# Patient Record
Sex: MALE | Race: WHITE | NOT HISPANIC OR LATINO | Employment: OTHER | URBAN - METROPOLITAN AREA
[De-identification: names, ages, dates, MRNs, and addresses within clinical notes are randomized per-mention and may not be internally consistent; named-entity substitution may affect disease eponyms.]

---

## 2019-10-12 ENCOUNTER — HOSPITAL ENCOUNTER (EMERGENCY)
Facility: CLINIC | Age: 54
Discharge: HOME | End: 2019-10-12
Attending: PHYSICIAN ASSISTANT | Admitting: PHYSICIAN ASSISTANT
Payer: COMMERCIAL

## 2019-10-12 VITALS
RESPIRATION RATE: 18 BRPM | OXYGEN SATURATION: 95 % | HEIGHT: 72 IN | TEMPERATURE: 98.9 F | WEIGHT: 283 LBS | BODY MASS INDEX: 38.33 KG/M2

## 2019-10-12 DIAGNOSIS — S30.860A TICK BITE OF BACK, INITIAL ENCOUNTER: ICD-10-CM
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
## 2019-10-12 PROCEDURE — G0463 HOSPITAL OUTPT CLINIC VISIT: HCPCS | Performed by: PHYSICIAN ASSISTANT

## 2019-10-12 PROCEDURE — 36415 COLL VENOUS BLD VENIPUNCTURE: CPT | Performed by: PHYSICIAN ASSISTANT

## 2019-10-12 PROCEDURE — 99214 OFFICE O/P EST MOD 30 MIN: CPT | Mod: Z6 | Performed by: PHYSICIAN ASSISTANT

## 2019-10-12 PROCEDURE — 86618 LYME DISEASE ANTIBODY: CPT | Performed by: PHYSICIAN ASSISTANT

## 2019-10-12 RX ORDER — METFORMIN HCL 500 MG
1000 TABLET, EXTENDED RELEASE 24 HR ORAL 2 TIMES DAILY
COMMUNITY
Start: 2018-12-20 | End: 2020-12-01

## 2019-10-12 RX ORDER — DOXYCYCLINE 100 MG/1
200 CAPSULE ORAL ONCE
Qty: 2 CAPSULE | Refills: 0 | Status: SHIPPED | OUTPATIENT
Start: 2019-10-12 | End: 2019-10-12

## 2019-10-12 RX ORDER — GLIPIZIDE 5 MG/1
5 TABLET ORAL DAILY
COMMUNITY
Start: 2019-04-22 | End: 2020-04-21

## 2019-10-12 ASSESSMENT — MIFFLIN-ST. JEOR: SCORE: 2156.68

## 2019-10-12 NOTE — ED PROVIDER NOTES
History     Chief Complaint   Patient presents with     Insect Bite     tick bite to back removed yesterday     HPI    Chris Delcid is a 53 year old male who presents with a tick bite. Removed tick last night. No difficulty with removal and no tick parts remained. Patient thinks tick has been present for 3-4 days. Area around bite has been slightly red with no pain, swelling, drainage, warmth, or annular rash.   Patient denies fever, chills, joint or muscle aches or other areas or rash.    last tetanus booster within 10 years    Problem list, Medication list, Allergies, and Medical/Social/Surgical histories reviewed in Psychiatric and updated as appropriate.    History reviewed. No pertinent past medical history.    Social History     Tobacco Use     Smoking status: Current Some Day Smoker     Packs/day: 0.00     Smokeless tobacco: Current User   Substance Use Topics     Alcohol use: No     Drug use: No           Allergies:  Allergies   Allergen Reactions     Aspirin Other (See Comments)     : Ringing in ears     Atorvastatin      Other reaction(s): Myalgias     Cortisone Hives     Fludrocortisone      Other reaction(s): Hypertension  head pounding at night     Hydrochlorothiazide      Other reaction(s): Syncope  per note 08/28/2012     Penicillins Hives     Simvastatin      Other reaction(s): Other (See Comments)  drowsy       Problem List:    There are no active problems to display for this patient.       Past Medical History:    History reviewed. No pertinent past medical history.    Past Surgical History:    History reviewed. No pertinent surgical history.    Family History:    History reviewed. No pertinent family history.    Social History:  Marital Status:   [2]  Social History     Tobacco Use     Smoking status: Current Some Day Smoker     Packs/day: 0.00     Smokeless tobacco: Current User   Substance Use Topics     Alcohol use: No     Drug use: No        Medications:    glipiZIDE (GLUCOTROL) 5 MG  tablet  metFORMIN (GLUCOPHAGE-XR) 500 MG 24 hr tablet          Review of Systems   Constitutional: Negative for fatigue and fever.   Respiratory: Negative.    Cardiovascular: Negative.    Musculoskeletal: Negative for arthralgias and myalgias.   Skin: Negative for rash.        Tick bite to back    Neurological: Negative for headaches.   All other systems reviewed and are negative.      Physical Exam   Heart Rate: 83  Temp: 98.9  F (37.2  C)  Resp: 18  Height: 182.9 cm (6')  Weight: 128.4 kg (283 lb)  SpO2: 95 %      Physical Exam  Vitals signs and nursing note reviewed.   Constitutional:       General: He is not in acute distress.     Appearance: Normal appearance. He is normal weight. He is not ill-appearing or toxic-appearing.   Musculoskeletal: Normal range of motion.   Skin:     General: Skin is warm.      Capillary Refill: Capillary refill takes less than 2 seconds.      Findings: No erythema or rash.          Neurological:      General: No focal deficit present.      Mental Status: He is alert and oriented to person, place, and time.   Psychiatric:         Mood and Affect: Mood normal.         Behavior: Behavior normal.         Thought Content: Thought content normal.         Judgement: Judgment normal.         ED Course        Procedures  Area cleaned with betadine and alcohol swabs in office today with no complications.             Critical Care time:  none                 Medications - No data to display    Assessments & Plan (with Medical Decision Making)     I have reviewed the nursing notes.    I have reviewed the findings, diagnosis, plan and need for follow up with the patient.   53-year-old male presents the urgent care with tick bite to the back that he found and removed last night.  Patient thinks the tick may have been on for a few days, but not entirely sure.  Tick was removed completely with no retained body parts by patient and patient has not had any symptoms since.  Patient here today because  he is concerned that the fatigue may be undertaken does not want to get Lyme's disease.  See exam as above.  Discussed with patient at this time no concerns or symptoms indicative of Lyme's disease however can treat prophylactically with a single dose of doxycycline.  And is currently pending.  Recommend follow-up Lyme's titer in 2 to 3 weeks patient had a Lyme's titer drawn today in office with primary care doctor.  Patient return sooner if signs or symptoms of Lyme's disease occur this was discussed with patient given a discharge paperwork.  Patient is up-to-date with his tetanus.  Patient also return sooner if signs or symptoms of secondary infection occur these were discussed with patient given a discharge paperwork.  Patient discharged in stable condition.  No concerns at this time for cellulitis, abscess, Active lyme's disease.    Discharge Medication List as of 10/12/2019  1:08 PM      START taking these medications    Details   doxycycline hyclate (VIBRAMYCIN) 100 MG capsule Take 2 capsules (200 mg) by mouth once for 1 dose, Disp-2 capsule, R-0, E-Prescribe             Final diagnoses:   Tick bite of back, initial encounter       10/12/2019   [facility] EMERGENCY DEPARTMENT     Christi Mendiola PA-C  10/13/19 1640

## 2019-10-12 NOTE — DISCHARGE INSTRUCTIONS
Rx: Doxycycline 200mg now for tick bite prophylaxis was provided.    Lyme titer obtained today (call in 2-3 days for results) can recheck Lyme titer in 14-21 days.   Lyme and tick bite information provided.  Return to care if any concerning symptoms occur (headache, fever, bull's-eye rash, joint pain, body aches, or fatigue occur)    Patient voiced understanding of instructions given.

## 2019-10-12 NOTE — ED AVS SNAPSHOT
[facility] Emergency Department  [address] [address]  Phone:  [phone]  Fax:  [phone]                                    Chris Delcid   MRN: [id number]    Department:  [facility] Emergency Department   Date of Visit:  10/12/2019           After Visit Summary Signature Page    I have received my discharge instructions, and my questions have been answered. I have discussed any challenges I see with this plan with the nurse or doctor.    ..........................................................................................................................................  Patient/Patient Representative Signature      ..........................................................................................................................................  Patient Representative Print Name and Relationship to Patient    ..................................................               ................................................  Date                                   Time    ..........................................................................................................................................  Reviewed by Signature/Title    ...................................................              ..............................................  Date                                               Time          22EPIC Rev 08/18

## 2019-10-13 ASSESSMENT — ENCOUNTER SYMPTOMS
MYALGIAS: 0
FATIGUE: 0
HEADACHES: 0
RESPIRATORY NEGATIVE: 1
FEVER: 0
ARTHRALGIAS: 0
CARDIOVASCULAR NEGATIVE: 1

## 2019-10-14 LAB — B BURGDOR IGG+IGM SER QL: 0.07 (ref 0–0.89)

## 2019-10-14 NOTE — RESULT ENCOUNTER NOTE
Final result for Lyme Disease Cielo with reflex to WB Serum is NEGATIVE.    No change in treatment per [facility] ED Lab Result protocol.

## 2020-10-22 ENCOUNTER — APPOINTMENT (OUTPATIENT)
Dept: MRI IMAGING | Facility: CLINIC | Age: 55
End: 2020-10-22
Attending: EMERGENCY MEDICINE
Payer: COMMERCIAL

## 2020-10-22 ENCOUNTER — HOSPITAL ENCOUNTER (OUTPATIENT)
Facility: CLINIC | Age: 55
Setting detail: OBSERVATION
Discharge: HOME HEALTH | End: 2020-10-23
Attending: EMERGENCY MEDICINE | Admitting: INTERNAL MEDICINE
Payer: COMMERCIAL

## 2020-10-22 ENCOUNTER — NURSE TRIAGE (OUTPATIENT)
Dept: NURSING | Facility: CLINIC | Age: 55
End: 2020-10-22

## 2020-10-22 ENCOUNTER — APPOINTMENT (OUTPATIENT)
Dept: CT IMAGING | Facility: CLINIC | Age: 55
End: 2020-10-22
Attending: EMERGENCY MEDICINE
Payer: COMMERCIAL

## 2020-10-22 DIAGNOSIS — R47.1 DYSARTHRIA: ICD-10-CM

## 2020-10-22 DIAGNOSIS — I63.9 LEFT PONTINE CEREBROVASCULAR ACCIDENT (H): ICD-10-CM

## 2020-10-22 DIAGNOSIS — R47.01 EXPRESSIVE APHASIA: ICD-10-CM

## 2020-10-22 DIAGNOSIS — Z20.828 EXPOSURE TO VIRAL DISEASE: ICD-10-CM

## 2020-10-22 DIAGNOSIS — R27.0 ATAXIA: ICD-10-CM

## 2020-10-22 DIAGNOSIS — R29.90 STROKE-LIKE SYMPTOMS: ICD-10-CM

## 2020-10-22 PROBLEM — Z86.73 OLD PONTINE INFARCT WITHOUT LATE EFFECT: Status: ACTIVE | Noted: 2020-10-22

## 2020-10-22 LAB
ALBUMIN SERPL-MCNC: 4.1 G/DL (ref 3.4–5)
ALP SERPL-CCNC: 75 U/L (ref 40–150)
ALT SERPL W P-5'-P-CCNC: 22 U/L (ref 0–70)
ANION GAP SERPL CALCULATED.3IONS-SCNC: 5 MMOL/L (ref 3–14)
APTT PPP: 29 SEC (ref 22–37)
AST SERPL W P-5'-P-CCNC: 14 U/L (ref 0–45)
BASOPHILS # BLD AUTO: 0.1 10E9/L (ref 0–0.2)
BASOPHILS NFR BLD AUTO: 1 %
BILIRUB SERPL-MCNC: 0.4 MG/DL (ref 0.2–1.3)
BUN SERPL-MCNC: 16 MG/DL (ref 7–30)
CALCIUM SERPL-MCNC: 9.3 MG/DL (ref 8.5–10.1)
CHLORIDE SERPL-SCNC: 111 MMOL/L (ref 94–109)
CO2 SERPL-SCNC: 27 MMOL/L (ref 20–32)
CREAT SERPL-MCNC: 0.96 MG/DL (ref 0.66–1.25)
DIFFERENTIAL METHOD BLD: NORMAL
EOSINOPHIL # BLD AUTO: 0.3 10E9/L (ref 0–0.7)
EOSINOPHIL NFR BLD AUTO: 3.8 %
ERYTHROCYTE [DISTWIDTH] IN BLOOD BY AUTOMATED COUNT: 13 % (ref 10–15)
GFR SERPL CREATININE-BSD FRML MDRD: 87 ML/MIN/{1.73_M2}
GLUCOSE BLDC GLUCOMTR-MCNC: 91 MG/DL (ref 70–99)
GLUCOSE SERPL-MCNC: 100 MG/DL (ref 70–99)
HCT VFR BLD AUTO: 46 % (ref 40–53)
HGB BLD-MCNC: 15.6 G/DL (ref 13.3–17.7)
IMM GRANULOCYTES # BLD: 0 10E9/L (ref 0–0.4)
IMM GRANULOCYTES NFR BLD: 0.5 %
INR PPP: 0.98 (ref 0.86–1.14)
LYMPHOCYTES # BLD AUTO: 2.2 10E9/L (ref 0.8–5.3)
LYMPHOCYTES NFR BLD AUTO: 29.3 %
MCH RBC QN AUTO: 29.9 PG (ref 26.5–33)
MCHC RBC AUTO-ENTMCNC: 33.9 G/DL (ref 31.5–36.5)
MCV RBC AUTO: 88 FL (ref 78–100)
MONOCYTES # BLD AUTO: 0.7 10E9/L (ref 0–1.3)
MONOCYTES NFR BLD AUTO: 9 %
NEUTROPHILS # BLD AUTO: 4.1 10E9/L (ref 1.6–8.3)
NEUTROPHILS NFR BLD AUTO: 56.4 %
NRBC # BLD AUTO: 0 10*3/UL
NRBC BLD AUTO-RTO: 0 /100
PLATELET # BLD AUTO: 327 10E9/L (ref 150–450)
POTASSIUM SERPL-SCNC: 4 MMOL/L (ref 3.4–5.3)
PROT SERPL-MCNC: 7.9 G/DL (ref 6.8–8.8)
RBC # BLD AUTO: 5.22 10E12/L (ref 4.4–5.9)
SODIUM SERPL-SCNC: 143 MMOL/L (ref 133–144)
TROPONIN I SERPL-MCNC: <0.015 UG/L (ref 0–0.04)
WBC # BLD AUTO: 7.3 10E9/L (ref 4–11)

## 2020-10-22 PROCEDURE — 255N000002 HC RX 255 OP 636: Performed by: EMERGENCY MEDICINE

## 2020-10-22 PROCEDURE — 70553 MRI BRAIN STEM W/O & W/DYE: CPT

## 2020-10-22 PROCEDURE — G0378 HOSPITAL OBSERVATION PER HR: HCPCS

## 2020-10-22 PROCEDURE — 250N000011 HC RX IP 250 OP 636: Performed by: EMERGENCY MEDICINE

## 2020-10-22 PROCEDURE — 99285 EMERGENCY DEPT VISIT HI MDM: CPT | Mod: 25 | Performed by: EMERGENCY MEDICINE

## 2020-10-22 PROCEDURE — 258N000003 HC RX IP 258 OP 636: Performed by: EMERGENCY MEDICINE

## 2020-10-22 PROCEDURE — 250N000013 HC RX MED GY IP 250 OP 250 PS 637: Performed by: EMERGENCY MEDICINE

## 2020-10-22 PROCEDURE — 85730 THROMBOPLASTIN TIME PARTIAL: CPT | Performed by: EMERGENCY MEDICINE

## 2020-10-22 PROCEDURE — 80053 COMPREHEN METABOLIC PANEL: CPT | Performed by: EMERGENCY MEDICINE

## 2020-10-22 PROCEDURE — 70498 CT ANGIOGRAPHY NECK: CPT

## 2020-10-22 PROCEDURE — U0003 INFECTIOUS AGENT DETECTION BY NUCLEIC ACID (DNA OR RNA); SEVERE ACUTE RESPIRATORY SYNDROME CORONAVIRUS 2 (SARS-COV-2) (CORONAVIRUS DISEASE [COVID-19]), AMPLIFIED PROBE TECHNIQUE, MAKING USE OF HIGH THROUGHPUT TECHNOLOGIES AS DESCRIBED BY CMS-2020-01-R: HCPCS | Performed by: EMERGENCY MEDICINE

## 2020-10-22 PROCEDURE — 99220 PR INITIAL OBSERVATION CARE,LEVEL III: CPT | Performed by: PHYSICIAN ASSISTANT

## 2020-10-22 PROCEDURE — 96360 HYDRATION IV INFUSION INIT: CPT | Mod: 59 | Performed by: EMERGENCY MEDICINE

## 2020-10-22 PROCEDURE — 36415 COLL VENOUS BLD VENIPUNCTURE: CPT | Performed by: PHYSICIAN ASSISTANT

## 2020-10-22 PROCEDURE — 84484 ASSAY OF TROPONIN QUANT: CPT | Performed by: EMERGENCY MEDICINE

## 2020-10-22 PROCEDURE — 250N000009 HC RX 250: Performed by: EMERGENCY MEDICINE

## 2020-10-22 PROCEDURE — 999N001017 HC STATISTIC GLUCOSE BY METER IP

## 2020-10-22 PROCEDURE — C9803 HOPD COVID-19 SPEC COLLECT: HCPCS | Performed by: EMERGENCY MEDICINE

## 2020-10-22 PROCEDURE — 70450 CT HEAD/BRAIN W/O DYE: CPT

## 2020-10-22 PROCEDURE — 85025 COMPLETE CBC W/AUTO DIFF WBC: CPT | Performed by: EMERGENCY MEDICINE

## 2020-10-22 PROCEDURE — A9585 GADOBUTROL INJECTION: HCPCS | Performed by: EMERGENCY MEDICINE

## 2020-10-22 PROCEDURE — 93005 ELECTROCARDIOGRAM TRACING: CPT | Performed by: EMERGENCY MEDICINE

## 2020-10-22 PROCEDURE — 99291 CRITICAL CARE FIRST HOUR: CPT | Performed by: EMERGENCY MEDICINE

## 2020-10-22 PROCEDURE — 85610 PROTHROMBIN TIME: CPT | Performed by: EMERGENCY MEDICINE

## 2020-10-22 RX ORDER — ACETAMINOPHEN 325 MG/1
650 TABLET ORAL EVERY 4 HOURS PRN
Status: DISCONTINUED | OUTPATIENT
Start: 2020-10-22 | End: 2020-10-23

## 2020-10-22 RX ORDER — GADOBUTROL 604.72 MG/ML
12 INJECTION INTRAVENOUS ONCE
Status: COMPLETED | OUTPATIENT
Start: 2020-10-22 | End: 2020-10-22

## 2020-10-22 RX ORDER — ONDANSETRON 4 MG/1
4 TABLET, ORALLY DISINTEGRATING ORAL EVERY 6 HOURS PRN
Status: DISCONTINUED | OUTPATIENT
Start: 2020-10-22 | End: 2020-10-23

## 2020-10-22 RX ORDER — ONDANSETRON 2 MG/ML
4 INJECTION INTRAMUSCULAR; INTRAVENOUS EVERY 6 HOURS PRN
Status: DISCONTINUED | OUTPATIENT
Start: 2020-10-22 | End: 2020-10-23

## 2020-10-22 RX ORDER — METFORMIN HCL 500 MG
1000 TABLET, EXTENDED RELEASE 24 HR ORAL 2 TIMES DAILY
COMMUNITY
Start: 2020-05-04 | End: 2024-05-17

## 2020-10-22 RX ORDER — IOPAMIDOL 755 MG/ML
70 INJECTION, SOLUTION INTRAVASCULAR ONCE
Status: COMPLETED | OUTPATIENT
Start: 2020-10-22 | End: 2020-10-22

## 2020-10-22 RX ORDER — CLOPIDOGREL BISULFATE 75 MG/1
300 TABLET ORAL ONCE
Status: COMPLETED | OUTPATIENT
Start: 2020-10-22 | End: 2020-10-22

## 2020-10-22 RX ORDER — GLIPIZIDE 5 MG/1
5 TABLET ORAL DAILY
COMMUNITY
Start: 2020-02-13 | End: 2023-12-10

## 2020-10-22 RX ORDER — ASPIRIN 325 MG
325 TABLET ORAL ONCE
Status: COMPLETED | OUTPATIENT
Start: 2020-10-22 | End: 2020-10-22

## 2020-10-22 RX ADMIN — SODIUM CHLORIDE 100 ML: 9 INJECTION, SOLUTION INTRAVENOUS at 18:27

## 2020-10-22 RX ADMIN — CLOPIDOGREL BISULFATE 300 MG: 75 TABLET ORAL at 19:27

## 2020-10-22 RX ADMIN — IOPAMIDOL 70 ML: 755 INJECTION, SOLUTION INTRAVENOUS at 18:27

## 2020-10-22 RX ADMIN — SODIUM CHLORIDE 500 ML: 9 INJECTION, SOLUTION INTRAVENOUS at 18:57

## 2020-10-22 RX ADMIN — ASPIRIN 325 MG ORAL TABLET 325 MG: 325 PILL ORAL at 19:27

## 2020-10-22 RX ADMIN — GADOBUTROL 12 ML: 604.72 INJECTION INTRAVENOUS at 20:09

## 2020-10-22 ASSESSMENT — MIFFLIN-ST. JEOR
SCORE: 2170.88
SCORE: 2153.95

## 2020-10-22 NOTE — ED PROVIDER NOTES
History     Chief Complaint   Patient presents with     Extremity Weakness     Aphasia     HPI  Chris Delcid is a 54 year old male with history of hypotension on midodrine, diabetes type 2 on Metformin and glipizide who presents to ED for evaluation of extremity weakness, word finding difficulty and change to his speech.  Patient reports that symptoms began at approximately noon today (6 hours prior to arrival).  He denies any history of heart disease or stroke.  He was able to ambulate and move around but said his arms and legs did not feel like they worked right.  He describes his speech change and word finding difficulty as if he were intoxicated or drunk.  Has not felt like this before.  No headache, change in vision, hearing, neck pain, chest pain, shortness of breath.  No fevers, chills, nausea or vomiting.  His wife later arrived and provided some collateral information.  She says she returned from work at approximately 315/330 this afternoon and he reported that he felt tired.  She did not notice any changes.  She was making some soup for supper and he lie down for a little and took a bath.  At about 5 PM (1 hour prior to arrival) she noticed a change in his speech.  Discussed this with both present in the room and he insists that his symptoms started approximately noon today.    I was called to the room emergently for stroke evaluation.  After my initial evaluation a code stroke was initiated.    The patient's PMHx, Surgical Hx, Allergies, and Medications were all reviewed with the patient.    Allergies:  Allergies   Allergen Reactions     Aspirin Other (See Comments)     : Ringing in ears     Atorvastatin      Other reaction(s): Myalgias     Cortisone Hives     Fludrocortisone      Other reaction(s): Hypertension  head pounding at night     Hydrochlorothiazide      Other reaction(s): Syncope  per note 08/28/2012     Penicillins Hives     Simvastatin      Other reaction(s): Other (See Comments)  drowsy  "      Problem List:    Patient Active Problem List    Diagnosis Date Noted     Ataxia 10/22/2020     Priority: Medium     Dysarthria 10/22/2020     Priority: Medium     Stroke-like symptoms 10/22/2020     Priority: Medium     Left pontine cerebrovascular accident (H) 10/22/2020     Priority: Medium     Old pontine infarct without late effect 10/22/2020     Priority: Medium        Past Medical History:    No past medical history on file.    Past Surgical History:    No past surgical history on file.    Family History:    No family history on file.    Social History:  Marital Status:   [2]  Social History     Tobacco Use     Smoking status: Current Some Day Smoker     Packs/day: 0.00     Smokeless tobacco: Current User   Substance Use Topics     Alcohol use: No     Drug use: No        Medications:    No current outpatient medications on file.        Review of Systems   Constitutional: Negative for chills, diaphoresis and fever.   HENT: Negative for tinnitus and trouble swallowing.    Eyes: Negative for visual disturbance.   Respiratory: Negative for cough and shortness of breath.    Cardiovascular: Negative for chest pain.   Gastrointestinal: Negative for abdominal distention, diarrhea, nausea and vomiting.   Genitourinary: Negative for difficulty urinating.   Musculoskeletal: Negative for back pain.   Skin: Negative for rash.   Neurological: Positive for weakness. Negative for dizziness, light-headedness and headaches.       Physical Exam   BP: (!) 228/123  Pulse: 88  Temp: 97.9  F (36.6  C)  Resp: 18  Height: 185.4 cm (6' 1\")  Weight: 127 kg (280 lb)  SpO2: 99 %    Physical Exam  General: Awake, alert, and cooperative. Appears distressed but non toxic.   Mental Status: Oriented x 3.   Head: Normocephalic, atraumatic, symmetric.   Eyes: Conjunctiva normal, no discharge, PERRL 3 mm. No neglect/hemianopsia.  Ears, Nose, Throat: External ears and nares normal.  No oral exudates. Oral mucosa moist.  Neck: Supple. " No adenopathy. Non-tender.   Cardiovascular: Regular rate. Regular rhythm. No murmurs. Peripheral pulses strong. Normal capillary refill. No LE edema.   Respiratory: Normal effort. No wheezes, rales, or rhonchi bilaterally.  Chest Wall: Equal rise.  Abdomen: No tenderess, soft, non-distended. No rebound or guarding. No masses palpated  Back: Atraumatic.   Genitourinary: Normal external genitalia  Musculoskeletal: No gross deformity. Warm and well perfused  Neurologic: Mental status: Alert, awake. Oriented to self, date, and place. Normal speech and language. GCS 15. Expressive aphasia. Dysarthria, slight slurring of speech.   Cranial Nerves: II-XII intact  Motor: Follows commands x 4 extremities. Down going Babinski's. No Clonus.   Upper Extremities:   RUE: 5/5 shoulder abduction. 5/5 elbow flex/ext. 5/5 wrist flex/ext. 5/5 hand .   LUE: 5/5 shoulder abduction. 5/5 elbow flex/ext. 5/5 wrist flex/ext. 5/5 hand .   Lower Extremities:   RLE: 5/5 hip flexion. 5/5 knee flex/ext. 5/5 ankle plantar-/dorsiflexion. 5/5 EHL.   LLE: 5/5 hip flexion. 5/5 knee flex/ext. 5/5 ankle plantar-/dorsiflexion. 5/5 EHL   Sensory: Sensation intact to light touch in all 4 extremities   Coordination: difficulty with finger to nose on right. Heel-shin intact.  Skin: Warm, dry, no pallor, no erythema, no jaundice or rash.  Psychiatric:  Appropriate affect. Behavior is normal.    ED Course     ED Course as of Oct 22 2306   Thu Oct 22, 2020   1823 I spoke with Dr. Spangler with stroke neurology at [facility] about presentation. Will follow up after imaging      1834 Dr Johnson - patchy white matter change on non contrast      1849 Reviewed imaging with Dr. Spangler and MR recommended aspirin and plavix         Procedures           Critical Care time:  was 45 minutes for this patient excluding procedures.     The patient has stroke symptoms:         ED Stroke specific documentation           NIHSS PDF     Patient last known well time: 1200  ED  Provider first to bedside at: 1815  CT Results received at: 1834/1849    tPA:   Not given due to unclear or unfavorable risk-benefit profile for extended window thrombolysis beyond the conventional 4.5 hour time window.    If treating with tPA: Ensure SBP<185 and DBP<105 prior to treatment with IV tPA.  Administering IV tPA after treatment with IV labetalol, hydralazine, or nicardipine is reasonable once BP control is established.    Endovascular Retrieval:  Not initiated due to absence of proximal vessel occlusion    National Institutes of Health Stroke Scale (Baseline)  Time Performed: 1815     Score    Level of consciousness: (0)   Alert, keenly responsive    LOC questions: (0)   Answers both questions correctly    LOC commands: (0)   Performs both tasks correctly    Best gaze: (0)   Normal    Visual: (0)   No visual loss    Facial palsy: (0)   Normal symmetrical movements    Motor arm (left): (0)   No drift    Motor arm (right): (0)   No drift    Motor leg (left): (0)   No drift    Motor leg (right): (0)   No drift    Limb ataxia: (1)   Present in one limb    Sensory: (0)   Normal- no sensory loss    Best language: (1)   Mild to moderate aphasia    Dysarthria: (1)   Mild to moderate dysarthria    Extinction and inattention: (0)   No abnormality        Total Score:  3        Stroke Mimics were considered (including migraine headache, seizure disorder, hypoglycemia (or hyperglycemia), head or spinal trauma, CNS infection, Toxin ingestion and shock state (e.g. sepsis) .                   Results for orders placed or performed during the hospital encounter of 10/22/20 (from the past 24 hour(s))   Glucose by meter   Result Value Ref Range    Glucose 91 70 - 99 mg/dL   Comprehensive metabolic panel   Result Value Ref Range    Sodium 143 133 - 144 mmol/L    Potassium 4.0 3.4 - 5.3 mmol/L    Chloride 111 (H) 94 - 109 mmol/L    Carbon Dioxide 27 20 - 32 mmol/L    Anion Gap 5 3 - 14 mmol/L    Glucose 100 (H) 70 - 99 mg/dL     Urea Nitrogen 16 7 - 30 mg/dL    Creatinine 0.96 0.66 - 1.25 mg/dL    GFR Estimate 87 >60 mL/min/[1.73_m2]    GFR Estimate If Black >90 >60 mL/min/[1.73_m2]    Calcium 9.3 8.5 - 10.1 mg/dL    Bilirubin Total 0.4 0.2 - 1.3 mg/dL    Albumin 4.1 3.4 - 5.0 g/dL    Protein Total 7.9 6.8 - 8.8 g/dL    Alkaline Phosphatase 75 40 - 150 U/L    ALT 22 0 - 70 U/L    AST 14 0 - 45 U/L   CBC with platelets differential   Result Value Ref Range    WBC 7.3 4.0 - 11.0 10e9/L    RBC Count 5.22 4.4 - 5.9 10e12/L    Hemoglobin 15.6 13.3 - 17.7 g/dL    Hematocrit 46.0 40.0 - 53.0 %    MCV 88 78 - 100 fl    MCH 29.9 26.5 - 33.0 pg    MCHC 33.9 31.5 - 36.5 g/dL    RDW 13.0 10.0 - 15.0 %    Platelet Count 327 150 - 450 10e9/L    Diff Method Automated Method     % Neutrophils 56.4 %    % Lymphocytes 29.3 %    % Monocytes 9.0 %    % Eosinophils 3.8 %    % Basophils 1.0 %    % Immature Granulocytes 0.5 %    Nucleated RBCs 0 0 /100    Absolute Neutrophil 4.1 1.6 - 8.3 10e9/L    Absolute Lymphocytes 2.2 0.8 - 5.3 10e9/L    Absolute Monocytes 0.7 0.0 - 1.3 10e9/L    Absolute Eosinophils 0.3 0.0 - 0.7 10e9/L    Absolute Basophils 0.1 0.0 - 0.2 10e9/L    Abs Immature Granulocytes 0.0 0 - 0.4 10e9/L    Absolute Nucleated RBC 0.0    Partial thromboplastin time   Result Value Ref Range    PTT 29 22 - 37 sec   INR   Result Value Ref Range    INR 0.98 0.86 - 1.14   Troponin I   Result Value Ref Range    Troponin I ES <0.015 0.000 - 0.045 ug/L   CT Head w/o Contrast    Narrative    EXAM: CT HEAD W/O CONTRAST  LOCATION: [facility]  DATE/TIME: 10/22/2020 6:21 PM    INDICATION: Aphasia, right upper extremity weakness  COMPARISON: None.  TECHNIQUE: Routine without IV contrast. Multiplanar reformats. Dose reduction techniques were used.    FINDINGS:  INTRACRANIAL CONTENTS: No intracranial hemorrhage, extraaxial collection, or mass effect.  No CT evidence of acute infarct. Moderate presumed chronic small vessel ischemic changes. Prominent  perivascular space versus chronic lacunar infarct at the   inferior aspect of the right foramen. Normal ventricles and sulci.     VISUALIZED ORBITS/SINUSES/MASTOIDS: No intraorbital abnormality. No paranasal sinus mucosal disease. No middle ear or mastoid effusion.    BONES/SOFT TISSUES: No acute abnormality.      Impression    IMPRESSION:  1.  Moderate patchy periventricular and deep white matter hypoattenuation, nonspecific and age indeterminate. Recommend further evaluation with brain MRI if there is high clinical concern for acute ischemia.  2.  No intracranial hemorrhage.    Results communicated to Dr. Estrella at 10/22/2020 6:34 PM   CTA Head Neck with Contrast    Narrative    EXAM: CTA  HEAD NECK WITH CONTRAST  LOCATION: [facility]  DATE/TIME: 10/22/2020 6:21 PM    INDICATION: Expressive aphasia, right upper extremity weakness  COMPARISON: 10/22/2020 head CT  CONTRAST: 70 ml's isovue  TECHNIQUE: Head and neck CT angiogram with IV contrast. Axial helical CT images of the head and neck vessels obtained during the arterial phase of intravenous contrast administration. Axial 2D reconstructed images and multiplanar 3D MIP reconstructed   images of the head and neck vessels were performed by the technologist. Dose reduction techniques were used. All stenosis measurements made according to NASCET criteria unless otherwise specified.    FINDINGS:     HEAD CTA:  ANTERIOR CIRCULATION: Mild stenosis of the right internal carotid artery at the petrous/cavernous junction. Mild bilateral paraclinoid ICA stenosis. No significant stenosis/occlusion, aneurysm, or high flow vascular malformation. Developmentally   hypoplastic left A1 anterior cerebral artery segment. Fetal origin left posterior cerebral artery.    POSTERIOR CIRCULATION: No stenosis/occlusion, aneurysm, or high flow vascular malformation. Balanced vertebral arteries supply a normal basilar artery.     DURAL VENOUS SINUSES: Expected enhancement of  the major dural venous sinuses.    NECK CTA:  RIGHT CAROTID: No measurable stenosis or dissection.    LEFT CAROTID: Atherosclerotic plaque results in less than 50% stenosis in the left ICA. No dissection.    VERTEBRAL ARTERIES: No focal stenosis or dissection. Balanced vertebral arteries.    AORTIC ARCH: Classic aortic arch anatomy with no significant stenosis at the origin of the great vessels.    NONVASCULAR STRUCTURES: Multiple dental caries.      Impression    IMPRESSION:     HEAD CTA:   1.  Mild multifocal stenoses as described.    NECK CTA:  1.  Normal neck CTA.  2.  Multiple dental caries.   MR Brain w/o & w Contrast    Narrative    MRI BRAIN WITHOUT AND WITH CONTRAST  10/22/2020 8:37 PM     HISTORY: Expressive aphasia, dysarthria, ataxia in right upper  extremity. Extremity weakness which has resolved.     TECHNIQUE:  Multiplanar, multisequence MRI of the brain without and  with 12ml Gadavist     COMPARISON: CT head of same date.    FINDINGS:  There is a small acute ischemic infarct involving the left cuca  (series 7 image 52, series 8 image 12), measuring approximately 1 cm  in anterior-posterior dimension. No other areas of abnormal  intracranial restricted diffusion identified.    The ventricles are normal in size and configuration. Moderate patchy  T2/FLAIR hyperintense signal in the periventricular and deep cerebral  white matter and cuca, nonspecific, but most likely related to chronic  small vessel ischemic disease. Mild generalized brain parenchymal  volume loss. No intracranial hemorrhage, mass lesion or mass effect.  No abnormal intracranial postcontrast enhancement evident.    Bilateral orbits appear normal. Mild mucosal thickening in the ethmoid  air cells. The major vascular flow voids of the skull base are grossly  maintained. The calvarium, skull base and mid face are otherwise  grossly unremarkable.      Impression    IMPRESSION:  1. Small acute left pontine infarct. No hemorrhage or mass  effect.  2. Brain atrophy and presumed chronic small vessel ischemic disease,  as described.         Medications   0.9% sodium chloride BOLUS (0 mLs Intravenous Stopped 10/22/20 2000)   iopamidol (ISOVUE-370) solution 70 mL (70 mLs Intravenous Given 10/22/20 1827)   sodium chloride 0.9 % bag 500mL for CT scan flush use (100 mLs As instructed Given 10/22/20 1827)   aspirin (ASA) tablet 325 mg (325 mg Oral Given 10/22/20 1927)   clopidogrel (PLAVIX) tablet 300 mg (300 mg Oral Given 10/22/20 1927)   gadobutrol (GADAVIST) injection 12 mL (12 mLs Intravenous Given 10/22/20 2009)       Assessments & Plan (with Medical Decision Making)   54 year old male with past medical history of DM2 or oral hypoglycemics, and hypotension on midodrine with expressive aphasia, word finding difficulties and extremity weakness which was resolved prior to arrival. Last known well at noon today (6 hours prior to arrival). On arrival to Emergency Department, vital signs were /123, T 97.9, P 88, RR 18, SpO2 99% on room air.     Initial NIH score of 3 for dysarthria, ataxia, and expressive aphasia.  No focal motor or sensory deficits.  No facial droop.  CT scan with likely chronic white matter changes and no large vessel occlusion on CT angiography of head or neck.  I discussed the noncontrast findings with radiology and the CT angiogram with stroke neurology.  Based on presentation will load with aspirin and Plavix and obtain MRI plan for admission to hospital.  Patient has an allergy listed to aspirin, says that he has had tinnitus but no specific allergy. Tolerated low dose aspirin in past.  He was given 325 mg of aspirin and 300 mg of Plavix in the ED.  MRI obtained and small left acute pontine infarct identified.  Results discussed with patient.  Patient not candidate for TPA given 6 hours of symptoms prior to presentation and relatively minor symptoms.  Not a candidate for thrombectomy given no large vessel occlusion.  Per neurology  recommendations we will tolerate systolic blood pressure less than 200.  Patient will be admitted for frequent neuro checks, medication management, and likely echocardiography in morning.  Discussed case with Samira Keating from the hospitalist service who accepted the admission.  Transition orders placed.  All results discussed with patient as well as his spouse.  They are in agreement with plan for admission.      At time of writing I do not see that an ECG was performed in the department.  Patient does not have any palpitations or known history of atrial fibrillation.  Regardless, he should have an ECG during this admission.  I will relay this information to the hospitalist service.       I have reviewed the nursing notes.         Current Discharge Medication List          Final diagnoses:   Expressive aphasia   Dysarthria   Ataxia   Stroke-like symptoms   Left pontine cerebrovascular accident (H)     Fuentes Estrella MD    10/22/2020   [facility] EMERGENCY DEPT    Disclaimer: This note consists of words and symbols derived from keyboarding and dictation using voice recognition software.  As a result, there may be errors that have gone undetected.  Please consider this when interpreting information found in this note.             Fuentes Estrella MD  10/23/20 0003

## 2020-10-22 NOTE — ED NOTES
Pt states at noon, he had sudden onset of extremity weakness and difficulty getting his words out. Comes in today with slurred speech and difficulty with mobility and coordination of both legs.

## 2020-10-22 NOTE — LETTER
Transition Communication Hand-off for Care Transitions to Next Level of Care Provider    Name: Chris Delcid  : 1965  MRN #: [id number]  Primary Care Provider: Gus Nuñez     Primary Clinic: [facility] [address] [place] [id number]     Reason for Hospitalization:  Expressive aphasia [R47.01]  Ataxia [R27.0]  Dysarthria [R47.1]  Stroke-like symptoms [R29.90]  Left pontine cerebrovascular accident (H) [I63.50]  Admit Date/Time: 10/22/2020  6:08 PM  Discharge Date: 10/23  Payor Source: Payor: MEDICA / Plan: [employer] / Product Type: HMO /            Reason for Communication Hand-off Referral: Fragility    Discharge Plan: home care       Concern for non-adherence with plan of care:   Y/N no  Discharge Needs Assessment:  Needs      Most Recent Value   Equipment Currently Used at Home  cane, straight   # of Referrals Placed by CTS  External Care Coordination, Homecare          Follow-up specialty is recommended: Yes    Follow-up plan:    Future Appointments   Date Time Provider Department Center   10/24/2020  9:00 AM Ritu Van PT [facility]   10/24/2020  9:00 AM Dayna Gaytan OT [facility]           Referrals     Future Labs/Procedures    Home Care Referral     Comments:    Your provider has referred you to: [facility]: [facility] [phone]   [email or web address]    Extended Emergency Contact Information  Primary Emergency Contact: YOSSI CASTRO  Home Phone: [phone]  Relation: Significant other  Secondary Emergency Contact: TUCKER SOL   [facility]  Home Phone: [phone]  Mobile Phone: [phone]  Relation: Sister    Patient Anticipated Discharge Date: 10/23   RN, PT, HHA to begin 24 - 48 hours after discharge.  PLEASE EVALUATE AND TREAT (Evaluation timeline is 24 - 48 hrs. Please call if there is need for a variance to this timeline).    REASON FOR  REFERRAL: Assessment & Treatment: PT    ADDITIONAL SERVICES NEEDED: OT    OTHER PERTINENT INFORMATION: Patient was last seen by provider on 10/23 for CVA.    No current outpatient medications on file.    Patient Active Problem List:     Ataxia     Dysarthria     Stroke-like symptoms     Left pontine cerebrovascular accident (H)     Old pontine infarct without late effect     Expressive aphasia     Chronic low back pain     Essential hypertension     Hyperlipidemia     Orthostatic hypotension     Statin intolerance     Type 2 diabetes mellitus (H)      Documentation of Face to Face and Certification for Home Health Services    I certify that patient, Chris Delcid is under my care and that I, or a Nurse Practitioner or Physician's Assistant working with me, had a face-to-face encounter that meets the physician face-to-face encounter requirements with this patient on: 10/23/2020.    This encounter with the patient was in whole, or in part, for the following medical condition, which is the primary reason for Home Health Care: CVA.    I certify that, based on my findings, the following services are medically necessary Home Health Services: Occupational Therapy and Physical Therapy    My clinical findings support the need for the above services because: Occupational Therapy Services are needed to assess and treat cognitive ability and address ADL safety due to impairment in CVA. and Physical Therapy Services are needed to assess and treat the following functional impairments: CVA.    Further, I certify that my clinical findings support that this patient is homebound (i.e. absences from home require considerable and taxing effort and are for medical reasons or Baptism services or infrequently or of short duration when for other reasons) because: Requires assistance of another person or specialized equipment to access medical services because patient: Requires supervision of another for safe transfer..    Based on the  above findings, I certify that this patient is confined to the home and needs intermittent skilled nursing care, physical therapy and/or speech therapy.  The patient is under my care, and I have initiated the establishment of the plan of care.  This patient will be followed by a physician who will periodically review the plan of care.    Physician/Provider to provide follow up care: Gus Nuñez certified Physician at time of discharge: Dr. Gold    Please be aware that coverage of these services is subject to the terms and limitations of your health insurance plan.  Call member services at your health plan with any benefit or coverage questions.            Key Recommendations:  [facility] home care PT/OT at discharge.  Wife, Meryl is a RN and supportive    Marci Thompson RN    AVS/Discharge Summary is the source of truth; this is a helpful guide for improved communication of patient story

## 2020-10-22 NOTE — TELEPHONE ENCOUNTER
"\"I am very weak all over and I am having a hard time talking. It's hard to get my words out. I also have a headache. My sx started today around noon, they seem to be getting worse.\"  Denies other sx   Rates headache a 2/10.  Patient's speech did seem slower and mumbled during triage.  Advised 911.  Myriam Rushing RN [facility]    COVID 19 Nurse Triage Plan/Patient Instructions    Please be aware that novel coronavirus (COVID-19) may be circulating in the community. If you develop symptoms such as fever, cough, or SOB or if you have concerns about the presence of another infection including coronavirus (COVID-19), please contact your health care provider or visit [email or web address].     Disposition/Instructions    Call to EMS/911 recommended. Follow protocol based instructions.     Bring Your Own Device:  Please also bring your smart device(s) (smart phones, tablets, laptops) and their charging cables for your personal use and to communicate with your care team during your visit.    Thank you for taking steps to prevent the spread of this virus.  o Limit your contact with others.  o Wear a simple mask to cover your cough.  o Wash your hands well and often.    Resources    [facility]: About COVID-19: [email or web address]    CDC: What to Do If You're Sick: www.cdc.gov/coronavirus/2019-ncov/about/steps-when-sick.html    CDC: Ending Home Isolation: www.cdc.gov/coronavirus/2019-ncov/hcp/disposition-in-home-patients.html     CDC: Caring for Someone: www.cdc.gov/coronavirus/2019-ncov/if-you-are-sick/care-for-someone.html     [facility]: Interim Guidance for Hospital Discharge to Home: www.health.[facility].mn.us/diseases/coronavirus/hcp/hospdischarge.pdf    [facility] clinical trials (COVID-19 research studies): clinicalaffairs.[facility]/umn-clinical-trials     Below are the COVID-19 hotlines at the [place] [facility] of [facility] ([facility]). Interpreters are available.   o For health questions: Call " 454.554.1400 or 1-362.509.3508 (7 a.m. to 7 p.m.)  o For questions about schools and childcare: Call 305-593-6241 or 1-602.763.9043 (7 a.m. to 7 p.m.)                     Reason for Disposition    Difficult to awaken or acting confused (e.g., disoriented, slurred speech)    Protocols used: NEUROLOGIC DEFICIT-A-AH

## 2020-10-22 NOTE — CONSULTS
[facility]     Stroke Telephone Note    I was called by Dr. Fuentes Tipton on 10/22/20 at 1818 regarding patient Chris Delcid. The patient is a 54 year old male who developed generalized weakness, slurred speech and expressive aphasia around noon. He presented outside the tPA window with SBP in the 220s/230s. CT/CTA head/neck was negative for hemorrhage, LVO, or significant flow limited stenosis. ED was informed of his finding and recommendations were made to load with  mg and Plavix 300 mg now. He will need to be admitted for MRI brain, additional stroke work up and his elevated blood pressure.     Stroke Code Data  (for stroke code without tele)  Stroke code activated 10/22/20   1818   First stroke provider response 10/22/20   1819   Last known normal 10/22/20   1200   Time of discovery   (or onset of symptoms) 10/22/20   1200   Head CT read by me 10/22/20   1828   Was stroke code de-escalated? Yes 10/22/20 1847  patient is outside emergent treatment time parameters       TPA Treatment   Not given due to unclear or unfavorable risk-benefit profile for extended window thrombolysis beyond the conventional 4.5 hour time window.    Endovascular Treatment  Not initiated due to absence of proximal vessel occlusion    Impression  Generalize weakness  HTN  Language Problem   Etiology: Ischemic Stroke due to embolic strokes vs HTN emergency/urgency   SBP in the 230s  CTA as above     Recommendations  Acute Ischemic Stroke (without tPA) Recommendations  - Permissive HTN; labetalol PRN for SBP > 200  - HTN treatment per primary   - Load with  mg and Plavix 300 mg now, then continue dual antiplatelet of ASA 81 mg and Plavix 75 mg daily x 21 days. Afterwards, switch to  mg monotherapy.  - Statin: Lipitor 40 mg, LDL goal 40-70  - MRI Stroke Protocol  - Telemetry, EKG  - Bedside Glucose Monitoring  - A1c, Lipid Panel, Troponin x 3  - PT/OT/SLP  - Stroke Education  -  "Euthermia, Euglycemia        The Stroke Staff is Dr. Perkins.    Mike Spangler MD  Vascular Neurology Fellow  To page me or covering stroke neurology team member, click here: [facility]   Choose \"On Call\" tab at top, then search dropdown box for \"Neurology Adult\", select location, press Enter, then look for stroke/neuro ICU/telestroke.           "

## 2020-10-23 ENCOUNTER — APPOINTMENT (OUTPATIENT)
Dept: OCCUPATIONAL THERAPY | Facility: CLINIC | Age: 55
End: 2020-10-23
Payer: COMMERCIAL

## 2020-10-23 ENCOUNTER — APPOINTMENT (OUTPATIENT)
Dept: SPEECH THERAPY | Facility: CLINIC | Age: 55
End: 2020-10-23
Payer: COMMERCIAL

## 2020-10-23 ENCOUNTER — APPOINTMENT (OUTPATIENT)
Dept: PHYSICAL THERAPY | Facility: CLINIC | Age: 55
End: 2020-10-23
Payer: COMMERCIAL

## 2020-10-23 ENCOUNTER — APPOINTMENT (OUTPATIENT)
Dept: CARDIOLOGY | Facility: CLINIC | Age: 55
End: 2020-10-23
Attending: PHYSICIAN ASSISTANT
Payer: COMMERCIAL

## 2020-10-23 VITALS
BODY MASS INDEX: 37.6 KG/M2 | SYSTOLIC BLOOD PRESSURE: 152 MMHG | HEIGHT: 73 IN | WEIGHT: 283.73 LBS | DIASTOLIC BLOOD PRESSURE: 85 MMHG | RESPIRATION RATE: 20 BRPM | HEART RATE: 76 BPM | TEMPERATURE: 98.4 F | OXYGEN SATURATION: 98 %

## 2020-10-23 PROBLEM — Z78.9 STATIN INTOLERANCE: Status: ACTIVE | Noted: 2020-02-13

## 2020-10-23 LAB
GLUCOSE BLDC GLUCOMTR-MCNC: 119 MG/DL (ref 70–99)
GLUCOSE BLDC GLUCOMTR-MCNC: 152 MG/DL (ref 70–99)
GLUCOSE BLDC GLUCOMTR-MCNC: 98 MG/DL (ref 70–99)
HBA1C MFR BLD: 6.1 % (ref 0–5.6)
LABORATORY COMMENT REPORT: NORMAL
SARS-COV-2 RNA SPEC QL NAA+PROBE: NEGATIVE
SARS-COV-2 RNA SPEC QL NAA+PROBE: NORMAL
SPECIMEN SOURCE: NORMAL
SPECIMEN SOURCE: NORMAL
TROPONIN I SERPL-MCNC: <0.015 UG/L (ref 0–0.04)

## 2020-10-23 PROCEDURE — 97110 THERAPEUTIC EXERCISES: CPT | Mod: GO

## 2020-10-23 PROCEDURE — 999N001017 HC STATISTIC GLUCOSE BY METER IP

## 2020-10-23 PROCEDURE — 83036 HEMOGLOBIN GLYCOSYLATED A1C: CPT | Performed by: PHYSICIAN ASSISTANT

## 2020-10-23 PROCEDURE — 250N000013 HC RX MED GY IP 250 OP 250 PS 637: Performed by: PHYSICIAN ASSISTANT

## 2020-10-23 PROCEDURE — 999N000208 ECHOCARDIOGRAM COMPLETE

## 2020-10-23 PROCEDURE — 84484 ASSAY OF TROPONIN QUANT: CPT | Performed by: PHYSICIAN ASSISTANT

## 2020-10-23 PROCEDURE — 97535 SELF CARE MNGMENT TRAINING: CPT | Mod: GO

## 2020-10-23 PROCEDURE — 97165 OT EVAL LOW COMPLEX 30 MIN: CPT | Mod: GO

## 2020-10-23 PROCEDURE — G0378 HOSPITAL OBSERVATION PER HR: HCPCS

## 2020-10-23 PROCEDURE — 92610 EVALUATE SWALLOWING FUNCTION: CPT | Mod: GN | Performed by: SPEECH-LANGUAGE PATHOLOGIST

## 2020-10-23 PROCEDURE — 97161 PT EVAL LOW COMPLEX 20 MIN: CPT | Mod: GP | Performed by: PHYSICAL THERAPIST

## 2020-10-23 PROCEDURE — 255N000002 HC RX 255 OP 636: Performed by: INTERNAL MEDICINE

## 2020-10-23 PROCEDURE — 36415 COLL VENOUS BLD VENIPUNCTURE: CPT | Performed by: PHYSICIAN ASSISTANT

## 2020-10-23 PROCEDURE — 97116 GAIT TRAINING THERAPY: CPT | Mod: GP | Performed by: PHYSICAL THERAPIST

## 2020-10-23 PROCEDURE — 99217 PR OBSERVATION CARE DISCHARGE: CPT | Performed by: INTERNAL MEDICINE

## 2020-10-23 PROCEDURE — 93306 TTE W/DOPPLER COMPLETE: CPT | Mod: 26 | Performed by: INTERNAL MEDICINE

## 2020-10-23 PROCEDURE — 93005 ELECTROCARDIOGRAM TRACING: CPT

## 2020-10-23 RX ORDER — ACETAMINOPHEN 325 MG/1
650 TABLET ORAL EVERY 4 HOURS PRN
Status: DISCONTINUED | OUTPATIENT
Start: 2020-10-23 | End: 2020-10-23 | Stop reason: HOSPADM

## 2020-10-23 RX ORDER — HYDROMORPHONE HYDROCHLORIDE 1 MG/ML
0.2 INJECTION, SOLUTION INTRAMUSCULAR; INTRAVENOUS; SUBCUTANEOUS
Status: DISCONTINUED | OUTPATIENT
Start: 2020-10-23 | End: 2020-10-23 | Stop reason: HOSPADM

## 2020-10-23 RX ORDER — METFORMIN HCL 500 MG
500 TABLET, EXTENDED RELEASE 24 HR ORAL 2 TIMES DAILY
Status: DISCONTINUED | OUTPATIENT
Start: 2020-10-23 | End: 2020-10-23 | Stop reason: HOSPADM

## 2020-10-23 RX ORDER — AMOXICILLIN 250 MG
2 CAPSULE ORAL 2 TIMES DAILY PRN
Status: DISCONTINUED | OUTPATIENT
Start: 2020-10-23 | End: 2020-10-23 | Stop reason: HOSPADM

## 2020-10-23 RX ORDER — CLOPIDOGREL BISULFATE 75 MG/1
75 TABLET ORAL DAILY
Status: DISCONTINUED | OUTPATIENT
Start: 2020-10-23 | End: 2020-10-23 | Stop reason: HOSPADM

## 2020-10-23 RX ORDER — AMOXICILLIN 250 MG
1 CAPSULE ORAL 2 TIMES DAILY PRN
Status: DISCONTINUED | OUTPATIENT
Start: 2020-10-23 | End: 2020-10-23 | Stop reason: HOSPADM

## 2020-10-23 RX ORDER — PROCHLORPERAZINE 25 MG
25 SUPPOSITORY, RECTAL RECTAL EVERY 12 HOURS PRN
Status: DISCONTINUED | OUTPATIENT
Start: 2020-10-23 | End: 2020-10-23 | Stop reason: HOSPADM

## 2020-10-23 RX ORDER — GLIPIZIDE 5 MG/1
5 TABLET ORAL DAILY
Status: DISCONTINUED | OUTPATIENT
Start: 2020-10-23 | End: 2020-10-23 | Stop reason: HOSPADM

## 2020-10-23 RX ORDER — ONDANSETRON 2 MG/ML
4 INJECTION INTRAMUSCULAR; INTRAVENOUS EVERY 6 HOURS PRN
Status: DISCONTINUED | OUTPATIENT
Start: 2020-10-23 | End: 2020-10-23 | Stop reason: HOSPADM

## 2020-10-23 RX ORDER — PROCHLORPERAZINE MALEATE 5 MG
10 TABLET ORAL EVERY 6 HOURS PRN
Status: DISCONTINUED | OUTPATIENT
Start: 2020-10-23 | End: 2020-10-23 | Stop reason: HOSPADM

## 2020-10-23 RX ORDER — MIDODRINE HYDROCHLORIDE 2.5 MG/1
5 TABLET ORAL 3 TIMES DAILY PRN
Status: DISCONTINUED | OUTPATIENT
Start: 2020-10-23 | End: 2020-10-23 | Stop reason: HOSPADM

## 2020-10-23 RX ORDER — NALOXONE HYDROCHLORIDE 0.4 MG/ML
.1-.4 INJECTION, SOLUTION INTRAMUSCULAR; INTRAVENOUS; SUBCUTANEOUS
Status: DISCONTINUED | OUTPATIENT
Start: 2020-10-23 | End: 2020-10-23 | Stop reason: HOSPADM

## 2020-10-23 RX ORDER — NICOTINE POLACRILEX 4 MG
15-30 LOZENGE BUCCAL
Status: DISCONTINUED | OUTPATIENT
Start: 2020-10-23 | End: 2020-10-23 | Stop reason: HOSPADM

## 2020-10-23 RX ORDER — ASPIRIN 325 MG
325 TABLET ORAL DAILY
Qty: 30 TABLET | Refills: 0 | Status: SHIPPED | OUTPATIENT
Start: 2020-11-13 | End: 2023-12-10

## 2020-10-23 RX ORDER — MIDODRINE HYDROCHLORIDE 5 MG/1
5 TABLET ORAL 3 TIMES DAILY PRN
COMMUNITY
End: 2023-12-10

## 2020-10-23 RX ORDER — CLOPIDOGREL BISULFATE 75 MG/1
75 TABLET ORAL DAILY
Qty: 20 TABLET | Refills: 0 | Status: SHIPPED | OUTPATIENT
Start: 2020-10-24 | End: 2020-12-01

## 2020-10-23 RX ORDER — ACETAMINOPHEN 650 MG/1
650 SUPPOSITORY RECTAL EVERY 4 HOURS PRN
Status: DISCONTINUED | OUTPATIENT
Start: 2020-10-23 | End: 2020-10-23 | Stop reason: HOSPADM

## 2020-10-23 RX ORDER — ASPIRIN 81 MG/1
81 TABLET ORAL DAILY
Status: DISCONTINUED | OUTPATIENT
Start: 2020-10-23 | End: 2020-10-23 | Stop reason: HOSPADM

## 2020-10-23 RX ORDER — LOVASTATIN 10 MG
10 TABLET ORAL AT BEDTIME
Qty: 30 TABLET | Refills: 0 | Status: SHIPPED | OUTPATIENT
Start: 2020-10-23 | End: 2023-12-10

## 2020-10-23 RX ORDER — ONDANSETRON 4 MG/1
4 TABLET, ORALLY DISINTEGRATING ORAL EVERY 6 HOURS PRN
Status: DISCONTINUED | OUTPATIENT
Start: 2020-10-23 | End: 2020-10-23 | Stop reason: HOSPADM

## 2020-10-23 RX ORDER — DEXTROSE MONOHYDRATE 25 G/50ML
25-50 INJECTION, SOLUTION INTRAVENOUS
Status: DISCONTINUED | OUTPATIENT
Start: 2020-10-23 | End: 2020-10-23 | Stop reason: HOSPADM

## 2020-10-23 RX ORDER — LABETALOL 20 MG/4 ML (5 MG/ML) INTRAVENOUS SYRINGE
10 EVERY 6 HOURS PRN
Status: DISCONTINUED | OUTPATIENT
Start: 2020-10-23 | End: 2020-10-23 | Stop reason: HOSPADM

## 2020-10-23 RX ORDER — OXYCODONE HYDROCHLORIDE 5 MG/1
5-10 TABLET ORAL
Status: DISCONTINUED | OUTPATIENT
Start: 2020-10-23 | End: 2020-10-23 | Stop reason: HOSPADM

## 2020-10-23 RX ORDER — LOVASTATIN 10 MG
10 TABLET ORAL AT BEDTIME
Status: DISCONTINUED | OUTPATIENT
Start: 2020-10-23 | End: 2020-10-23 | Stop reason: HOSPADM

## 2020-10-23 RX ADMIN — LOVASTATIN 10 MG: 10 TABLET ORAL at 01:21

## 2020-10-23 RX ADMIN — CLOPIDOGREL BISULFATE 75 MG: 75 TABLET ORAL at 08:19

## 2020-10-23 RX ADMIN — HUMAN ALBUMIN MICROSPHERES AND PERFLUTREN 2 ML: 10; .22 INJECTION, SOLUTION INTRAVENOUS at 11:28

## 2020-10-23 RX ADMIN — ACETAMINOPHEN 650 MG: 325 TABLET, FILM COATED ORAL at 08:24

## 2020-10-23 RX ADMIN — METFORMIN 500 MG: 500 TABLET, EXTENDED RELEASE ORAL at 08:19

## 2020-10-23 RX ADMIN — ASPIRIN 81 MG: 81 TABLET ORAL at 08:19

## 2020-10-23 RX ADMIN — GLIPIZIDE 5 MG: 5 TABLET ORAL at 08:19

## 2020-10-23 ASSESSMENT — ENCOUNTER SYMPTOMS
WEAKNESS: 1
SHORTNESS OF BREATH: 0
NAUSEA: 0
HEADACHES: 0
DIZZINESS: 0
TROUBLE SWALLOWING: 0
DIAPHORESIS: 0
DIFFICULTY URINATING: 0
BACK PAIN: 0
CHILLS: 0
DIARRHEA: 0
FEVER: 0
ABDOMINAL DISTENTION: 0
VOMITING: 0
COUGH: 0
LIGHT-HEADEDNESS: 0

## 2020-10-23 NOTE — ED NOTES
Pt is resting in bed with wife at bedside. Pt's speech seems clear. He is looking for his reader glasses. States he lost them while here.

## 2020-10-23 NOTE — PLAN OF CARE
"[facility]    Patient admitted to room 2306 at approximately 2250 via wheel chair from emergency room. Patient was accompanied by transport tech.     Verbal SBAR report received from [place] prior to patient arrival.     Patient ambulated to bed with stand-by assist. Patient alert and oriented X 3. The patient is not having any pain.  . Admission vital signs: Blood pressure (!) 143/80, pulse 79, temperature 97.9  F (36.6  C), resp. rate 16, height 1.854 m (6' 1\"), weight 128.7 kg (283 lb 11.7 oz), SpO2 97 %. Patient was oriented to plan of care, call light, bed controls, tv, telephone, bathroom, and visiting hours.     Risk Assessment    The following safety risks were identified during admission: fall. Yellow risk band applied: YES.     Skin Initial Assessment    This writer admitted this patient and completed a full skin assessment and Rebel score in the Adult PCS flowsheet. Appropriate interventions initiated as needed.     Secondary skin check completed by Trinidad.    Rebel Risk Assessment  Sensory Perception: 3-->slightly limited  Moisture: 4-->rarely moist  Activity: 3-->walks occasionally  Mobility: 3-->slightly limited  Nutrition: 3-->adequate  Friction and Shear: 3-->no apparent problem  Rebel Score: 19  Bed Support Surface: Atmos Air mattress  Reassessed using Bed Algorithm: No    Education    Patient has a Nicol[place] to Observation order: Yes  Observation education completed and documented: Yes      Criss Gonzalez RN      "

## 2020-10-23 NOTE — ED NOTES
Called to room by wife - patient seems to have aphasia that is worsening than before - was talking on phone with relative and couldn't speak - lasting about 3 minutes in duration and then subsided. Patient continues to be hypertensive - MD in room.

## 2020-10-23 NOTE — PLAN OF CARE
[facility]      OUTPATIENT OCCUPATIONAL THERAPY  EVALUATION  PLAN OF TREATMENT FOR OUTPATIENT REHABILITATION  (COMPLETE FOR INITIAL CLAIMS ONLY)  Patient's Last Name, First Name, M.I.  YOB: 1965  Chris Delcid                          Provider's Name  [facility] Medical Record No.  [id number]                               Onset Date:  10/22/20   Start of Care Date:   10/23/2020     Type:     ___PT   _X_OT   ___SLP Medical Diagnosis:                        Left Pontine CVA, R UE weakness and incoordination   OT Diagnosis:  decreased independence with ADLs and functional mobility    Visits from SOC:  1   _________________________________________________________________________________  Plan of Treatment/Functional Goals    Planned Interventions: strengthening, progressive activity/exercise, ADL retraining, fine motor coordination training, neuromuscular re-education   Goals: See Occupational Therapy Goals on Care Plan in [employer] electronic health record.    Therapy Frequency: Daily  Predicted Duration of Therapy Intervention: 2-3 days  _________________________________________________________________________________    I CERTIFY THE NEED FOR THESE SERVICES FURNISHED UNDER        THIS PLAN OF TREATMENT AND WHILE UNDER MY CARE     (Physician co-signature of this document indicates review and certification of the therapy plan).               ,      Referring Physician: Samira Keating PA-C            Initial Assessment        See Occupational Therapy evaluation dated   in Epic electronic health record.

## 2020-10-23 NOTE — PLAN OF CARE
[facility]      OUTPATIENT PHYSICAL THERAPY EVALUATION  PLAN OF TREATMENT FOR OUTPATIENT REHABILITATION  (COMPLETE FOR INITIAL CLAIMS ONLY)  Patient's Last Name, First Name, M.I.  YOB: 1965  BhavinPerez  [facility]                        Provider's Name  [facility] Medical Record No.  [id number]                               Onset Date:  10/22/20   Start of Care Date:  10/23/20      Type:     _X_PT   ___OT   ___SLP Medical Diagnosis:  pontine cerebrovascular accident                        PT Diagnosis:  CVA; impaired gait Visits from SOC:  1   _________________________________________________________________________________  Plan of Treatment/Functional Goals    Planned Interventions: balance training, gait training, strengthening     Goals: See Physical Therapy Goals on Care Plan in [employer] electronic health record.    Therapy Frequency: Daily  Predicted Duration of Therapy Intervention: LOS  _________________________________________________________________________________    I CERTIFY THE NEED FOR THESE SERVICES FURNISHED UNDER        THIS PLAN OF TREATMENT AND WHILE UNDER MY CARE     (Physician co-signature of this document indicates review and certification of the therapy plan).                Certification date from: 10/23/20, Certification date to: 10/25/20    Referring Physician: Zuri MEYERS            Initial Assessment        See Physical Therapy evaluation dated 10/23/20 in Epic electronic health record.

## 2020-10-23 NOTE — PROGRESS NOTES
Impression: Patient referred for bedsides swallow exam after being admitted on 10/22/20 presenting with a left pontine cerebrovascular accident. Patient and partner report his condition has improved as of today 10/23/2020 and that he has been tolerating thin liquids and regular foods since admittance. Patient denies history of dysphagia. Patient completed oral mech which revealed some right sided weakness in both cheek and tongue.  PO trials of thin liquid by straw, pudding, and cracker were provided. No overt s/s noted during intake. No oral residue. Recommend regular diet with thin liquids. While swallowing is functional, Patient and his partner report speech and language concerns and would like a referral for a speech therapy eval upon discharge to address those concerns.   Bedside Swallow Evaluation  10/23/20   General Information   Onset of Illness/Injury or Date of Surgery 10/22/20   General Observations Patient is sitting up right at the side of his bed during evaluation. Patient's partner is present for evaluation.   Past History of Dysphagia History not significant for dysphagia   Type of Evaluation   Type of Evaluation Swallow Evaluation   Oral Motor   Oral Musculature anomalies present  (right sided weakness)   Structural Abnormalities none present   Mucosal Quality adequate   Dentition (Oral Motor)   Dentition (Oral Motor) natural dentition;some missing teeth   Facial Symmetry (Oral Motor)   Facial Symmetry (Oral Motor) right side impairment   Right Side Facial Asymmetry minimal impairment;moderate impairment   Lip Function (Oral Motor)   Lip Range of Motion (Oral Motor) WNL   Lip Strength (Oral Motor) WFL   Tongue Function (Oral Motor)   Tongue ROM (Oral Motor) WNL   Protrusion, Tongue ROM Impairment (Oral Motor) right side;minimal impairment   Jaw Function (Oral Motor)   Jaw Function (Oral Motor) WNL   Cough/Swallow/Gag Reflex (Oral Motor)   Soft Palate/Velum (Oral Motor) WNL   Volitional Throat  Clear/Cough (Oral Motor) WNL   Volitional Swallow (Oral Motor) WNL   Vocal Quality/Secretion Management (Oral Motor)   Vocal Quality (Oral Motor) WNL   Secretion Management (Oral Motor) WNL   General Swallowing Observations   Current Diet/Method of Nutritional Intake (General Swallowing Observations, NIS) regular diet;thin liquids   Respiratory Support (General Swallowing Observations) none   Swallowing Evaluation Clinical swallow evaluation   Clinical Swallow Evaluation   Feeding Assistance no assistance needed   Clinical Swallow Evaluation Textures Trialed Thin Liquids;Pudding Thick Liquids;Solid Foods   Clinical Swallow Eval: Thin Liquid Texture Trial   Mode of Presentation, Thin Liquids straw;self-fed   Volume of Liquid or Food Presented 4 oz   Oral Phase of Swallow WFL   Pharyngeal Phase of Swallow intact   Diagnostic Statement No overt s/s of aspiration noted. Swallow WFL for thin liquid textures   Clinical Swallow Evaluation: Pudding Thick Liquid Texture Trial   Mode of Presentation, Pudding spoon;self-fed   Volume of Pudding Presented 1 tablespoon   Oral Phase, Pudding WFL   Pharyngeal Phase, Pudding intact   Diagnostic Statement No overt s/s of aspiration noted. Swallow WFL for pudding textures   Clinical Swallow Evaluation: Solid Food Texture Trial   Mode of Presentation, Solid self-fed   Volume of Solid Food Presented 1 cracker   Oral Phase, Solid WFL   Pharyngeal Phase, Solid intact   Diagnostic Statement No overt s/s of aspiration noted. Swallow WFL for solid textures   Esophageal Phase of Swallow   Patient reports or presents with symptoms of esophageal dysphagia No   Swallowing Recommendations   Diet Consistency Recommendations regular diet;thin liquids   Supervision Level for Intake patient independent   Swallowing Maneuver Recommendations alternate food and liquid intake   Recommended Feeding/Eating Techniques (Swallow Eval) patient is independent, no specific recommendations   Comment, Swallowing  Recommendations Patient referred for bedsides swallow exam after being admitted on 10/22/20 presenting with a left pontine cerebrovascular accident. Patient and partner report his condition has improved as of today 10/23/2020 and that he has been tolerating thin liquids and regular foods since admittance. Patient denies history of dysphagia. Patient completed oral mech which revealed some right sided weakness in both cheek and tongue.  PO trials of thin liquid by straw, pudding, and cracker were provided. No overt s/s noted during intake. No oral residue. Recommend regular diet with thin liquids. While swallowing is functional, Patient and his partner report speech and language concerns and would like a referral for a speech therapy eval upon discharge to address those concerns.   SLP Therapy Assessment/Plan   Criteria for Skilled Therapeutic Interventions Met (SLP Eval) does not meet criteria for skilled intervention  (for swallowing, speech/language eval indicated)    Total Evaluation Time   Total Evaluation Time (Minutes) 15   Coping Strategies   Trust Relationship/Rapport care explained;choices provided;empathic listening provided;questions answered

## 2020-10-23 NOTE — CONSULTS
Care Management Assessment and Discharge Consult    General Information  Assessment completed with:: Family, Meryl  Type of CM/SW Visit: Initial Assessment  Primary Care Provider verified and updated as needed?: Yes  Readmission Within the Last 30 Days: no previous admission in last 30 days     Reason for Consult: discharge planning  Advance Care Planning: Advance Care Planning Reviewed: no concerns identified          Communication Assessment  Patient's communication style: spoken language (English or Bilingual)  Hearing Difficulty or Deaf: no Wear Glasses or Blind: yes    Cognitive  Cognitive/Neuro/Behavioral: WDL, speech        Orientation: oriented x 4  Mood/Behavior: calm, cooperative  Best Language: 1 - Mild to moderate  Speech: dysarthric, slurred    Living Environment:   People in home: spouse  Name(s) of People in Home: Meryl  Current living Arrangements: house          Family/Social Support:  Care provided by: self  Provides care for: no one  Marital Status:   Who is your support system?: Wife  Spouse's Name: Meryl  Description of Support System: Supportive, Involved  Description of Support System: Supportive, Involved  Adequate family and caregiver support        Description of Support System: Supportive, Involved  Support Assessment: Adequate family and caregiver support    Current Resources:   Skilled Home Care Services:  none  Community Resources: None  Equipment currently used at home: cane, straight  Supplies currently used at home: None    Employment:  Employment Status: disabled        Financial/Environmental Concerns: No concerns identified          Lifestyle & Psychosocial Needs:        Socioeconomic History     Marital status:      Spouse name: Not on file     Number of children: Not on file     Years of education: Not on file     Highest education level: Not on file     Tobacco Use     Smoking status: Never Smoker     Smokeless tobacco: Current User   Substance and Sexual Activity      Alcohol use: No     Drug use: No       Functional Status:  Prior to admission patient needed assistance:     Assesssment of Functional Status: Not at baseline with ADL Functioning, Not at baseline with mobility    Mental Health Status:  WDL                            Values/Beliefs:  Spiritual, Cultural Beliefs, Anabaptist Practices, Values that affect care: no                 Discharge Planning:  Expected Discharge Date: 10/23/20     Concerns to be Addressed: discharge planning       Anticipated Discharge Disposition: Home, Home Care  Anticipated Discharge DME: Walker    Patient/family educated on Medicare website which has current facility and service quality ratings: yes  Referrals Placed by CM/SW: External Care Coordination, Homecare  Patient/Family in Agreement with the Plan: yes     Disposition Comments:      Selected Continued Care - Admitted Since 10/22/2020    No services have been selected for the patient.         Additional Information:  Spoke with patient and S.O., Meryl via phone, introduced self and role.  Patient lives with Meryl in a home in [place].  He has no in home services currently.  At baseline, he is active, using a cane for ambulation assist.  He does not work, due to chronic back issues, but continues to drive.  He manages his own ADLs independently.      Discussed discharge planning with Meryl and patient.  They both feel patient will be safe to return home.  Meryl, who works as a RN, plans to take time off work to stay with patient.  They are agreeable to home care services.  Patient prefers [facility]  (phone: [phone] Fax: [phone]).  Spoke with Martine.  They do NOT hae availability 10/27.   Patient then prefers [facility] ([phone] Fax: [phone]).  Order/referral sent for PT and OT.      Meryl will provide transportation home upon discharge.      Marci YORK RN  Inpatient Care Coordinator  [facility] [phone]  M  [facility] [phone]    HOME CARE HAND OFF  Patient Name: Chris Delcid    MRN: [id number]    : 1965    Patient Zip Code: [id number]    Admit Diagnosis: Expressive aphasia [R47.01]  Ataxia [R27.0]  Dysarthria [R47.1]  Stroke-like symptoms [R29.90]  Left pontine cerebrovascular accident (H) [I63.50]      Services Pt Needs at Home: PT and OT    Discharge Support: Family/Friend Support    Living Arrangements: With spouse     or Address Other Than Pt: Yes: wife, Meryl    Wound Care: No    Anticipate DC Date: 10/23/2020

## 2020-10-23 NOTE — DISCHARGE SUMMARY
[facility]   Hospitalist Discharge Summary       Date of Admission:  10/22/2020  Date of Discharge:  10/23/2020  Discharging Provider: Solomon Gold MD      Discharge Diagnoses     Left pontine cerebrovascular infarct  Ataxia  Expressive aphasia  Dysarthria  Type 2 diabetes mellitus   Essential hypertension  Orthostatic hypotension  Hyperlipidemia  Statin intolerance  Chronic low back pain  COVID-19 negative    Follow-ups Needed After Discharge   Follow-up Appointments     Follow-up and recommended labs and tests      Follow up with primary care provider, Gus Nuñez, within 7 days for   hospital follow- up.  The following labs/tests are recommended: BMP and   CBC.           Discharge Disposition   Discharged to home  Condition at discharge: Good    Hospital Course   Chris Delcid is a 54 year old male admitted on 10/22/2020. He presented to the emergency department for evaluation of hand clumsiness, word finding difficulties, and labored speech and was found to have a new stroke for which he is being admitted for further evaluation and treatment.     Left pontine cerebrovascular infarct  Ataxia  Expressive aphasia  Dysarthria  Head CT, CTA head/neck, and MRI brain obtained, MRI found small acute left pontine infarct, no hemorrhage or mass effect. Also some mild multifocal intracranial stenosis noted on CTA. No known arrhythmias. Case discussed between emergency department and stroke neuro. Patient given aspirin 325 mg and Plavix 300 mg in the emergency department.  - Continue aspirin 81 mg and Plavix 75 mg x 21 days; then change to aspirin 325 mg daily  - Allow permissive hypertension - prn labetalol available for SBP > 200  - Started lovastatin 10 mg at bedtime  - Telemetry unremarkable  - EKG demonstrates NSR with PVCs  - HbA1c 6.1%, troponin <0.015  - PT/OT/Speech recommending home therapies  - Echo unremarkable, EF 55-60%, no RWMA, valvulopathies, or interatrial  shunt     Type 2 diabetes mellitus   Last A1c 6.4. Managed prior to admission with glipizide 5 mg bid and metformin 1000 mg bid.   - Continue glipizide and metformin     Essential hypertension  Orthostatic hypotension  Blood pressure significantly elevated in the emergency department - 228/123. Had taken midodrine prior to emergency department arrival. Has history of both hypertension and orthostasis. No longer on antihypertensive medications. Takes midodrine 5 mg prn for hypotension.  - Continue with prn midodrine     Hyperlipidemia  Statin intolerance  Previously on Zetia and Fenofibrate, stopped about 3 months ago by PCP. History of myalgias on atorvastatin and simvastatin, but thinks he tolerated lovastatin.  - Start lovastatin as above     Chronic low back pain  Secondary to prior injury many years ago. Does not take any regular analgesics for pain, just occasional acetaminophen.  - Prn acetaminophen available     COVID-19 negative  Tested as asymptomatic COVID screen based on hospital admission criteria. No concern for active COVID infection on admission.  - COVID PCR negative  - No indication for COVID precautions or repeat testing    Consultations This Hospital Stay   CARE MANAGEMENT / SOCIAL WORK IP CONSULT  PHYSICAL THERAPY ADULT IP CONSULT  OCCUPATIONAL THERAPY ADULT IP CONSULT  SPEECH LANGUAGE PATH ADULT IP CONSULT    Code Status   Full Code    Time Spent on this Encounter   I, Solomon Gold MD, personally saw the patient today and spent greater than 30 minutes discharging this patient.       Solomon Gold MD  [facility]   ______________________________________________________________________    Physical Exam   Vital Signs: Temp: 98.4  F (36.9  C) Temp src: Oral BP: (!) 152/85 Pulse: 76   Resp: 20 SpO2: 98 % O2 Device: None (Room air)    Weight: 283 lbs 11.71 oz       Gen: Well nourished male, alert and oriented x 3, no acute distressed  HEENT: Atraumatic,  normocephalic; sclera non-injected, anicterric; oral mucosa moist, no lesion, no exudate  Lungs: Clear to ausculation, no wheezes, no rhonchi, no rales  Heart: Regular rate, regular rhythm, no gallops, no rubs, no murmurs  GI: Bowel sound normal, no hepatosplenomegaly, no masses, non-tender, non-distended, no guarding, no rebound tenderness  Lymph: No lymphadenopathy, no edema  Skin: No rashes, no chronic venous stasis     Primary Care Physician   Gus Nuñez    Discharge Orders      Home Care Referral      NEUROLOGY ADULT REFERRAL      Home Care PT Referral for Hospital Discharge      Home Care OT Referral for Hospital Discharge      Home Care SLP Referral for Hospital Discharge      Reason for your hospital stay    This is a 54 year old male admitted with an acute ischemic stroke in the left cuca.     Follow-up and recommended labs and tests    Follow up with primary care provider, Gus Nuñez, within 7 days for hospital follow- up.  The following labs/tests are recommended: BMP and CBC.     Activity    Your activity upon discharge: activity as tolerated     MD face to face encounter    Documentation of Face to Face and Certification for Home Health Services    I certify that patient: Chris Delcid is under my care and that I, or a nurse practitioner or physician's assistant working with me, had a face-to-face encounter that meets the physician face-to-face encounter requirements with this patient on: October 23, 2020.    This encounter with the patient was in whole, or in part, for the following medical condition, which is the primary reason for home health care: Left pontine stroke.    I certify that, based on my findings, the following services are medically necessary home health services: Occupational Therapy, Physical Therapy and Speech Language Therapy.    My clinical findings support the need for the above services because: Occupational Therapy Services are needed to assess and treat cognitive ability  and address ADL safety due to impairment in mobility., Physical Therapy Services are needed to assess and treat the following functional impairments: ataxia. and Speech Therapy Services are needed to assess and treat impairments in language and/or swallow functions due to dysarthria.    Further, I certify that my clinical findings support that this patient is homebound (i.e. absences from home require considerable and taxing effort and are for medical reasons or [employer] services or infrequently or of short duration when for other reasons) because: Requires assistance of another person or specialized equipment to access medical services because patient: Requires supervision of another for safe transfer...    Based on the above findings. I certify that this patient is confined to the home and needs intermittent skilled nursing care, physical therapy and/or speech therapy.  The patient is under my care, and I have initiated the establishment of the plan of care.  This patient will be followed by a physician who will periodically review the plan of care.  Physician/Provider to provide follow up care: Gus Nuñez    Attending hospital physician (the Medicare certified PEC provider): Solomon Gold MD  Physician Signature: See electronic signature associated with these discharge orders.  Date: 10/23/2020     Full Code     Diet    Follow this diet upon discharge: Orders Placed This Encounter      Low Salt Low Fat High Consistent CHO Diet       Significant Results and Procedures   Most Recent 3 CBC's:  Recent Labs   Lab Test 10/22/20  1815 09/04/15  2100 08/06/15  1330   WBC 7.3 10.0 10.7   HGB 15.6 14.8 15.4   MCV 88 84 84    373 402     Most Recent 3 BMP's:  Recent Labs   Lab Test 10/22/20  1815 09/04/15  2100 08/06/15  1330    138 138   POTASSIUM 4.0 3.8 4.2   CHLORIDE 111* 104 102   CO2 27 26 27   BUN 16 11 9   CR 0.96 0.99 1.00   ANIONGAP 5 8 9   JAMIE 9.3 8.2* 8.7   * 221* 134*   ,    Results for orders placed or performed during the hospital encounter of 10/22/20   CT Head w/o Contrast    Narrative    EXAM: CT HEAD W/O CONTRAST  LOCATION: [facility]  DATE/TIME: 10/22/2020 6:21 PM    INDICATION: Aphasia, right upper extremity weakness  COMPARISON: None.  TECHNIQUE: Routine without IV contrast. Multiplanar reformats. Dose reduction techniques were used.    FINDINGS:  INTRACRANIAL CONTENTS: No intracranial hemorrhage, extraaxial collection, or mass effect.  No CT evidence of acute infarct. Moderate presumed chronic small vessel ischemic changes. Prominent perivascular space versus chronic lacunar infarct at the   inferior aspect of the right foramen. Normal ventricles and sulci.     VISUALIZED ORBITS/SINUSES/MASTOIDS: No intraorbital abnormality. No paranasal sinus mucosal disease. No middle ear or mastoid effusion.    BONES/SOFT TISSUES: No acute abnormality.      Impression    IMPRESSION:  1.  Moderate patchy periventricular and deep white matter hypoattenuation, nonspecific and age indeterminate. Recommend further evaluation with brain MRI if there is high clinical concern for acute ischemia.  2.  No intracranial hemorrhage.    Results communicated to Dr. Estrella at 10/22/2020 6:34 PM   CTA Head Neck with Contrast    Narrative    EXAM: CTA  HEAD NECK WITH CONTRAST  LOCATION: [facility]  DATE/TIME: 10/22/2020 6:21 PM    INDICATION: Expressive aphasia, right upper extremity weakness  COMPARISON: 10/22/2020 head CT  CONTRAST: 70 ml's isovue  TECHNIQUE: Head and neck CT angiogram with IV contrast. Axial helical CT images of the head and neck vessels obtained during the arterial phase of intravenous contrast administration. Axial 2D reconstructed images and multiplanar 3D MIP reconstructed   images of the head and neck vessels were performed by the technologist. Dose reduction techniques were used. All stenosis measurements made according to NASCET criteria unless  otherwise specified.    FINDINGS:     HEAD CTA:  ANTERIOR CIRCULATION: Mild stenosis of the right internal carotid artery at the petrous/cavernous junction. Mild bilateral paraclinoid ICA stenosis. No significant stenosis/occlusion, aneurysm, or high flow vascular malformation. Developmentally   hypoplastic left A1 anterior cerebral artery segment. Fetal origin left posterior cerebral artery.    POSTERIOR CIRCULATION: No stenosis/occlusion, aneurysm, or high flow vascular malformation. Balanced vertebral arteries supply a normal basilar artery.     DURAL VENOUS SINUSES: Expected enhancement of the major dural venous sinuses.    NECK CTA:  RIGHT CAROTID: No measurable stenosis or dissection.    LEFT CAROTID: Atherosclerotic plaque results in less than 50% stenosis in the left ICA. No dissection.    VERTEBRAL ARTERIES: No focal stenosis or dissection. Balanced vertebral arteries.    AORTIC ARCH: Classic aortic arch anatomy with no significant stenosis at the origin of the great vessels.    NONVASCULAR STRUCTURES: Multiple dental caries.      Impression    IMPRESSION:     HEAD CTA:   1.  Mild multifocal stenoses as described.    NECK CTA:  1.  Normal neck CTA.  2.  Multiple dental caries.   MR Brain w/o & w Contrast    Narrative    MRI BRAIN WITHOUT AND WITH CONTRAST  10/22/2020 8:37 PM     HISTORY: Expressive aphasia, dysarthria, ataxia in right upper  extremity. Extremity weakness which has resolved.     TECHNIQUE:  Multiplanar, multisequence MRI of the brain without and  with 12ml Gadavist     COMPARISON: CT head of same date.    FINDINGS:  There is a small acute ischemic infarct involving the left cuca  (series 7 image 52, series 8 image 12), measuring approximately 1 cm  in anterior-posterior dimension. No other areas of abnormal  intracranial restricted diffusion identified.    The ventricles are normal in size and configuration. Moderate patchy  T2/FLAIR hyperintense signal in the periventricular and deep  cerebral  white matter and cuca, nonspecific, but most likely related to chronic  small vessel ischemic disease. Mild generalized brain parenchymal  volume loss. No intracranial hemorrhage, mass lesion or mass effect.  No abnormal intracranial postcontrast enhancement evident.    Bilateral orbits appear normal. Mild mucosal thickening in the ethmoid  air cells. The major vascular flow voids of the skull base are grossly  maintained. The calvarium, skull base and mid face are otherwise  grossly unremarkable.      Impression    IMPRESSION:  1. Small acute left pontine infarct. No hemorrhage or mass effect.  2. Brain atrophy and presumed chronic small vessel ischemic disease,  as described.    MIRIAN JIANG MD   Echocardiogram Complete    Narrative    [id number]  [id number]  [id number]  [id number]^KENA^HUSEYIN^SHANNA           [facility]  Echocardiography Laboratory  [address] [place] [address]        Name: MARILYN STACK  MRN: [id number]  : 1965  Study Date: 10/23/2020 11:00 AM  Age: 54 yrs  Gender: Male  Patient Location: [facility]  Reason For Study: CVA  Ordering Physician: HUSEYIN ESCUDERO  Referring Physician: Gus Nuñez  Performed By: Miguelina Murray RDCS     BSA: 2.5 m2  Height: 73 in  Weight: 283 lb  HR: 76  BP: 140/85 mmHg  _____________________________________________________________________________  __        Procedure  Complete Portable Bubble Echo Adult. Optison (NDC #[id number]) given  intravenously.  _____________________________________________________________________________  __        Interpretation Summary     1. Left ventricular systolic function is normal. The visual ejection fraction  is estimated at 55-60%.  2. No regional wall motion abnormalities noted.  3. The right ventricle is normal in structure, function and size.  4. The aortic valve is trileaflet with aortic valve sclerosis.  5. There is no color Doppler evidence of an atrial shunt. A contrast  injection  (Bubble Study) was performed that was negative for flow across the interatrial  septum.  _____________________________________________________________________________  __        Left Ventricle  The left ventricle is normal in size. There is mild to moderate concentric  left ventricular hypertrophy. The visual ejection fraction is estimated at 55-  60%. Left ventricular systolic function is normal. Grade I or early diastolic  dysfunction. No regional wall motion abnormalities noted.     Right Ventricle  The right ventricle is normal in structure, function and size.     Atria  The left atrium is mildly dilated. Right atrial size is normal. There is no  color Doppler evidence of an atrial shunt. A contrast injection (Bubble Study)  was performed that was negative for flow across the interatrial septum.     Mitral Valve  There is mild mitral annular calcification.        Tricuspid Valve  The tricuspid valve is normal in structure and function.     Aortic Valve  The aortic valve is trileaflet with aortic valve sclerosis.     Pulmonic Valve  The pulmonic valve is normal in structure and function.     Vessels  Mild aortic root dilatation. The ascending aorta is Mildly dilated.     Pericardium  There is no pericardial effusion.        Rhythm  Sinus rhythm was noted.  _____________________________________________________________________________  __  MMode/2D Measurements & Calculations  IVSd: 1.5 cm     LVIDd: 4.8 cm  LVIDs: 3.3 cm  LVPWd: 1.6 cm  FS: 30.7 %  LV mass(C)d: 316.2 grams  LV mass(C)dI: 126.8 grams/m2  Ao root diam: 4.3 cm  LA dimension: 5.1 cm  asc Aorta Diam: 3.7 cm  LA/Ao: 1.2  LA Volume (BP): 80.0 ml  LA Volume Index (BP): 32.1 ml/m2  RWT: 0.66           Doppler Measurements & Calculations  MV E max richa: 58.6 cm/sec  MV A max richa: 92.2 cm/sec  MV E/A: 0.64  MV dec time: 0.18 sec  PA acc time: 0.16 sec  E/E' av.3  Lateral E/e': 6.7  Medial E/e': 12.0            _____________________________________________________________________________  __           Report approved by: Bere Kline 10/23/2020 01:03 PM            Discharge Medications   Current Discharge Medication List      START taking these medications    Details   aspirin (ASA) 325 MG tablet Take 1 tablet (325 mg) by mouth daily Starting November 13th after 81 mg aspirin therapy complete.  Qty: 30 tablet, Refills: 0    Associated Diagnoses: Left pontine cerebrovascular accident (H)      aspirin (ASA) 81 MG EC tablet Take 1 tablet (81 mg) by mouth daily  Qty: 20 tablet, Refills: 0    Associated Diagnoses: Stroke-like symptoms      clopidogrel (PLAVIX) 75 MG tablet Take 1 tablet (75 mg) by mouth daily  Qty: 20 tablet, Refills: 0    Associated Diagnoses: Stroke-like symptoms      lovastatin (MEVACOR) 10 MG tablet Take 1 tablet (10 mg) by mouth At Bedtime  Qty: 30 tablet, Refills: 0    Associated Diagnoses: Stroke-like symptoms         CONTINUE these medications which have NOT CHANGED    Details   glipiZIDE (GLUCOTROL) 5 MG tablet Take 5 mg by mouth daily      metFORMIN (GLUCOPHAGE-XR) 500 MG 24 hr tablet Take 500 mg by mouth 2 times daily      midodrine (PROAMATINE) 5 MG tablet Take 5 mg by mouth 3 times daily as needed           Allergies   Allergies   Allergen Reactions     Aspirin Other (See Comments)     : Ringing in ears     Atorvastatin      Other reaction(s): Myalgias     Cortisone Hives     Fludrocortisone      Other reaction(s): Hypertension  head pounding at night     Hydrochlorothiazide      Other reaction(s): Syncope  per note 08/28/2012     Penicillins Hives     Simvastatin      Other reaction(s): Other (See Comments)  drowsy

## 2020-10-23 NOTE — PROGRESS NOTES
10/23/20 1000   Quick Adds   Type of Visit Initial PT Evaluation   Living Environment   People in home spouse   Current Living Arrangements house   Home Accessibility stairs within home   Number of Stairs, Within Home, Primary 3   Self-Care   Equipment Currently Used at Home cane, straight   Disability/Function   Hearing Difficulty or Deaf no   Wear Glasses or Blind yes   Concentrating, Remembering or Making Decisions Difficulty no   Difficulty Eating/Swallowing no   Walking or Climbing Stairs Difficulty yes   Walking or Climbing Stairs stair climbing difficulty, requires equipment   Mobility Management PLOF- Indep. ambulation using a SEC    Dressing/Bathing Difficulty no   Toileting no   Doing Errands Independently Difficulty (such as shopping) no   Fall history within last six months no   Change in Functional Status Since Onset of Current Illness/Injury yes   General Information   Onset of Illness/Injury or Date of Surgery 10/22/20   Referring Physician Zuri MEYERS   Patient/Family Therapy Goals Statement (PT) return home   Pertinent History of Current Problem (include personal factors and/or comorbidities that impact the POC) 54 year old male admitted on 10/22/2020. He presented to the emergency department for evaluation of hand clumsiness, word finding difficulties, and labored speech and was found to have a new stroke for which he is being admitted for further evaluation and treatment.- CT revealed Left pontine cerebrovascular accident   Existing Precautions/Restrictions fall   General Observations Pt alert,speech slightly dysarthric-  Wife present and able to assist    Pain Assessment   Patient Currently in Pain No   Strength   Strength Comments Mild weakness throughout R UE, LE   MMT: Shoulder   Shoulder Flexion - Left Side (5/5) normal,left   Shoulder Flexion - Right Side (4-/5) good minus, right   MMT: Elbow/Forearm, Rehab Eval   Elbow Flexion - Left Side (5/5) normal,left   Elbow Extension - Left Side  (5/5) normal,left   Elbow Flexion - Right Side (4-/5) good minus, right   Elbow Extension - Right Side (4-/5) good minus, right   MMT: Hip, Rehab Eval   Hip Flexion - Left Side (5/5) normal,left   Hip Flexion - Right Side (4-/5) good minus, right   MMT: Knee, Rehab Eval   Knee Flexion - Left Side (5/5) normal,left   Knee Extension - Left Side (5/5) normal,left   Knee Flexion - Right Side (4-/5) good minus, right   Knee Extension - Right Side (4-/5) good minus, right   MMT: Ankle, Rehab Eval   Ankle Dorsiflexion - Right Side (4-/5) good minus, right   Bed Mobility   Comment (Bed Mobility) NT    Transfers   Transfer Safety Comments SBA sit<> stand w/ RW use, cues for hand placement   Gait/Stairs (Locomotion)   [place] Level (Gait) verbal cues;contact guard   Assistive Device (Gait) walker, front-wheeled   Distance in Feet (Required for LE Total Joints)   (50 feet x1)   Pattern (Gait) step-through   Deviations/Abnormal Patterns (Gait)   (altered foot placement, discontinous steps)   Comment (Gait/Stairs) Pt amb 50 feet x1 with RW and CGA- variable and mildly unsteady at times w/ discontinuous steps  ( AMB to/ from doorway, wc transport to PT department) .  Practiced steps w/ use of both UEs on one rail- initial attempts w/ SEC too difficult due to incoordination.- Pt able to perform w/ CGA. Wife present and able to assist- has a gait belt at home. Improves w/ second bout of steps w/ cues for foot placment and to widen stance.    Balance   Balance Comments Fair dynamic standing balance    Coordination   Coordination Comments Incoordination noted- impaired FTN on R-     R LE - impaired heel shin movement, toe tapping    Clinical Impression   Criteria for Skilled Therapeutic Intervention Yes, Treatment indicated    PT Diagnosis (PT) CVA   Influenced by the following impairments Incoordination, Mild R hemiparesis    Functional limitations due to impairments decreased amb status - need for walker support' ,  stair amb,  "fall risk    Clinical Presentation Stable/Uncomplicated   Clinical Presentation Rationale clinical judgement   Clinical Decision Making (Complexity) low complexity   Therapy Frequency (PT) Daily   Predicted Duration of Therapy Intervention (days/wks) LOS   Planned Therapy Interventions (PT) balance training;gait training;strengthening   Anticipated Equipment Needs at Discharge (PT)   (Pt has a RW for home use)   Risk & Benefits of therapy have been explained care plan/treatment goals reviewed;patient   PT Discharge Planning    PT Discharge Recommendation (DC Rec) home with home care physical therapy;Transitional Care Facility   PT Rationale for DC Rec   (Level of assistance)   PT Brief overview of current status  Eval completed-   Pt amb w/ RW, CGA, unsteady at times- TCU  would be beneficial but pt/ wife deciding to discharge to home ( Wife reports pt would receive limited therapy due to COVID restrictions) . Wife able to assist- recommned gait belt/ RW  for mobility.   HC PT as it would be a taxing effort to leave the home   Therapy Certification   Start of care date 10/23/20   Certification date from 10/23/20   Certification date to 10/25/20   Medical Diagnosis pontine cerebrovascular accident   [facility] AM-PAC  \"6 Clicks\" V.2 Basic Mobility Inpatient Short Form   1. Turning from your back to your side while in a flat bed without using bedrails? 4 - None   2. Moving from lying on your back to sitting on the side of a flat bed without using bedrails? 4 - None   3. Moving to and from a bed to a chair (including a wheelchair)? 3 - A Little   4. Standing up from a chair using your arms (e.g., wheelchair, or bedside chair)? 3 - A Little   5. To walk in hospital room? 3 - A Little   6. Climbing 3-5 steps with a railing? 3 - A Little   Basic Mobility Raw Score (Score out of 24.Lower scores equate to lower levels of function) 20     "

## 2020-10-23 NOTE — PLAN OF CARE
[facility] DISCHARGE NOTE    Patient discharged to home at 3:30 PM via wheel chair. Accompanied by spouse and staff. Discharge instructions reviewed with patient and spouse, opportunity offered to ask questions. Prescriptions filled and sent with patient upon discharge. All belongings sent with patient.    Rosmery Brock RN

## 2020-10-23 NOTE — H&P
[facility]     History and Physical - Hospitalist Service       Date of Admission:  10/22/2020    Assessment & Plan   Chris Delcid is a 54 year old male admitted on 10/22/2020. He presented to the emergency department for evaluation of hand clumsiness, word finding difficulties, and labored speech and was found to have a new stroke for which he is being admitted for further evaluation and treatment.    Left pontine cerebrovascular accident  Ataxia  Expressive aphasia / Dysarthria  Head CT, CTA head/neck, and MRI brain obtained, MRI found small acute left pontine infarct, no hemorrhage or mass effect. Also some mild multifocal intracranial stenosis noted on CTA. No known arrhythmias. Case discussed between emergency department and stroke neuro. Patient given aspirin 325 mg and Plavix 300 mg in the emergency department.  - Continue aspirin 81 mg and Plavix 75 mg x 21 days; then change to aspirin 325 mg daily  - Allow permissive hypertension - prn labetalol available for SBP > 200  - Lipid profile pending, goal LDL 40-70  - Initiate statin - neuro recommends atorvastatin, although previously intolerant. Though he possibly tolerated lovastatin in the past - will start 10 mg  - Monitor on telemetry  - EKG pending  - A1c, lipid panel, troponin  - PT/OT/Speech  - Echo pending (not mentioned by stroke neuro)    Type 2 diabetes mellitus   Last A1c 6.4. Managed prior to admission with glipizide 5 mg bid and metformin 1000 mg bid.   - Continue glipizide and metformin  - High sliding scale insulin  - Consistent carbohydrate diet  - Hyper/hypoglycemia protocols     Essential hypertension  Orthostatic hypotension  Blood pressure significantly elevated in the emergency department - 228/123. Had taken midodrine prior to emergency department arrival. Has history of both hypertension and orthostasis. No longer on antihypertensive medications. Takes midodrine 5 mg prn for hypotension.  - Continue with  "prn midodrine  - Prn labetalol as above    Hyperlipidemia  Statin intolerance  Previously on Zetia and Fenofibrate, stopped about 3 months ago by PCP. History of myalgias on atorvastatin and simvastatin, but thinks he tolerated lovastatin.  - Start lovastatin as above    Chronic low back pain  Secondary to prior injury many years ago. Does not take any regular analgesics for pain, just occasional acetaminophen.  - Prn acetaminophen available    COVID status  Tested as asymptomatic COVID screen based on hospital admission criteria. No concern for active COVID infection on admission.  - COVID PCR pending  - No indication for COVID precautions or repeat testing       Diet: High Consistent CHO Diet    DVT Prophylaxis: Pneumatic Compression Devices  Hodges Catheter: not present  Code Status: Full Code           Disposition Plan   Expected discharge: Tomorrow, recommended to prior living arrangement once work-up completed, neuro checks stable, therapy evaluations complete and cleared per therapy teams.  Entered: Samira Keating PA-C 10/23/2020, 3:10 AM     The patient's care was discussed with the Attending Physician, Dr. Solomon Gold and Patient.    Samira Keating PA-C  [facility]   Contact information available via [facility] Paging/Directory      ______________________________________________________________________    Chief Complaint   \"my hand was clumsy and I was weak. Then the speech changes started later.\"    History is obtained from the patient, review of EMR, and emergency department sign out from Dr. Sterling Estrella.    History of Present Illness   Chris Delcid is a 54 year old male who presented to the emergency department for evaluation of weakness and hand clumsiness.    Patient woke in his usual state of health with the exception of a sharp pain in his left eye..  Around noon today he developed some non focal weakness in both arms and legs which he thought may be " hypoglycemia.  He also had some clumsiness of his hands, right worse than left.  Hand was slightly numb and tingly.  He had an associated headache.    He was initially concerned about hypoglycemia, although his glucose was noted to be 128 at home.  He was then concerned about hypotension and took his blood pressure which was 70/40.  He took a dose of his midodrine but did not feel any change in his symptoms.    He talked to his girlfriend on the phone about his concerns, his speech was reportedly normal at that time.  By the time girlfriend returned home a few hours later, he had a slow labored speech and some word finding difficulties.  Presented to the emergency department for evaluation.  Work-up for stroke revealed a small acute left pontine infarct.    He denies any associated dizziness/lightheadedness, falls, or syncope.  No dysphagia.  No vision changes.  He denies a prior history of strokes.    On review of systems, he denies any recent illness, fever, chills, myalgias.  Appetite has been adequate.  Poor sleep at baseline due to chronic back pain.  Denies cough, wheeze, shortness of breath.  Reports ongoing and varying abdominal pain, nausea, vomiting, diarrhea, constipation; states this is chronic and unchanged from baseline.  He has occasional palpitations but denies chest pain.  The remainder review of systems is negative.    Review of Systems    The 10 point Review of Systems is negative other than noted in the HPI or here.     Past Medical History    I have reviewed this patient's medical history and updated it with pertinent information if needed.   Past Medical History:   Diagnosis Date     Chronic low back pain 5/29/2014    Overview:  Follow at [facility] in [place]. Follow at [facility] in [place].     Essential hypertension 6/7/2003     Hyperlipidemia 3/4/2011     Orthostatic hypotension 12/26/2014    Overview:  Secondary to diabetic autonomic dysfunction?  Started midodrine Dec,   Secondary to diabetic autonomic dysfunction?  Started midodrine Dec, 2014     Statin intolerance 2020     Type 2 diabetes mellitus (H) 2014    Type 2 diabetes, uncontrolled, with autonomic neuropathy (HCC)       Past Surgical History   I have reviewed this patient's surgical history and updated it with pertinent information if needed.  Past Surgical History:   Procedure Laterality Date     FRACTURE TX, WRIST RT/LT Left     Multiple surgeries, eventually needed fusion     HC SHOULDER ARTHROSCOPY, DX Right      INTRAVITREAL INJECTION OU (BOTH EYES) Bilateral     Multiple injections     RETINAL REATTACHMENT         Social History   I have reviewed this patient's social history and updated it with pertinent information if needed.  Social History     Tobacco Use     Smoking status: Never Smoker     Smokeless tobacco: Current User   Substance Use Topics     Alcohol use: No     Drug use: No       Family History   I have reviewed this patient's family history and updated it with pertinent information if needed.  Family History   Problem Relation Age of Onset     Diabetes Mother          age 49 of pneumonia     Diabetes Father      Heart Failure Father      Coronary Artery Disease Father      Myocardial Infarction Father         Stents     Hypertension Father      No Known Problems Sister      No Known Problems Brother      Esophageal Cancer Brother          Prior to Admission Medications   Prior to Admission Medications   Prescriptions Last Dose Informant Patient Reported? Taking?   glipiZIDE (GLUCOTROL) 5 MG tablet   Yes No   Sig: Take 5 mg by mouth daily   glipiZIDE (GLUCOTROL) 5 MG tablet 10/22/2020 at 0800  Yes Yes   Sig: Take 5 mg by mouth daily   metFORMIN (GLUCOPHAGE-XR) 500 MG 24 hr tablet   Yes No   Sig: Take 1,000 mg by mouth 2 times daily   metFORMIN (GLUCOPHAGE-XR) 500 MG 24 hr tablet 10/22/2020 at 0800  Yes Yes   Sig: Take 500 mg by mouth 2 times daily   midodrine (PROAMATINE) 5 MG  tablet 10/23/2020 at afternoon  Yes Yes   Sig: Take 5 mg by mouth 3 times daily as needed      Facility-Administered Medications: None     Allergies   Allergies   Allergen Reactions     Aspirin Other (See Comments)     : Ringing in ears     Atorvastatin      Other reaction(s): Myalgias     Cortisone Hives     Fludrocortisone      Other reaction(s): Hypertension  head pounding at night     Hydrochlorothiazide      Other reaction(s): Syncope  per note 08/28/2012     Penicillins Hives     Simvastatin      Other reaction(s): Other (See Comments)  drowsy       Physical Exam   Vital Signs: Temp: 98  F (36.7  C) Temp src: Oral BP: (!) 162/75 Pulse: 71   Resp: 15 SpO2: 97 % O2 Device: None (Room air)    Weight: 283 lbs 11.71 oz    Constitutional: Alert, oriented, cooperative. No apparent distress, appears nontoxic, speaking in full sentences.     Eyes: Eyes are clear, pupils are reactive. No scleral icterus. Extraocular movements intact.     HEENT: Oropharynx is clear and moist, no lesions. Poor dentition. Normocephalic, no evidence of cranial trauma.      Cardiovascular: Regular rhythm and rate, normal S1 and S2. No murmur, rubs, or gallops. Peripheral pulses intact bilaterally. No lower extremity edema.    Respiratory: Lung sounds are clear to auscultation bilaterally without wheezes, rhonchi, or crackles.    GI: Soft, non-distended. Non-tender, no rebound or guarding. No hepatosplenomegaly or masses appreciated. Normal bowel sounds.     Musculoskeletal: Without obvious deformity, normal range of motion. Pain with spine movements and readjusting in bed. Normal muscle bulk and tone. Distal CMS intact.      Skin: Warm and dry, no rashes or ecchymoses. No mottling of skin.      Neurologic: Patient moves all extremities. Cranial nerves are grossly intact.  is symmetric. Gross sensation equal bilaterally. Slightly diminished strength on the right upper extremity compared to left. Normal rapid alternating movements, no  pronator drift. Impaired finger to nose test with right hand, normal on left.    Genitourinary: Deferred      Data   Data reviewed today: I reviewed all medications, new labs and imaging results over the last 24 hours. I personally reviewed no images or EKG's today.    Recent Labs   Lab 10/22/20  1815   WBC 7.3   HGB 15.6   MCV 88      INR 0.98      POTASSIUM 4.0   CHLORIDE 111*   CO2 27   BUN 16   CR 0.96   ANIONGAP 5   JAMIE 9.3   *   ALBUMIN 4.1   PROTTOTAL 7.9   BILITOTAL 0.4   ALKPHOS 75   ALT 22   AST 14   TROPI <0.015     Recent Results (from the past 24 hour(s))   CT Head w/o Contrast    Narrative    EXAM: CT HEAD W/O CONTRAST  LOCATION: [facility]  DATE/TIME: 10/22/2020 6:21 PM    INDICATION: Aphasia, right upper extremity weakness  COMPARISON: None.  TECHNIQUE: Routine without IV contrast. Multiplanar reformats. Dose reduction techniques were used.    FINDINGS:  INTRACRANIAL CONTENTS: No intracranial hemorrhage, extraaxial collection, or mass effect.  No CT evidence of acute infarct. Moderate presumed chronic small vessel ischemic changes. Prominent perivascular space versus chronic lacunar infarct at the   inferior aspect of the right foramen. Normal ventricles and sulci.     VISUALIZED ORBITS/SINUSES/MASTOIDS: No intraorbital abnormality. No paranasal sinus mucosal disease. No middle ear or mastoid effusion.    BONES/SOFT TISSUES: No acute abnormality.      Impression    IMPRESSION:  1.  Moderate patchy periventricular and deep white matter hypoattenuation, nonspecific and age indeterminate. Recommend further evaluation with brain MRI if there is high clinical concern for acute ischemia.  2.  No intracranial hemorrhage.    Results communicated to Dr. Estrella at 10/22/2020 6:34 PM   CTA Head Neck with Contrast    Narrative    EXAM: CTA  HEAD NECK WITH CONTRAST  LOCATION: [facility]  DATE/TIME: 10/22/2020 6:21 PM    INDICATION: Expressive aphasia, right upper  extremity weakness  COMPARISON: 10/22/2020 head CT  CONTRAST: 70 ml's isovue  TECHNIQUE: Head and neck CT angiogram with IV contrast. Axial helical CT images of the head and neck vessels obtained during the arterial phase of intravenous contrast administration. Axial 2D reconstructed images and multiplanar 3D MIP reconstructed   images of the head and neck vessels were performed by the technologist. Dose reduction techniques were used. All stenosis measurements made according to NASCET criteria unless otherwise specified.    FINDINGS:     HEAD CTA:  ANTERIOR CIRCULATION: Mild stenosis of the right internal carotid artery at the petrous/cavernous junction. Mild bilateral paraclinoid ICA stenosis. No significant stenosis/occlusion, aneurysm, or high flow vascular malformation. Developmentally   hypoplastic left A1 anterior cerebral artery segment. Fetal origin left posterior cerebral artery.    POSTERIOR CIRCULATION: No stenosis/occlusion, aneurysm, or high flow vascular malformation. Balanced vertebral arteries supply a normal basilar artery.     DURAL VENOUS SINUSES: Expected enhancement of the major dural venous sinuses.    NECK CTA:  RIGHT CAROTID: No measurable stenosis or dissection.    LEFT CAROTID: Atherosclerotic plaque results in less than 50% stenosis in the left ICA. No dissection.    VERTEBRAL ARTERIES: No focal stenosis or dissection. Balanced vertebral arteries.    AORTIC ARCH: Classic aortic arch anatomy with no significant stenosis at the origin of the great vessels.    NONVASCULAR STRUCTURES: Multiple dental caries.      Impression    IMPRESSION:     HEAD CTA:   1.  Mild multifocal stenoses as described.    NECK CTA:  1.  Normal neck CTA.  2.  Multiple dental caries.   MR Brain w/o & w Contrast    Narrative    MRI BRAIN WITHOUT AND WITH CONTRAST  10/22/2020 8:37 PM     HISTORY: Expressive aphasia, dysarthria, ataxia in right upper  extremity. Extremity weakness which has resolved.     TECHNIQUE:   Multiplanar, multisequence MRI of the brain without and  with 12ml Gadavist     COMPARISON: CT head of same date.    FINDINGS:  There is a small acute ischemic infarct involving the left cuca  (series 7 image 52, series 8 image 12), measuring approximately 1 cm  in anterior-posterior dimension. No other areas of abnormal  intracranial restricted diffusion identified.    The ventricles are normal in size and configuration. Moderate patchy  T2/FLAIR hyperintense signal in the periventricular and deep cerebral  white matter and cuca, nonspecific, but most likely related to chronic  small vessel ischemic disease. Mild generalized brain parenchymal  volume loss. No intracranial hemorrhage, mass lesion or mass effect.  No abnormal intracranial postcontrast enhancement evident.    Bilateral orbits appear normal. Mild mucosal thickening in the ethmoid  air cells. The major vascular flow voids of the skull base are grossly  maintained. The calvarium, skull base and mid face are otherwise  grossly unremarkable.      Impression    IMPRESSION:  1. Small acute left pontine infarct. No hemorrhage or mass effect.  2. Brain atrophy and presumed chronic small vessel ischemic disease,  as described.    MIRIAN JIANG MD

## 2020-10-23 NOTE — PROGRESS NOTES
10/23/20 0900   Quick Adds   Type of Visit Initial Occupational Therapy Evaluation       Present no   Living Environment   People in home spouse   Current Living Arrangements house   Home Accessibility stairs within home   Number of Stairs, Within Home, Primary 3   Living Environment Comments once in home all needs are met. does not need to complete stairs.    Self-Care   Regular Exercise No   Equipment Currently Used at Home cane, straight   Activity/Exercise/Self-Care Comment hx of back surgery. chronic pain in back and B shoulders    Disability/Function   Hearing Difficulty or Deaf no   Wear Glasses or Blind yes   Vision Management reading glasses   Concentrating, Remembering or Making Decisions Difficulty no   Difficulty Eating/Swallowing no   Walking or Climbing Stairs Difficulty yes   Walking or Climbing Stairs stair climbing difficulty, requires equipment   Dressing/Bathing Difficulty no   Toileting no   Doing Errands Independently Difficulty (such as shopping) no   Fall history within last six months no   Change in Functional Status Since Onset of Current Illness/Injury yes   General Information   Onset of Illness/Injury or Date of Surgery 10/22/20   Referring Physician Samira Keating PA-C   Patient/Family Therapy Goal Statement (OT) to return home. May consider TCU as pt would get daily therapy   Additional Occupational Profile Info/Pertinent History of Current Problem Left pontine cerebrovascular accident   Cognitive Status Examination   Orientation Status orientation to person, place and time   Affect/Mental Status (Cognitive) WNL   Visual Perception   Visual Impairment/Limitations WNL   Visual Motor Impairment   (WNL)   Pain Assessment   Patient Currently in Pain Yes, see Vital Sign flowsheet  (chronic back and B shoulder pain )   Range of Motion Comprehensive   Comment, General Range of Motion B UE ROM: WNL   Strength Comprehensive (MMT)   Comment, General Manual Muscle  Testing (MMT) Assessment R UE strength: grossly 4-/5. L UE strength: 5/5   Coordination   Upper Extremity Coordination Right UE impaired   Fine Motor Coordination difficulty with finger to thumb tap. Is milagro to complete, however slowed pace and increased concentration needed.    Coordination Comments Pt is R hand dominant    Upper Body Dressing Assessment/Training   [place] Level (Upper Body Dressing) independent   Lower Body Dressing Assessment/Training   [place] Level (Lower Body Dressing) contact guard assist   Grooming Assessment/Training   [place] Level (Grooming) contact guard assist  (standing )   Toileting   [place] Level (Toileting) contact guard assist   Comment (Toileting) using FWW   Instrumental Activities of Daily Living (IADL)   IADL Comments pt is on disability- was independent with other IADLs. Just built a shed.    Clinical Impression   Criteria for Skilled Therapeutic Interventions Met (OT) yes;meets criteria;skilled treatment is necessary   OT Diagnosis decreased independence with ADLs and functional mobility    OT Problem List-Impairments impacting ADL balance;coordination;strength   Assessment of Occupational Performance 5 or more Performance Deficits   Identified Performance Deficits dressing, grooming/hygiene, showering, functional mobility, home management tasks    Planned Therapy Interventions (OT) strengthening;progressive activity/exercise;ADL retraining;fine motor coordination training;neuromuscular re-education   Clinical Decision Making Complexity (OT) low complexity   Therapy Frequency (OT) Daily   Predicted Duration of Therapy 2-3 days   Risks and Benefits of Treatment have been explained. Yes   Patient, Family & other staff in agreement with plan of care Yes   OT Discharge Planning    OT Discharge Recommendation (DC Rec) home with home care occupational therapy;Transitional Care Facility   OT Rationale for DC Rec Pt presents with R sided weakness and  "incoordination affecting ability to complete ADLs/IADLs safely.    OT Brief overview of current status  If pt does return home- does have very supportive wife who is a retired RN and can provide 24/7 assist/supervision if needed. Due to recent stroke may benefit from daily therapy at TCU for best recovery. Pt and spouse state they will start to discuss discharge today.   [facility] AM-PAC  \"6 Clicks\" Daily Activity Inpatient Short Form   1. Putting on and taking off regular lower body clothing? 3 - A Little   2. Bathing (including washing, rinsing, drying)? 3 - A Little   3. Toileting, which includes using toilet, bedpan or urinal? 3 - A Little   4. Putting on and taking off regular upper body clothing? 4 - None   5. Taking care of personal grooming such as brushing teeth? 3 - A Little   6. Eating meals? 4 - None   Daily Activity Raw Score (Score out of 24.Lower scores equate to lower levels of function) 20   Total Evaluation Time (Minutes)   Total Evaluation Time (Minutes) 10     "

## 2020-10-23 NOTE — PLAN OF CARE
Pt is on observation status with the following goals of care:  ~Diagnostic tests and consults completed and       resulted. In progress. Pt has routine EKG and echo ordered.   ~Vital signs at baseline. In progress.  ~Tolerating oral intake. Met.  ~Safe discharge environment has been      identified by care management. In progress.  ~Adequate pain control on oral analgesia. Pt has baseline chronic back pain and neuropathy. Denies need for intervention at present.   ~Ability to ambulate is at baseline. Not met. Pt usually independent. Now up with one assist for safety due to weakness.   ~Tolerating oral antibiotics without worsening      of infection. N/A.  ~O2 sats at baseline or greater than 93% on Met.  Continue to assess progress per goals.

## 2020-10-24 NOTE — PLAN OF CARE
Occupational Therapy Discharge Summary    Reason for therapy discharge:    Discharged to home with home therapy.    Progress towards therapy goal(s). See goals on Care Plan in [facility] electronic health record for goal details.  Goals partially met.  Barriers to achieving goals:   discharge from facility.    Therapy recommendation(s):    Continued therapy is recommended.  Rationale/Recommendations:  To increase strength, safety and I for ADLs.    Dayna Gaytan, OTR

## 2020-11-16 ENCOUNTER — HOSPITAL ENCOUNTER (EMERGENCY)
Facility: CLINIC | Age: 55
Discharge: HOME | End: 2020-11-16
Attending: FAMILY MEDICINE | Admitting: FAMILY MEDICINE
Payer: COMMERCIAL

## 2020-11-16 VITALS
RESPIRATION RATE: 15 BRPM | TEMPERATURE: 98.2 F | WEIGHT: 283 LBS | SYSTOLIC BLOOD PRESSURE: 156 MMHG | HEART RATE: 80 BPM | OXYGEN SATURATION: 94 % | BODY MASS INDEX: 37.34 KG/M2 | DIASTOLIC BLOOD PRESSURE: 96 MMHG

## 2020-11-16 DIAGNOSIS — H35.61 RETINAL HEMORRHAGE OF RIGHT EYE: ICD-10-CM

## 2020-11-16 LAB
ALBUMIN SERPL-MCNC: 3.7 G/DL (ref 3.4–5)
ALP SERPL-CCNC: 79 U/L (ref 40–150)
ALT SERPL W P-5'-P-CCNC: 17 U/L (ref 0–70)
ANION GAP SERPL CALCULATED.3IONS-SCNC: 5 MMOL/L (ref 3–14)
AST SERPL W P-5'-P-CCNC: 11 U/L (ref 0–45)
BASOPHILS # BLD AUTO: 0.1 10E9/L (ref 0–0.2)
BASOPHILS NFR BLD AUTO: 0.7 %
BILIRUB SERPL-MCNC: 0.3 MG/DL (ref 0.2–1.3)
BUN SERPL-MCNC: 24 MG/DL (ref 7–30)
CALCIUM SERPL-MCNC: 9.1 MG/DL (ref 8.5–10.1)
CHLORIDE SERPL-SCNC: 107 MMOL/L (ref 94–109)
CO2 SERPL-SCNC: 27 MMOL/L (ref 20–32)
CREAT SERPL-MCNC: 0.91 MG/DL (ref 0.66–1.25)
DIFFERENTIAL METHOD BLD: NORMAL
EOSINOPHIL # BLD AUTO: 0.2 10E9/L (ref 0–0.7)
EOSINOPHIL NFR BLD AUTO: 3 %
ERYTHROCYTE [DISTWIDTH] IN BLOOD BY AUTOMATED COUNT: 12.9 % (ref 10–15)
GFR SERPL CREATININE-BSD FRML MDRD: >90 ML/MIN/{1.73_M2}
GLUCOSE BLDC GLUCOMTR-MCNC: 148 MG/DL (ref 70–99)
GLUCOSE SERPL-MCNC: 168 MG/DL (ref 70–99)
HCT VFR BLD AUTO: 44.8 % (ref 40–53)
HGB BLD-MCNC: 15.3 G/DL (ref 13.3–17.7)
IMM GRANULOCYTES # BLD: 0.1 10E9/L (ref 0–0.4)
IMM GRANULOCYTES NFR BLD: 0.6 %
LYMPHOCYTES # BLD AUTO: 1.5 10E9/L (ref 0.8–5.3)
LYMPHOCYTES NFR BLD AUTO: 18.2 %
MCH RBC QN AUTO: 30.1 PG (ref 26.5–33)
MCHC RBC AUTO-ENTMCNC: 34.2 G/DL (ref 31.5–36.5)
MCV RBC AUTO: 88 FL (ref 78–100)
MONOCYTES # BLD AUTO: 0.6 10E9/L (ref 0–1.3)
MONOCYTES NFR BLD AUTO: 7.5 %
NEUTROPHILS # BLD AUTO: 5.7 10E9/L (ref 1.6–8.3)
NEUTROPHILS NFR BLD AUTO: 70 %
NRBC # BLD AUTO: 0 10*3/UL
NRBC BLD AUTO-RTO: 0 /100
PLATELET # BLD AUTO: 336 10E9/L (ref 150–450)
POTASSIUM SERPL-SCNC: 3.8 MMOL/L (ref 3.4–5.3)
PROT SERPL-MCNC: 7.6 G/DL (ref 6.8–8.8)
RBC # BLD AUTO: 5.08 10E12/L (ref 4.4–5.9)
SODIUM SERPL-SCNC: 139 MMOL/L (ref 133–144)
WBC # BLD AUTO: 8.1 10E9/L (ref 4–11)

## 2020-11-16 PROCEDURE — 93010 ELECTROCARDIOGRAM REPORT: CPT | Performed by: FAMILY MEDICINE

## 2020-11-16 PROCEDURE — 80053 COMPREHEN METABOLIC PANEL: CPT | Performed by: FAMILY MEDICINE

## 2020-11-16 PROCEDURE — 999N001017 HC STATISTIC GLUCOSE BY METER IP

## 2020-11-16 PROCEDURE — 99284 EMERGENCY DEPT VISIT MOD MDM: CPT | Performed by: FAMILY MEDICINE

## 2020-11-16 PROCEDURE — 99284 EMERGENCY DEPT VISIT MOD MDM: CPT | Mod: 25 | Performed by: FAMILY MEDICINE

## 2020-11-16 PROCEDURE — 85025 COMPLETE CBC W/AUTO DIFF WBC: CPT | Performed by: FAMILY MEDICINE

## 2020-11-16 PROCEDURE — 93005 ELECTROCARDIOGRAM TRACING: CPT | Performed by: FAMILY MEDICINE

## 2020-11-16 ASSESSMENT — ENCOUNTER SYMPTOMS
EYE PAIN: 1
CHILLS: 0
COUGH: 0
DIAPHORESIS: 0
HEADACHES: 0
BLOOD IN STOOL: 0
PALPITATIONS: 0
DIARRHEA: 0
CONSTIPATION: 0
SHORTNESS OF BREATH: 0
DYSURIA: 0
FEVER: 0
ABDOMINAL PAIN: 0
FREQUENCY: 0
SINUS PRESSURE: 0
NAUSEA: 0
SORE THROAT: 0
VOMITING: 0
WHEEZING: 0

## 2020-11-16 NOTE — DISCHARGE INSTRUCTIONS
ICD-10-CM    1. Retinal hemorrhage of right eye  H35.61     see retinal specialists this afterenoon as scheduled.  avoid exertion,l eye trauma,. watch BP

## 2020-11-16 NOTE — ED PROVIDER NOTES
History     Chief Complaint   Patient presents with     Eye Problem     right eye blurry     HPI  Chris Delcid is a 54 year old male who presents with a history of left pontine CVA resulting in a right-sided weakness and dysarthria/dysphagia.  CVA was  October 22.  Was on aspirin and Plavix but Plavix was discontinued he tells me in the last week.  He also has a history of hypertension hyperlipidemia.  He has chronic retinal disease and sees a retinologist.  He has been on Avastin.  He had a visit with the retinologist a week ago who noted a venous change in the right eye he tells me.  I cannot see that note but he says that the provider had wanted to perform a globe injection but with the patient on dual antiplatelet agents  He could not do that.    This morning when he awoke early a.m. hours at 0330 hrs.  He saw floaters that started he believes that the lateral aspect of the eye.  There is some stringy appearance to this and then when he awoke this morning his vision was significantly blurred in the right eye and he could not see well out of the side.  He had no obvious flashes with this.  He had no eye trauma.  His eye pressure.  He has no new weakness.  Chronic weakness right arm right leg.    Allergies:  Allergies   Allergen Reactions     Aspirin Other (See Comments)     : Ringing in ears     Atorvastatin      Other reaction(s): Myalgias     Cortisone Hives     Fludrocortisone      Other reaction(s): Hypertension  head pounding at night     Hydrochlorothiazide      Other reaction(s): Syncope  per note 08/28/2012     Penicillins Hives     Simvastatin      Other reaction(s): Other (See Comments)  drowsy       Problem List:    Patient Active Problem List    Diagnosis Date Noted     Ataxia 10/22/2020     Priority: Medium     Dysarthria 10/22/2020     Priority: Medium     Stroke-like symptoms 10/22/2020     Priority: Medium     Left pontine cerebrovascular accident (H) 10/22/2020     Priority: Medium     Old  pontine infarct without late effect 10/22/2020     Priority: Medium     Expressive aphasia 10/22/2020     Priority: Medium     Statin intolerance 2020     Priority: Medium     Orthostatic hypotension 2014     Priority: Medium     Overview:   Secondary to diabetic autonomic dysfunction?  Started midodrine Dec, 2014  Secondary to diabetic autonomic dysfunction?  Started midodrine Dec, 2014       Type 2 diabetes mellitus (H) 2014     Priority: Medium     Type 2 diabetes, uncontrolled, with autonomic neuropathy (HCC)       Chronic low back pain 2014     Priority: Medium     Overview:   Follow at [facility] in [place].  Follow at [facility] in [place].       Hyperlipidemia 2011     Priority: Medium     Essential hypertension 2003     Priority: Medium        Past Medical History:    Past Medical History:   Diagnosis Date     Chronic low back pain 2014     Essential hypertension 2003     Hyperlipidemia 3/4/2011     Orthostatic hypotension 2014     Statin intolerance 2020     Type 2 diabetes mellitus (H) 2014       Past Surgical History:    Past Surgical History:   Procedure Laterality Date     FRACTURE TX, WRIST RT/LT Left     Multiple surgeries, eventually needed fusion     HC SHOULDER ARTHROSCOPY, DX Right      INTRAVITREAL INJECTION OU (BOTH EYES) Bilateral     Multiple injections     RETINAL REATTACHMENT         Family History:    Family History   Problem Relation Age of Onset     Diabetes Mother          age 49 of pneumonia     Diabetes Father      Heart Failure Father      Coronary Artery Disease Father      Myocardial Infarction Father         Stents     Hypertension Father      No Known Problems Sister      No Known Problems Brother      Esophageal Cancer Brother        Social History:  Marital Status:   [2]  Social History     Tobacco Use     Smoking status: Never Smoker     Smokeless tobacco: Current User    Substance Use Topics     Alcohol use: No     Drug use: No        Medications:         aspirin (ASA) 325 MG tablet       aspirin (ASA) 81 MG EC tablet       clopidogrel (PLAVIX) 75 MG tablet       glipiZIDE (GLUCOTROL) 5 MG tablet       glipiZIDE (GLUCOTROL) 5 MG tablet       lovastatin (MEVACOR) 10 MG tablet       metFORMIN (GLUCOPHAGE-XR) 500 MG 24 hr tablet       metFORMIN (GLUCOPHAGE-XR) 500 MG 24 hr tablet       midodrine (PROAMATINE) 5 MG tablet          Review of Systems   Constitutional: Negative for chills, diaphoresis and fever.   HENT: Negative for ear pain, sinus pressure and sore throat.    Eyes: Positive for pain and visual disturbance.   Respiratory: Negative for cough, shortness of breath and wheezing.    Cardiovascular: Negative for chest pain and palpitations.   Gastrointestinal: Negative for abdominal pain, blood in stool, constipation, diarrhea, nausea and vomiting.   Genitourinary: Negative for dysuria, frequency and urgency.   Skin: Negative for rash.   Neurological: Negative for headaches.   All other systems reviewed and are negative.      Physical Exam   BP: (!) 193/110  Pulse: 90  Temp: 98.2  F (36.8  C)  Resp: 16  Weight: 128.4 kg (283 lb)  SpO2: 98 %      Physical Exam  Constitutional:       General: He is in acute distress.   HENT:      Head: Atraumatic.      Mouth/Throat:      Mouth: Mucous membranes are moist.   Eyes:      Extraocular Movements: Extraocular movements intact.      Conjunctiva/sclera: Conjunctivae normal.   Neck:      Musculoskeletal: Neck supple.   Cardiovascular:      Rate and Rhythm: Normal rate and regular rhythm.      Heart sounds: No murmur.   Pulmonary:      Effort: Pulmonary effort is normal. No respiratory distress.      Breath sounds: No stridor. No wheezing or rhonchi.   Musculoskeletal:      Right lower leg: No edema.      Left lower leg: No edema.   Skin:     Coloration: Skin is not pale.      Findings: No rash.   Neurological:      Mental Status: He is  alert and oriented to person, place, and time.      Cranial Nerves: Cranial nerve deficit present.      Motor: Weakness present.       He has a 4+ out of 5 weakness on the right arm and right leg.  No coordination deficit with finger-nose-finger.  No significant cranial nerve deficits although there is some ptosis on the left side.  I see no obvious facial droop otherwise.  His funduscopic exam is difficult without dilation but I believe I see a somewhat purplish discoloration and difficult to see any retinal findings.  This is obscured by the color.        ED Course        Procedures                 EKG Interpretation:      Interpreted by Ross Esparza MD  EKG done at 750 hours demonstrates a sinus rhythm 81 bpm leftward axis.  There is no ST change.  No T wave changes.  Poor R progression V1 through V3.  No Q waves.  Normal intervals with exception of a MI of 212.  No ectopy.  Normal conduction.  Impression sinus rhythm 81 bpm with poor R wave progression possible prior anterior septal MI.  There is a borderline first-degree AV block.  No other change.        Critical Care time:  none               Results for orders placed or performed during the hospital encounter of 11/16/20 (from the past 24 hour(s))   Glucose by meter   Result Value Ref Range    Glucose 148 (H) 70 - 99 mg/dL   CBC with platelets differential   Result Value Ref Range    WBC 8.1 4.0 - 11.0 10e9/L    RBC Count 5.08 4.4 - 5.9 10e12/L    Hemoglobin 15.3 13.3 - 17.7 g/dL    Hematocrit 44.8 40.0 - 53.0 %    MCV 88 78 - 100 fl    MCH 30.1 26.5 - 33.0 pg    MCHC 34.2 31.5 - 36.5 g/dL    RDW 12.9 10.0 - 15.0 %    Platelet Count 336 150 - 450 10e9/L    Diff Method Automated Method     % Neutrophils 70.0 %    % Lymphocytes 18.2 %    % Monocytes 7.5 %    % Eosinophils 3.0 %    % Basophils 0.7 %    % Immature Granulocytes 0.6 %    Nucleated RBCs 0 0 /100    Absolute Neutrophil 5.7 1.6 - 8.3 10e9/L    Absolute Lymphocytes 1.5 0.8 - 5.3 10e9/L    Absolute  Monocytes 0.6 0.0 - 1.3 10e9/L    Absolute Eosinophils 0.2 0.0 - 0.7 10e9/L    Absolute Basophils 0.1 0.0 - 0.2 10e9/L    Abs Immature Granulocytes 0.1 0 - 0.4 10e9/L    Absolute Nucleated RBC 0.0    Comprehensive metabolic panel   Result Value Ref Range    Sodium 139 133 - 144 mmol/L    Potassium 3.8 3.4 - 5.3 mmol/L    Chloride 107 94 - 109 mmol/L    Carbon Dioxide 27 20 - 32 mmol/L    Anion Gap 5 3 - 14 mmol/L    Glucose 168 (H) 70 - 99 mg/dL    Urea Nitrogen 24 7 - 30 mg/dL    Creatinine 0.91 0.66 - 1.25 mg/dL    GFR Estimate >90 >60 mL/min/[1.73_m2]    GFR Estimate If Black >90 >60 mL/min/[1.73_m2]    Calcium 9.1 8.5 - 10.1 mg/dL    Bilirubin Total 0.3 0.2 - 1.3 mg/dL    Albumin 3.7 3.4 - 5.0 g/dL    Protein Total 7.6 6.8 - 8.8 g/dL    Alkaline Phosphatase 79 40 - 150 U/L    ALT 17 0 - 70 U/L    AST 11 0 - 45 U/L       Medications - No data to display    Assessments & Plan (with Medical Decision Making)     MDCM :  Chris Delcid is a 54 year old male who presents with history of left pontine CVA right-sided weakness and dysarthria dysphagia type 2 diabetes who presents with acute onset floaters and then vision loss this morning.  I am highly suspicious of retinal bleed based on his history and also has opacification of vision that occurred with floaters to start.  Less suspicious of an embolic phenomenon either central artery venous or arterial change.  He has no new acute neurologic findings.    Patient underwent ophthalmology evaluation demonstrating retinal hemorrhage.  He is referred to retinal allergy and they will be seeing him this afternoon.  Discussed precautions for return.      I have reviewed the nursing notes.    I have reviewed the findings, diagnosis, plan and need for follow up with the patient.       New Prescriptions    No medications on file       Final diagnoses:   Retinal hemorrhage of right eye - see retinal specialists this afterenoon as scheduled.  avoid exertion,l eye trauma,. watch  BP       11/16/2020   [facility] EMERGENCY DEPT     Ross Esparza MD  11/16/20 3001

## 2020-11-16 NOTE — ED NOTES
Patient presents with concerns of right vision blurriness.  Pt states that he noticed this at 0330 when he woke up.  Pt states that he went to bed at 2130 last night and had no problems.  Pt had a stroke 3 weeks ago.  He does have a hx of retinal issues

## 2020-11-16 NOTE — ED AVS SNAPSHOT
[facility] Emergency Dept  [address] [address]  Phone: [phone]  Fax: [phone]                                    Chris Delcid   MRN: [id number]    Department: [facility]   Date of Visit: 11/16/2020           After Visit Summary Signature Page    I have received my discharge instructions, and my questions have been answered. I have discussed any challenges I see with this plan with the nurse or doctor.    ..........................................................................................................................................  Patient/Patient Representative Signature      ..........................................................................................................................................  Patient Representative Print Name and Relationship to Patient    ..................................................               ................................................  Date                                   Time    ..........................................................................................................................................  Reviewed by Signature/Title    ...................................................              ..............................................  Date                                               Time          22EPIC Rev 08/18

## 2020-11-30 ENCOUNTER — PRE VISIT (OUTPATIENT)
Dept: NEUROLOGY | Facility: CLINIC | Age: 55
End: 2020-11-30

## 2020-11-30 NOTE — TELEPHONE ENCOUNTER
FUTURE VISIT INFORMATION:    Date: 12/1/20    Time:  1500    Location: [facility]   REFERRAL INFORMATION:    Referring provider:  [facility]    Referring providers clinic:     Reason for visit/diagnosis:  Dysarthria; Left Pontine CVA; Ataxia       NOTES (FOR ALL VISITS) STATUS DETAILS   OFFICE NOTE from referring provider Printed    OFFICE NOTE from other specialist Printed  Dr. Nuñez () 10/30/20  SLP/ OT 11/11/20   DISCHARGE SUMMARY from hospital  Printed  10/23/20 [facility]   DISCHARGE REPORT from the ER Printed 10/22/20 [facility]   MEDICATION LIST In Epic     IMAGING  (FOR ALL VISITS)       EMG       EEG       MRI (HEAD, NECK, SPINE) Printed  Images in PACs Brain MRI 10/22/20  Head and neck CTA 10/22/20  Head CT 10/22/20   CARDIAC STUDIES Printed  Echo 10/23/20  Event Monitor 11/7/15    FORMAL COGNITIVE TESTING       OTHER

## 2020-12-01 ENCOUNTER — VIRTUAL VISIT (OUTPATIENT)
Dept: NEUROLOGY | Facility: CLINIC | Age: 55
End: 2020-12-01
Attending: INTERNAL MEDICINE
Payer: COMMERCIAL

## 2020-12-01 DIAGNOSIS — I63.9 LEFT PONTINE STROKE (H): Primary | ICD-10-CM

## 2020-12-01 PROCEDURE — 99201 PR OFFICE/OUTPT VISIT, NEW, LEVEL I: CPT | Mod: TEL | Performed by: PSYCHIATRY & NEUROLOGY

## 2020-12-01 RX ORDER — OMEPRAZOLE 10 MG/1
10 CAPSULE, DELAYED RELEASE ORAL DAILY
COMMUNITY
End: 2023-12-10

## 2020-12-01 NOTE — PATIENT INSTRUCTIONS
Plan:  -- Continue the daily aspirin and lovastatin.  -- Continue to work with your primary care provider with regards to managing your blood pressure.  -- Follow-up in Neurology clinic in 3 months.

## 2020-12-01 NOTE — LETTER
"    12/1/2020         RE: Chris Delcid  [address]  [place] [address]        Dear Colleague,    Thank you for referring your patient, Chris Delcid, to the [facility] [place]. Please see a copy of my visit note below.    Chris Delcid is a 54 year old male who is being evaluated via a billable telephone visit.      The patient has been notified of following:     \"This telephone visit will be conducted via a call between you and your physician/provider. We have found that certain health care needs can be provided without the need for a physical exam.  This service lets us provide the care you need with a short phone conversation.  If a prescription is necessary we can send it directly to your pharmacy.  If lab work is needed we can place an order for that and you can then stop by our lab to have the test done at a later time.    Telephone visits are billed at different rates depending on your insurance coverage. During this emergency period, for some insurers they may be billed the same as an in-person visit.  Please reach out to your insurance provider with any questions.    If during the course of the call the physician/provider feels a telephone visit is not appropriate, you will not be charged for this service.\"    Patient has given verbal consent for Telephone visit?  Yes    What phone number would you like to be contacted at? [phone]    How would you like to obtain your AVS? Mail a copy    Phone call duration: 15 minutes    *See accompanying note for visit details.    Glo Graff MD        INITIAL NEUROLOGY CONSULTATION VIRTUAL VISIT    DATE OF VISIT: 12/1/2020  MRN: [id number]  PATIENT NAME: Chris Delcid  YOB: 1965    REFERRING PROVIDER: Solomon Gold    Chief Complaint   Patient presents with     New Patient     Left Pontine CVA; Ataxia.  Referral by [facility]       SUBJECTIVE:                                            "           HPI:   Chris Delcid is a 54 year old male whom I have been asked by the [facility] inpatient team to see in consultation for Left pontine stroke. Per chart review, the patient presented to the [facility] ED on 10.22.20 with generalized weakness and some word finding difficulty.  Blood pressure was very high in the ED. exam revealed dysarthria and expressive aphasia.  Otherwise it appears his neurologic exam was normal.  NIHSS: 3.    Head CT did not show any acute findings.  CTA showed multifocal stenosis intracranially.  Normal neck CTA.  Brain MRI showed a small acute left pontine infarct, as well as atrophy and SVID.  Echocardiogram was unremarkable.  He was given aspirin and Plavix.  tPA was not done due to him being outside of the window.  No history of atrial fibrillation.  And was to continue the DAP for 3 weeks and then switch to aspirin monotherapy.  He was started on a statin.  LDL had been 136 earlier this year.  Hemoglobin A1c was 6.1%.  Plan follow-up with his primary provider, he reported ongoing dysarthria and a general feeling of weakness.  Was also having wide swings in his blood pressures at home.  His provider noted risk factors for sleep apnea, so they are suggesting he have a sleep study done.  Speech and Occupational therapy assessments have also been completed in the interim.  They are treating him for dysarthria, swallow and right arm weakness.    He has additional history of hypotension (treated with midodrine), type 2 diabetes.       I spoke with Mr. Delcid over the phone today.  He reports that he still has some slurring, but otherwise he has noticed improvement of his symptoms.  The choking has stopped.    He has been going up to [place] for his therapies.  He is off this week, because he had to have eye surgery and is not able to do any rigorous therapy as he recovers.    He confirms that he was not on an aspirin at the time of the stroke.  He says his pressure has been  fluctuating at home.  He had one reading where hisDBP was above 100, but otherwise it tends to be in the 90s.  His SBP has been in the 160's.  As he still also has low pressures at times as well.  He says he was worked up at [place] for this and had Holter monitoring at that time, probably around 2014/2015.    He is not a heavy snorer, and does not any known problems with breathing during sleep.  He has some back issues so he does not feel well-rested, in the mornings.     He does not smoke tobacco.  He is not a heavy drinker.    Past Medical History:   Diagnosis Date     Chronic low back pain 5/29/2014    Overview:  Follow at [facility] in [place]. Follow at [facility] in [place].     Essential hypertension 6/7/2003     Hyperlipidemia 3/4/2011     Orthostatic hypotension 12/26/2014    Overview:  Secondary to diabetic autonomic dysfunction?  Started midodrine Dec, 2014 Secondary to diabetic autonomic dysfunction?  Started midodrine Dec, 2014     Statin intolerance 2/13/2020     Type 2 diabetes mellitus (H) 12/26/2014    Type 2 diabetes, uncontrolled, with autonomic neuropathy (HCC)     Past Surgical History:   Procedure Laterality Date     FRACTURE TX, WRIST RT/LT Left 1997    Multiple surgeries, eventually needed fusion     HC SHOULDER ARTHROSCOPY, DX Right 1992     INTRAVITREAL INJECTION OU (BOTH EYES) Bilateral     Multiple injections     RETINAL REATTACHMENT              aspirin (ASA) 325 MG tablet, Take 1 tablet (325 mg) by mouth daily Starting November 13th after 81 mg aspirin therapy complete.       glipiZIDE (GLUCOTROL) 5 MG tablet, Take 5 mg by mouth daily       lovastatin (MEVACOR) 10 MG tablet, Take 1 tablet (10 mg) by mouth At Bedtime (Patient taking differently: Take 40 mg by mouth At Bedtime )       metFORMIN (GLUCOPHAGE-XR) 500 MG 24 hr tablet, Take 1,000 mg by mouth 2 times daily        midodrine (PROAMATINE) 5 MG tablet, Take 5 mg by mouth 3 times daily as needed       omeprazole  (PRILOSEC) 10 MG DR capsule, Take 10 mg by mouth daily       glipiZIDE (GLUCOTROL) 5 MG tablet, Take 5 mg by mouth daily    No current facility-administered medications on file prior to visit.     Allergies   Allergen Reactions     Aspirin Other (See Comments)     : Ringing in ears     Atorvastatin      Other reaction(s): Myalgias     Cortisone Hives     Fludrocortisone      Other reaction(s): Hypertension  head pounding at night     Hydrochlorothiazide      Other reaction(s): Syncope  per note 2012     Penicillins Hives     Simvastatin      Other reaction(s): Other (See Comments)  drowsy        Problem (# of Occurrences) Relation (Name,Age of Onset)    Coronary Artery Disease (1) Father    Diabetes (2) Mother:  age 49 of pneumonia, Father    Esophageal Cancer (1) Brother    Heart Failure (1) Father    Hypertension (1) Father    Myocardial Infarction (1) Father: Stents    No Known Problems (2) Sister, Brother        Social History     Tobacco Use     Smoking status: Never Smoker     Smokeless tobacco: Current User     Types: Snuff   Substance Use Topics     Alcohol use: No     Drug use: No       REVIEW OF SYSTEMS:                                                      10-point review of systems is negative except as mentioned above in HPI.     EXAM:                                                      Physical Exam:   Vitals: There were no vitals taken for this visit.  BMI= There is no height or weight on file to calculate BMI.  GENERAL: NAD.  Speech is slightly dysarthric at times.    Right hand dominant.    Relevant Data:  MRI Brain (10.22.20):  FINDINGS:  There is a small acute ischemic infarct involving the left cuca  (series 7 image 52, series 8 image 12), measuring approximately 1 cm  in anterior-posterior dimension. No other areas of abnormal  intracranial restricted diffusion identified.     The ventricles are normal in size and configuration. Moderate patchy  T2/FLAIR hyperintense signal in the  periventricular and deep cerebral  white matter and cuca, nonspecific, but most likely related to chronic  small vessel ischemic disease. Mild generalized brain parenchymal  volume loss. No intracranial hemorrhage, mass lesion or mass effect.  No abnormal intracranial postcontrast enhancement evident.     Bilateral orbits appear normal. Mild mucosal thickening in the ethmoid  air cells. The major vascular flow voids of the skull base are grossly  maintained. The calvarium, skull base and mid face are otherwise  grossly unremarkable.                                                                  IMPRESSION:  1. Small acute left pontine infarct. No hemorrhage or mass effect.  2. Brain atrophy and presumed chronic small vessel ischemic disease,  as described.     CTA Head and Neck (10.22.20):  IMPRESSION:      HEAD CTA:   1.  Mild multifocal stenoses as described.     NECK CTA:  1.  Normal neck CTA.  2.  Multiple dental caries.    Echocardiogram (10.23.20):  Interpretation Summary    1. Left ventricular systolic function is normal. The visual ejection fraction  is estimated at 55-60%.  2. No regional wall motion abnormalities noted.  3. The right ventricle is normal in structure, function and size.  4. The aortic valve is trileaflet with aortic valve sclerosis.  5. There is no color Doppler evidence of an atrial shunt. A contrast injection  (Bubble Study) was performed that was negative for flow across the interatrial  Septum.    Imaging reviewed by me.  Agree with radiology read.    ASSESSMENT and PLAN:                                                      Assessment:     ICD-10-CM    1. Left pontine stroke (H)  I63.50         Bhavin is a pleasant 54-year-old man who was referred to neurology for follow-up regarding left pontine stroke.  He feels that his recovery is going well, and plans to continue his therapies up in [place].  We discussed his risk factors for future strokes.  I do encourage him to continue the  statin and the daily regular strength aspirin.  Will defer to his PCP with regards to managing the blood pressure.  I encouraged Chris to get in touch with Dr. Nuñez about his recent numbers.  I will try to get the records from [place] with regards to the cardiac monitoring that was completed.  The patient mentioned some skipped beats when we were talking today.  No known history of an arrhythmia though.  I would like to meet with Chris in clinic in about 3 months for a physical exam and to follow-up.  Chris understands and agrees with the plan.    Plan:  -- Continue the daily aspirin and lovastatin.  -- Continue to work with your primary care provider with regards to managing your blood pressure.  -- Follow-up in Neurology clinic in 3 months.    Total Time: 15 minutes were spent on the phone. More than 50% of the time spent on counseling (as described above in Assessment and Plan) /coordinating the care.    Glo Graff MD  Neurology    CC: Gus Nuñez MD    Dragon software used in the dictation of this note.        Again, thank you for allowing me to participate in the care of your patient.        Sincerely,        Glo Graff MD

## 2020-12-01 NOTE — PROGRESS NOTES
"Chris Delcid is a 54 year old male who is being evaluated via a billable telephone visit.      The patient has been notified of following:     \"This telephone visit will be conducted via a call between you and your physician/provider. We have found that certain health care needs can be provided without the need for a physical exam.  This service lets us provide the care you need with a short phone conversation.  If a prescription is necessary we can send it directly to your pharmacy.  If lab work is needed we can place an order for that and you can then stop by our lab to have the test done at a later time.    Telephone visits are billed at different rates depending on your insurance coverage. During this emergency period, for some insurers they may be billed the same as an in-person visit.  Please reach out to your insurance provider with any questions.    If during the course of the call the physician/provider feels a telephone visit is not appropriate, you will not be charged for this service.\"    Patient has given verbal consent for Telephone visit?  Yes    What phone number would you like to be contacted at? [phone]    How would you like to obtain your AVS? Mail a copy    Phone call duration: 15 minutes    *See accompanying note for visit details.    Glo Graff MD      "

## 2020-12-01 NOTE — PROGRESS NOTES
INITIAL NEUROLOGY CONSULTATION VIRTUAL VISIT    DATE OF VISIT: 12/1/2020  MRN: [id number]  PATIENT NAME: Chris Delcid  YOB: 1965    REFERRING PROVIDER: Solomon Gold    Chief Complaint   Patient presents with     New Patient     Left Pontine CVA; Ataxia.  Referral by [facility]       SUBJECTIVE:                                                      HPI:   Chris Delcid is a 54 year old male whom I have been asked by the [facility] inpatient team to see in consultation for Left pontine stroke. Per chart review, the patient presented to the [facility] ED on 10.22.20 with generalized weakness and some word finding difficulty.  Blood pressure was very high in the ED. exam revealed dysarthria and expressive aphasia.  Otherwise it appears his neurologic exam was normal.  NIHSS: 3.    Head CT did not show any acute findings.  CTA showed multifocal stenosis intracranially.  Normal neck CTA.  Brain MRI showed a small acute left pontine infarct, as well as atrophy and SVID.  Echocardiogram was unremarkable.  He was given aspirin and Plavix.  tPA was not done due to him being outside of the window.  No history of atrial fibrillation.  And was to continue the DAP for 3 weeks and then switch to aspirin monotherapy.  He was started on a statin.  LDL had been 136 earlier this year.  Hemoglobin A1c was 6.1%.  Plan follow-up with his primary provider, he reported ongoing dysarthria and a general feeling of weakness.  Was also having wide swings in his blood pressures at home.  His provider noted risk factors for sleep apnea, so they are suggesting he have a sleep study done.  Speech and Occupational therapy assessments have also been completed in the interim.  They are treating him for dysarthria, swallow and right arm weakness.    He has additional history of hypotension (treated with midodrine), type 2 diabetes.       I spoke with Mr. Delcid over the phone today.  He reports that he still has some slurring,  but otherwise he has noticed improvement of his symptoms.  The choking has stopped.    He has been going up to [place] for his therapies.  He is off this week, because he had to have eye surgery and is not able to do any rigorous therapy as he recovers.    He confirms that he was not on an aspirin at the time of the stroke.  He says his pressure has been fluctuating at home.  He had one reading where hisDBP was above 100, but otherwise it tends to be in the 90s.  His SBP has been in the 160's.  As he still also has low pressures at times as well.  He says he was worked up at [facility] for this and had Holter monitoring at that time, probably around 2014/2015.    He is not a heavy snorer, and does not any known problems with breathing during sleep.  He has some back issues so he does not feel well-rested, in the mornings.     He does not smoke tobacco.  He is not a heavy drinker.    Past Medical History:   Diagnosis Date     Chronic low back pain 5/29/2014    Overview:  Follow at [facility] in [place]. Follow at [facility] in [place].     Essential hypertension 6/7/2003     Hyperlipidemia 3/4/2011     Orthostatic hypotension 12/26/2014    Overview:  Secondary to diabetic autonomic dysfunction?  Started midodrine Dec, 2014 Secondary to diabetic autonomic dysfunction?  Started midodrine Dec, 2014     Statin intolerance 2/13/2020     Type 2 diabetes mellitus (H) 12/26/2014    Type 2 diabetes, uncontrolled, with autonomic neuropathy (HCC)     Past Surgical History:   Procedure Laterality Date     FRACTURE TX, WRIST RT/LT Left 1997    Multiple surgeries, eventually needed fusion     HC SHOULDER ARTHROSCOPY, DX Right 1992     INTRAVITREAL INJECTION OU (BOTH EYES) Bilateral     Multiple injections     RETINAL REATTACHMENT              aspirin (ASA) 325 MG tablet, Take 1 tablet (325 mg) by mouth daily Starting November 13th after 81 mg aspirin therapy complete.       glipiZIDE (GLUCOTROL) 5 MG tablet, Take 5  mg by mouth daily       lovastatin (MEVACOR) 10 MG tablet, Take 1 tablet (10 mg) by mouth At Bedtime (Patient taking differently: Take 40 mg by mouth At Bedtime )       metFORMIN (GLUCOPHAGE-XR) 500 MG 24 hr tablet, Take 1,000 mg by mouth 2 times daily        midodrine (PROAMATINE) 5 MG tablet, Take 5 mg by mouth 3 times daily as needed       omeprazole (PRILOSEC) 10 MG DR capsule, Take 10 mg by mouth daily       glipiZIDE (GLUCOTROL) 5 MG tablet, Take 5 mg by mouth daily    No current facility-administered medications on file prior to visit.     Allergies   Allergen Reactions     Aspirin Other (See Comments)     : Ringing in ears     Atorvastatin      Other reaction(s): Myalgias     Cortisone Hives     Fludrocortisone      Other reaction(s): Hypertension  head pounding at night     Hydrochlorothiazide      Other reaction(s): Syncope  per note 2012     Penicillins Hives     Simvastatin      Other reaction(s): Other (See Comments)  drowsy        Problem (# of Occurrences) Relation (Name,Age of Onset)    Coronary Artery Disease (1) Father    Diabetes (2) Mother:  age 49 of pneumonia, Father    Esophageal Cancer (1) Brother    Heart Failure (1) Father    Hypertension (1) Father    Myocardial Infarction (1) Father: Stents    No Known Problems (2) Sister, Brother        Social History     Tobacco Use     Smoking status: Never Smoker     Smokeless tobacco: Current User     Types: Snuff   Substance Use Topics     Alcohol use: No     Drug use: No       REVIEW OF SYSTEMS:                                                      10-point review of systems is negative except as mentioned above in HPI.     EXAM:                                                      Physical Exam:   Vitals: There were no vitals taken for this visit.  BMI= There is no height or weight on file to calculate BMI.  GENERAL: NAD.  Speech is slightly dysarthric at times.    Right hand dominant.    Relevant Data:  MRI Brain  (10.22.20):  FINDINGS:  There is a small acute ischemic infarct involving the left cuca  (series 7 image 52, series 8 image 12), measuring approximately 1 cm  in anterior-posterior dimension. No other areas of abnormal  intracranial restricted diffusion identified.     The ventricles are normal in size and configuration. Moderate patchy  T2/FLAIR hyperintense signal in the periventricular and deep cerebral  white matter and cuca, nonspecific, but most likely related to chronic  small vessel ischemic disease. Mild generalized brain parenchymal  volume loss. No intracranial hemorrhage, mass lesion or mass effect.  No abnormal intracranial postcontrast enhancement evident.     Bilateral orbits appear normal. Mild mucosal thickening in the ethmoid  air cells. The major vascular flow voids of the skull base are grossly  maintained. The calvarium, skull base and mid face are otherwise  grossly unremarkable.                                                                  IMPRESSION:  1. Small acute left pontine infarct. No hemorrhage or mass effect.  2. Brain atrophy and presumed chronic small vessel ischemic disease,  as described.     CTA Head and Neck (10.22.20):  IMPRESSION:      HEAD CTA:   1.  Mild multifocal stenoses as described.     NECK CTA:  1.  Normal neck CTA.  2.  Multiple dental caries.    Echocardiogram (10.23.20):  Interpretation Summary    1. Left ventricular systolic function is normal. The visual ejection fraction  is estimated at 55-60%.  2. No regional wall motion abnormalities noted.  3. The right ventricle is normal in structure, function and size.  4. The aortic valve is trileaflet with aortic valve sclerosis.  5. There is no color Doppler evidence of an atrial shunt. A contrast injection  (Bubble Study) was performed that was negative for flow across the interatrial  Septum.    Imaging reviewed by me.  Agree with radiology read.    ASSESSMENT and PLAN:                                                       Assessment:     ICD-10-CM    1. Left pontine stroke (H)  I63.50         Bhavin is a pleasant 54-year-old man who was referred to neurology for follow-up regarding left pontine stroke.  He feels that his recovery is going well, and plans to continue his therapies up in [place].  We discussed his risk factors for future strokes.  I do encourage him to continue the statin and the daily regular strength aspirin.  Will defer to his PCP with regards to managing the blood pressure.  I encouraged Chris to get in touch with Dr. Nuñez about his recent numbers.  I will try to get the records from [place] with regards to the cardiac monitoring that was completed.  The patient mentioned some skipped beats when we were talking today.  No known history of an arrhythmia though.  I would like to meet with Chris in clinic in about 3 months for a physical exam and to follow-up.  Chris understands and agrees with the plan.    Plan:  -- Continue the daily aspirin and lovastatin.  -- Continue to work with your primary care provider with regards to managing your blood pressure.  -- Follow-up in Neurology clinic in 3 months.    Total Time: 15 minutes were spent on the phone. More than 50% of the time spent on counseling (as described above in Assessment and Plan) /coordinating the care.    Glo Graff MD  Neurology    CC: MD Robbie Johnson software used in the dictation of this note.

## 2020-12-01 NOTE — NURSING NOTE
"Initial There were no vitals taken for this visit. Estimated body mass index is 37.34 kg/m  as calculated from the following:    Height as of 10/22/20: 1.854 m (6' 1\").    Weight as of 11/16/20: 128.4 kg (283 lb).     Patti POEA      "

## 2023-05-03 ENCOUNTER — TRANSFERRED RECORDS (OUTPATIENT)
Dept: MULTI SPECIALTY CLINIC | Facility: CLINIC | Age: 58
End: 2023-05-03

## 2023-05-03 LAB — RETINOPATHY: NORMAL

## 2023-12-10 ENCOUNTER — HOSPITAL ENCOUNTER (INPATIENT)
Facility: CLINIC | Age: 58
LOS: 4 days | Discharge: HOME | DRG: 305 | End: 2023-12-15
Attending: EMERGENCY MEDICINE | Admitting: STUDENT IN AN ORGANIZED HEALTH CARE EDUCATION/TRAINING PROGRAM
Payer: COMMERCIAL

## 2023-12-10 ENCOUNTER — APPOINTMENT (OUTPATIENT)
Dept: CT IMAGING | Facility: CLINIC | Age: 58
DRG: 305 | End: 2023-12-10
Attending: EMERGENCY MEDICINE
Payer: COMMERCIAL

## 2023-12-10 ENCOUNTER — APPOINTMENT (OUTPATIENT)
Dept: GENERAL RADIOLOGY | Facility: CLINIC | Age: 58
DRG: 305 | End: 2023-12-10
Attending: EMERGENCY MEDICINE
Payer: COMMERCIAL

## 2023-12-10 DIAGNOSIS — L03.113 CELLULITIS OF RIGHT UPPER EXTREMITY: ICD-10-CM

## 2023-12-10 DIAGNOSIS — F17.220 CHEWING TOBACCO NICOTINE DEPENDENCE, UNCOMPLICATED: Primary | ICD-10-CM

## 2023-12-10 DIAGNOSIS — I16.0 HYPERTENSIVE URGENCY: ICD-10-CM

## 2023-12-10 DIAGNOSIS — I16.9 HYPERTENSIVE CRISIS: ICD-10-CM

## 2023-12-10 DIAGNOSIS — R53.1 GENERALIZED WEAKNESS: ICD-10-CM

## 2023-12-10 PROBLEM — G90.3 NEUROGENIC ORTHOSTATIC HYPOTENSION (H): Status: ACTIVE | Noted: 2021-11-05

## 2023-12-10 PROBLEM — I69.359 HEMIPARESIS AFFECTING DOMINANT SIDE AS LATE EFFECT OF STROKE (H): Status: ACTIVE | Noted: 2020-10-30

## 2023-12-10 PROBLEM — G47.33 OSA ON CPAP: Status: ACTIVE | Noted: 2022-02-08

## 2023-12-10 PROBLEM — I69.322 DYSARTHRIA AS LATE EFFECT OF STROKE: Status: ACTIVE | Noted: 2020-10-30

## 2023-12-10 PROBLEM — I42.8 NONISCHEMIC CARDIOMYOPATHY (H): Status: ACTIVE | Noted: 2021-11-05

## 2023-12-10 PROBLEM — I10 HTN (HYPERTENSION): Status: ACTIVE | Noted: 2021-11-05

## 2023-12-10 PROBLEM — Z86.73 HISTORY OF CEREBROVASCULAR ACCIDENT: Status: ACTIVE | Noted: 2020-10-26

## 2023-12-10 PROBLEM — G25.81 RESTLESS LEGS SYNDROME (RLS): Status: ACTIVE | Noted: 2023-12-10

## 2023-12-10 PROBLEM — K21.00 GASTROESOPHAGEAL REFLUX DISEASE WITH ESOPHAGITIS: Status: ACTIVE | Noted: 2023-12-10

## 2023-12-10 PROBLEM — I49.8 BIGEMINY: Status: ACTIVE | Noted: 2021-11-05

## 2023-12-10 LAB
ALBUMIN SERPL BCG-MCNC: 4.3 G/DL (ref 3.5–5.2)
ALBUMIN UR-MCNC: 100 MG/DL
ALP SERPL-CCNC: 74 U/L (ref 40–150)
ALT SERPL W P-5'-P-CCNC: 15 U/L (ref 0–70)
ANION GAP SERPL CALCULATED.3IONS-SCNC: 11 MMOL/L (ref 7–15)
APPEARANCE UR: CLEAR
AST SERPL W P-5'-P-CCNC: 17 U/L (ref 0–45)
BASOPHILS # BLD AUTO: 0.1 10E3/UL (ref 0–0.2)
BASOPHILS NFR BLD AUTO: 1 %
BILIRUB SERPL-MCNC: 0.4 MG/DL
BILIRUB UR QL STRIP: NEGATIVE
BUN SERPL-MCNC: 13.6 MG/DL (ref 8–23)
CALCIUM SERPL-MCNC: 9.3 MG/DL (ref 8.6–10)
CHLORIDE SERPL-SCNC: 103 MMOL/L (ref 98–107)
COLOR UR AUTO: YELLOW
CREAT SERPL-MCNC: 0.89 MG/DL (ref 0.67–1.17)
DEPRECATED HCO3 PLAS-SCNC: 25 MMOL/L (ref 22–29)
EGFRCR SERPLBLD CKD-EPI 2021: >90 ML/MIN/1.73M2
EOSINOPHIL # BLD AUTO: 0.2 10E3/UL (ref 0–0.7)
EOSINOPHIL NFR BLD AUTO: 3 %
ERYTHROCYTE [DISTWIDTH] IN BLOOD BY AUTOMATED COUNT: 13.3 % (ref 10–15)
GLUCOSE BLDC GLUCOMTR-MCNC: 143 MG/DL (ref 70–99)
GLUCOSE SERPL-MCNC: 146 MG/DL (ref 70–99)
GLUCOSE UR STRIP-MCNC: NEGATIVE MG/DL
HBA1C MFR BLD: 6.3 %
HCT VFR BLD AUTO: 45.3 % (ref 40–53)
HGB BLD-MCNC: 15.7 G/DL (ref 13.3–17.7)
HGB UR QL STRIP: NEGATIVE
HOLD SPECIMEN: NORMAL
HOLD SPECIMEN: NORMAL
IMM GRANULOCYTES # BLD: 0 10E3/UL
IMM GRANULOCYTES NFR BLD: 1 %
KETONES UR STRIP-MCNC: NEGATIVE MG/DL
LEUKOCYTE ESTERASE UR QL STRIP: NEGATIVE
LIPASE SERPL-CCNC: 24 U/L (ref 13–60)
LYMPHOCYTES # BLD AUTO: 1.5 10E3/UL (ref 0.8–5.3)
LYMPHOCYTES NFR BLD AUTO: 25 %
MCH RBC QN AUTO: 29.5 PG (ref 26.5–33)
MCHC RBC AUTO-ENTMCNC: 34.7 G/DL (ref 31.5–36.5)
MCV RBC AUTO: 85 FL (ref 78–100)
MONOCYTES # BLD AUTO: 0.5 10E3/UL (ref 0–1.3)
MONOCYTES NFR BLD AUTO: 8 %
NEUTROPHILS # BLD AUTO: 3.7 10E3/UL (ref 1.6–8.3)
NEUTROPHILS NFR BLD AUTO: 62 %
NITRATE UR QL: NEGATIVE
NRBC # BLD AUTO: 0 10E3/UL
NRBC BLD AUTO-RTO: 0 /100
PH UR STRIP: 6 [PH] (ref 5–7)
PLATELET # BLD AUTO: 357 10E3/UL (ref 150–450)
POTASSIUM SERPL-SCNC: 4.1 MMOL/L (ref 3.4–5.3)
PROT SERPL-MCNC: 7.4 G/DL (ref 6.4–8.3)
RBC # BLD AUTO: 5.32 10E6/UL (ref 4.4–5.9)
RBC URINE: 1 /HPF
SODIUM SERPL-SCNC: 139 MMOL/L (ref 135–145)
SP GR UR STRIP: 1.01 (ref 1–1.03)
SPERM #/AREA URNS HPF: PRESENT /HPF
TROPONIN T SERPL HS-MCNC: 13 NG/L
TROPONIN T SERPL HS-MCNC: 20 NG/L
UROBILINOGEN UR STRIP-MCNC: 2 MG/DL
WBC # BLD AUTO: 6 10E3/UL (ref 4–11)
WBC URINE: 1 /HPF

## 2023-12-10 PROCEDURE — 99285 EMERGENCY DEPT VISIT HI MDM: CPT | Mod: 25 | Performed by: EMERGENCY MEDICINE

## 2023-12-10 PROCEDURE — 93010 ELECTROCARDIOGRAM REPORT: CPT | Performed by: EMERGENCY MEDICINE

## 2023-12-10 PROCEDURE — 83690 ASSAY OF LIPASE: CPT | Performed by: EMERGENCY MEDICINE

## 2023-12-10 PROCEDURE — 96361 HYDRATE IV INFUSION ADD-ON: CPT | Performed by: EMERGENCY MEDICINE

## 2023-12-10 PROCEDURE — 96360 HYDRATION IV INFUSION INIT: CPT | Performed by: EMERGENCY MEDICINE

## 2023-12-10 PROCEDURE — G0378 HOSPITAL OBSERVATION PER HR: HCPCS

## 2023-12-10 PROCEDURE — 250N000013 HC RX MED GY IP 250 OP 250 PS 637: Performed by: PHYSICIAN ASSISTANT

## 2023-12-10 PROCEDURE — 96374 THER/PROPH/DIAG INJ IV PUSH: CPT

## 2023-12-10 PROCEDURE — 258N000003 HC RX IP 258 OP 636: Performed by: EMERGENCY MEDICINE

## 2023-12-10 PROCEDURE — 83036 HEMOGLOBIN GLYCOSYLATED A1C: CPT | Performed by: PHYSICIAN ASSISTANT

## 2023-12-10 PROCEDURE — 85025 COMPLETE CBC W/AUTO DIFF WBC: CPT | Performed by: EMERGENCY MEDICINE

## 2023-12-10 PROCEDURE — 36415 COLL VENOUS BLD VENIPUNCTURE: CPT | Performed by: EMERGENCY MEDICINE

## 2023-12-10 PROCEDURE — 93005 ELECTROCARDIOGRAM TRACING: CPT | Performed by: EMERGENCY MEDICINE

## 2023-12-10 PROCEDURE — 99223 1ST HOSP IP/OBS HIGH 75: CPT | Performed by: PHYSICIAN ASSISTANT

## 2023-12-10 PROCEDURE — 71046 X-RAY EXAM CHEST 2 VIEWS: CPT

## 2023-12-10 PROCEDURE — 250N000011 HC RX IP 250 OP 636: Mod: JZ | Performed by: PHYSICIAN ASSISTANT

## 2023-12-10 PROCEDURE — 70450 CT HEAD/BRAIN W/O DYE: CPT

## 2023-12-10 PROCEDURE — 84484 ASSAY OF TROPONIN QUANT: CPT | Performed by: EMERGENCY MEDICINE

## 2023-12-10 PROCEDURE — 250N000013 HC RX MED GY IP 250 OP 250 PS 637: Performed by: EMERGENCY MEDICINE

## 2023-12-10 PROCEDURE — 81001 URINALYSIS AUTO W/SCOPE: CPT | Performed by: EMERGENCY MEDICINE

## 2023-12-10 PROCEDURE — 82962 GLUCOSE BLOOD TEST: CPT

## 2023-12-10 PROCEDURE — 80053 COMPREHEN METABOLIC PANEL: CPT | Performed by: EMERGENCY MEDICINE

## 2023-12-10 RX ORDER — LISINOPRIL 10 MG/1
10 TABLET ORAL EVERY EVENING
Status: ON HOLD | COMMUNITY
End: 2023-12-15

## 2023-12-10 RX ORDER — PRAVASTATIN SODIUM 20 MG
40 TABLET ORAL EVERY EVENING
Status: DISCONTINUED | OUTPATIENT
Start: 2023-12-10 | End: 2023-12-15 | Stop reason: HOSPADM

## 2023-12-10 RX ORDER — PROCHLORPERAZINE 25 MG
25 SUPPOSITORY, RECTAL RECTAL EVERY 12 HOURS PRN
Status: DISCONTINUED | OUTPATIENT
Start: 2023-12-10 | End: 2023-12-15 | Stop reason: HOSPADM

## 2023-12-10 RX ORDER — TICAGRELOR 90 MG/1
90 TABLET ORAL 2 TIMES DAILY
COMMUNITY
End: 2024-06-21

## 2023-12-10 RX ORDER — ONDANSETRON 2 MG/ML
4 INJECTION INTRAMUSCULAR; INTRAVENOUS EVERY 6 HOURS PRN
Status: DISCONTINUED | OUTPATIENT
Start: 2023-12-10 | End: 2023-12-10

## 2023-12-10 RX ORDER — AMOXICILLIN 250 MG
1 CAPSULE ORAL 2 TIMES DAILY PRN
Status: DISCONTINUED | OUTPATIENT
Start: 2023-12-10 | End: 2023-12-15 | Stop reason: HOSPADM

## 2023-12-10 RX ORDER — ACETAMINOPHEN 325 MG/1
650 TABLET ORAL EVERY 4 HOURS PRN
Status: DISCONTINUED | OUTPATIENT
Start: 2023-12-10 | End: 2023-12-15 | Stop reason: HOSPADM

## 2023-12-10 RX ORDER — CALCIUM CARBONATE 500 MG/1
1000 TABLET, CHEWABLE ORAL 4 TIMES DAILY PRN
Status: DISCONTINUED | OUTPATIENT
Start: 2023-12-10 | End: 2023-12-15 | Stop reason: HOSPADM

## 2023-12-10 RX ORDER — HYDRALAZINE HYDROCHLORIDE 20 MG/ML
5 INJECTION INTRAMUSCULAR; INTRAVENOUS ONCE
Status: COMPLETED | OUTPATIENT
Start: 2023-12-10 | End: 2023-12-10

## 2023-12-10 RX ORDER — BLOOD-GLUCOSE METER
EACH MISCELLANEOUS
COMMUNITY
Start: 2023-04-13

## 2023-12-10 RX ORDER — LANCETS
EACH MISCELLANEOUS
COMMUNITY
Start: 2023-04-13

## 2023-12-10 RX ORDER — AMOXICILLIN 250 MG
2 CAPSULE ORAL 2 TIMES DAILY PRN
Status: DISCONTINUED | OUTPATIENT
Start: 2023-12-10 | End: 2023-12-15 | Stop reason: HOSPADM

## 2023-12-10 RX ORDER — FAMOTIDINE 40 MG/1
1 TABLET, FILM COATED ORAL
COMMUNITY
Start: 2023-09-11 | End: 2024-04-10

## 2023-12-10 RX ORDER — ACETAMINOPHEN 325 MG/1
650 TABLET ORAL EVERY 4 HOURS PRN
Status: DISCONTINUED | OUTPATIENT
Start: 2023-12-10 | End: 2023-12-11

## 2023-12-10 RX ORDER — PROCHLORPERAZINE MALEATE 10 MG
10 TABLET ORAL EVERY 6 HOURS PRN
Status: DISCONTINUED | OUTPATIENT
Start: 2023-12-10 | End: 2023-12-15 | Stop reason: HOSPADM

## 2023-12-10 RX ORDER — DEXTROSE MONOHYDRATE 25 G/50ML
25-50 INJECTION, SOLUTION INTRAVENOUS
Status: DISCONTINUED | OUTPATIENT
Start: 2023-12-10 | End: 2023-12-15 | Stop reason: HOSPADM

## 2023-12-10 RX ORDER — LISINOPRIL 10 MG/1
10 TABLET ORAL EVERY EVENING
Status: DISCONTINUED | OUTPATIENT
Start: 2023-12-11 | End: 2023-12-12

## 2023-12-10 RX ORDER — SODIUM CHLORIDE 9 MG/ML
INJECTION, SOLUTION INTRAVENOUS CONTINUOUS
Status: DISCONTINUED | OUTPATIENT
Start: 2023-12-10 | End: 2023-12-10

## 2023-12-10 RX ORDER — ONDANSETRON 2 MG/ML
4 INJECTION INTRAMUSCULAR; INTRAVENOUS EVERY 6 HOURS PRN
Status: DISCONTINUED | OUTPATIENT
Start: 2023-12-10 | End: 2023-12-15 | Stop reason: HOSPADM

## 2023-12-10 RX ORDER — ONDANSETRON 4 MG/1
4 TABLET, ORALLY DISINTEGRATING ORAL EVERY 6 HOURS PRN
Status: DISCONTINUED | OUTPATIENT
Start: 2023-12-10 | End: 2023-12-15 | Stop reason: HOSPADM

## 2023-12-10 RX ORDER — LISINOPRIL 10 MG/1
10 TABLET ORAL ONCE
Status: COMPLETED | OUTPATIENT
Start: 2023-12-10 | End: 2023-12-10

## 2023-12-10 RX ORDER — LOVASTATIN 40 MG
TABLET ORAL
COMMUNITY
Start: 2023-10-11 | End: 2024-04-10

## 2023-12-10 RX ORDER — GLIPIZIDE 5 MG/1
5 TABLET ORAL
COMMUNITY
End: 2024-08-14

## 2023-12-10 RX ORDER — METFORMIN HCL 500 MG
1000 TABLET, EXTENDED RELEASE 24 HR ORAL 2 TIMES DAILY
Status: DISCONTINUED | OUTPATIENT
Start: 2023-12-11 | End: 2023-12-15 | Stop reason: HOSPADM

## 2023-12-10 RX ORDER — L. ACIDOPHILUS/BIFIDO. LONGUM 15 MG
CAPSULE,DELAYED RELEASE (ENTERIC COATED) ORAL
COMMUNITY
Start: 2023-04-13

## 2023-12-10 RX ORDER — SODIUM CHLORIDE 9 MG/ML
1000 INJECTION, SOLUTION INTRAVENOUS CONTINUOUS
Status: DISCONTINUED | OUTPATIENT
Start: 2023-12-10 | End: 2023-12-10

## 2023-12-10 RX ORDER — ATORVASTATIN CALCIUM 10 MG/1
10 TABLET, FILM COATED ORAL DAILY
Status: DISCONTINUED | OUTPATIENT
Start: 2023-12-11 | End: 2023-12-10

## 2023-12-10 RX ORDER — ONDANSETRON 4 MG/1
4 TABLET, ORALLY DISINTEGRATING ORAL EVERY 6 HOURS PRN
Status: DISCONTINUED | OUTPATIENT
Start: 2023-12-10 | End: 2023-12-10

## 2023-12-10 RX ORDER — NICOTINE POLACRILEX 4 MG
15-30 LOZENGE BUCCAL
Status: DISCONTINUED | OUTPATIENT
Start: 2023-12-10 | End: 2023-12-15 | Stop reason: HOSPADM

## 2023-12-10 RX ORDER — GLIPIZIDE 5 MG/1
5 TABLET ORAL
Status: DISCONTINUED | OUTPATIENT
Start: 2023-12-11 | End: 2023-12-15 | Stop reason: HOSPADM

## 2023-12-10 RX ORDER — FAMOTIDINE 20 MG/1
40 TABLET, FILM COATED ORAL
Status: DISCONTINUED | OUTPATIENT
Start: 2023-12-10 | End: 2023-12-15 | Stop reason: HOSPADM

## 2023-12-10 RX ADMIN — FAMOTIDINE 40 MG: 20 TABLET, FILM COATED ORAL at 21:55

## 2023-12-10 RX ADMIN — LISINOPRIL 10 MG: 10 TABLET ORAL at 16:58

## 2023-12-10 RX ADMIN — PRAVASTATIN SODIUM 40 MG: 20 TABLET ORAL at 21:55

## 2023-12-10 RX ADMIN — TICAGRELOR 90 MG: 90 TABLET ORAL at 21:16

## 2023-12-10 RX ADMIN — SODIUM CHLORIDE: 9 INJECTION, SOLUTION INTRAVENOUS at 18:46

## 2023-12-10 RX ADMIN — SODIUM CHLORIDE 250 ML: 9 INJECTION, SOLUTION INTRAVENOUS at 14:30

## 2023-12-10 RX ADMIN — HYDRALAZINE HYDROCHLORIDE 5 MG: 20 INJECTION, SOLUTION INTRAMUSCULAR; INTRAVENOUS at 21:17

## 2023-12-10 ASSESSMENT — ACTIVITIES OF DAILY LIVING (ADL)
ADLS_ACUITY_SCORE: 23
ADLS_ACUITY_SCORE: 35
ADLS_ACUITY_SCORE: 23
ADLS_ACUITY_SCORE: 20
ADLS_ACUITY_SCORE: 35

## 2023-12-10 NOTE — ED PROVIDER NOTES
History     Chief Complaint   Patient presents with    Hypertension     HPI  Chris Delcid is a 57 year old male past medical history significant for ataxia, dysarthria CVA x 3 rest of aphasia orthostatic hypotension type 2 diabetes hyperlipidemia who presents emergency department complaining of elevated blood pressure and generalized weakness.  Patient states he has often gotten episodes of this in the past where his blood pressure was elevated and he feels a bit weak these usually last about 2 to 3 days he has felt a little off for the last week and noted his blood pressure to be significantly elevated and 175/110 at home.  Patient fell a week ago does not think he had a head but has had symptoms since then.  Denies any recent illness has not had any significant medication changes denies any headache chronic visual changes.  Denies any bowel or bladder dysfunction.  Has not had any significant leg swelling calf pain focal numbness weakness in extremity or bowel or bladder dysfunction.    Allergies:  Allergies   Allergen Reactions    Aspirin Other (See Comments)     : Ringing in ears    Atorvastatin      Other reaction(s): Myalgias    Cortisone Hives    Fludrocortisone      Other reaction(s): Hypertension  head pounding at night    Hydrochlorothiazide      Other reaction(s): Syncope  per note 08/28/2012    Pcn [Penicillins] Hives    Simvastatin      Other reaction(s): Other (See Comments)  drowsy       Problem List:    Patient Active Problem List    Diagnosis Date Noted    Ataxia 10/22/2020     Priority: Medium    Dysarthria 10/22/2020     Priority: Medium    Stroke-like symptoms 10/22/2020     Priority: Medium    Left pontine cerebrovascular accident (H) 10/22/2020     Priority: Medium    Old pontine infarct without late effect 10/22/2020     Priority: Medium    Expressive aphasia 10/22/2020     Priority: Medium    Statin intolerance 02/13/2020     Priority: Medium    Orthostatic hypotension 12/26/2014      Priority: Medium     Overview:   Secondary to diabetic autonomic dysfunction?  Started midodrine Dec, 2014  Secondary to diabetic autonomic dysfunction?  Started midodrine Dec, 2014      Type 2 diabetes mellitus (H) 2014     Priority: Medium     Type 2 diabetes, uncontrolled, with autonomic neuropathy (HCC)      Chronic low back pain 2014     Priority: Medium     Overview:   Follow at [facility] in [place].  Follow at [facility] in [place].      Hyperlipidemia 2011     Priority: Medium    Essential hypertension 2003     Priority: Medium        Past Medical History:    Past Medical History:   Diagnosis Date    Chronic low back pain 2014    Essential hypertension 2003    Hyperlipidemia 3/4/2011    Orthostatic hypotension 2014    Statin intolerance 2020    Type 2 diabetes mellitus (H) 2014       Past Surgical History:    Past Surgical History:   Procedure Laterality Date    FRACTURE TX, WRIST RT/LT Left     Multiple surgeries, eventually needed fusion    HC SHOULDER ARTHROSCOPY, DX Right     INTRAVITREAL INJECTION OU (BOTH EYES) Bilateral     Multiple injections    RETINAL REATTACHMENT         Family History:    Family History   Problem Relation Age of Onset    Diabetes Mother          age 49 of pneumonia    Diabetes Father     Heart Failure Father     Coronary Artery Disease Father     Myocardial Infarction Father         Stents    Hypertension Father     No Known Problems Sister     No Known Problems Brother     Esophageal Cancer Brother        Social History:  Marital Status:   [2]  Social History     Tobacco Use    Smoking status: Never    Smokeless tobacco: Current     Types: Snuff   Substance Use Topics    Alcohol use: No    Drug use: No        Medications:    aspirin (ASA) 325 MG tablet  glipiZIDE (GLUCOTROL) 5 MG tablet  lovastatin (MEVACOR) 10 MG tablet  metFORMIN (GLUCOPHAGE-XR) 500 MG 24 hr tablet  midodrine (PROAMATINE) 5  MG tablet  omeprazole (PRILOSEC) 10 MG DR capsule          Review of Systems  As per HPI.  Physical Exam   BP: (!) 184/104  Pulse: 88  Temp: 97.1  F (36.2  C)  Resp: 16  Height: 182.9 cm (6')  SpO2: 100 %  Lying Orthostatic BP: 154/115  Lying Orthostatic Pulse: 90 bpm  Sitting Orthostatic BP: 158/108  Sitting Orthostatic Pulse: 62 bpm  Standing Orthostatic BP: 126/94  Standing Orthostatic Pulse: 96 bpm      Physical Exam  Vitals and nursing note reviewed.   Constitutional:       General: He is not in acute distress.     Appearance: Normal appearance. He is not ill-appearing, toxic-appearing or diaphoretic.   HENT:      Head: Normocephalic and atraumatic.      Nose: Nose normal.      Mouth/Throat:      Mouth: Mucous membranes are moist.      Pharynx: Oropharynx is clear.   Eyes:      Conjunctiva/sclera: Conjunctivae normal.   Cardiovascular:      Rate and Rhythm: Normal rate and regular rhythm.      Pulses: Normal pulses.      Heart sounds: Normal heart sounds. No murmur heard.  Pulmonary:      Effort: Pulmonary effort is normal.      Breath sounds: Normal breath sounds. No stridor. No wheezing or rhonchi.   Abdominal:      General: Abdomen is flat. Bowel sounds are normal. There is no distension.      Palpations: Abdomen is soft.      Tenderness: There is no abdominal tenderness. There is no right CVA tenderness or left CVA tenderness.   Musculoskeletal:         General: No swelling or tenderness. Normal range of motion.      Cervical back: Normal range of motion and neck supple.      Right lower leg: No edema.      Left lower leg: No edema.   Skin:     General: Skin is warm and dry.      Findings: No rash.   Neurological:      General: No focal deficit present.      Mental Status: He is alert and oriented to person, place, and time.      Sensory: No sensory deficit.      Motor: No weakness.      Coordination: Coordination normal.      Comments: Slight dysarthria is present with mild weakness in bilateral upper  extremities and lower extremities but symmetrical no obvious focal neurologic deficit.   Psychiatric:         Mood and Affect: Mood normal.         ED Course                 Procedures              EKG Interpretation:      Interpreted by Yaniv Landeros MD  Rhythm: normal sinus   Rate: Normal  Axis: Normal  Ectopy: none  Conduction: normal  ST Segments/ T Waves: Non-specific ST-T wave changes  Q Waves: none  Comparison to prior: Unchanged from 11/16/20    Clinical Impression: Normal sinus rhythm with nonspecific ST-T wave changes.            Critical Care time:  none               Results for orders placed or performed during the hospital encounter of 12/10/23 (from the past 24 hour(s))   CBC with platelets differential    Narrative    The following orders were created for panel order CBC with platelets differential.  Procedure                               Abnormality         Status                     ---------                               -----------         ------                     CBC with platelets and d...[[id number]]                                                   Please view results for these tests on the individual orders.       Medications   sodium chloride 0.9% BOLUS 250 mL (has no administration in time range)   sodium chloride 0.9 % infusion (has no administration in time range)       Assessments & Plan (with Medical Decision Making) GERD reviewed.  Past medical history medications and allergies were reviewed.  He send office visit with cardiology on 6/30/2023 was reviewed.  Labs were obtained.  EKG revealed a sinus rhythm with nonspecific ST-T wave changes no change from previous.  I independently reviewed and interpreted labs.  A CT scan of the head was also obtained along with chest x-ray.  Statics were obtained.  Patient's blood pressure significantly decreased normal range when he stood up and felt a little lightheaded.  When lying down his blood pressure is elevated.  Patient takes his  blood pressure at night.  CBC was unremarkable.  Presents metabolic panel with a glucose of 146.  Analysis without significant abnormality.  Lipase 24 initial troponin 20-second troponin was 13.  Patient was given oral lisinopril.  Blood pressure remains elevated after fluid bolus.  At this time patient feels off balance and dizzy and I feel he would not do well at home.  He has previously been on midodrine for orthostatic hypotension and now has significant hypertension with lying still this was checked in both arms and his in the 190-200 range.  At this point I feel would be best to admit the patient for further evaluation and care.  Case was discussed with Sera VILLAVICENCIO with hospitalist service and she is agree with excepting the patient for admission.  Findings and plan discussed with  and wife throughout his stay and they are in agreement with admission.     I have reviewed the nursing notes.    I have reviewed the findings, diagnosis, plan and need for follow up with the patient.         Current Discharge Medication List          Final diagnoses:   Hypertensive urgency   Generalized weakness       12/10/2023   [facility] EMERGENCY DEPT       Yaniv Landeros MD  12/11/23 [id number]

## 2023-12-10 NOTE — ED NOTES
History of 3 strokes last one in August, has generalized weakness as a result.  Per patient and significant other patient has periods where he is more weak, lasting 2-3 days. Currently has had generalized weakness over 1 week.  Also hypertensive.

## 2023-12-10 NOTE — MEDICATION SCRIBE - ADMISSION MEDICATION HISTORY
Medication Scribe Admission Medication History    Admission medication history is complete. The information provided in this note is only as accurate as the sources available at the time of the update.    Information Source(s): Patient via in-person    Pertinent Information: pt also receives bilat eye injections, monthly    Changes made to PTA medication list:  Added: lisinopril, famotidine, brilinta, artificial tears  Deleted: asa, midodrine, omeprazole  Changed: glipizide 5 mg every day to BID, lovastatin 10 mg to 40 mg,      Medication Affordability:  Not including over the counter (OTC) medications, was there a time in the past 3 months when you did not take your medications as prescribed because of cost?: No    Allergies reviewed with patient and updates made in EHR: yes    Medication History Completed By: Annabel Dela Cruz 12/10/2023 5:58 PM    PTA Med List   Medication Sig Last Dose    blood glucose monitoring (SOFTCLIX) lancets TEST BLOOD SUGAR ONCE DAILY     BRILINTA 90 MG tablet Take 90 mg by mouth 2 times daily 12/10/2023 at am    famotidine (PEPCID) 40 MG tablet Take 1 tablet by mouth 2 times daily 12/10/2023 at am    glipiZIDE (GLUCOTROL) 5 MG tablet Take 5 mg by mouth 2 times daily (before meals) 12/10/2023 at am    hypromellose (ARTIFICIAL TEARS) 0.5 % SOLN ophthalmic solution Place 1 drop into both eyes 4 times daily as needed for dry eyes 12/10/2023 at am    [employer] BLOOD GLUCOSE TEST test strip Test 1 times per day.     lisinopril (ZESTRIL) 10 MG tablet Take 10 mg by mouth every evening 12/9/2023 at pm    lovastatin (MEVACOR) 40 MG tablet TAKE ONE TABLET BY MOUTH ONCE DAILY WITH EVENING MEAL 12/9/2023 at pm    metFORMIN (GLUCOPHAGE-XR) 500 MG 24 hr tablet Take 1,000 mg by mouth 2 times daily  12/10/2023 at am

## 2023-12-10 NOTE — ED NOTES
[facility]   Admission Handoff    The patient is Chris Delcid, 57 year old who arrived in the ED by WALKED from emergency room with a complaint of Hypertension  . The patient's current symptoms are new and during this time the symptoms have increased. In the ED, patient was diagnosed with   Final diagnoses:   Hypertensive urgency   Generalized weakness         Needed?: No    Allergies:    Allergies   Allergen Reactions    Aspirin Other (See Comments)     : Ringing in ears    Atorvastatin      Other reaction(s): Myalgias    Cortisone Hives    Fludrocortisone      Other reaction(s): Hypertension  head pounding at night    Hydrochlorothiazide      Other reaction(s): Syncope  per note 08/28/2012    Pcn [Penicillins] Hives    Simvastatin      Other reaction(s): Other (See Comments)  drowsy       Past Medical Hx:   Past Medical History:   Diagnosis Date    Chronic low back pain 5/29/2014    Overview:  Follow at [facility] in [place]. Follow at [facility] in [place].    Essential hypertension 6/7/2003    Hyperlipidemia 3/4/2011    Orthostatic hypotension 12/26/2014    Overview:  Secondary to diabetic autonomic dysfunction?  Started midodrine Dec, 2014 Secondary to diabetic autonomic dysfunction?  Started midodrine Dec, 2014    Statin intolerance 2/13/2020    Type 2 diabetes mellitus (H) 12/26/2014    Type 2 diabetes, uncontrolled, with autonomic neuropathy (HCC)       Initial vitals were: BP: (!) 184/104  Pulse: 88  Temp: 97.1  F (36.2  C)  Resp: 16  Height: 182.9 cm (6')  SpO2: 100 %   Recent vital Signs: BP (!) 179/113   Pulse 85   Temp 97.1  F (36.2  C) (Tympanic)   Resp 16   Ht 1.829 m (6')   SpO2 98%   BMI 38.38 kg/m      Elimination Status: Continent: Yes     Activity Level: SBA w/ walker with cane    Fall Status: Reason for falls risk:  Mobility  room door open and toileting schedule implemented    Baseline Mental status: WDL history of CVA's slurred  speech at baseline  Current Mental Status changes: at basesline    Infection present or suspected this encounter: no  Sepsis suspected: No    Isolation type: none      Bariatric equipment needed?: No    In the ED these meds were given:   Medications   sodium chloride 0.9 % infusion (has no administration in time range)   sodium chloride 0.9% BOLUS 250 mL (0 mLs Intravenous Stopped 12/10/23 1602)   lisinopril (ZESTRIL) tablet 10 mg (10 mg Oral $Given 12/10/23 0133)       Drips running?  No    Home pump  No    Current LDAs: Peripheral IV: Site Right AC; Gauge 20g  none     Results:   Labs/Imaging  Ordered and Resulted from Time of ED Arrival Up to the Time of Departure from the ED  Results for orders placed or performed during the hospital encounter of 12/10/23 (from the past 24 hour(s))   CBC with platelets differential    Narrative    The following orders were created for panel order CBC with platelets differential.  Procedure                               Abnormality         Status                     ---------                               -----------         ------                     CBC with platelets and d...[[id number]]                      Final result                 Please view results for these tests on the individual orders.   Comprehensive metabolic panel   Result Value Ref Range    Sodium 139 135 - 145 mmol/L    Potassium 4.1 3.4 - 5.3 mmol/L    Carbon Dioxide (CO2) 25 22 - 29 mmol/L    Anion Gap 11 7 - 15 mmol/L    Urea Nitrogen 13.6 8.0 - 23.0 mg/dL    Creatinine 0.89 0.67 - 1.17 mg/dL    GFR Estimate >90 >60 mL/min/1.73m2    Calcium 9.3 8.6 - 10.0 mg/dL    Chloride 103 98 - 107 mmol/L    Glucose 146 (H) 70 - 99 mg/dL    Alkaline Phosphatase 74 40 - 150 U/L    AST 17 0 - 45 U/L    ALT 15 0 - 70 U/L    Protein Total 7.4 6.4 - 8.3 g/dL    Albumin 4.3 3.5 - 5.2 g/dL    Bilirubin Total 0.4 <=1.2 mg/dL   Lipase   Result Value Ref Range    Lipase 24 13 - 60 U/L   Troponin T, High Sensitivity   Result Value  Ref Range    Troponin T, High Sensitivity 20 <=22 ng/L   CBC with platelets and differential   Result Value Ref Range    WBC Count 6.0 4.0 - 11.0 10e3/uL    RBC Count 5.32 4.40 - 5.90 10e6/uL    Hemoglobin 15.7 13.3 - 17.7 g/dL    Hematocrit 45.3 40.0 - 53.0 %    MCV 85 78 - 100 fL    MCH 29.5 26.5 - 33.0 pg    MCHC 34.7 31.5 - 36.5 g/dL    RDW 13.3 10.0 - 15.0 %    Platelet Count 357 150 - 450 10e3/uL    % Neutrophils 62 %    % Lymphocytes 25 %    % Monocytes 8 %    % Eosinophils 3 %    % Basophils 1 %    % Immature Granulocytes 1 %    NRBCs per 100 WBC 0 <1 /100    Absolute Neutrophils 3.7 1.6 - 8.3 10e3/uL    Absolute Lymphocytes 1.5 0.8 - 5.3 10e3/uL    Absolute Monocytes 0.5 0.0 - 1.3 10e3/uL    Absolute Eosinophils 0.2 0.0 - 0.7 10e3/uL    Absolute Basophils 0.1 0.0 - 0.2 10e3/uL    Absolute Immature Granulocytes 0.0 <=0.4 10e3/uL    Absolute NRBCs 0.0 10e3/uL   [facility] Draw    Narrative    The following orders were created for panel order [place] Draw.  Procedure                               Abnormality         Status                     ---------                               -----------         ------                     Extra Blue Top Tube[[id number]]                              Final result               Extra Red Top Tube[[id number]]                               Final result                 Please view results for these tests on the individual orders.   Extra Blue Top Tube   Result Value Ref Range    Hold Specimen JIC    Extra Red Top Tube   Result Value Ref Range    Hold Specimen     CT Head w/o Contrast    Narrative    EXAM: CT HEAD W/O CONTRAST  LOCATION: [facility]  DATE: 12/10/2023    INDICATION: Fall. Head trauma. Evaluate for intracranial hemorrhage.  COMPARISON: 10/22/2020 head CT.  TECHNIQUE: Routine CT Head without IV contrast. Multiplanar reformats. Dose reduction techniques were used.    FINDINGS:  INTRACRANIAL CONTENTS: No intracranial hemorrhage, extraaxial  collection, or mass effect.  No CT evidence of acute infarct. Moderate to severe burden presumed chronic small vessel ischemic changes. Mild to moderate generalized volume loss. No   hydrocephalus.     VISUALIZED ORBITS/SINUSES/MASTOIDS: No intraorbital abnormality. No paranasal sinus mucosal disease. No middle ear or mastoid effusion.    BONES/SOFT TISSUES: No skull fracture. No scalp hematoma.      Impression    IMPRESSION:  1.  Age-related changes as above with no acute intracranial abnormality.   Chest XR,  PA & LAT    Narrative    EXAM: XR CHEST 2 VIEWS  LOCATION: [facility]  DATE: 12/10/2023    INDICATION: Dizziness lightheadedness rule out infection  COMPARISON: None.      Impression    IMPRESSION:     Cardiac silhouette is normal in size. Normal mediastinal interfaces. There is a loop recorder in the left anterior chest wall.    Minimal atelectasis in the left base near the apex of the left ventricle. The lungs are otherwise clear. Normal vascularity. No pleural effusion.   UA with Microscopic reflex to Culture    Specimen: Urine, Midstream   Result Value Ref Range    Color Urine Yellow Colorless, Straw, Light Yellow, Yellow    Appearance Urine Clear Clear    Glucose Urine Negative Negative mg/dL    Bilirubin Urine Negative Negative    Ketones Urine Negative Negative mg/dL    Specific Gravity Urine 1.010 1.003 - 1.035    Blood Urine Negative Negative    pH Urine 6.0 5.0 - 7.0    Protein Albumin Urine 100 (A) Negative mg/dL    Urobilinogen Urine 2.0 Normal, 2.0 mg/dL    Nitrite Urine Negative Negative    Leukocyte Esterase Urine Negative Negative    Sperm Urine Present (A) None Seen /HPF    RBC Urine 1 <=2 /HPF    WBC Urine 1 <=5 /HPF    Narrative    Urine Culture not indicated   Troponin T, High Sensitivity   Result Value Ref Range    Troponin T, High Sensitivity 13 <=22 ng/L       For the majority of the shift this patient's behavior was Green     Cardiac Rhythm: Other  Pt needs  tele? No  Skin/wound Issues: None    Code Status: Full Code    Pain control: good    Nausea control: good    Abnormal labs/tests/findings requiring intervention: n/a    Patient tested for COVID 19 prior to admission: NO     OBS brochure/video discussed/provided to patient/family: Yes     Family present during ED course? Yes     Family Comments/Social Situation comments: significant other at bedside    Tasks needing completion: None    Tish Verma RN

## 2023-12-10 NOTE — ED TRIAGE NOTES
Pt in today for elevated blood pressure.  Has not been feeling well x 1 week, feeling week and dizzy.  BP at home 174/110.  Pt has been taking medications as prescribed.  Hx of stroke x 2.  Hx of blurry vision (retinal issue), also patient's baseline.  Pt collapsed last Sunday, states he was feeling weak and dizzy at that time.     Triage Assessment (Adult)       Row Name 12/10/23 3889          Triage Assessment    Airway WDL WDL        Respiratory WDL    Respiratory WDL WDL        Skin Circulation/Temperature WDL    Skin Circulation/Temperature WDL WDL        Cardiac WDL    Cardiac WDL X  hypertensive        Peripheral/Neurovascular WDL    Peripheral Neurovascular WDL X  numbness/tinging in lower extremitieso-patient's baseline        Cognitive/Neuro/Behavioral WDL    Cognitive/Neuro/Behavioral WDL WDL

## 2023-12-11 ENCOUNTER — APPOINTMENT (OUTPATIENT)
Dept: OCCUPATIONAL THERAPY | Facility: CLINIC | Age: 58
DRG: 305 | End: 2023-12-11
Payer: COMMERCIAL

## 2023-12-11 PROBLEM — I16.9 HYPERTENSIVE CRISIS: Status: ACTIVE | Noted: 2023-12-11

## 2023-12-11 LAB
ANION GAP SERPL CALCULATED.3IONS-SCNC: 10 MMOL/L (ref 7–15)
BASOPHILS # BLD AUTO: 0.1 10E3/UL (ref 0–0.2)
BASOPHILS NFR BLD AUTO: 1 %
BUN SERPL-MCNC: 11.3 MG/DL (ref 8–23)
CALCIUM SERPL-MCNC: 9 MG/DL (ref 8.6–10)
CHLORIDE SERPL-SCNC: 103 MMOL/L (ref 98–107)
CREAT SERPL-MCNC: 0.86 MG/DL (ref 0.67–1.17)
DEPRECATED HCO3 PLAS-SCNC: 25 MMOL/L (ref 22–29)
EGFRCR SERPLBLD CKD-EPI 2021: >90 ML/MIN/1.73M2
EOSINOPHIL # BLD AUTO: 0.2 10E3/UL (ref 0–0.7)
EOSINOPHIL NFR BLD AUTO: 4 %
ERYTHROCYTE [DISTWIDTH] IN BLOOD BY AUTOMATED COUNT: 13.4 % (ref 10–15)
GLUCOSE BLDC GLUCOMTR-MCNC: 133 MG/DL (ref 70–99)
GLUCOSE BLDC GLUCOMTR-MCNC: 164 MG/DL (ref 70–99)
GLUCOSE BLDC GLUCOMTR-MCNC: 186 MG/DL (ref 70–99)
GLUCOSE BLDC GLUCOMTR-MCNC: 194 MG/DL (ref 70–99)
GLUCOSE BLDC GLUCOMTR-MCNC: 209 MG/DL (ref 70–99)
GLUCOSE SERPL-MCNC: 170 MG/DL (ref 70–99)
HCT VFR BLD AUTO: 42.6 % (ref 40–53)
HGB BLD-MCNC: 14.8 G/DL (ref 13.3–17.7)
IMM GRANULOCYTES # BLD: 0 10E3/UL
IMM GRANULOCYTES NFR BLD: 1 %
LYMPHOCYTES # BLD AUTO: 1.5 10E3/UL (ref 0.8–5.3)
LYMPHOCYTES NFR BLD AUTO: 23 %
MCH RBC QN AUTO: 29.5 PG (ref 26.5–33)
MCHC RBC AUTO-ENTMCNC: 34.7 G/DL (ref 31.5–36.5)
MCV RBC AUTO: 85 FL (ref 78–100)
MONOCYTES # BLD AUTO: 0.6 10E3/UL (ref 0–1.3)
MONOCYTES NFR BLD AUTO: 9 %
NEUTROPHILS # BLD AUTO: 4 10E3/UL (ref 1.6–8.3)
NEUTROPHILS NFR BLD AUTO: 62 %
NRBC # BLD AUTO: 0 10E3/UL
NRBC BLD AUTO-RTO: 0 /100
PLATELET # BLD AUTO: 347 10E3/UL (ref 150–450)
POTASSIUM SERPL-SCNC: 3.7 MMOL/L (ref 3.4–5.3)
RBC # BLD AUTO: 5.01 10E6/UL (ref 4.4–5.9)
SODIUM SERPL-SCNC: 138 MMOL/L (ref 135–145)
WBC # BLD AUTO: 6.5 10E3/UL (ref 4–11)

## 2023-12-11 PROCEDURE — 250N000013 HC RX MED GY IP 250 OP 250 PS 637: Performed by: PHYSICIAN ASSISTANT

## 2023-12-11 PROCEDURE — 85025 COMPLETE CBC W/AUTO DIFF WBC: CPT | Performed by: PHYSICIAN ASSISTANT

## 2023-12-11 PROCEDURE — 82962 GLUCOSE BLOOD TEST: CPT

## 2023-12-11 PROCEDURE — 82384 ASSAY THREE CATECHOLAMINES: CPT | Performed by: STUDENT IN AN ORGANIZED HEALTH CARE EDUCATION/TRAINING PROGRAM

## 2023-12-11 PROCEDURE — 120N000001 HC R&B MED SURG/OB

## 2023-12-11 PROCEDURE — 97165 OT EVAL LOW COMPLEX 30 MIN: CPT | Mod: GO

## 2023-12-11 PROCEDURE — G0378 HOSPITAL OBSERVATION PER HR: HCPCS

## 2023-12-11 PROCEDURE — 97530 THERAPEUTIC ACTIVITIES: CPT | Mod: GO

## 2023-12-11 PROCEDURE — 36415 COLL VENOUS BLD VENIPUNCTURE: CPT | Performed by: PHYSICIAN ASSISTANT

## 2023-12-11 PROCEDURE — 250N000012 HC RX MED GY IP 250 OP 636 PS 637: Performed by: PHYSICIAN ASSISTANT

## 2023-12-11 PROCEDURE — 999N000147 HC STATISTIC PT IP EVAL DEFER

## 2023-12-11 PROCEDURE — 99232 SBSQ HOSP IP/OBS MODERATE 35: CPT | Performed by: STUDENT IN AN ORGANIZED HEALTH CARE EDUCATION/TRAINING PROGRAM

## 2023-12-11 PROCEDURE — 82310 ASSAY OF CALCIUM: CPT | Performed by: PHYSICIAN ASSISTANT

## 2023-12-11 PROCEDURE — 36415 COLL VENOUS BLD VENIPUNCTURE: CPT | Performed by: STUDENT IN AN ORGANIZED HEALTH CARE EDUCATION/TRAINING PROGRAM

## 2023-12-11 PROCEDURE — 250N000011 HC RX IP 250 OP 636: Mod: JZ | Performed by: PHYSICIAN ASSISTANT

## 2023-12-11 PROCEDURE — 250N000013 HC RX MED GY IP 250 OP 250 PS 637: Performed by: STUDENT IN AN ORGANIZED HEALTH CARE EDUCATION/TRAINING PROGRAM

## 2023-12-11 RX ORDER — NIFEDIPINE 30 MG/1
30 TABLET, EXTENDED RELEASE ORAL DAILY
Status: DISCONTINUED | OUTPATIENT
Start: 2023-12-11 | End: 2023-12-12

## 2023-12-11 RX ORDER — CLONIDINE HYDROCHLORIDE 0.2 MG/1
0.2 TABLET ORAL ONCE
Status: COMPLETED | OUTPATIENT
Start: 2023-12-11 | End: 2023-12-11

## 2023-12-11 RX ORDER — CLONIDINE HYDROCHLORIDE 0.1 MG/1
0.1 TABLET ORAL
Status: DISCONTINUED | OUTPATIENT
Start: 2023-12-11 | End: 2023-12-15 | Stop reason: HOSPADM

## 2023-12-11 RX ADMIN — PRAVASTATIN SODIUM 40 MG: 20 TABLET ORAL at 17:40

## 2023-12-11 RX ADMIN — NIFEDIPINE 30 MG: 30 TABLET, FILM COATED, EXTENDED RELEASE ORAL at 09:41

## 2023-12-11 RX ADMIN — LABETALOL HYDROCHLORIDE 50 MG: 100 TABLET, FILM COATED ORAL at 20:40

## 2023-12-11 RX ADMIN — CLONIDINE HYDROCHLORIDE 0.2 MG: 0.2 TABLET ORAL at 09:34

## 2023-12-11 RX ADMIN — INSULIN ASPART 2 UNITS: 100 INJECTION, SOLUTION INTRAVENOUS; SUBCUTANEOUS at 17:42

## 2023-12-11 RX ADMIN — GLIPIZIDE 5 MG: 5 TABLET ORAL at 17:40

## 2023-12-11 RX ADMIN — FAMOTIDINE 40 MG: 20 TABLET, FILM COATED ORAL at 09:34

## 2023-12-11 RX ADMIN — CLONIDINE HYDROCHLORIDE 0.1 MG: 0.1 TABLET ORAL at 12:02

## 2023-12-11 RX ADMIN — INSULIN ASPART 1 UNITS: 100 INJECTION, SOLUTION INTRAVENOUS; SUBCUTANEOUS at 12:03

## 2023-12-11 RX ADMIN — TICAGRELOR 90 MG: 90 TABLET ORAL at 20:41

## 2023-12-11 RX ADMIN — LISINOPRIL 10 MG: 10 TABLET ORAL at 17:41

## 2023-12-11 RX ADMIN — ACETAMINOPHEN 650 MG: 325 TABLET, FILM COATED ORAL at 15:39

## 2023-12-11 RX ADMIN — ONDANSETRON 4 MG: 2 INJECTION INTRAMUSCULAR; INTRAVENOUS at 08:37

## 2023-12-11 RX ADMIN — FAMOTIDINE 40 MG: 20 TABLET, FILM COATED ORAL at 17:40

## 2023-12-11 RX ADMIN — TICAGRELOR 90 MG: 90 TABLET ORAL at 09:34

## 2023-12-11 RX ADMIN — GLIPIZIDE 5 MG: 5 TABLET ORAL at 06:35

## 2023-12-11 ASSESSMENT — ACTIVITIES OF DAILY LIVING (ADL)
ADLS_ACUITY_SCORE: 23
ADLS_ACUITY_SCORE: 23
ADLS_ACUITY_SCORE: 24
ADLS_ACUITY_SCORE: 23
ADLS_ACUITY_SCORE: 24
ADLS_ACUITY_SCORE: 23
ADLS_ACUITY_SCORE: 24
ADLS_ACUITY_SCORE: 23
ADLS_ACUITY_SCORE: 24
ADLS_ACUITY_SCORE: 24

## 2023-12-11 NOTE — PLAN OF CARE
Problem: Adult Inpatient Plan of Care  Goal: Absence of Hospital-Acquired Illness or Injury  Intervention: Identify and Manage Fall Risk  Recent Flowsheet Documentation  Taken 12/11/2023 0000 by Devick, Karen M, RN  Safety Promotion/Fall Prevention:   activity supervised   clutter free environment maintained   nonskid shoes/slippers when out of bed   mobility aid in reach   safety round/check completed   room organization consistent  Intervention: Prevent Skin Injury  Recent Flowsheet Documentation  Taken 12/11/2023 0000 by Devick, Karen M, RN  Body Position: position changed independently  Intervention: Prevent Infection  Recent Flowsheet Documentation  Taken 12/11/2023 0000 by Devick, Karen M, RN  Infection Prevention: rest/sleep promoted     Problem: Hypertension Acute  Goal: Blood Pressure Within Desired Range  Outcome: Progressing     Goal Outcome Evaluation:  Patient is on telemetry in Sinus Rhythm. Patient alert and oriented. Up with assist of one and his own walker. SBP less than 180.

## 2023-12-11 NOTE — H&P
"[facility]    History and Physical - Hospitalist Service       Date of Admission:  12/10/2023    Assessment & Plan      Chris Delcid is a 57 year old male admitted on 12/10/2023. He has a past medical history significant for dysarthria and ataxia secondary to multiple prior strokes, hypertension, orthostatic hypotension, type 2 diabetes mellitus, hyperlipidemia, GERD, and restless leg syndrome who presented to the emergency department for evaluation of feeling lightheaded / dizzy intermittently for the past week and was found to have hypertensive urgency for which he is being admitted for further evaluation and treatment.     Hypertensive urgency  Orthostatic hypotension  Essential hypertension  Presented with blood pressure as high as 208/112. Hasn't felt \"right\" for the past week, especially in the mornings; he feels dizzy and lightheaded when making coffee. Has a known history of hypertension managed with lisinopril 10 mg q pm. Also has known orthostatic hypotension, was formerly on midodrine but this was discontinued by cardiology after his last stroke.   Patient asymptomatic, no evidence of end organ damage.   Was given his home lisinopril in the emergency department with slight improvement in blood pressure.   Suspect patient may have supine / nocturnal hypertension in addition to his orthostasis, making him difficult to manage.  - Give IV hydralazine 5 mg x 1 dose and monitor blood pressure closely; goal SBP not less than 160 for gradual lowering  - If still elevated, could consider oral hydralazine 25 mg and monitor closely  - Blood pressure checks q1h x 6 occurrences  - Orthostatic vitals  - PT/OT evaluations due to history of orthostasis / syncope    History of cerebrovascular accident  Hemiparesis affecting dominant side as late effect of stroke (H)  Ataxia  Dysphagia   Dysarthria as late effect of stroke.  Hyperlipidemia  Prior history of 3 strokes (Oct 2020, Nov 2021, Aug " 2022) with residual dysarthria, dysphagia, hemiparesis, and ataxia. No known history of arrhythmias. Is managed with Brilinta 90 mg bid and lovastatin 40 mg q pm.  - Continue Brilinta and statin (formulary substitution with pravastatin)    Type 2 diabetes mellitus  HgbA1c 6.3. Managed prior to admission with metformin 1000 mg bid and glipizide 5 mg bid.   - Continue metformin and glipizide  - Medium sliding scale insulin   - Hyper/hypoglycemia protocols  - Consistent carbohydrate diet    Nonischemic cardiomyopathy  Follows with cardiology through [facility]. Last echo in 2022 with EF 60-65%, normal LV and RV size and function. Has a loop recorder in situ, but no known arrhythmias. Is on lisinopril noted above, but no beta blockers or diuretics.   Appears euvolemic, but reports gasping for air at night.   - See above regarding lisinopril  - Would benefit from outpatient cardiology, possibly echo    Chronic low back pain  Chronic and stable. Not on any chronic narcotics.  - Prn medications available    Gastroesophageal reflux disease with esophagitis  Continue home Pepcid.    Restless legs syndrome (RLS)  Continue home Requip.    ADITI  Formerly used a CPAP, but stopped after developing aspiration problems.            Observation Goals: -diagnostic tests and consults completed and resulted, -vital signs normal or at patient baseline, -safe disposition plan has been identified, Nurse to notify provider when observation goals have been met and patient is ready for discharge.  Diet: Combination Diet Low Saturated Fat Diet  DVT Prophylaxis: Low Risk/Ambulatory with no VTE prophylaxis indicated  Hodges Catheter: Not present  Lines: None     Cardiac Monitoring: ACTIVE order. Indication: Tachyarrhythmias, acute (48 hours)  Code Status: Full Code - discussed at time of admission     Clinically Significant Risk Factors Present on Admission                # Drug Induced Platelet Defect: home medication list includes an antiplatelet  "medication   # Hypertension: Noted on problem list                 Disposition Plan      Expected Discharge Date: 12/11/2023                The patient's care was discussed with the Attending Physician, Dr. Zaki Lugo and Patient.    Samira Keating PA-C  Hospitalist Service  [facility]  Securely message with [employer] (more info)  Text page via [facility] Paging/Directory     ______________________________________________________________________    Chief Complaint   \"I haven't been feeling quite right.\"    History is obtained from the patient, review of EMR, and emergency department sign out from Dr. Sanjeev Rice.     History of Present Illness   Chris Delcid is a 57 year old male who presented to the emergency department for evaluation of intermittent dizziness.     He has a known history of orthostatic hypotension, was formerly on midodrine which was discontinued after a prior stroke.   Also has known hypertension and takes lisinopril 10 mg daily.    He fell once last week which sounds like a syncopal episode.   Fall was witnessed by his girlfriend, said he doesn't think he lost consciousness, denies head trauma or other traumatic injuries.   He says this happens quite frequently, so he did not seek medical care.    Four days ago he was working in the shop in the morning and felt nauseated and dizzy.   Since then he has felt dizzy, specifically in the mornings when getting up, moving around, and getting coffee.  He has nausea and sometimes vomits, often feels near syncopal.      Today he presented to the emergency department today because symptoms were not getting better.  He was found to have hypertensive urgency but is asymptomatic at this time.     Review of systems   For the past number of months he often feels short of breath, but no acute respiratory changes.   Has dysphagia after his strokes and frequently chokes when he swallows, especially on rice. No acute changes.  Has had " "alternating diarrhea and constipation over the last week or two.  No dysuria or urinary problems.  No rash, hearing changes.  Has bilateral blurry vision since his prior strokes, no acute changes. Gets eye injections every 6 weeks.   Wakes at night gasping for air, has been occurring since last stroke.   Used to use CPAP, but got aspiration pneumonia so not using it anymore.      Past Medical History    Past Medical History:   Diagnosis Date    Chronic low back pain 5/29/2014    Overview:  Follow at [facility] in [place]. Follow at [facility] in [place].    Essential hypertension 6/7/2003    Hyperlipidemia 3/4/2011    Orthostatic hypotension 12/26/2014    Overview:  Secondary to diabetic autonomic dysfunction?  Started midodrine Dec, 2014 Secondary to diabetic autonomic dysfunction?  Started midodrine Dec, 2014    Statin intolerance 2/13/2020    Type 2 diabetes mellitus (H) 12/26/2014    Type 2 diabetes, uncontrolled, with autonomic neuropathy (HCC)       Past Surgical History   Past Surgical History:   Procedure Laterality Date    FRACTURE TX, WRIST RT/LT Left 1997    Multiple surgeries, eventually needed fusion    HC SHOULDER ARTHROSCOPY, DX Right 1992    INTRAVITREAL INJECTION OU (BOTH EYES) Bilateral     Multiple injections    RETINAL REATTACHMENT         Prior to Admission Medications   Prior to Admission Medications   Prescriptions Last Dose Informant Patient Reported? Taking?   BRILINTA 90 MG tablet 12/10/2023 at am Self Yes Yes   Sig: Take 90 mg by mouth 2 times daily   Blood Glucose Monitoring Suppl (ACCU-CHEK GUIDE) w/Device KIT  Self Yes Yes   Sig: TEST BLOOD SUGAR ONCE DAILY   [facility] test strip  Self Yes Yes   Sig: Test 1 times per day.   UNABLE TO FIND Past Month at unknown Self Yes Yes   Sig: Place 1 drop into both eyes every 30 days MEDICATION NAME: Injection in each eye-\" different medications for each eye\" every month   blood glucose monitoring (SOFTCLIX) lancets "  Self Yes Yes   Sig: TEST BLOOD SUGAR ONCE DAILY   famotidine (PEPCID) 40 MG tablet 12/10/2023 at am Self Yes Yes   Sig: Take 1 tablet by mouth 2 times daily   glipiZIDE (GLUCOTROL) 5 MG tablet 12/10/2023 at am Self Yes Yes   Sig: Take 5 mg by mouth 2 times daily (before meals)   hypromellose (ARTIFICIAL TEARS) 0.5 % SOLN ophthalmic solution 12/10/2023 at am Self Yes Yes   Sig: Place 1 drop into both eyes 4 times daily as needed for dry eyes   lisinopril (ZESTRIL) 10 MG tablet 2023 at pm Self Yes Yes   Sig: Take 10 mg by mouth every evening   lovastatin (MEVACOR) 40 MG tablet 2023 at pm Self Yes Yes   Sig: TAKE ONE TABLET BY MOUTH ONCE DAILY WITH EVENING MEAL   metFORMIN (GLUCOPHAGE-XR) 500 MG 24 hr tablet 12/10/2023 at am Self Yes Yes   Sig: Take 1,000 mg by mouth 2 times daily       Facility-Administered Medications: None        Review of Systems    The 10 point Review of Systems is negative other than noted in the HPI or here.     Social History   I have reviewed this patient's social history and updated it with pertinent information if needed.  Social History     Tobacco Use    Smoking status: Never    Smokeless tobacco: Current     Types: Snuff   Substance Use Topics    Alcohol use: No    Drug use: No         Family History   I have reviewed this patient's family history and updated it with pertinent information if needed.  Family History   Problem Relation Age of Onset    Diabetes Mother          age 49 of pneumonia    Diabetes Father     Heart Failure Father     Coronary Artery Disease Father     Myocardial Infarction Father         Stents    Hypertension Father     No Known Problems Sister     No Known Problems Brother     Esophageal Cancer Brother         Physical Exam   Vital Signs: Temp: 98.1  F (36.7  C) Temp src: Oral BP: (!) 195/107 Pulse: 82   Resp: 20 SpO2: 96 % O2 Device: None (Room air)    Weight: 0 lbs 0 oz    Constitutional: Alert, oriented, cooperative. No apparent distress,  appears nontoxic. Speaking in full sentences, although speech slightly slurred.    Eyes: Eyes are clear, pupils are reactive. No scleral icterus.     HEENT: Oropharynx is clear and moist, no lesions. Normocephalic, no evidence of cranial trauma.      Cardiovascular: Regular rhythm and rate, normal S1 and S2. No murmur, rubs, or gallops. Peripheral pulses intact bilaterally. No lower extremity edema.    Respiratory: Non-labored breathing on room air. Lung sounds are clear to auscultation bilaterally without wheezes, rhonchi, or crackles.    GI: Soft, non-distended. Non-tender, no rebound or guarding. No hepatosplenomegaly or masses appreciated. Normal bowel sounds.     Musculoskeletal: Without obvious deformity, normal range of motion. Normal muscle bulk and tone. Distal CMS intact.      Skin: Warm and dry, no rashes or ecchymoses. No mottling of skin.      Neurologic: Patient moves all extremities. Gross strength and sensation are equal bilaterally.    Genitourinary: Deferred      Medical Decision Making       75 MINUTES SPENT BY ME on the date of service doing chart review, history, exam, documentation & further activities per the note.  MANAGEMENT DISCUSSED with the following over the past 24 hours: Dr. Zaki Lugo   NOTE(S)/MEDICAL RECORDS REVIEWED over the past 24 hours: Care Everywhere - cardiology visit June 2023, hospitalizations Oct 2020, Nov 2021, Aug 2022   Tests ORDERED & REVIEWED in the past 24 hours:  - BMP  - CBC  - Troponin  - UA  Tests personally interpreted in the past 24 hours:      Data     I have personally reviewed the following data over the past 24 hrs:    6.0  \   15.7   / 357     139 103 13.6 /  146 (H)   4.1 25 0.89 \     ALT: 15 AST: 17 AP: 74 TBILI: 0.4   ALB: 4.3 TOT PROTEIN: 7.4 LIPASE: 24     Trop: 13 BNP: N/A     TSH: N/A T4: N/A A1C: 6.3 (H)       Imaging results reviewed over the past 24 hrs:   Recent Results (from the past 24 hour(s))   CT Head w/o Contrast    Narrative    EXAM: CT  HEAD W/O CONTRAST  LOCATION: [facility]  DATE: 12/10/2023    INDICATION: Fall. Head trauma. Evaluate for intracranial hemorrhage.  COMPARISON: 10/22/2020 head CT.  TECHNIQUE: Routine CT Head without IV contrast. Multiplanar reformats. Dose reduction techniques were used.    FINDINGS:  INTRACRANIAL CONTENTS: No intracranial hemorrhage, extraaxial collection, or mass effect.  No CT evidence of acute infarct. Moderate to severe burden presumed chronic small vessel ischemic changes. Mild to moderate generalized volume loss. No   hydrocephalus.     VISUALIZED ORBITS/SINUSES/MASTOIDS: No intraorbital abnormality. No paranasal sinus mucosal disease. No middle ear or mastoid effusion.    BONES/SOFT TISSUES: No skull fracture. No scalp hematoma.      Impression    IMPRESSION:  1.  Age-related changes as above with no acute intracranial abnormality.   Chest XR,  PA & LAT    Narrative    EXAM: XR CHEST 2 VIEWS  LOCATION: [facility]  DATE: 12/10/2023    INDICATION: Dizziness lightheadedness rule out infection  COMPARISON: None.      Impression    IMPRESSION:     Cardiac silhouette is normal in size. Normal mediastinal interfaces. There is a loop recorder in the left anterior chest wall.    Minimal atelectasis in the left base near the apex of the left ventricle. The lungs are otherwise clear. Normal vascularity. No pleural effusion.

## 2023-12-11 NOTE — PROGRESS NOTES
12/11/23 0900   Appointment Info   Signing Clinician's Name / Credentials (OT) Gertrude Campos, OTR/L   Living Environment   People in Home significant other   Current Living Arrangements house   Home Accessibility stairs to enter home   Number of Stairs, Main Entrance 3   Stair Railings, Main Entrance railings on both sides of stairs   Living Environment Comments significant other is an RN.   Self-Care   Equipment Currently Used at Home walker, rolling;cane, straight;grab bar, toilet;grab bar, tub/shower   Fall history within last six months yes   Number of times patient has fallen within last six months 1  (Pt states d/t dizziness.)   Activity/Exercise/Self-Care Comment Uses rolling walker out in the community. FWW or cane within the home.   General Information   Onset of Illness/Injury or Date of Surgery 12/10/23   Referring Physician Samira Keating PA-C   Patient/Family Therapy Goal Statement (OT) to return home.   Additional Occupational Profile Info/Pertinent History of Current Problem Chris Delcid is a 57 year old male admitted on 12/10/2023. He has a past medical history significant for dysarthria and ataxia secondary to multiple prior strokes, hypertension, orthostatic hypotension, type 2 diabetes mellitus, hyperlipidemia, GERD, and restless leg syndrome who presented to the emergency department for evaluation of feeling lightheaded / dizzy intermittently for the past week and was found to have hypertensive urgency for which he is being admitted for further evaluation and treatment.   Existing Precautions/Restrictions fall   Cognitive Status Examination   Orientation Status orientation to person, place and time   Pain Assessment   Patient Currently in Pain No   Strength Comprehensive (MMT)   Comment, General Manual Muscle Testing (MMT) Assessment B UE strength: WFL. Pt denies any acute weakness.   Bed Mobility   Comment (Bed Mobility) independent   Transfers   Transfer Comments independent    Balance   Balance Comments steady on feet using 4WW within room. Able to complete tight turns within bathroom with no difficulties. Denies any new balance deficits.   Activities of Daily Living   BADL Assessment/Intervention lower body dressing;upper body dressing;toileting   Upper Body Dressing Assessment/Training   Cobb Level (Upper Body Dressing) independent   Lower Body Dressing Assessment/Training   Cobb Level (Lower Body Dressing) supervision   Toileting   Cobb Level (Toileting) supervision   Clinical Impression   Criteria for Skilled Therapeutic Interventions Met (OT) Yes, treatment indicated   OT Diagnosis assess ADLS for safe discharge.   Influenced by the following impairments orthostatic with hx of stroke.   Planned Therapy Interventions (OT) progressive activity/exercise   Clinical Decision Making Complexity (OT) problem focused assessment/low complexity   Risk & Benefits of therapy have been explained patient;participants included;participants voiced agreement with care plan;risks/benefits reviewed;current/potential barriers reviewed;care plan/treatment goals reviewed;evaluation/treatment results reviewed   OT Total Evaluation Time   OT Eval, Low Complexity Minutes (00345) 10   OT Goals   Therapy Frequency (OT) One time eval and treatment   OT Predicted Duration/Target Date for Goal Attainment 12/11/23   OT Goals OT Goal 1   OT: Goal 1 Pt will verbalize understanding to recommended activity while admitted in hospital to maintain strength/activity tolerance in prep for safe return home.   Interventions   Interventions Quick Adds Therapeutic Activity   Therapeutic Activities   Therapeutic Activity Minutes (86063) 10   Treatment Detail/Skilled Intervention Educated pt on technique to decrease dizziness/lightheaded when dressing and showering, verbalizes understanding. Educated pt on recommended activity while IP to assist with maintaining strength/activity tolerance in prep for  safe return home. Pt declines any further IP OT or PT needs.   OT Discharge Planning   OT Plan 1x treat. no further IP OT needs identified. Pt appears to be at baseline. Recommend continued activity with nursing staff.   OT Discharge Recommendation (DC Rec) home   OT Rationale for DC Rec Based on assessment and conversation with pt- pt is at baseline for ADLs and related mobility using 4WW.   OT Brief overview of current status independent with ADLs and related mobility using 4WW   Total Session Time   Timed Code Treatment Minutes 10   Total Session Time (sum of timed and untimed services) 20

## 2023-12-11 NOTE — UTILIZATION REVIEW
Admission Status; Secondary Review Determination         Under the authority of the Utilization Management Committee, the utilization review process indicated a secondary review on the above patient.  The review outcome is based on review of the medical records, discussions with staff, and applying clinical experience noted on the date of the review.        (x)      Inpatient Status Appropriate - This patient's medical care is consistent with medical management for inpatient care and reasonable inpatient medical practice.     RATIONALE FOR DETERMINATION   The patient is a 57-year-old male admitted on 12/10/2023.  Patient has had significant past medical history for prior strokes along with history of orthostatic hypotension.  He has diabetes mellitus type 2.  He has a history of obstructive sleep apnea and does not use a CPAP machine currently due to previous history of possible aspiration pneumonia.  He came to the ED because of feeling lightheaded and dizzy intermittently over the prior week.  He was admitted for hypertensive emergency.  Blood pressure noted in the chart is 219/128 and current blood pressure is 192/112.  He was given IV hydralazine.  Blood pressures remained labile.  Based on criteria for hypertensive urgency and appropriate admission for inpatient criteria (see below MCG criteria) he is appropriate to remain as inpatient status pending stabilization of severe hypertension.    MCG - Admission is indicated for 1 or more of the following:  Expand Hypertensive emergency indicated by SBP greater than 180 mm Hg or DBP greater than 120 mm Hg[A] with evidence of acute or worsening target organ damage.      The severity of illness, intensity of service provided, expected LOS and risk for adverse outcome make the care complex, high risk and appropriate for hospital admission.        The information on this document is developed by the utilization review team in order for the business office to ensure  compliance.  This only denotes the appropriateness of proper admission status and does not reflect the quality of care rendered.         The definitions of Inpatient Status and Observation Status used in making the determination above are those provided in the CMS Coverage Manual, Chapter 1 and Chapter 6, section 70.4.      Sincerely,     Tomas Her MD  Physician Advisor  Utilization Review/ Case Management  [facility].

## 2023-12-11 NOTE — PROGRESS NOTES
"[facility]    Medicine Progress Note - Hospitalist Service    Date of Admission:  12/10/2023    Assessment & Plan      Chris Delcid is a 57 year old male admitted on 12/10/2023. He has a past medical history significant for dysarthria and ataxia secondary to multiple prior strokes, hypertension, orthostatic hypotension, type 2 diabetes mellitus, hyperlipidemia, GERD, and restless leg syndrome who presented to the emergency department for evaluation of feeling lightheaded / dizzy intermittently for the past week and was found to have hypertensivecrisis for which he is being admitted for further evaluation and treatment.     Hypertensive Crisis   Orthostatic hypotension  Essential hypertension  Presented with blood pressure as high as 208/112. Hasn't felt \"right\" for the past week, especially in the mornings; he feels dizzy and lightheaded when making coffee. Has a known history of hypertension managed with lisinopril 10 mg q pm. Also has known orthostatic hypotension, was formerly on midodrine but this was discontinued by cardiology after his last stroke.   Patient asymptomatic, no evidence of end organ damage.   Was given his home lisinopril in the emergency department with slight improvement in blood pressure.   Suspect patient may have supine / nocturnal hypertension in addition to his orthostasis, making him difficult to manage.  Orthostatic vital signs 12/11: Positive for SBP drop and increase in HR  Plasma norepi pending (<220 could indicate use of atomoxetine but will hold off until BP controlled)    - clonidine 0.2mg once () followed by 0.1mg q1h for SBP >160 for 5 total doses  - started nifedipine 30mg qAM, normodyne 50mg BID to start PM 12/11  - Isac Murphy  - Fall precautions 12/11 while lowering blood pressure  - PT/OT evaluations due to history of orthostasis / syncope    History of cerebrovascular accident  Hemiparesis affecting dominant side as late effect of stroke " (H)  Ataxia  Dysphagia   Dysarthria as late effect of stroke.  Hyperlipidemia  Prior history of 3 strokes (Oct 2020, Nov 2021, Aug 2022) with residual dysarthria, dysphagia, hemiparesis, and ataxia. No known history of arrhythmias. Is managed with Brilinta 90 mg bid and lovastatin 40 mg q pm.  - Continue Brilinta and statin (formulary substitution with pravastatin)    Type 2 diabetes mellitus  Diabetic Retinopathy  Diabetic neuropathy  HgbA1c 6.3. Managed prior to admission with metformin 1000 mg bid and glipizide 5 mg bid.   - Continue glipizide  - Medium sliding scale insulin   - Hyper/hypoglycemia protocols  - Consistent carbohydrate diet    Nonischemic cardiomyopathy  Follows with cardiology through [facility]. Last echo in 2022 with EF 60-65%, normal LV and RV size and function. Has a loop recorder in situ, but no known arrhythmias. Is on lisinopril noted above, but no beta blockers or diuretics.   Appears euvolemic, but reports gasping for air at night.   - See above regarding lisinopril  - Would benefit from outpatient cardiology, possibly echo    Chronic low back pain  Chronic and stable. Not on any chronic narcotics.  - Prn medications available    Gastroesophageal reflux disease with esophagitis  Continue home Pepcid.    Restless legs syndrome (RLS)  Continue home Requip.    ADITI  Formerly used a CPAP, but stopped after developing aspiration problems.            Observation Goals: -diagnostic tests and consults completed and resulted, -vital signs normal or at patient baseline, -safe disposition plan has been identified, Nurse to notify provider when observation goals have been met and patient is ready for discharge.  Diet: Combination Diet Moderate Consistent Carb (60 g CHO per Meal) Diet; Low Saturated Fat Diet    DVT Prophylaxis: Low Risk/Ambulatory with no VTE prophylaxis indicated  Hodges Catheter: Not present  Lines: None     Cardiac Monitoring: ACTIVE order. Indication: Tachyarrhythmias, acute (48  hours)  Code Status: Full Code      Clinically Significant Risk Factors Present on Admission                # Drug Induced Platelet Defect: home medication list includes an antiplatelet medication   # Hypertension: Noted on problem list                 Disposition Plan      Expected Discharge Date: 12/11/2023                    Tanmay Reid DO  Hospitalist Service  [facility]  Securely message with [employer] (more info)  Text page via [employer] Paging/Directory   ______________________________________________________________________    Interval History   No acute events. Nauseated in AM with some lightheadedness. . Orthostatics + for SBP drop with increase in HR .    Physical Exam   Vital Signs: Temp: 97.5  F (36.4  C) Temp src: Oral BP: (!) 192/112 Pulse: 89   Resp: 18 SpO2: 98 % O2 Device: None (Room air)    Weight: 0 lbs 0 oz    Physical Exam  Constitutional:       General: He is not in acute distress.  HENT:      Head: Normocephalic and atraumatic.   Eyes:      Comments: Reduced upwards end gaze holding  Reduced lateral end gaze holding  No nystagmus  Pupils minimally reactive to light bilaterally. No obvious dilation when room darkened.   Bilateral cataracts.   Cardiovascular:      Rate and Rhythm: Normal rate and regular rhythm.   Pulmonary:      Effort: Pulmonary effort is normal.      Breath sounds: No wheezing.   Abdominal:      General: Abdomen is flat. There is no distension.      Tenderness: There is no abdominal tenderness.   Musculoskeletal:      Right lower leg: No edema.      Left lower leg: No edema.      Comments: Interosseous muscle wasting bilaterally affecting dorsal 1st interosseous muscles and palmar lateral interosseous muscles. Left slight greater than right.   Skin:     General: Skin is warm.      Capillary Refill: Capillary refill takes less than 2 seconds.   Neurological:      Mental Status: He is alert.           Medical Decision Making       55 MINUTES SPENT BY  ME on the date of service doing chart review, history, exam, documentation & further activities per the note.      Data     I have personally reviewed the following data over the past 24 hrs:    6.5  \   14.8   / 347     138 103 11.3 /  164 (H)   3.7 25 0.86 \     ALT: 15 AST: 17 AP: 74 TBILI: 0.4   ALB: 4.3 TOT PROTEIN: 7.4 LIPASE: 24     Trop: 13 BNP: N/A     TSH: N/A T4: N/A A1C: 6.3 (H)       Imaging results reviewed over the past 24 hrs:   Recent Results (from the past 24 hour(s))   CT Head w/o Contrast    Narrative    EXAM: CT HEAD W/O CONTRAST  LOCATION: [facility]  DATE: 12/10/2023    INDICATION: Fall. Head trauma. Evaluate for intracranial hemorrhage.  COMPARISON: 10/22/2020 head CT.  TECHNIQUE: Routine CT Head without IV contrast. Multiplanar reformats. Dose reduction techniques were used.    FINDINGS:  INTRACRANIAL CONTENTS: No intracranial hemorrhage, extraaxial collection, or mass effect.  No CT evidence of acute infarct. Moderate to severe burden presumed chronic small vessel ischemic changes. Mild to moderate generalized volume loss. No   hydrocephalus.     VISUALIZED ORBITS/SINUSES/MASTOIDS: No intraorbital abnormality. No paranasal sinus mucosal disease. No middle ear or mastoid effusion.    BONES/SOFT TISSUES: No skull fracture. No scalp hematoma.      Impression    IMPRESSION:  1.  Age-related changes as above with no acute intracranial abnormality.   Chest XR,  PA & LAT    Narrative    EXAM: XR CHEST 2 VIEWS  LOCATION: [facility]  DATE: 12/10/2023    INDICATION: Dizziness lightheadedness rule out infection  COMPARISON: None.      Impression    IMPRESSION:     Cardiac silhouette is normal in size. Normal mediastinal interfaces. There is a loop recorder in the left anterior chest wall.    Minimal atelectasis in the left base near the apex of the left ventricle. The lungs are otherwise clear. Normal vascularity. No pleural effusion.

## 2023-12-11 NOTE — PROGRESS NOTES
[facility] [facility] ADMISSION NOTE    Patient admitted to room 2211 at approximately 1825 via wheel chair from emergency room. Patient was accompanied by transport tech.     Verbal SBAR report received from Tish prior to patient arrival.     Patient ambulated to bed independently. Patient alert and oriented X 3. The patient is not having any pain.  . Admission vital signs: Blood pressure (!) 177/111, pulse 87, temperature 97.1  F (36.2  C), temperature source Tympanic, resp. rate 16, height 1.829 m (6'), SpO2 98%. Patient was oriented to plan of care, call light, bed controls, tv, telephone, bathroom, and visiting hours.     Risk Assessment    The following safety risks were identified during admission: fall. Yellow risk band applied: YES.     Skin Initial Assessment    This writer admitted this patient and completed a full skin assessment and Rebel score in the Adult PCS flowsheet. Appropriate interventions initiated as needed.     Secondary skin check completed by declined.         Education    Patient has a [place] to Observation order: Yes  Observation education completed and documented: Yes      Catracho Hernandez RN

## 2023-12-11 NOTE — CONSULTS
Care Management Note:     Care Management team received referral to assist with discharge planning.      Per IDT rounds, EMR review, discussion with pt's hospital medical provider, and/or brief discussion with pt, it has been determined that pt will discharge to home with no identifiable discharge needs.        Care Management will close referral at this time, but please place new consult should pt develop new needs during this stay.        Lila Dixon, \[facility] [place] [facility]\""  Inpatient Care Coordinator   [facility] [phone]  [facility] [phone]

## 2023-12-11 NOTE — PLAN OF CARE
Physical Therapy: Per discussion with OT, no identified IP PT needs and pt denies concerns re: mobility. Will discontinue order at this time. Refer to OT evaluation for discharge recommendations.

## 2023-12-12 LAB
GLUCOSE BLDC GLUCOMTR-MCNC: 187 MG/DL (ref 70–99)
GLUCOSE BLDC GLUCOMTR-MCNC: 187 MG/DL (ref 70–99)
GLUCOSE BLDC GLUCOMTR-MCNC: 211 MG/DL (ref 70–99)
GLUCOSE BLDC GLUCOMTR-MCNC: 220 MG/DL (ref 70–99)
GLUCOSE BLDC GLUCOMTR-MCNC: 245 MG/DL (ref 70–99)

## 2023-12-12 PROCEDURE — 250N000013 HC RX MED GY IP 250 OP 250 PS 637: Performed by: STUDENT IN AN ORGANIZED HEALTH CARE EDUCATION/TRAINING PROGRAM

## 2023-12-12 PROCEDURE — 250N000013 HC RX MED GY IP 250 OP 250 PS 637: Performed by: PHYSICIAN ASSISTANT

## 2023-12-12 PROCEDURE — 250N000011 HC RX IP 250 OP 636: Performed by: STUDENT IN AN ORGANIZED HEALTH CARE EDUCATION/TRAINING PROGRAM

## 2023-12-12 PROCEDURE — 250N000013 HC RX MED GY IP 250 OP 250 PS 637

## 2023-12-12 PROCEDURE — 120N000001 HC R&B MED SURG/OB

## 2023-12-12 PROCEDURE — 99232 SBSQ HOSP IP/OBS MODERATE 35: CPT | Performed by: STUDENT IN AN ORGANIZED HEALTH CARE EDUCATION/TRAINING PROGRAM

## 2023-12-12 RX ORDER — LABETALOL HYDROCHLORIDE 5 MG/ML
20 INJECTION, SOLUTION INTRAVENOUS EVERY 4 HOURS PRN
Status: DISCONTINUED | OUTPATIENT
Start: 2023-12-12 | End: 2023-12-13

## 2023-12-12 RX ORDER — LOSARTAN POTASSIUM 50 MG/1
50 TABLET ORAL EVERY EVENING
Status: DISCONTINUED | OUTPATIENT
Start: 2023-12-13 | End: 2023-12-13

## 2023-12-12 RX ORDER — NIFEDIPINE 30 MG/1
60 TABLET, EXTENDED RELEASE ORAL DAILY
Status: DISCONTINUED | OUTPATIENT
Start: 2023-12-13 | End: 2023-12-13

## 2023-12-12 RX ORDER — LABETALOL HYDROCHLORIDE 5 MG/ML
20 INJECTION, SOLUTION INTRAVENOUS ONCE
Status: COMPLETED | OUTPATIENT
Start: 2023-12-12 | End: 2023-12-12

## 2023-12-12 RX ORDER — LOSARTAN POTASSIUM 25 MG/1
25 TABLET ORAL
Status: DISCONTINUED | OUTPATIENT
Start: 2023-12-13 | End: 2023-12-15 | Stop reason: HOSPADM

## 2023-12-12 RX ORDER — NIFEDIPINE 30 MG/1
30 TABLET, EXTENDED RELEASE ORAL ONCE
Status: COMPLETED | OUTPATIENT
Start: 2023-12-12 | End: 2023-12-12

## 2023-12-12 RX ADMIN — LABETALOL HYDROCHLORIDE 50 MG: 100 TABLET, FILM COATED ORAL at 08:25

## 2023-12-12 RX ADMIN — LABETALOL HYDROCHLORIDE 50 MG: 100 TABLET, FILM COATED ORAL at 21:49

## 2023-12-12 RX ADMIN — ACETAMINOPHEN 650 MG: 325 TABLET, FILM COATED ORAL at 12:26

## 2023-12-12 RX ADMIN — INSULIN ASPART 3 UNITS: 100 INJECTION, SOLUTION INTRAVENOUS; SUBCUTANEOUS at 12:26

## 2023-12-12 RX ADMIN — TICAGRELOR 90 MG: 90 TABLET ORAL at 08:19

## 2023-12-12 RX ADMIN — GLIPIZIDE 5 MG: 5 TABLET ORAL at 17:59

## 2023-12-12 RX ADMIN — TICAGRELOR 90 MG: 90 TABLET ORAL at 21:49

## 2023-12-12 RX ADMIN — ACETAMINOPHEN 650 MG: 325 TABLET, FILM COATED ORAL at 03:46

## 2023-12-12 RX ADMIN — NIFEDIPINE 30 MG: 30 TABLET, FILM COATED, EXTENDED RELEASE ORAL at 14:27

## 2023-12-12 RX ADMIN — NIFEDIPINE 30 MG: 30 TABLET, FILM COATED, EXTENDED RELEASE ORAL at 08:28

## 2023-12-12 RX ADMIN — ACETAMINOPHEN 650 MG: 325 TABLET, FILM COATED ORAL at 08:25

## 2023-12-12 RX ADMIN — FAMOTIDINE 40 MG: 20 TABLET, FILM COATED ORAL at 17:58

## 2023-12-12 RX ADMIN — INSULIN ASPART 1 UNITS: 100 INJECTION, SOLUTION INTRAVENOUS; SUBCUTANEOUS at 08:19

## 2023-12-12 RX ADMIN — ACETAMINOPHEN 650 MG: 325 TABLET, FILM COATED ORAL at 17:57

## 2023-12-12 RX ADMIN — Medication 3 MG: at 21:53

## 2023-12-12 RX ADMIN — LABETALOL HYDROCHLORIDE 20 MG: 5 INJECTION, SOLUTION INTRAVENOUS at 13:04

## 2023-12-12 RX ADMIN — INSULIN ASPART 2 UNITS: 100 INJECTION, SOLUTION INTRAVENOUS; SUBCUTANEOUS at 18:03

## 2023-12-12 RX ADMIN — FAMOTIDINE 40 MG: 20 TABLET, FILM COATED ORAL at 08:18

## 2023-12-12 RX ADMIN — PRAVASTATIN SODIUM 40 MG: 20 TABLET ORAL at 17:57

## 2023-12-12 RX ADMIN — GLIPIZIDE 5 MG: 5 TABLET ORAL at 06:35

## 2023-12-12 ASSESSMENT — ACTIVITIES OF DAILY LIVING (ADL)
ADLS_ACUITY_SCORE: 24

## 2023-12-12 NOTE — PROGRESS NOTES
"[facility]    Medicine Progress Note - Hospitalist Service    Date of Admission:  12/10/2023    Assessment & Plan      Chris Delcid is a 57 year old male admitted on 12/10/2023. He has a past medical history significant for dysarthria and ataxia secondary to multiple prior strokes, hypertension, orthostatic hypotension, type 2 diabetes mellitus, hyperlipidemia, GERD, and restless leg syndrome who presented to the emergency department for evaluation of feeling lightheaded / dizzy intermittently for the past week and was found to have hypertensivecrisis for which he is being admitted for further evaluation and treatment.     Hypertensive Crisis   Orthostatic hypotension  Essential hypertension  Presented with blood pressure as high as 208/112. Hasn't felt \"right\" for the past week, especially in the mornings; he feels dizzy and lightheaded when making coffee. Has a known history of hypertension managed with lisinopril 10 mg q pm. Also has known orthostatic hypotension, was formerly on midodrine but this was discontinued by cardiology after his last stroke.   Patient asymptomatic, no evidence of end organ damage.   Was given his home lisinopril in the emergency department with slight improvement in blood pressure.   Suspect patient may have supine / nocturnal hypertension in addition to his orthostasis, making him difficult to manage.  Orthostatic vital signs 12/11: Positive for SBP drop and increase in HR  12/12 BP controlled overnight at 2am but 200/100 this AM. 50 point SBP drop on orthostat. Lightheadedness delayed until about 60-90 seconds after standing (not immediate) suggesting a vasodepressor response. HR 88 while lying and went up to 94 so no true reflex tachycardia. Patient was aware of sx and was able to hold onto his walker. Told patient to not lay on the floor if his back hurts and told him if he gets dizzy to sit down. As this is his baseline, fall precautions removed. "     Discontinued home lisinopril (last dose 12/11 17:30), started 25mg Cozaar qAM starting 12/13 AM (36hr washout) and 50mg cozaar qPM to help with supine hypertension  Started nifedipine 30mg qAM 12/11 -> increased to 60mg 12/12   Started normodyne 50mg BID started PM 12/11  Plasma norepi pending (>220 could indicate use of atomoxetine but will hold off until BP controlled)  Isac Hose knee high and abdominal binder - both during day only while upright/out of bed. Off at night or when laying down  HOB to 30-45 degrees at night and out of bed during day to help with autonomic issues  PT/OT taught patient counter pressor maneuvers     History of cerebrovascular accident  Hemiparesis affecting dominant side as late effect of stroke (H)  Ataxia  Dysphagia   Dysarthria as late effect of stroke.  Hyperlipidemia  Prior history of 3 strokes (Oct 2020, Nov 2021, Aug 2022) with residual dysarthria, dysphagia, hemiparesis, and ataxia. No known history of arrhythmias. Is managed with Brilinta 90 mg bid and lovastatin 40 mg q pm.  - Continue Brilinta and statin (formulary substitution with pravastatin)    Type 2 diabetes mellitus  Diabetic Retinopathy  Diabetic neuropathy  HgbA1c 6.3. Managed prior to admission with metformin 1000 mg bid and glipizide 5 mg bid.   - Continue glipizide  - Medium sliding scale insulin   - Hyper/hypoglycemia protocols  - Consistent carbohydrate diet    Nonischemic cardiomyopathy  Follows with cardiology through [facility]. Last echo in 2022 with EF 60-65%, normal LV and RV size and function. Has a loop recorder in situ, but no known arrhythmias. Is on lisinopril noted above, but no beta blockers or diuretics.   Appears euvolemic, but reports gasping for air at night.   - See above regarding lisinopril  - Would benefit from outpatient cardiology, possibly echo    Chronic low back pain  Chronic and stable. Not on any chronic narcotics.  - Prn medications available    Gastroesophageal reflux disease with  esophagitis  Continue home Pepcid.    Restless legs syndrome (RLS)  Continue home Requip.    ADITI  Formerly used a CPAP, but stopped after developing aspiration problems.               Diet: Combination Diet Moderate Consistent Carb (60 g CHO per Meal) Diet; Mechanical/Dental Soft Diet; Low Saturated Fat Diet    DVT Prophylaxis: Low Risk/Ambulatory with no VTE prophylaxis indicated  Hodges Catheter: Not present  Lines: None     Cardiac Monitoring: ACTIVE order. Indication: Tachyarrhythmias, acute (48 hours)  Code Status: Full Code      Clinically Significant Risk Factors                  # Hypertension: Noted on problem list                   Disposition Plan     Expected Discharge Date: 12/12/2023                    Tanmay Reid DO  Hospitalist Service  [facility]  Securely message with [employer] (more info)  Text page via [employer] Paging/Directory   ______________________________________________________________________    Interval History   No acute events. Chart review shows documented lightheadedness with standing but increase in BP when standing that accompanied sx. BP controlled this /74 at 2AM but 200 SBP at 11AM with 50 point SBP drop on orthostatics. Patient will stay to improve BP control given risk with his CVA hx and orthostasis.     Physical Exam   Vital Signs: Temp: 97.9  F (36.6  C) Temp src: Oral BP: (!) 182/108 Pulse: 88   Resp: 18 SpO2: 99 % O2 Device: None (Room air)    Weight: 0 lbs 0 oz    Physical Exam  Constitutional:       General: He is not in acute distress.     Comments: During orthostatic vital signs 12/12, patient started to sway in all directions while standing after 90 seconds. No swaying in first 60 seconds of standing.   HENT:      Head: Normocephalic and atraumatic.   Eyes:      Comments: Reduced upwards end gaze holding  Reduced lateral end gaze holding  No nystagmus  Pupils minimally reactive to light bilaterally. No obvious dilation when room darkened.    Bilateral cataracts.   Cardiovascular:      Rate and Rhythm: Normal rate and regular rhythm.   Pulmonary:      Effort: Pulmonary effort is normal.      Breath sounds: No wheezing.   Abdominal:      General: Abdomen is flat. There is no distension.      Tenderness: There is no abdominal tenderness.   Musculoskeletal:      Right lower leg: No edema.      Left lower leg: No edema.      Comments: Interosseous muscle wasting bilaterally affecting dorsal 1st interosseous muscles and palmar lateral interosseous muscles. Left slight greater than right.   Skin:     General: Skin is warm.      Capillary Refill: Capillary refill takes less than 2 seconds.   Neurological:      Mental Status: He is alert.           Medical Decision Making       45 MINUTES SPENT BY ME on the date of service doing chart review, history, exam, documentation & further activities per the note.      Data         Imaging results reviewed over the past 24 hrs:   No results found for this or any previous visit (from the past 24 hour(s)).

## 2023-12-12 NOTE — PLAN OF CARE
Met with patient and wife Meryl to discuss the use of bed/chair alarms. The patient is adamant that he does not want any alarms in place. Our discussion included that the nursing teams goal is to keep our patients safe from falling and/or injury from falling, hence the bed/chair alarms and keeping patient within arms reach. His admitting diagnosis including orthostatic hypotension places him at an increased risk for falls per the A falls scoring, and that the nursing team is trained to take fall prevention very seriously. Patient remains adamant that he does not want to be alarmed following the education. The patient agreed that the nursing staff can ask each time they are leaving the room if they could engage the alarm and that he has the right to decline. Nursing staff will document each education attempt and refusal. In addition, the patient agreed to use the call light for needs throughout the night.  To assist with the patients comfort, the air mattress pump will be added to the bed. The patient often lays on the floor at home when his back is hurting, we discussed that if he felt like he needed to lay on the floor to let the team know and we will get a mat for him to lay on and help him to the floor.

## 2023-12-12 NOTE — PLAN OF CARE
Goal Outcome Evaluation:  Problem: Hypertension Acute  Goal: Blood Pressure Within Desired Range  Outcome: Progressing          Blood pressure improved. Last check Blood pressure: 132/84. Clonidine 0.1 mg PRN for SBP >160.

## 2023-12-12 NOTE — PLAN OF CARE
Problem: Adult Inpatient Plan of Care  Goal: Absence of Hospital-Acquired Illness or Injury  Intervention: Identify and Manage Fall Risk  Recent Flowsheet Documentation  Taken 12/12/2023 0300 by Debora Phillips RN  Safety Promotion/Fall Prevention:   activity supervised   assistive device/personal items within reach   clutter free environment maintained   increased rounding and observation   lighting adjusted   increase visualization of patient   mobility aid in reach   nonskid shoes/slippers when out of bed   room near nurse's station   supervised activity   safety round/check completed  Taken 12/11/2023 2055 by Debora Phillips, RN  Safety Promotion/Fall Prevention:   activity supervised   assistive device/personal items within reach   clutter free environment maintained   increased rounding and observation   lighting adjusted   increase visualization of patient   mobility aid in reach   nonskid shoes/slippers when out of bed   room near nurse's station   supervised activity   safety round/check completed     Problem: Adult Inpatient Plan of Care  Goal: Plan of Care Review  Description: The Plan of Care Review/Shift note should be completed every shift.  The Outcome Evaluation is a brief statement about your assessment that the patient is improving, declining, or no change.  This information will be displayed automatically on your shift  note.  Flowsheets (Taken 12/12/2023 0644)  Outcome Evaluation: Patient is alert and oriented, does not like the bed alarm on or chair alarm.  Continues to have an increase in BP when standing and became dizzy.  Did go for multiple walks in the night.  Complained of chronic low back pain and PRN tylenol utilized  Plan of Care Reviewed With: patient  Overall Patient Progress: no change   Goal Outcome Evaluation:      Plan of Care Reviewed With: patient    Overall Patient Progress: no changeOverall Patient Progress: no change    Outcome Evaluation: Patient is alert and  oriented, does not like the bed alarm on or chair alarm.  Continues to have an increase in BP when standing and became dizzy.  Did go for multiple walks in the night.  Complained of chronic low back pain and PRN tylenol utilized

## 2023-12-13 LAB
GLUCOSE BLDC GLUCOMTR-MCNC: 183 MG/DL (ref 70–99)
GLUCOSE BLDC GLUCOMTR-MCNC: 228 MG/DL (ref 70–99)
GLUCOSE BLDC GLUCOMTR-MCNC: 228 MG/DL (ref 70–99)
GLUCOSE BLDC GLUCOMTR-MCNC: 234 MG/DL (ref 70–99)
GLUCOSE BLDC GLUCOMTR-MCNC: 244 MG/DL (ref 70–99)

## 2023-12-13 PROCEDURE — 250N000013 HC RX MED GY IP 250 OP 250 PS 637: Performed by: STUDENT IN AN ORGANIZED HEALTH CARE EDUCATION/TRAINING PROGRAM

## 2023-12-13 PROCEDURE — 250N000011 HC RX IP 250 OP 636: Performed by: PHYSICIAN ASSISTANT

## 2023-12-13 PROCEDURE — 250N000013 HC RX MED GY IP 250 OP 250 PS 637: Performed by: PHYSICIAN ASSISTANT

## 2023-12-13 PROCEDURE — 120N000001 HC R&B MED SURG/OB

## 2023-12-13 PROCEDURE — 99232 SBSQ HOSP IP/OBS MODERATE 35: CPT | Performed by: INTERNAL MEDICINE

## 2023-12-13 PROCEDURE — 250N000013 HC RX MED GY IP 250 OP 250 PS 637: Performed by: INTERNAL MEDICINE

## 2023-12-13 PROCEDURE — 250N000013 HC RX MED GY IP 250 OP 250 PS 637

## 2023-12-13 RX ORDER — NICARDIPINE HYDROCHLORIDE 20 MG/1
40 CAPSULE ORAL AT BEDTIME
Status: DISCONTINUED | OUTPATIENT
Start: 2023-12-13 | End: 2023-12-13

## 2023-12-13 RX ORDER — LABETALOL HYDROCHLORIDE 5 MG/ML
10 INJECTION, SOLUTION INTRAVENOUS
Status: DISCONTINUED | OUTPATIENT
Start: 2023-12-13 | End: 2023-12-15 | Stop reason: HOSPADM

## 2023-12-13 RX ORDER — LOSARTAN POTASSIUM 50 MG/1
50 TABLET ORAL EVERY EVENING
Status: DISCONTINUED | OUTPATIENT
Start: 2023-12-13 | End: 2023-12-15 | Stop reason: HOSPADM

## 2023-12-13 RX ORDER — NIFEDIPINE 30 MG/1
60 TABLET, EXTENDED RELEASE ORAL AT BEDTIME
Status: DISCONTINUED | OUTPATIENT
Start: 2023-12-14 | End: 2023-12-15 | Stop reason: HOSPADM

## 2023-12-13 RX ORDER — HYDRALAZINE HYDROCHLORIDE 20 MG/ML
10 INJECTION INTRAMUSCULAR; INTRAVENOUS EVERY 4 HOURS PRN
Status: DISCONTINUED | OUTPATIENT
Start: 2023-12-13 | End: 2023-12-15 | Stop reason: HOSPADM

## 2023-12-13 RX ORDER — LABETALOL 100 MG/1
100 TABLET, FILM COATED ORAL AT BEDTIME
Status: DISCONTINUED | OUTPATIENT
Start: 2023-12-13 | End: 2023-12-15 | Stop reason: HOSPADM

## 2023-12-13 RX ADMIN — NIFEDIPINE 60 MG: 30 TABLET, FILM COATED, EXTENDED RELEASE ORAL at 07:46

## 2023-12-13 RX ADMIN — LOSARTAN POTASSIUM 25 MG: 25 TABLET, FILM COATED ORAL at 07:32

## 2023-12-13 RX ADMIN — LABETALOL HYDROCHLORIDE 50 MG: 100 TABLET, FILM COATED ORAL at 07:33

## 2023-12-13 RX ADMIN — TICAGRELOR 90 MG: 90 TABLET ORAL at 19:42

## 2023-12-13 RX ADMIN — TICAGRELOR 90 MG: 90 TABLET ORAL at 07:32

## 2023-12-13 RX ADMIN — INSULIN ASPART 3 UNITS: 100 INJECTION, SOLUTION INTRAVENOUS; SUBCUTANEOUS at 12:27

## 2023-12-13 RX ADMIN — GLIPIZIDE 5 MG: 5 TABLET ORAL at 16:16

## 2023-12-13 RX ADMIN — LABETALOL HYDROCHLORIDE 100 MG: 100 TABLET, FILM COATED ORAL at 19:42

## 2023-12-13 RX ADMIN — FAMOTIDINE 40 MG: 20 TABLET, FILM COATED ORAL at 07:32

## 2023-12-13 RX ADMIN — LOSARTAN POTASSIUM 50 MG: 50 TABLET, FILM COATED ORAL at 17:19

## 2023-12-13 RX ADMIN — ONDANSETRON 4 MG: 4 TABLET, ORALLY DISINTEGRATING ORAL at 06:56

## 2023-12-13 RX ADMIN — GLIPIZIDE 5 MG: 5 TABLET ORAL at 07:46

## 2023-12-13 RX ADMIN — ACETAMINOPHEN 650 MG: 325 TABLET, FILM COATED ORAL at 22:17

## 2023-12-13 RX ADMIN — PRAVASTATIN SODIUM 40 MG: 20 TABLET ORAL at 17:19

## 2023-12-13 RX ADMIN — FAMOTIDINE 40 MG: 20 TABLET, FILM COATED ORAL at 17:20

## 2023-12-13 RX ADMIN — INSULIN ASPART 2 UNITS: 100 INJECTION, SOLUTION INTRAVENOUS; SUBCUTANEOUS at 07:32

## 2023-12-13 RX ADMIN — INSULIN ASPART 2 UNITS: 100 INJECTION, SOLUTION INTRAVENOUS; SUBCUTANEOUS at 17:22

## 2023-12-13 RX ADMIN — ACETAMINOPHEN 650 MG: 325 TABLET, FILM COATED ORAL at 06:56

## 2023-12-13 RX ADMIN — Medication 3 MG: at 22:17

## 2023-12-13 RX ADMIN — ACETAMINOPHEN 650 MG: 325 TABLET, FILM COATED ORAL at 17:19

## 2023-12-13 ASSESSMENT — ACTIVITIES OF DAILY LIVING (ADL)
ADLS_ACUITY_SCORE: 22
ADLS_ACUITY_SCORE: 24
ADLS_ACUITY_SCORE: 24
ADLS_ACUITY_SCORE: 22
ADLS_ACUITY_SCORE: 22
ADLS_ACUITY_SCORE: 24
ADLS_ACUITY_SCORE: 22
ADLS_ACUITY_SCORE: 24
ADLS_ACUITY_SCORE: 24
ADLS_ACUITY_SCORE: 22
ADLS_ACUITY_SCORE: 22
ADLS_ACUITY_SCORE: 24

## 2023-12-13 NOTE — PLAN OF CARE
12/12/23  [id number]    Problem: Adult Inpatient Plan of Care  Goal: Plan of Care Review  Outcome: Progressing  Flowsheets (Taken 12/12/2023 1727)  Plan of Care Reviewed With:   patient   spouse     Problem: Fall Injury Risk  Goal: Absence of Fall and Fall-Related Injury  Outcome: Unable to Meet  Intervention: Promote Injury-Free Environment  Recent Flowsheet Documentation  Taken 12/12/2023 1616 by Diana Shields RN  Safety Promotion/Fall Prevention:   activity supervised   assistive device/personal items within reach   clutter free environment maintained   increased rounding and observation   lighting adjusted   increase visualization of patient   mobility aid in reach   nonskid shoes/slippers when out of bed   room near nurse's station   supervised activity   safety round/check completed     Goal Outcome Evaluation:      Plan of Care Reviewed With: patient, spouse      Discussed and educated patient/family on promoting safety. Pt positive for orthostatic hypotension and reports feeling dizzy upon standing. MD present for assessment. Pt uncooperative and irritable. Pt refusing to call for assistance when standing/ambulating. Pt refusing bed/chair alarms and refusing romain hose. Pt found sitting on floor and expressed to staff that he was told by his outpatient provider to sit on the floor if he feels dizzy. No injury noted. Pt advised repeatedly by RN, staff, Charge, and Nurse manager to call for assistance when wanting to stand/ambulate and staff will continue to request to allow bed and chair alarms for safety precautions.

## 2023-12-13 NOTE — PROGRESS NOTES
"[facility]    Hospitalist Progress Note    Date of Service (when I saw the patient): 12/13/2023    Assessment & Plan   Chris Delcid is a 57 year old male admitted on 12/10/2023 with hypertensive crisis, after presenting to the emergency department feeling lightheaded and dizzy intermittently for the past week.       Hypertensive Crisis   Orthostatic hypotension  Essential hypertension  Presented with blood pressure as high as 208/112. Hasn't felt \"right\" for the past week, especially in the mornings; he feels dizzy and lightheaded when making coffee. Has a known history of hypertension managed with lisinopril 10 mg q pm. Also has known orthostatic hypotension, was formerly on midodrine but this was discontinued by cardiology after his last stroke.   Suspect patient may have supine / nocturnal hypertension in addition to his orthostasis, making him difficult to manage.  -Discontinuing a.m. antihypertensives and changing medications to 8 PM: labetalol 100 mg, Cozaar 50 mg, and nifedipine ER 60 mg.  May take several days for long-acting medications to wear off, then will likely need to add on low doses of a.m. medications.  -Discussed starting midodrine or Florinef, patient declined.    History of cerebrovascular accident  Hemiparesis affecting dominant side as late effect of stroke (H)  Ataxia  Dysphagia   Dysarthria as late effect of stroke.  Hyperlipidemia  Prior history of 3 strokes (Oct 2020, Nov 2021, Aug 2022) with residual dysarthria, dysphagia, hemiparesis, and ataxia. No known history of arrhythmias. Is managed with Brilinta 90 mg bid and lovastatin 40 mg q pm.  - Continue Brilinta and statin (formulary substitution with pravastatin)     Type 2 diabetes mellitus  Diabetic Retinopathy  Diabetic neuropathy  HgbA1c 6.3. Managed prior to admission with metformin 1000 mg bid and glipizide 5 mg bid.   - Continue glipizide  - Medium sliding scale insulin   - Hyper/hypoglycemia " protocols  - Consistent carbohydrate diet     Nonischemic cardiomyopathy  Follows with cardiology through [facility]. Last echo in 2022 with EF 60-65%, normal LV and RV size and function. Has a loop recorder in situ, but no known arrhythmias. Is on lisinopril noted above, but no beta blockers or diuretics.   Appears euvolemic, but reports gasping for air at night.   - See above regarding lisinopril  - Would benefit from outpatient cardiology, possibly echo     Chronic low back pain  Chronic and stable. Not on any chronic narcotics.  - Prn medications available     Gastroesophageal reflux disease with esophagitis  Continue home Pepcid.     Restless legs syndrome (RLS)  Continue home Requip.     ADITI  Formerly used a CPAP, but stopped after developing aspiration problems.     Diet: Combination Diet Moderate Consistent Carb (60 g CHO per Meal) Diet; Mechanical/Dental Soft Diet; Low Saturated Fat Diet    DVT Prophylaxis: Low Risk/Ambulatory with no VTE prophylaxis indicated  Hodges Catheter: Not present  Lines: None     Cardiac Monitoring: ACTIVE order. Indication: Tachyarrhythmias, acute (48 hours)  Code Status: Full Code    Disposition: Expected discharge in 2-3 days once orthostasis and BP control improved.    30 MINUTES SPENT BY ME on the date of service doing chart review, history, exam, documentation & further activities per the note.    Solomon Gold MD    Interval History   The patient is sitting in a chair.  He complains of lightheadedness with standing.  He complained of waking up with a headache the last several mornings.    -Data reviewed today: I reviewed all new labs and imaging results over the last 24 hours. I personally reviewed no images or EKG's today.    Physical Exam   Temp: 97.5  F (36.4  C) Temp src: Oral BP: (!) 157/90 Pulse: 84   Resp: 18 SpO2: 98 % O2 Device: None (Room air)    There were no vitals filed for this visit.  Vital Signs with Ranges  Temp:  [97.5  F (36.4  C)-98.6  F (37  C)] 97.5   F (36.4  C)  Pulse:  [77-90] 84  Resp:  [18] 18  BP: (144-178)/(67-98) 157/90  SpO2:  [94 %-99 %] 98 %  No intake/output data recorded.    Gen: Well nourished male, alert and oriented x 3, no acute distressed  HEENT: Atraumatic, normocephalic; sclera non-injected, anicterric; oral mucosa moist, no lesion, no exudate  Lungs: Clear to ausculation, no wheezes, no rhonchi, no rales  Heart: Regular rate, regular rhythm, no gallops, no rubs, no murmurs  GI: Bowel sound normal, no hepatosplenomegaly, no masses, non-tender, non-distended, no guarding, no rebound tenderness  Lymph: No lymphadenopathy, no edema  Skin: No rashes, no chronic venous stasis     Medications      famotidine  40 mg Oral BID    glipiZIDE  5 mg Oral BID AC    insulin aspart  1-7 Units Subcutaneous TID AC    insulin aspart  1-5 Units Subcutaneous At Bedtime    labetalol  100 mg Oral At Bedtime    [Held by provider] losartan  25 mg Oral QAM AC    losartan  50 mg Oral QPM    [Held by provider] metFORMIN  1,000 mg Oral BID    niCARdipine  40 mg Oral At Bedtime    pravastatin  40 mg Oral QPM    ticagrelor  90 mg Oral BID       Data   Recent Labs   Lab 12/13/23  1138 12/13/23  0550 12/13/23  0211 12/11/23  0814 12/11/23  0600 12/10/23  2208 12/10/23  1416   WBC  --   --   --   --  6.5  --  6.0   HGB  --   --   --   --  14.8  --  15.7   MCV  --   --   --   --  85  --  85   PLT  --   --   --   --  347  --  357   NA  --   --   --   --  138  --  139   POTASSIUM  --   --   --   --  3.7  --  4.1   CHLORIDE  --   --   --   --  103  --  103   CO2  --   --   --   --  25  --  25   BUN  --   --   --   --  11.3  --  13.6   CR  --   --   --   --  0.86  --  0.89   ANIONGAP  --   --   --   --  10  --  11   JAMIE  --   --   --   --  9.0  --  9.3   * 234* 244*   < > 170*   < > 146*   ALBUMIN  --   --   --   --   --   --  4.3   PROTTOTAL  --   --   --   --   --   --  7.4   BILITOTAL  --   --   --   --   --   --  0.4   ALKPHOS  --   --   --   --   --   --  74   ALT  --    --   --   --   --   --  15   AST  --   --   --   --   --   --  17   LIPASE  --   --   --   --   --   --  24    < > = values in this interval not displayed.       No results found for this or any previous visit (from the past 24 hour(s)).

## 2023-12-14 LAB
ANION GAP SERPL CALCULATED.3IONS-SCNC: 12 MMOL/L (ref 7–15)
BUN SERPL-MCNC: 17.2 MG/DL (ref 8–23)
CALCIUM SERPL-MCNC: 8.8 MG/DL (ref 8.8–10.2)
CATECHOLS PLAS-IMP: ABNORMAL
CHLORIDE SERPL-SCNC: 104 MMOL/L (ref 98–107)
CREAT SERPL-MCNC: 0.89 MG/DL (ref 0.67–1.17)
DEPRECATED HCO3 PLAS-SCNC: 23 MMOL/L (ref 22–29)
DOPAMINE SERPL-MCNC: <130 PMOL/L
EGFRCR SERPLBLD CKD-EPI 2021: >90 ML/MIN/1.73M2
EPINEPH PLAS-MCNC: <55 PMOL/L
ERYTHROCYTE [DISTWIDTH] IN BLOOD BY AUTOMATED COUNT: 13.4 % (ref 10–15)
GLUCOSE BLDC GLUCOMTR-MCNC: 192 MG/DL (ref 70–99)
GLUCOSE BLDC GLUCOMTR-MCNC: 212 MG/DL (ref 70–99)
GLUCOSE BLDC GLUCOMTR-MCNC: 216 MG/DL (ref 70–99)
GLUCOSE BLDC GLUCOMTR-MCNC: 228 MG/DL (ref 70–99)
GLUCOSE BLDC GLUCOMTR-MCNC: 234 MG/DL (ref 70–99)
GLUCOSE SERPL-MCNC: 217 MG/DL (ref 70–99)
HCT VFR BLD AUTO: 41.2 % (ref 40–53)
HGB BLD-MCNC: 14 G/DL (ref 13.3–17.7)
MCH RBC QN AUTO: 29.5 PG (ref 26.5–33)
MCHC RBC AUTO-ENTMCNC: 34 G/DL (ref 31.5–36.5)
MCV RBC AUTO: 87 FL (ref 78–100)
NOREPINEPH PLAS-MCNC: 796 PMOL/L
PLATELET # BLD AUTO: 335 10E3/UL (ref 150–450)
POTASSIUM SERPL-SCNC: 3.8 MMOL/L (ref 3.4–5.3)
RBC # BLD AUTO: 4.74 10E6/UL (ref 4.4–5.9)
SODIUM SERPL-SCNC: 139 MMOL/L (ref 135–145)
WBC # BLD AUTO: 6.4 10E3/UL (ref 4–11)

## 2023-12-14 PROCEDURE — 99232 SBSQ HOSP IP/OBS MODERATE 35: CPT | Performed by: INTERNAL MEDICINE

## 2023-12-14 PROCEDURE — 250N000013 HC RX MED GY IP 250 OP 250 PS 637: Performed by: INTERNAL MEDICINE

## 2023-12-14 PROCEDURE — 36415 COLL VENOUS BLD VENIPUNCTURE: CPT | Performed by: INTERNAL MEDICINE

## 2023-12-14 PROCEDURE — 120N000001 HC R&B MED SURG/OB

## 2023-12-14 PROCEDURE — 250N000013 HC RX MED GY IP 250 OP 250 PS 637: Performed by: PHYSICIAN ASSISTANT

## 2023-12-14 PROCEDURE — 85027 COMPLETE CBC AUTOMATED: CPT | Performed by: INTERNAL MEDICINE

## 2023-12-14 PROCEDURE — 80048 BASIC METABOLIC PNL TOTAL CA: CPT | Performed by: INTERNAL MEDICINE

## 2023-12-14 PROCEDURE — 250N000011 HC RX IP 250 OP 636: Mod: JZ | Performed by: INTERNAL MEDICINE

## 2023-12-14 PROCEDURE — 250N000013 HC RX MED GY IP 250 OP 250 PS 637

## 2023-12-14 RX ADMIN — TICAGRELOR 90 MG: 90 TABLET ORAL at 08:04

## 2023-12-14 RX ADMIN — HYDRALAZINE HYDROCHLORIDE 10 MG: 20 INJECTION, SOLUTION INTRAMUSCULAR; INTRAVENOUS at 17:28

## 2023-12-14 RX ADMIN — GLIPIZIDE 5 MG: 5 TABLET ORAL at 17:21

## 2023-12-14 RX ADMIN — FAMOTIDINE 40 MG: 20 TABLET, FILM COATED ORAL at 17:20

## 2023-12-14 RX ADMIN — NIFEDIPINE 60 MG: 30 TABLET, FILM COATED, EXTENDED RELEASE ORAL at 21:16

## 2023-12-14 RX ADMIN — Medication 3 MG: at 21:16

## 2023-12-14 RX ADMIN — TICAGRELOR 90 MG: 90 TABLET ORAL at 20:13

## 2023-12-14 RX ADMIN — LOSARTAN POTASSIUM 50 MG: 50 TABLET, FILM COATED ORAL at 17:22

## 2023-12-14 RX ADMIN — INSULIN ASPART 2 UNITS: 100 INJECTION, SOLUTION INTRAVENOUS; SUBCUTANEOUS at 12:10

## 2023-12-14 RX ADMIN — LABETALOL HYDROCHLORIDE 100 MG: 100 TABLET, FILM COATED ORAL at 20:13

## 2023-12-14 RX ADMIN — GLIPIZIDE 5 MG: 5 TABLET ORAL at 08:06

## 2023-12-14 RX ADMIN — INSULIN ASPART 2 UNITS: 100 INJECTION, SOLUTION INTRAVENOUS; SUBCUTANEOUS at 17:21

## 2023-12-14 RX ADMIN — PRAVASTATIN SODIUM 40 MG: 20 TABLET ORAL at 17:22

## 2023-12-14 RX ADMIN — FAMOTIDINE 40 MG: 20 TABLET, FILM COATED ORAL at 08:04

## 2023-12-14 RX ADMIN — INSULIN ASPART 2 UNITS: 100 INJECTION, SOLUTION INTRAVENOUS; SUBCUTANEOUS at 08:04

## 2023-12-14 RX ADMIN — ACETAMINOPHEN 650 MG: 325 TABLET, FILM COATED ORAL at 08:04

## 2023-12-14 ASSESSMENT — ACTIVITIES OF DAILY LIVING (ADL)
ADLS_ACUITY_SCORE: 23
ADLS_ACUITY_SCORE: 24
ADLS_ACUITY_SCORE: 23
ADLS_ACUITY_SCORE: 24
ADLS_ACUITY_SCORE: 23
ADLS_ACUITY_SCORE: 24
ADLS_ACUITY_SCORE: 23
ADLS_ACUITY_SCORE: 23
ADLS_ACUITY_SCORE: 24
ADLS_ACUITY_SCORE: 23

## 2023-12-14 NOTE — PROGRESS NOTES
"[facility]    Hospitalist Progress Note    Date of Service (when I saw the patient): 12/14/2023    Assessment & Plan   Chris Delcid is a 57 year old male admitted on 12/10/2023 with hypertensive crisis, after presenting to the emergency department feeling lightheaded and dizzy intermittently for the past week.       Hypertensive Crisis   Orthostatic hypotension  Essential hypertension  Presented with blood pressure as high as 208/112. Hasn't felt \"right\" for the past week, especially in the mornings; he feels dizzy and lightheaded when making coffee. Has a known history of hypertension managed with lisinopril 10 mg q pm. Also has known orthostatic hypotension, was formerly on midodrine but this was discontinued by cardiology after his last stroke.   Suspect patient may have supine / nocturnal hypertension in addition to his orthostasis, making him difficult to manage.  -Discontinuing a.m. antihypertensives and changing medications to 8 PM: labetalol 100 mg, Cozaar 50 mg, and nifedipine ER 60 mg.  May take several days for long-acting medications to wear off, then will likely need to add on low doses of a.m. medications.  -Discussed starting midodrine or Florinef, patient declined.  -Orthostatics much improved on a.m. of 12/14: Sitting 177/102 P87, standing 138/83 P85,  168/91 P86  -Observe again overnight as patient continues to complain of dizziness with standing.    History of cerebrovascular accident  Hemiparesis affecting dominant side as late effect of stroke (H)  Ataxia  Dysphagia   Dysarthria as late effect of stroke.  Hyperlipidemia  Prior history of 3 strokes (Oct 2020, Nov 2021, Aug 2022) with residual dysarthria, dysphagia, hemiparesis, and ataxia. No known history of arrhythmias. Is managed with Brilinta 90 mg bid and lovastatin 40 mg q pm.  - Continue Brilinta and statin (formulary substitution with pravastatin)     Type 2 diabetes mellitus  Diabetic Retinopathy  Diabetic " neuropathy  HgbA1c 6.3. Managed prior to admission with metformin 1000 mg bid and glipizide 5 mg bid.   - Continue glipizide  - Medium sliding scale insulin   - Hyper/hypoglycemia protocols  - Consistent carbohydrate diet     Nonischemic cardiomyopathy  Follows with cardiology through [facility]. Last echo in 2022 with EF 60-65%, normal LV and RV size and function. Has a loop recorder in situ, but no known arrhythmias. Is on lisinopril noted above, but no beta blockers or diuretics.   Appears euvolemic, but reports gasping for air at night.   -Lisinopril changed to losartan  - Would benefit from outpatient cardiology, possibly echo     Chronic low back pain  Chronic and stable. Not on any chronic narcotics.  - Prn medications available     Gastroesophageal reflux disease with esophagitis  Continue home Pepcid.     Restless legs syndrome (RLS)  Continue home Requip.     ADITI  Formerly used a CPAP, but stopped after developing aspiration problems.     Diet: Combination Diet Moderate Consistent Carb (60 g CHO per Meal) Diet; Mechanical/Dental Soft Diet; Low Saturated Fat Diet    DVT Prophylaxis: Low Risk/Ambulatory with no VTE prophylaxis indicated  Hodges Catheter: Not present  Lines: None     Cardiac Monitoring: ACTIVE order. Indication: Tachyarrhythmias, acute (48 hours)  Code Status: Full Code    Disposition: Expected discharge in 1-2 days once orthostasis and BP control improved.    30 MINUTES SPENT BY ME on the date of service doing chart review, history, exam, documentation & further activities per the note.    Solomon Gold MD    Interval History   The patient is sitting in a chair.  Wife reports she was able to ambulate down the gaviria, but felt weak at the end.  She feels that he attempted to walk too much.  Patient complaining of dizziness with standing.  Appetite remains good.    -Data reviewed today: I reviewed all new labs and imaging results over the last 24 hours. I personally reviewed no images or EKG's  today.    Physical Exam   Temp: 97.7  F (36.5  C) Temp src: Oral BP: (!) 164/91 Pulse: 76   Resp: 18 SpO2: 99 % O2 Device: None (Room air)    There were no vitals filed for this visit.  Vital Signs with Ranges  Temp:  [97.6  F (36.4  C)-98.2  F (36.8  C)] 97.7  F (36.5  C)  Pulse:  [73-86] 76  Resp:  [16-18] 18  BP: (134-166)/(54-99) 164/91  SpO2:  [97 %-99 %] 99 %  No intake/output data recorded.    Gen: Well nourished male, alert and oriented x 3, no acute distressed  HEENT: Atraumatic, normocephalic; sclera non-injected, anicterric; oral mucosa moist, no lesion, no exudate  Lungs: Clear to ausculation, no wheezes, no rhonchi, no rales  Heart: Regular rate, regular rhythm, no gallops, no rubs, no murmurs  GI: Bowel sound normal, no hepatosplenomegaly, no masses, non-tender, non-distended, no guarding, no rebound tenderness  Lymph: No lymphadenopathy, no edema  Skin: No rashes, no chronic venous stasis     Medications      famotidine  40 mg Oral BID    glipiZIDE  5 mg Oral BID AC    insulin aspart  1-7 Units Subcutaneous TID AC    insulin aspart  1-5 Units Subcutaneous At Bedtime    labetalol  100 mg Oral At Bedtime    [Held by provider] losartan  25 mg Oral QAM AC    losartan  50 mg Oral QPM    [Held by provider] metFORMIN  1,000 mg Oral BID    NIFEdipine ER OSMOTIC  60 mg Oral At Bedtime    pravastatin  40 mg Oral QPM    ticagrelor  90 mg Oral BID       Data   Recent Labs   Lab 12/14/23  0730 12/14/23  0421 12/14/23  0226 12/11/23  0814 12/11/23  0600 12/10/23  2208 12/10/23  1416   WBC  --  6.4  --   --  6.5  --  6.0   HGB  --  14.0  --   --  14.8  --  15.7   MCV  --  87  --   --  85  --  85   PLT  --  335  --   --  347  --  357   NA  --  139  --   --  138  --  139   POTASSIUM  --  3.8  --   --  3.7  --  4.1   CHLORIDE  --  104  --   --  103  --  103   CO2  --  23  --   --  25  --  25   BUN  --  17.2  --   --  11.3  --  13.6   CR  --  0.89  --   --  0.86  --  0.89   ANIONGAP  --  12  --   --  10  --  11   JAMIE   --  8.8  --   --  9.0  --  9.3   * 217* 192*   < > 170*   < > 146*   ALBUMIN  --   --   --   --   --   --  4.3   PROTTOTAL  --   --   --   --   --   --  7.4   BILITOTAL  --   --   --   --   --   --  0.4   ALKPHOS  --   --   --   --   --   --  74   ALT  --   --   --   --   --   --  15   AST  --   --   --   --   --   --  17   LIPASE  --   --   --   --   --   --  24    < > = values in this interval not displayed.       No results found for this or any previous visit (from the past 24 hour(s)).

## 2023-12-14 NOTE — PLAN OF CARE
Goal Outcome Evaluation:      Plan of Care Reviewed With: patient    Overall Patient Progress: improvingOverall Patient Progress: improving    Patient refuses bed alarm, transfer belt and assistance. Ambulated in the halls with stand by assist. Ambulating in room independently.    Blood pressure 134/54, pulse 79, temperature 97.6  F (36.4  C), temperature source Oral, resp. rate 16, height 1.829 m (6'), SpO2 97%.

## 2023-12-14 NOTE — PLAN OF CARE
"12/13/23  [id number]  Problem: Adult Inpatient Plan of Care  Outcome: Progressing     Problem: Hypertension Acute  Goal: Blood Pressure Within Desired Range  Outcome: improving. MD adjusted POC and implemented medication changes     Goal Outcome Evaluation:       BP (!) 148/76 (BP Location: Left arm, Patient Position: Chair, Cuff Size: Adult Regular)   Pulse 84   Temp 97.5  F (36.4  C) (Oral)   Resp 18   Ht 1.829 m (6')   SpO2 97%   BMI 38.38 kg/m          Pt refusing bed alarms, pt refusing to notify staff of ambulation/standing. Pt refusing gait belt. Pt refusing cardiac/easy to chew diet. Diet changed to regular. MD notified that pt reported visual \"floaters\". This is not accompanied by changes in position. Neuros intact per baseline. Pt confirms this has occurred in the past and denied HA. Only reports dizziness upon standing.          "

## 2023-12-15 VITALS
BODY MASS INDEX: 37.34 KG/M2 | HEART RATE: 87 BPM | TEMPERATURE: 97.9 F | OXYGEN SATURATION: 98 % | DIASTOLIC BLOOD PRESSURE: 55 MMHG | HEIGHT: 73 IN | SYSTOLIC BLOOD PRESSURE: 115 MMHG | RESPIRATION RATE: 16 BRPM

## 2023-12-15 LAB
GLUCOSE BLDC GLUCOMTR-MCNC: 170 MG/DL (ref 70–99)
GLUCOSE BLDC GLUCOMTR-MCNC: 215 MG/DL (ref 70–99)
HOLD SPECIMEN: NORMAL
MAGNESIUM SERPL-MCNC: 2.1 MG/DL (ref 1.7–2.3)

## 2023-12-15 PROCEDURE — 99239 HOSP IP/OBS DSCHRG MGMT >30: CPT | Performed by: INTERNAL MEDICINE

## 2023-12-15 PROCEDURE — 83735 ASSAY OF MAGNESIUM: CPT | Performed by: INTERNAL MEDICINE

## 2023-12-15 PROCEDURE — 36415 COLL VENOUS BLD VENIPUNCTURE: CPT | Performed by: INTERNAL MEDICINE

## 2023-12-15 PROCEDURE — 250N000013 HC RX MED GY IP 250 OP 250 PS 637: Performed by: INTERNAL MEDICINE

## 2023-12-15 PROCEDURE — 250N000013 HC RX MED GY IP 250 OP 250 PS 637: Performed by: PHYSICIAN ASSISTANT

## 2023-12-15 RX ORDER — CEPHALEXIN 500 MG/1
500 CAPSULE ORAL EVERY 6 HOURS SCHEDULED
Status: DISCONTINUED | OUTPATIENT
Start: 2023-12-15 | End: 2023-12-15 | Stop reason: HOSPADM

## 2023-12-15 RX ORDER — LOSARTAN POTASSIUM 50 MG/1
50 TABLET ORAL EVERY EVENING
Qty: 30 TABLET | Refills: 0 | Status: SHIPPED | OUTPATIENT
Start: 2023-12-15 | End: 2024-01-08

## 2023-12-15 RX ORDER — LABETALOL 100 MG/1
100 TABLET, FILM COATED ORAL AT BEDTIME
Qty: 30 TABLET | Refills: 0 | Status: SHIPPED | OUTPATIENT
Start: 2023-12-15 | End: 2024-01-18

## 2023-12-15 RX ORDER — HYDRALAZINE HYDROCHLORIDE 10 MG/1
10-20 TABLET, FILM COATED ORAL EVERY 6 HOURS PRN
Qty: 30 TABLET | Refills: 0 | Status: SHIPPED | OUTPATIENT
Start: 2023-12-15 | End: 2024-01-08

## 2023-12-15 RX ORDER — CEPHALEXIN 500 MG/1
500 CAPSULE ORAL EVERY 6 HOURS
Qty: 27 CAPSULE | Refills: 0 | Status: SHIPPED | OUTPATIENT
Start: 2023-12-15 | End: 2023-12-22

## 2023-12-15 RX ORDER — NIFEDIPINE 30 MG
30 TABLET, EXTENDED RELEASE ORAL AT BEDTIME
Qty: 30 TABLET | Refills: 0 | Status: SHIPPED | OUTPATIENT
Start: 2023-12-15 | End: 2024-01-09

## 2023-12-15 RX ADMIN — INSULIN ASPART 2 UNITS: 100 INJECTION, SOLUTION INTRAVENOUS; SUBCUTANEOUS at 08:46

## 2023-12-15 RX ADMIN — TICAGRELOR 90 MG: 90 TABLET ORAL at 08:47

## 2023-12-15 RX ADMIN — FAMOTIDINE 40 MG: 20 TABLET, FILM COATED ORAL at 08:47

## 2023-12-15 RX ADMIN — GLIPIZIDE 5 MG: 5 TABLET ORAL at 08:47

## 2023-12-15 RX ADMIN — CEPHALEXIN 500 MG: 500 CAPSULE ORAL at 11:52

## 2023-12-15 ASSESSMENT — ACTIVITIES OF DAILY LIVING (ADL)
ADLS_ACUITY_SCORE: 23
ADLS_ACUITY_SCORE: 24
ADLS_ACUITY_SCORE: 23
ADLS_ACUITY_SCORE: 24

## 2023-12-15 NOTE — PLAN OF CARE
Problem: Fall Injury Risk  Goal: Absence of Fall and Fall-Related Injury  Intervention: Identify and Manage Contributors  Recent Flowsheet Documentation  Taken 12/15/2023 0400 by Devick, Karen M, RN  Medication Review/Management: medications reviewed  Taken 12/15/2023 0000 by Devick, Karen M, RN  Medication Review/Management: medications reviewed  Intervention: Promote Injury-Free Environment  Recent Flowsheet Documentation  Taken 12/15/2023 0400 by Devick, Karen M, RN  Safety Promotion/Fall Prevention: assistive device/personal items within reach  Taken 12/15/2023 0000 by Devick, Karen M, RN  Safety Promotion/Fall Prevention:   assistive device/personal items within reach   clutter free environment maintained   nonskid shoes/slippers when out of bed   mobility aid in reach   safety round/check completed   room organization consistent     Goal Outcome Evaluation: Patient is alert and oriented. 18g right forearm, saline lock. Independent in room with walker. Patient had an episode at 0230 after using the bathroom where he felt nauseated and light headed.  Blood pressure dropped to 108/61. Patient condition improved after lying down in bed with his feet up, BP was rechecked 149/69. Abdominal Binder placed in room for patient to wear during the day when out of bed. Patient refused Isac Compression Stockings yesterday.  Patient's PIV was pulled yesterday from the right AC. There is a small white, pus-like area in the middle and the area is red and warm.  Note left for Provider to look at.    Patient had another episode of nausea and light headedness at 0545 when he sat up to go to the bathroom. Blood Pressure was 129/61 HR 62 (left arm).  BP checked on right arm and BP was 150/59 HR 60. Patient laid down in bed and BP was rechecked at 0600 and was 115/55 (right arm).   Provider Joao Greene was notified at 0620.

## 2023-12-15 NOTE — PLAN OF CARE
"12/14/23  [id number]  Problem: Adult Inpatient Plan of Care  Goal: Absence of Hospital-Acquired Illness or Injury  Intervention: Identify and Manage Fall Risk/SAFETY  Recent Flowsheet Documentation  Taken 12/14/2023 1528 by Diana Shields, RN  Safety Promotion/Fall Prevention:   activity supervised   assistive device/personal items within reach   clutter free environment maintained   increased rounding and observation   lighting adjusted   increase visualization of patient   mobility aid in reach   nonskid shoes/slippers when out of bed   room near nurse's station   supervised activity   safety round/check completed  Intervention: Prevent Skin Injury  Recent Flowsheet Documentation  Taken 12/14/2023 1528 by Diana Shields, RN  Body Position: position changed independently  Intervention: Prevent and Manage VTE (Venous Thromboembolism) Risk  Recent Flowsheet Documentation  Taken 12/14/2023 1528 by Diana Shields RN  VTE Prevention/Management: SCDs (sequential compression devices) off  Intervention: Prevent Infection  Recent Flowsheet Documentation  Taken 12/14/2023 1528 by Diana Shields RN  Infection Prevention: rest/sleep promoted    Problem: Fall Injury Risk  Goal: Absence of Fall and Fall-Related Injury  Intervention: Promote Injury-Free Environment     Goal Outcome Evaluation:         BP (!) 148/81 (BP Location: Left arm)   Pulse 99   Temp 97.9  F (36.6  C)   Resp 20   Ht 1.854 m (6' 1\")   SpO2 98%   BMI 37.34 kg/m         Pt refusing bed alarms, pt refusing romain hose, pt refusing to use call light, pt refusing to notify for assistance to stand/ambulate. Pt educated on safety and orthostatic hypotension and risk for falls. 1 dose hydralazine given for bp 189/105. Improvement noted at recheck 148/81.          "

## 2023-12-15 NOTE — PROGRESS NOTES
[facility] DISCHARGE NOTE    Patient discharged to home at 12:26 PM via wheel chair. Accompanied by spouse and staff. Discharge instructions reviewed with patient, opportunity offered to ask questions. Prescriptions sent to patients preferred pharmacy. All belongings sent with patient.    Eliud Wilhelm RN

## 2023-12-15 NOTE — PLAN OF CARE
Problem: Adult Inpatient Plan of Care  Goal: Plan of Care Review  Description: The Plan of Care Review/Shift note should be completed every shift.  The Outcome Evaluation is a brief statement about your assessment that the patient is improving, declining, or no change.  This information will be displayed automatically on your shift  note.  Outcome: Not Progressing  Flowsheets (Taken 12/15/2023 1634)  Outcome Evaluation: While patient has not had an extreme hypewrtensive crisis his BP still remains labile and he is having swings in his BP which cause symptoms of nausea and dizziness, he does recover from these, hopefully they will even out as he gets use to his new therapy regime   Goal Outcome Evaluation:                 Outcome Evaluation: While patient has not had an extreme hypewrtensive crisis his BP still remains labile and he is having swings in his BP which cause symptoms of nausea and dizziness, he does recover from these, hopefully they will even out as he gets use to his new therapy regime    A&Ox4  Slurred speech, baseline, due to previous stroke    Patient is expressing frustration with his current hospitalization, often cursing but speaking in a calm non aggressive tone:  He does not like the hospital gown  Staff were too loud last night out in the [place]  Patient adamantly refuses fall prevention interventions    Otherwise cooperative with cares and friendly  Good appetite eating 75% of breakfast    Red area in right AC from previous PIV, less than a dime size in diameter, no pus or drainage noted, tiny dot of eschar in the center, seems to be improved from previous RNs note on this old insertion site    Had shower this AM    Eliud PACHECO [facility]

## 2023-12-15 NOTE — DISCHARGE SUMMARY
"[facility]  Hospitalist Discharge Summary       Date of Admission:  12/10/2023  Date of Discharge:  12/15/2023 12:25 PM  Discharging Provider: Solomon Gold MD      Discharge Diagnoses     Hypertensive Crisis   Orthostatic hypotension  Essential hypertension  History of cerebrovascular accident  Hemiparesis affecting dominant side as late effect of stroke (H)  Ataxia  Dysphagia   Dysarthria as late effect of stroke.  Hyperlipidemia  Type 2 diabetes mellitus  Diabetic Retinopathy  Diabetic neuropathy  Nonischemic cardiomyopathy  Chronic low back pain  Gastroesophageal reflux disease with esophagitis  Restless legs syndrome (RLS)  ADITI  Right antecubital cellulitis    Follow-ups Needed After Discharge   Follow-up Appointments     Follow-up and recommended labs and tests       Follow up with primary care provider, Gus Nuñez, within 7 days for   hospital follow- up.  The following labs/tests are recommended: CBC and   CMP.            Discharge Disposition   Discharged to home  Condition at discharge: Stable    Hospital Course   Chris Delcid is a 57 year old male admitted on 12/10/2023 with hypertensive crisis, after presenting to the emergency department feeling lightheaded and dizzy intermittently for the past week.       Hypertensive Crisis   Orthostatic hypotension  Essential hypertension  Presented with blood pressure as high as 208/112. Hasn't felt \"right\" for the past week, especially in the mornings; he feels dizzy and lightheaded when making coffee. Has a known history of hypertension managed with lisinopril 10 mg q pm. Also has known orthostatic hypotension, was formerly on midodrine but this was discontinued by cardiology after his last stroke.   Suspect patient may have supine / nocturnal hypertension in addition to his orthostasis, making him difficult to manage.  -Discontinued a.m. antihypertensives and changing medications to 8 PM: labetalol 100 mg, Cozaar 50 mg, and " nifedipine ER 60 mg.    -Discussed starting midodrine or Florinef, patient declined.  -Orthostatics much improved on a.m. of 12/14: Sitting 177/102 P87, standing 138/83 P85,  168/91 P86  -Patient complains of hypertension getting up in the middle of the night.  Patient encouraged to slowly change positions, and taking a moment to reequilibrate, before moving.  -Nifedipine ER decreased to 30 mg at bedtime.     History of cerebrovascular accident  Hemiparesis affecting dominant side as late effect of stroke (H)  Ataxia  Dysphagia   Dysarthria as late effect of stroke.  Hyperlipidemia  Prior history of 3 strokes (Oct 2020, Nov 2021, Aug 2022) with residual dysarthria, dysphagia, hemiparesis, and ataxia. No known history of arrhythmias. Is managed with Brilinta 90 mg bid and lovastatin 40 mg q pm.  - Continue Brilinta and statin (formulary substitution with pravastatin)     Type 2 diabetes mellitus  Diabetic Retinopathy  Diabetic neuropathy  HgbA1c 6.3. Managed prior to admission with metformin 1000 mg bid and glipizide 5 mg bid.   - Resume glipizide at discharge  - Consistent carbohydrate diet     Nonischemic cardiomyopathy  [facility]. Last echo in 2022 with EF 60-65%, normal LV and RV size and function. Has a loop recorder in situ, but no known arrhythmias. Is on lisinopril noted above, but no beta blockers or diuretics.   Appears euvolemic, but reports gasping for air at night.   - Lisinopril changed to losartan  - Would benefit from outpatient cardiology, possibly echo     Chronic low back pain  Chronic and stable. Not on any chronic narcotics.  - Prn medications available     Gastroesophageal reflux disease with esophagitis  Continue home Pepcid.     Restless legs syndrome (RLS)  Continue home Requip.     ADITI  Formerly used a CPAP, but stopped after developing aspiration problems.    Right antecubital cellulitis  Patient noted to have cellulitis at IV site in right antecubital  fossa.  -Start Keflex    Consultations This Hospital Stay   PHYSICAL THERAPY ADULT IP CONSULT  OCCUPATIONAL THERAPY ADULT IP CONSULT  CARE MANAGEMENT / SOCIAL WORK IP CONSULT    Code Status   Full Code    35 MINUTES SPENT BY ME on the date of service doing chart review, history, exam, documentation & further activities per the note.         Solomon Gold MD  [facility]  ______________________________________________________________________    Physical Exam   Vital Signs: Temp: 97.9  F (36.6  C) Temp src: Oral BP: 115/55 Pulse: 87   Resp: 16 SpO2: 98 % O2 Device: None (Room air)    Weight: 0 lbs 0 oz       Gen: Obese, well developed, alert and oriented x 3, no acute distressed  HEENT: Atraumatic, normocephalic; sclera non-injected, anicterric; oral mucosa moist, no lesion, no exudate  Lungs: Clear to ausculation, no wheezes, no rhonchi, no rales  Heart: Regular rate, regular rhythm, no gallops, no rubs, no murmurs  GI: Bowel sound normal, no hepatosplenomegaly, no masses, non-tender, non-distended, no guarding, no rebound tenderness  Lymph: No lymphadenopathy, trace BLE edema  Skin: No rashes, no chronic venous stasis     Primary Care Physician   Gus Nuñez    Discharge Orders      Primary Care Referral      Adult Nephrology  Referral      Reason for your hospital stay    This is a 58-year-old male admitted with hypertensive urgency.     Activity    Your activity upon discharge: activity as tolerated     Follow-up and recommended labs and tests     Follow up with primary care provider, [facility] or [facility], within 7 days for hospital follow- up.  The following labs/tests are recommended: CBC and CMP.     Full Code     Diet    Follow this diet upon discharge: Orders Placed This Encounter      Low Salt Low Fat Diabetic Diet Adult       Significant Results and Procedures   Most Recent 3 CBC's:  Recent Labs   Lab Test 12/14/23  0421 12/11/23  0600 12/10/23  1416    WBC 6.4 6.5 6.0   HGB 14.0 14.8 15.7   MCV 87 85 85    347 357     Most Recent 3 BMP's:  Recent Labs   Lab Test 12/15/23  0749 12/15/23  0201 12/14/23  2227 12/14/23  0730 12/14/23  0421 12/11/23  0814 12/11/23  0600 12/10/23  2208 12/10/23  1416   NA  --   --   --   --  139  --  138  --  139   POTASSIUM  --   --   --   --  3.8  --  3.7  --  4.1   CHLORIDE  --   --   --   --  104  --  103  --  103   CO2  --   --   --   --  23  --  25  --  25   BUN  --   --   --   --  17.2  --  11.3  --  13.6   CR  --   --   --   --  0.89  --  0.86  --  0.89   ANIONGAP  --   --   --   --  12  --  10  --  11   JAMIE  --   --   --   --  8.8  --  9.0  --  9.3   * 170* 212*   < > 217*   < > 170*   < > 146*    < > = values in this interval not displayed.   ,   Results for orders placed or performed during the hospital encounter of 12/10/23   CT Head w/o Contrast    Narrative    EXAM: CT HEAD W/O CONTRAST  LOCATION: [facility]  DATE: 12/10/2023    INDICATION: Fall. Head trauma. Evaluate for intracranial hemorrhage.  COMPARISON: 10/22/2020 head CT.  TECHNIQUE: Routine CT Head without IV contrast. Multiplanar reformats. Dose reduction techniques were used.    FINDINGS:  INTRACRANIAL CONTENTS: No intracranial hemorrhage, extraaxial collection, or mass effect.  No CT evidence of acute infarct. Moderate to severe burden presumed chronic small vessel ischemic changes. Mild to moderate generalized volume loss. No   hydrocephalus.     VISUALIZED ORBITS/SINUSES/MASTOIDS: No intraorbital abnormality. No paranasal sinus mucosal disease. No middle ear or mastoid effusion.    BONES/SOFT TISSUES: No skull fracture. No scalp hematoma.      Impression    IMPRESSION:  1.  Age-related changes as above with no acute intracranial abnormality.   Chest XR,  PA & LAT    Narrative    EXAM: XR CHEST 2 VIEWS  LOCATION: [facility]  DATE: 12/10/2023    INDICATION: Dizziness lightheadedness rule out  infection  COMPARISON: None.      Impression    IMPRESSION:     Cardiac silhouette is normal in size. Normal mediastinal interfaces. There is a loop recorder in the left anterior chest wall.    Minimal atelectasis in the left base near the apex of the left ventricle. The lungs are otherwise clear. Normal vascularity. No pleural effusion.       Discharge Medications   Current Discharge Medication List        START taking these medications    Details   cephALEXin (KEFLEX) 500 MG capsule Take 1 capsule (500 mg) by mouth every 6 hours for 7 days  Qty: 27 capsule, Refills: 0    Associated Diagnoses: Cellulitis of right upper extremity      hydrALAZINE (APRESOLINE) 10 MG tablet Take 1-2 tablets (10-20 mg) by mouth every 6 hours as needed for high blood pressure (take 1 tab for SBP > 160 mmHg or 2 tabs for SBP > 200 mmHg)  Qty: 30 tablet, Refills: 0    Associated Diagnoses: Hypertensive crisis      labetalol (NORMODYNE) 100 MG tablet Take 1 tablet (100 mg) by mouth at bedtime  Qty: 30 tablet, Refills: 0    Associated Diagnoses: Hypertensive crisis      losartan (COZAAR) 50 MG tablet Take 1 tablet (50 mg) by mouth every evening  Qty: 30 tablet, Refills: 0    Associated Diagnoses: Hypertensive crisis      NIFEdipine ER OSMOTIC (ADALAT CC) 30 MG 24 hr tablet Take 1 tablet (30 mg) by mouth at bedtime  Qty: 30 tablet, Refills: 0    Associated Diagnoses: Hypertensive crisis           CONTINUE these medications which have NOT CHANGED    Details   blood glucose monitoring (SOFTCLIX) lancets TEST BLOOD SUGAR ONCE DAILY      Blood Glucose Monitoring Suppl (ACCU-CHEK GUIDE) w/Device KIT TEST BLOOD SUGAR ONCE DAILY      BRILINTA 90 MG tablet Take 90 mg by mouth 2 times daily      famotidine (PEPCID) 40 MG tablet Take 1 tablet by mouth 2 times daily      glipiZIDE (GLUCOTROL) 5 MG tablet Take 5 mg by mouth 2 times daily (before meals)      hypromellose (ARTIFICIAL TEARS) 0.5 % SOLN ophthalmic solution Place 1 drop into both eyes 4  times daily as needed for dry eyes      [employer] BLOOD GLUCOSE TEST test strip Test 1 times per day.      lovastatin (MEVACOR) 40 MG tablet TAKE ONE TABLET BY MOUTH ONCE DAILY WITH EVENING MEAL      metFORMIN (GLUCOPHAGE-XR) 500 MG 24 hr tablet Take 1,000 mg by mouth 2 times daily            STOP taking these medications       lisinopril (ZESTRIL) 10 MG tablet Comments:   Reason for Stopping:         UNABLE TO FIND Comments:   Reason for Stopping:             Allergies   Allergies   Allergen Reactions    Aspirin Other (See Comments)     : Ringing in ears    Atorvastatin      Other reaction(s): Myalgias    Cortisone Hives    Fludrocortisone      Other reaction(s): Hypertension  head pounding at night    Hydrochlorothiazide      Other reaction(s): Syncope  per note 08/28/2012    Pcn [Penicillins] Hives    Simvastatin      Other reaction(s): Other (See Comments)  drowsy

## 2023-12-17 ENCOUNTER — NURSE TRIAGE (OUTPATIENT)
Dept: NURSING | Facility: CLINIC | Age: 58
End: 2023-12-17
Payer: COMMERCIAL

## 2023-12-17 NOTE — TELEPHONE ENCOUNTER
Verbal consent received from Chris to speak with his SO Meryl.    Patient was hospitalized last week and has been home on a new BP regiment.  Meryl states that he is scheduled to take 2 BP medicines before bed.  She states the last 2 nights Chris has been so dizzy he cannot sit on the side of the bed.  She states that during the day he is fine.  She just wants to move the time he takes the BP medicine up so it is not so close to bedtime. Advised ok to try that but if no improvement he would need to be seen. Patient declined triage.     PRETTY LAMB RN                    Reason for Disposition   Caller has medicine question, adult has minor symptoms, caller declines triage, AND triager answers question    Additional Information   Negative: [1] Intentional drug overdose AND [2] suicidal thoughts or ideas   Negative: Drug overdose and triager unable to answer question   Negative: Caller requesting a renewal or refill of a medicine patient is currently taking   Negative: Caller requesting information unrelated to medicine   Negative: Caller requesting information about COVID-19 Vaccine   Negative: Caller requesting information about Emergency Contraception   Negative: Caller requesting information about Combined Birth Control Pills   Negative: Caller requesting information about Progestin Birth Control Pills   Negative: Caller requesting information about Post-Op pain or medicines   Negative: Caller requesting a prescription antibiotic (such as Penicillin) for Strep throat and has a positive culture result   Negative: Caller requesting a prescription anti-viral med (such as Tamiflu) and has influenza (flu) symptoms   Negative: Immunization reaction suspected   Negative: Rash while taking a medicine or within 3 days of stopping it   Negative: [1] Asthma and [2] having symptoms of asthma (cough, wheezing, etc.)   Negative: [1] Symptom of illness (e.g., headache, abdominal pain, earache, vomiting) AND [2]  more than mild   Negative: Breastfeeding questions about mother's medicines and diet   Negative: MORE THAN A DOUBLE DOSE of a prescription or over-the-counter (OTC) drug   Negative: [1] DOUBLE DOSE (an extra dose or lesser amount) of prescription drug AND [2] any symptoms (e.g., dizziness, nausea, pain, sleepiness)   Negative: [1] DOUBLE DOSE (an extra dose or lesser amount) of over-the-counter (OTC) drug AND [2] any symptoms (e.g., dizziness, nausea, pain, sleepiness)   Negative: Took another person's prescription drug   Negative: [1] DOUBLE DOSE (an extra dose or lesser amount) of prescription drug AND [2] NO symptoms  (Exception: A double dose of antibiotics.)   Negative: Diabetes drug error or overdose (e.g., took wrong type of insulin or took extra dose)   Negative: [1] Prescription not at pharmacy AND [2] was prescribed by PCP recently (Exception: Triager has access to EMR and prescription is recorded there. Go to Home Care and confirm for pharmacy.)   Negative: [1] Pharmacy calling with prescription question AND [2] triager unable to answer question   Negative: [1] Caller has URGENT medicine question about med that PCP or specialist prescribed AND [2] triager unable to answer question   Negative: Medicine patch causing local rash or itching   Negative: [1] Caller has medicine question about med NOT prescribed by PCP AND [2] triager unable to answer question (e.g., compatibility with other med, storage)   Negative: Prescription request for new medicine (not a refill)   Negative: [1] Caller has NON-URGENT medicine question about med that PCP prescribed AND [2] triager unable to answer question   Negative: Caller wants to use a complementary or alternative medicine   Negative: [1] Prescription prescribed recently is not at pharmacy AND [2] triager has access to patient's EMR AND [3] prescription is recorded in the EMR   Negative: [1] DOUBLE DOSE (an extra dose or lesser amount) of over-the-counter (OTC) drug AND  [2] NO symptoms   Negative: [1] DOUBLE DOSE (an extra dose or lesser amount) of antibiotic drug AND [2] NO symptoms   Negative: Caller has medicine question only, adult not sick, AND triager answers question    Protocols used: Medication Question Call-A-AH

## 2023-12-19 ENCOUNTER — TELEPHONE (OUTPATIENT)
Dept: NEPHROLOGY | Facility: CLINIC | Age: 58
End: 2023-12-19
Payer: COMMERCIAL

## 2023-12-19 DIAGNOSIS — I10 ESSENTIAL HYPERTENSION: Primary | ICD-10-CM

## 2023-12-19 NOTE — TELEPHONE ENCOUNTER
[facility] Call Center    Phone Message    May a detailed message be left on voicemail: yes     Reason for Call: Other: Please add lab orders for 12/20 lab appointment for Dr. Crowley at the [facility]. Thank you!     Action Taken: Other: [facility] Neph    Travel Screening: Not Applicable

## 2023-12-20 ENCOUNTER — OFFICE VISIT (OUTPATIENT)
Dept: FAMILY MEDICINE | Facility: CLINIC | Age: 58
End: 2023-12-20
Payer: COMMERCIAL

## 2023-12-20 ENCOUNTER — PATIENT OUTREACH (OUTPATIENT)
Dept: CARE COORDINATION | Facility: CLINIC | Age: 58
End: 2023-12-20

## 2023-12-20 VITALS
TEMPERATURE: 98.5 F | HEIGHT: 73 IN | RESPIRATION RATE: 20 BRPM | BODY MASS INDEX: 35.41 KG/M2 | HEART RATE: 82 BPM | OXYGEN SATURATION: 100 % | SYSTOLIC BLOOD PRESSURE: 173 MMHG | WEIGHT: 267.2 LBS | DIASTOLIC BLOOD PRESSURE: 95 MMHG

## 2023-12-20 DIAGNOSIS — I95.1 ORTHOSTATIC HYPOTENSION: Primary | ICD-10-CM

## 2023-12-20 DIAGNOSIS — I10 ESSENTIAL HYPERTENSION: ICD-10-CM

## 2023-12-20 DIAGNOSIS — K31.84 DIABETIC GASTROPARESIS (H): ICD-10-CM

## 2023-12-20 DIAGNOSIS — E11.43 DIABETIC GASTROPARESIS (H): ICD-10-CM

## 2023-12-20 LAB
ALBUMIN MFR UR ELPH: 31.5 MG/DL
ALBUMIN SERPL BCG-MCNC: 4.3 G/DL (ref 3.5–5.2)
ALBUMIN UR-MCNC: 30 MG/DL
ANION GAP SERPL CALCULATED.3IONS-SCNC: 11 MMOL/L (ref 7–15)
APPEARANCE UR: CLEAR
BACTERIA #/AREA URNS HPF: ABNORMAL /HPF
BILIRUB UR QL STRIP: NEGATIVE
BUN SERPL-MCNC: 14.1 MG/DL (ref 8–23)
CALCIUM SERPL-MCNC: 9.9 MG/DL (ref 8.8–10.2)
CHLORIDE SERPL-SCNC: 104 MMOL/L (ref 98–107)
CHOLEST SERPL-MCNC: 155 MG/DL
COLOR UR AUTO: YELLOW
CREAT SERPL-MCNC: 0.88 MG/DL (ref 0.67–1.17)
CREAT UR-MCNC: 54.5 MG/DL
CREAT UR-MCNC: 54.5 MG/DL
DEPRECATED HCO3 PLAS-SCNC: 24 MMOL/L (ref 22–29)
EGFRCR SERPLBLD CKD-EPI 2021: >90 ML/MIN/1.73M2
ERYTHROCYTE [DISTWIDTH] IN BLOOD BY AUTOMATED COUNT: 13 % (ref 10–15)
FASTING STATUS PATIENT QL REPORTED: NORMAL
GLUCOSE SERPL-MCNC: 132 MG/DL (ref 70–99)
GLUCOSE UR STRIP-MCNC: NEGATIVE MG/DL
HCT VFR BLD AUTO: 43.6 % (ref 40–53)
HDLC SERPL-MCNC: 50 MG/DL
HGB BLD-MCNC: 15.2 G/DL (ref 13.3–17.7)
HGB UR QL STRIP: NEGATIVE
KETONES UR STRIP-MCNC: NEGATIVE MG/DL
LDLC SERPL CALC-MCNC: 86 MG/DL
LEUKOCYTE ESTERASE UR QL STRIP: NEGATIVE
MCH RBC QN AUTO: 30.2 PG (ref 26.5–33)
MCHC RBC AUTO-ENTMCNC: 34.9 G/DL (ref 31.5–36.5)
MCV RBC AUTO: 87 FL (ref 78–100)
MICROALBUMIN UR-MCNC: 151 MG/L
MICROALBUMIN/CREAT UR: 277.06 MG/G CR (ref 0–17)
NITRATE UR QL: NEGATIVE
NONHDLC SERPL-MCNC: 105 MG/DL
PH UR STRIP: 5.5 [PH] (ref 5–8)
PHOSPHATE SERPL-MCNC: 3.3 MG/DL (ref 2.5–4.5)
PLATELET # BLD AUTO: 354 10E3/UL (ref 150–450)
POTASSIUM SERPL-SCNC: 5.2 MMOL/L (ref 3.4–5.3)
PROT/CREAT 24H UR: 0.58 MG/MG CR (ref 0–0.2)
RBC # BLD AUTO: 5.04 10E6/UL (ref 4.4–5.9)
RBC #/AREA URNS AUTO: ABNORMAL /HPF
SODIUM SERPL-SCNC: 139 MMOL/L (ref 135–145)
SP GR UR STRIP: 1.01 (ref 1–1.03)
SQUAMOUS #/AREA URNS AUTO: ABNORMAL /LPF
TRIGL SERPL-MCNC: 96 MG/DL
UROBILINOGEN UR STRIP-ACNC: 1 E.U./DL
WBC # BLD AUTO: 6 10E3/UL (ref 4–11)
WBC #/AREA URNS AUTO: ABNORMAL /HPF

## 2023-12-20 PROCEDURE — 99000 SPECIMEN HANDLING OFFICE-LAB: CPT | Performed by: FAMILY MEDICINE

## 2023-12-20 PROCEDURE — 84244 ASSAY OF RENIN: CPT | Mod: 90 | Performed by: FAMILY MEDICINE

## 2023-12-20 PROCEDURE — 99204 OFFICE O/P NEW MOD 45 MIN: CPT | Performed by: FAMILY MEDICINE

## 2023-12-20 PROCEDURE — 84156 ASSAY OF PROTEIN URINE: CPT | Performed by: FAMILY MEDICINE

## 2023-12-20 PROCEDURE — 80069 RENAL FUNCTION PANEL: CPT | Performed by: FAMILY MEDICINE

## 2023-12-20 PROCEDURE — 82043 UR ALBUMIN QUANTITATIVE: CPT | Performed by: FAMILY MEDICINE

## 2023-12-20 PROCEDURE — 82570 ASSAY OF URINE CREATININE: CPT | Performed by: FAMILY MEDICINE

## 2023-12-20 PROCEDURE — 82088 ASSAY OF ALDOSTERONE: CPT | Performed by: FAMILY MEDICINE

## 2023-12-20 PROCEDURE — 85027 COMPLETE CBC AUTOMATED: CPT | Performed by: FAMILY MEDICINE

## 2023-12-20 PROCEDURE — 36415 COLL VENOUS BLD VENIPUNCTURE: CPT | Performed by: FAMILY MEDICINE

## 2023-12-20 PROCEDURE — 80061 LIPID PANEL: CPT | Performed by: FAMILY MEDICINE

## 2023-12-20 PROCEDURE — 81001 URINALYSIS AUTO W/SCOPE: CPT | Performed by: FAMILY MEDICINE

## 2023-12-20 RX ORDER — RESPIRATORY SYNCYTIAL VIRUS VACCINE 120MCG/0.5
0.5 KIT INTRAMUSCULAR ONCE
Qty: 1 EACH | Refills: 0 | Status: CANCELLED | OUTPATIENT
Start: 2023-12-20 | End: 2023-12-20

## 2023-12-20 ASSESSMENT — PAIN SCALES - GENERAL: PAINLEVEL: SEVERE PAIN (6)

## 2023-12-20 NOTE — PROGRESS NOTES
Contact   Chart Review     Situation: Patient chart reviewed by .    Assessment: Sent email to [facility] as [facility] cannot accept the home care referral.     Plan/Recommendations: will look for home care.   [facility] has accepted the order.     No further outreach.      Zainab Gramajo,   [facility]  [phone]

## 2023-12-20 NOTE — PATIENT INSTRUCTIONS
Move labetalol to AM, and keep the losartan to the afternoon, and nifedipine at bed time.     Have the nephrologist weigh in on the blood pressure medications for the labetalol and nifedipine. I also want to also have your heart checked to see if there is a valve.     In three days let's have you follow up with me via mychart with the new readings.

## 2023-12-20 NOTE — PROGRESS NOTES
"  Assessment & Plan     Orthostatic hypotension  Chronic, likely multifactorial from prior neurologic injury and possible diabetic autonomic neuropathy. Will order up to date echocardiogram to rule out valvular pathology. Change labetalol to AM to better adjust for real time high blood pressures.   - Echocardiogram Complete  - Physical Therapy Referral  - Home Care Referral    Diabetic gastroparesis (H)  Chronic, stable.   - Lipid panel reflex to direct LDL Non-fasting  - Lipid panel reflex to direct LDL Non-fasting    Essential hypertension  Chronic, not well controlled. Pending nephrology referral. Unclear if nephrology will be primary- would want to change to higher dose ARB and consider spironolactone for more consistent Bps.   - Renal panel  - CBC with platelets  - UA with Microscopic  - Protein  random urine  - Albumin Random Urine Quantitative with Creat Ratio  - Renin activity  - Aldosterone  - Urine Microscopic Exam      Prescription drug management  I spent a total of 27 minutes on the day of the visit.   Time spent by me doing chart review, history and exam, documentation and further activities per the note     MED REC REQUIRED  Post Medication Reconciliation Status:  Discharge medications reconciled and changed, see notes/orders  BMI:   Estimated body mass index is 35.25 kg/m  as calculated from the following:    Height as of this encounter: 1.854 m (6' 1\").    Weight as of this encounter: 121.2 kg (267 lb 3.2 oz).   Weight management plan: Discussed healthy diet and exercise guidelines    See Patient Instructions    DO JUNIOR [facility]    Amy Perez is a 58 year old, presenting for the following health issues:  Hospital F/U (Hypertensive Urgency), Referral (Physical Therapy), and Hypertension (Nights are bad-can't even sit up without getting dizzy or losing his breath. Meds were changed to taken in afternoon to help, but it isn't. Hydralazine dosage " "is not good-could that change?)        12/20/2023    12:34 PM   Additional Questions   Roomed by VIDHI Davila   Accompanied by Girlfriend       [facility]     Hospital Follow-up Visit:    Hospital/Nursing Home/IP Rehab Facility: [facility]  Date of Admission: 12/10/2023  Date of Discharge: 12/15/2023  Reason(s) for Admission: Hypertensive urgency    Was your hospitalization related to COVID-19? No   Problems taking medications regularly:  None  Medication changes since discharge: START: cephALEXin (KEFLEX) 500 MG capsule,  hydrALAZINE (APRESOLINE) 10 MG tablet, labetalol (NORMODYNE) 100 MG tablet, losartan (COZAAR) 50 MG tablet,  NIFEdipine ER OSMOTIC (ADALAT CC) 30 MG 24 hr tablet     STOP: lisinopril (ZESTRIL) 10 MG tablet   Problems adhering to non-medication therapy:  None    Summary of hospitalization:  [facility] discharge summary reviewed  Diagnostic Tests/Treatments reviewed.  Follow up needed: none  Other Healthcare Providers Involved in Patient s Care:         Specialist appointment - 12/27   Update since discharge: fluctuating course. Breathing at night is difficult. He will get low blood pressure at night. He struggles chronically with orthostatic hypotension.     Plan of care communicated with patient and family.    Mild improvement with switching losartan to 1PM.     Review of Systems   Constitutional, HEENT, cardiovascular, pulmonary, gi and gu systems are negative, except as otherwise noted.      Objective    BP (!) 173/95 (BP Location: Right arm, Patient Position: Sitting, Cuff Size: Adult Large)   Pulse 82   Temp 98.5  F (36.9  C) (Oral)   Resp 20   Ht 1.854 m (6' 1\")   Wt 121.2 kg (267 lb 3.2 oz)   SpO2 100%   BMI 35.25 kg/m    Body mass index is 35.25 kg/m .  Physical Exam   GENERAL: healthy, alert and no distress  EYES: Eyes grossly normal to inspection, PERRL and conjunctivae and sclerae normal  NECK: no adenopathy, no asymmetry, masses, or scars " and thyroid normal to palpation  RESP: lungs clear to auscultation - no rales, rhonchi or wheezes  CV: regular rate and rhythm, normal S1 S2, no S3 or S4, no murmur, click or rub, no peripheral edema and peripheral pulses strong  MS: no gross musculoskeletal defects noted, no edema  SKIN: no suspicious lesions or rashes    Admission on 12/10/2023, Discharged on 12/15/2023   Component Date Value Ref Range Status    Sodium 12/10/2023 139  135 - 145 mmol/L Final    Reference intervals for this test were updated on 09/26/2023 to more accurately reflect our healthy population. There may be differences in the flagging of prior results with similar values performed with this method. Interpretation of those prior results can be made in the context of the updated reference intervals.     Potassium 12/10/2023 4.1  3.4 - 5.3 mmol/L Final    Carbon Dioxide (CO2) 12/10/2023 25  22 - 29 mmol/L Final    Anion Gap 12/10/2023 11  7 - 15 mmol/L Final    Urea Nitrogen 12/10/2023 13.6  8.0 - 23.0 mg/dL Final    Creatinine 12/10/2023 0.89  0.67 - 1.17 mg/dL Final    GFR Estimate 12/10/2023 >90  >60 mL/min/1.73m2 Final    Calcium 12/10/2023 9.3  8.6 - 10.0 mg/dL Final    Chloride 12/10/2023 103  98 - 107 mmol/L Final    Glucose 12/10/2023 146 (H)  70 - 99 mg/dL Final    Alkaline Phosphatase 12/10/2023 74  40 - 150 U/L Final    Reference intervals for this test were updated on 11/14/2023 to more accurately reflect our healthy population. There may be differences in the flagging of prior results with similar values performed with this method. Interpretation of those prior results can be made in the context of the updated reference intervals.    AST 12/10/2023 17  0 - 45 U/L Final    Reference intervals for this test were updated on 6/12/2023 to more accurately reflect our healthy population. There may be differences in the flagging of prior results with similar values performed with this method. Interpretation of those prior results can be  made in the context of the updated reference intervals.    ALT 12/10/2023 15  0 - 70 U/L Final    Reference intervals for this test were updated on 6/12/2023 to more accurately reflect our healthy population. There may be differences in the flagging of prior results with similar values performed with this method. Interpretation of those prior results can be made in the context of the updated reference intervals.      Protein Total 12/10/2023 7.4  6.4 - 8.3 g/dL Final    Albumin 12/10/2023 4.3  3.5 - 5.2 g/dL Final    Bilirubin Total 12/10/2023 0.4  <=1.2 mg/dL Final    Lipase 12/10/2023 24  13 - 60 U/L Final    Troponin T, High Sensitivity 12/10/2023 20  <=22 ng/L Final    Either a High Sensitivity Troponin T baseline (0 hours) value = 100 ng/L, or an increase in High Sensitivity Troponin T = 7 ng/L at 2 hours compared to 0 hours (2-0 hours), suggests myocardial injury, and urgent clinical attention is required.    If the 2-0 hours increase is <7 ng/L, a High Sensitivity Troponin T result above gender-specific reference ranges warrants further evaluation.   Recommendations for further evaluation include correlation with clinical decision-making tool (e.g., HEART), a 3rd High Sensitivity Troponin T test 2 hours after the 2nd (a 20% change from baseline would represent concern), admission for observation, close PCC/cardiology follow-up, or urgent outpatient provocative testing.    Color Urine 12/10/2023 Yellow  Colorless, Straw, Light Yellow, Yellow Final    Appearance Urine 12/10/2023 Clear  Clear Final    Glucose Urine 12/10/2023 Negative  Negative mg/dL Final    Bilirubin Urine 12/10/2023 Negative  Negative Final    Ketones Urine 12/10/2023 Negative  Negative mg/dL Final    Specific Gravity Urine 12/10/2023 1.010  1.003 - 1.035 Final    Blood Urine 12/10/2023 Negative  Negative Final    pH Urine 12/10/2023 6.0  5.0 - 7.0 Final    Protein Albumin Urine 12/10/2023 100 (A)  Negative mg/dL Final    Urobilinogen Urine  12/10/2023 2.0  Normal, 2.0 mg/dL Final    Nitrite Urine 12/10/2023 Negative  Negative Final    Leukocyte Esterase Urine 12/10/2023 Negative  Negative Final    Sperm Urine 12/10/2023 Present (A)  None Seen /HPF Final    RBC Urine 12/10/2023 1  <=2 /HPF Final    WBC Urine 12/10/2023 1  <=5 /HPF Final    WBC Count 12/10/2023 6.0  4.0 - 11.0 10e3/uL Final    RBC Count 12/10/2023 5.32  4.40 - 5.90 10e6/uL Final    Hemoglobin 12/10/2023 15.7  13.3 - 17.7 g/dL Final    Hematocrit 12/10/2023 45.3  40.0 - 53.0 % Final    MCV 12/10/2023 85  78 - 100 fL Final    MCH 12/10/2023 29.5  26.5 - 33.0 pg Final    MCHC 12/10/2023 34.7  31.5 - 36.5 g/dL Final    RDW 12/10/2023 13.3  10.0 - 15.0 % Final    Platelet Count 12/10/2023 357  150 - 450 10e3/uL Final    % Neutrophils 12/10/2023 62  % Final    % Lymphocytes 12/10/2023 25  % Final    % Monocytes 12/10/2023 8  % Final    % Eosinophils 12/10/2023 3  % Final    % Basophils 12/10/2023 1  % Final    % Immature Granulocytes 12/10/2023 1  % Final    NRBCs per 100 WBC 12/10/2023 0  <1 /100 Final    Absolute Neutrophils 12/10/2023 3.7  1.6 - 8.3 10e3/uL Final    Absolute Lymphocytes 12/10/2023 1.5  0.8 - 5.3 10e3/uL Final    Absolute Monocytes 12/10/2023 0.5  0.0 - 1.3 10e3/uL Final    Absolute Eosinophils 12/10/2023 0.2  0.0 - 0.7 10e3/uL Final    Absolute Basophils 12/10/2023 0.1  0.0 - 0.2 10e3/uL Final    Absolute Immature Granulocytes 12/10/2023 0.0  <=0.4 10e3/uL Final    Absolute NRBCs 12/10/2023 0.0  10e3/uL Final    Hold Specimen 12/10/2023 [facility]   Final    Troponin T, High Sensitivity 12/10/2023 13  <=22 ng/L Final    Either a High Sensitivity Troponin T baseline (0 hours) value = 100 ng/L, or an increase in High Sensitivity Troponin T = 7 ng/L at 2 hours compared to 0 hours (2-0 hours), suggests myocardial injury, and urgent clinical attention is required.    If the 2-0 hours increase is <7 ng/L, a High Sensitivity Troponin T result above gender-specific reference ranges  warrants further evaluation.   Recommendations for further evaluation include correlation with clinical decision-making tool (e.g., HEART), a 3rd High Sensitivity Troponin T test 2 hours after the 2nd (a 20% change from baseline would represent concern), admission for observation, close PCC/cardiology follow-up, or urgent outpatient provocative testing.    Hemoglobin A1C 12/10/2023 6.3 (H)  <5.7 % Final    Normal <5.7%   Prediabetes 5.7-6.4%    Diabetes 6.5% or higher     Note: Adopted from ADA consensus guidelines.    GLUCOSE BY METER POCT 12/10/2023 143 (H)  70 - 99 mg/dL Final    Sodium 12/11/2023 138  135 - 145 mmol/L Final    Reference intervals for this test were updated on 09/26/2023 to more accurately reflect our healthy population. There may be differences in the flagging of prior results with similar values performed with this method. Interpretation of those prior results can be made in the context of the updated reference intervals.     Potassium 12/11/2023 3.7  3.4 - 5.3 mmol/L Final    Chloride 12/11/2023 103  98 - 107 mmol/L Final    Carbon Dioxide (CO2) 12/11/2023 25  22 - 29 mmol/L Final    Anion Gap 12/11/2023 10  7 - 15 mmol/L Final    Urea Nitrogen 12/11/2023 11.3  8.0 - 23.0 mg/dL Final    Creatinine 12/11/2023 0.86  0.67 - 1.17 mg/dL Final    GFR Estimate 12/11/2023 >90  >60 mL/min/1.73m2 Final    Calcium 12/11/2023 9.0  8.6 - 10.0 mg/dL Final    Glucose 12/11/2023 170 (H)  70 - 99 mg/dL Final    GLUCOSE BY METER POCT 12/11/2023 133 (H)  70 - 99 mg/dL Final    WBC Count 12/11/2023 6.5  4.0 - 11.0 10e3/uL Final    RBC Count 12/11/2023 5.01  4.40 - 5.90 10e6/uL Final    Hemoglobin 12/11/2023 14.8  13.3 - 17.7 g/dL Final    Hematocrit 12/11/2023 42.6  40.0 - 53.0 % Final    MCV 12/11/2023 85  78 - 100 fL Final    MCH 12/11/2023 29.5  26.5 - 33.0 pg Final    MCHC 12/11/2023 34.7  31.5 - 36.5 g/dL Final    RDW 12/11/2023 13.4  10.0 - 15.0 % Final    Platelet Count 12/11/2023 347  150 - 450 10e3/uL  Final    % Neutrophils 12/11/2023 62  % Final    % Lymphocytes 12/11/2023 23  % Final    % Monocytes 12/11/2023 9  % Final    % Eosinophils 12/11/2023 4  % Final    % Basophils 12/11/2023 1  % Final    % Immature Granulocytes 12/11/2023 1  % Final    NRBCs per 100 WBC 12/11/2023 0  <1 /100 Final    Absolute Neutrophils 12/11/2023 4.0  1.6 - 8.3 10e3/uL Final    Absolute Lymphocytes 12/11/2023 1.5  0.8 - 5.3 10e3/uL Final    Absolute Monocytes 12/11/2023 0.6  0.0 - 1.3 10e3/uL Final    Absolute Eosinophils 12/11/2023 0.2  0.0 - 0.7 10e3/uL Final    Absolute Basophils 12/11/2023 0.1  0.0 - 0.2 10e3/uL Final    Absolute Immature Granulocytes 12/11/2023 0.0  <=0.4 10e3/uL Final    Absolute NRBCs 12/11/2023 0.0  10e3/uL Final    GLUCOSE BY METER POCT 12/11/2023 164 (H)  70 - 99 mg/dL Final    Catecholamine Blood Interpretation 12/11/2023 See Note   Final    Comment: INTERPRETIVE INFORMATION: Catecholamines Panel, Plasma    Small increases in catecholamines (less than 2 times the   upper reference limit) are usually the result of   physiological stimuli, drugs, or improper specimen   collection. Significant elevation of one or more   catecholamines (2 or more times the upper reference limit)   is associated with an increased probability of a   neuroendocrine tumor. Measurement of plasma or urine   fractionated metanephrines provides better diagnostic   sensitivity than measurement of catecholamines.    Lower catecholamine concentrations are observed in   specimens collected from supine adults.     To convert to picograms per milliliter (pg/mL), multiply   the reported concentration for Dopamine by 0.153,   Epinephrine by 0.183, and Norepinephrine by 0.169.    Access complete set of age- and/or gender-specific   reference intervals for this test in the [facility]   Test Directory ([facility]).    This test was developed and its performance                            characteristics   determined by [employer] [facility] [employer].  It has not been cleared or   approved by the US Food and Drug Administration. This test   was performed in a CLIA certified laboratory and is   intended for clinical purposes.  Performed By: [facility]  [address] [place] [address]  : Romeo Awan MD, PhD  CLIA Number: [id number]    Dopamine (pmol/L) 12/11/2023 <130  <=240 pmol/L Final    INTERPRETIVE INFORMATION: Dopamine    Seated (15 min) less than or equal to 240 pmol/L  Supine (30 min) less than or equal to 240 pmol/L    Epinephrine (pmol/L) 12/11/2023 <55  <=330 pmol/L Final    INTERPRETIVE INFORMATION:Epinephrine    Seated (15 min) less than or equal to 330 pmol/L  Supine (30 min) less than or equal to 265 pmol/L    Norepinephrine (pmol/L) 12/11/2023 796 (L)  1050 - 4800 pmol/L Final    INTERPRETIVE INFORMATION: Norepinephrine    Seated (15 min) 1050 - 4800 pmol/L  Supine (30 min) 680 - 3100 pmol/L    GLUCOSE BY METER POCT 12/11/2023 186 (H)  70 - 99 mg/dL Final    GLUCOSE BY METER POCT 12/11/2023 209 (H)  70 - 99 mg/dL Final    GLUCOSE BY METER POCT 12/11/2023 194 (H)  70 - 99 mg/dL Final    GLUCOSE BY METER POCT 12/12/2023 187 (H)  70 - 99 mg/dL Final    GLUCOSE BY METER POCT 12/12/2023 187 (H)  70 - 99 mg/dL Final    GLUCOSE BY METER POCT 12/12/2023 245 (H)  70 - 99 mg/dL Final    GLUCOSE BY METER POCT 12/12/2023 211 (H)  70 - 99 mg/dL Final    GLUCOSE BY METER POCT 12/12/2023 220 (H)  70 - 99 mg/dL Final    GLUCOSE BY METER POCT 12/13/2023 244 (H)  70 - 99 mg/dL Final    GLUCOSE BY METER POCT 12/13/2023 234 (H)  70 - 99 mg/dL Final    Patient Treated    GLUCOSE BY METER POCT 12/13/2023 228 (H)  70 - 99 mg/dL Final    GLUCOSE BY METER POCT 12/13/2023 228 (H)  70 - 99 mg/dL Final    GLUCOSE BY METER POCT 12/13/2023 183 (H)  70 - 99 mg/dL Final    Sodium 12/14/2023 139  135 - 145 mmol/L Final    Reference intervals for this test were updated on 09/26/2023 to more accurately reflect our healthy population. There  may be differences in the flagging of prior results with similar values performed with this method. Interpretation of those prior results can be made in the context of the updated reference intervals.     Potassium 12/14/2023 3.8  3.4 - 5.3 mmol/L Final    Chloride 12/14/2023 104  98 - 107 mmol/L Final    Carbon Dioxide (CO2) 12/14/2023 23  22 - 29 mmol/L Final    Anion Gap 12/14/2023 12  7 - 15 mmol/L Final    Urea Nitrogen 12/14/2023 17.2  8.0 - 23.0 mg/dL Final    Creatinine 12/14/2023 0.89  0.67 - 1.17 mg/dL Final    GFR Estimate 12/14/2023 >90  >60 mL/min/1.73m2 Final    Calcium 12/14/2023 8.8  8.8 - 10.2 mg/dL Final    Glucose 12/14/2023 217 (H)  70 - 99 mg/dL Final    WBC Count 12/14/2023 6.4  4.0 - 11.0 10e3/uL Final    RBC Count 12/14/2023 4.74  4.40 - 5.90 10e6/uL Final    Hemoglobin 12/14/2023 14.0  13.3 - 17.7 g/dL Final    Hematocrit 12/14/2023 41.2  40.0 - 53.0 % Final    MCV 12/14/2023 87  78 - 100 fL Final    MCH 12/14/2023 29.5  26.5 - 33.0 pg Final    MCHC 12/14/2023 34.0  31.5 - 36.5 g/dL Final    RDW 12/14/2023 13.4  10.0 - 15.0 % Final    Platelet Count 12/14/2023 335  150 - 450 10e3/uL Final    GLUCOSE BY METER POCT 12/14/2023 192 (H)  70 - 99 mg/dL Final    GLUCOSE BY METER POCT 12/14/2023 228 (H)  70 - 99 mg/dL Final    GLUCOSE BY METER POCT 12/14/2023 234 (H)  70 - 99 mg/dL Final    GLUCOSE BY METER POCT 12/14/2023 216 (H)  70 - 99 mg/dL Final    GLUCOSE BY METER POCT 12/14/2023 212 (H)  70 - 99 mg/dL Final    GLUCOSE BY METER POCT 12/15/2023 170 (H)  70 - 99 mg/dL Final    Hold Specimen 12/15/2023 [facility]   Final    Hold Specimen 12/15/2023 [facility]   Final    Hold Specimen 12/15/2023 [facility]   Final    Magnesium 12/15/2023 2.1  1.7 - 2.3 mg/dL Final    GLUCOSE BY METER POCT 12/15/2023 215 (H)  70 - 99 mg/dL Final    Chart Critical Test  Dr/RN Notified

## 2023-12-20 NOTE — COMMUNITY RESOURCES LIST (ENGLISH)
12/20/2023   [facility]  N/A  For questions about this resource list or additional care needs, please contact your primary care clinic or care manager.  Phone: [phone]   Email: N/A   Address: [address] [place] [id number]   Hours: N/A        Transportation       Free or low-cost transportation  1  [employer] Distance: 16.65 miles      In-Person   [address] [place] [address]  Language: English  Hours: Mon - Fri 6:00 AM - 6:00 PM  Fees: Insurance, Self Pay   Phone: [phone]) [phone] Email: info@[employer] Website: http://www.[employer]     Transportation to medical appointments  2  [employer] Distance: 16.65 miles      In-Person   [address] [place] [address]  Language: English  Hours: Mon - Fri 6:00 AM - 6:00 PM  Fees: Insurance, Self Pay   Phone: [phone]) [phone] Email: info@[employer] Website: http://www.[employer]     3  [facility]   Family Wellness ([employer]) Distance: 21.79 miles      In-Person   [address] [place] [address] [address] [place] 583250  Language: English, Georgian, English, Gujarati, Yessenia, Tajik, Bengali, Belarusian, Kazakh, German  Hours: Mon - Wed 9:00 AM - 5:00 PM , Thu 12:00 PM - 6:00 PM , Fri 9:00 AM - 5:00 PM , Sun 10:30 AM - 2:00 PM Appt. Only  Fees: Free   Phone: (387) 299-2884 Email: info@[employer]aif[employer].org Website: https://www.sewa-aifw.org/          Important Numbers & Websites       Emergency Services   911  City Services   311  Poison Control   (503) 783-4549  Suicide Prevention Lifeline   (210) 923-4555 (TALK)  Child Abuse Hotline   (301) 339-8679 (4-A-Child)  Sexual Assault Hotline   (554) 933-6194 (HOPE)  National Runaway Safeline   (478) 392-4407 (RUNAWAY)  All-Options Talkline   (728) 740-3126  Substance Abuse Referral   (456) 572-1313 (HELP)

## 2023-12-21 ENCOUNTER — TELEPHONE (OUTPATIENT)
Dept: FAMILY MEDICINE | Facility: CLINIC | Age: 58
End: 2023-12-21
Payer: COMMERCIAL

## 2023-12-21 NOTE — TELEPHONE ENCOUNTER
Multiple Concerns: BP, Home Care referral, Visit note    ( BP/Medication Concern) Spoke with Meryl ANDERSON) with patient on speaker phone.   Patient had visit with provider on 12/20.  Patient is reporting the following:  Patient took labetalol (100 mg) last night at 8 pm as advised as patient had not taken BP medication day of appt. BP at time of administration 145/90 at 2 am patient attempting to get out of bed to use bathroom  and he could not sit up, weak,  /58, HR 42 ( lying position).   Patient also dizzy and fell walking mid stride at 6:30 PM (/113) and 8 PM ( /91) , Patient also reported bloody nose, wretching and dizziness last evening.  Pulse this AM 72. Has not taken Labetalol today. Patient also noted he does not have these issues when taking Losartan.     Blood pressure today at 9 am 173/119, patient took hydralazine PRN dose for BP  > 160    Per visit note: going forward patient to take labetalol in AM,  Losartan in the afternoon, and Nifedipine at HS.    Patient asking if a catheter would be appropriate as patient is having difficulty getting out of bed and ambulating to bathroom during the night. Cannot sit at side of bed.    Provider Please advise patient  BP and labetolol concern    2.  Home Care referral sent by provider at 12/20 visit . PT requested. RN assessment would advise RN  and OT eval and treat referral also. The recent referral would require update.    3. [facility] has accepted patient. Provider visit note for 12/20 requires completion for referral to be sent to home care for start of care as soon as possible.

## 2023-12-21 NOTE — TELEPHONE ENCOUNTER
Reason for Call:  Other call back    Detailed comments: Completed appointment yesterday with Dr. Zainab Oneil and were advised to call with any questions.  Would like to speak to PCP or Care Team regarding medication,  labetalol (NORMODYNE) 100 MG tablet     Patient does not have a Consent to Communicate on file, please call patient back. Patient is currently with friend Meryl.    Phone Number Patient can be reached at: Home number on file [phone] (home)    Best Time: Any    Can we leave a detailed message on this number?  Yes    Call taken on 12/21/2023 at 8:52 AM by Dayna Ji

## 2023-12-22 ENCOUNTER — TELEPHONE (OUTPATIENT)
Dept: FAMILY MEDICINE | Facility: CLINIC | Age: 58
End: 2023-12-22
Payer: COMMERCIAL

## 2023-12-22 ENCOUNTER — MYC MEDICAL ADVICE (OUTPATIENT)
Dept: FAMILY MEDICINE | Facility: CLINIC | Age: 58
End: 2023-12-22
Payer: COMMERCIAL

## 2023-12-22 DIAGNOSIS — I95.1 ORTHOSTATIC HYPOTENSION: Primary | ICD-10-CM

## 2023-12-22 DIAGNOSIS — I16.9 HYPERTENSIVE CRISIS: ICD-10-CM

## 2023-12-22 LAB — ALDOST SERPL-MCNC: <3 NG/DL (ref 0–31)

## 2023-12-22 NOTE — RESULT ENCOUNTER NOTE
Hello -    Here are my comments about the recent results. Your kidney function is stable. You do have some protein in your urine, but we will just monitor. Your cholesterol is great, however, I will leave it up to you and your cardiologist as they may want your LDL C less than 70. Please let me know about the changes in blood pressure.    Please let us know if you have any questions or concerns.    Regards,  YAAKOV BETANCUR, DO

## 2023-12-22 NOTE — TELEPHONE ENCOUNTER
Home Care is calling regarding an established patient with [facility].       Requesting orders from: Zainab Oneil  Provider is following patient: Yes  Is this a 60-day recertification request?  No    Orders Requested    Skilled Nursing  Request for initial evaluation and treatment (one time)        Verbal orders given.  Home Care will send orders for provider to sign.  Confirmed ok to leave a detailed message with call back.  Contact information confirmed and updated as needed.    Sobia Espinal RN

## 2023-12-22 NOTE — TELEPHONE ENCOUNTER
[facility]   Patti    Verbal Orders     Skilled nursing eval   start of care     Trying to get scheduled over the weekend for start of care     Critical access hospital  [phone]

## 2023-12-22 NOTE — TELEPHONE ENCOUNTER
I am OK with PRN hydralazine. Sorry to clarify, holding parameters for labetalol: Pulse <65, BP <100/70 mmHg

## 2023-12-22 NOTE — TELEPHONE ENCOUNTER
Signed note. Please see how yesterday's pulse and BP were yesterday. If some of his orthostatic hypotension is being exacerbated by the labetalol, then we should stay away from potent AV node blocking meds like metoprolol.     YAAKOV BETANCUR, DO

## 2023-12-22 NOTE — TELEPHONE ENCOUNTER
Spoke to patient & significant other- they are holding Labetalol    See in3Deptht message from today with udpate on BP & Pulse    Want to ensure okay to continue PRN doses of Hydralazine- okay to reply via [employer] ([employer])hart    Faxed homecare referrals to [facility]

## 2023-12-23 LAB — RENIN PLAS-CCNC: 0.6 NG/ML/HR

## 2023-12-26 ENCOUNTER — TELEPHONE (OUTPATIENT)
Dept: FAMILY MEDICINE | Facility: CLINIC | Age: 58
End: 2023-12-26
Payer: COMMERCIAL

## 2023-12-26 NOTE — TELEPHONE ENCOUNTER
Order/Referral Request    Who is requesting: Arlene    Orders being requested: Start of care on 12/24    Skilled nursing 1 time a wk for 1 wk   Twice a wk for 3 wks   Once a wk for 5 wks & 3 as needed     Add social eval and treat, for community resources and transportation     When are orders needed by: ASAP     Has this been discussed with Provider: No    Does patient have a preference on a Group/Provider/Facility? [facility]     Does patient have an appointment scheduled?: No    Where to send orders:  Verbal Orders    Could we send this information to you in [facility] or would you prefer to receive a phone call?:   No preference   Okay to leave a detailed message?: Yes at Other phone number:  Arlene [phone]        Pt has decided to hold this medication Labetalol until he sees the nephrologist tomorrow. Pt has noticed an improvement in his low BP

## 2023-12-27 ENCOUNTER — VIRTUAL VISIT (OUTPATIENT)
Dept: NEPHROLOGY | Facility: CLINIC | Age: 58
End: 2023-12-27
Attending: INTERNAL MEDICINE
Payer: COMMERCIAL

## 2023-12-27 VITALS — BODY MASS INDEX: 36.57 KG/M2 | HEIGHT: 72 IN | WEIGHT: 270 LBS

## 2023-12-27 DIAGNOSIS — I16.9 HYPERTENSIVE CRISIS: ICD-10-CM

## 2023-12-27 DIAGNOSIS — R03.0 ELEVATED BP WITHOUT DIAGNOSIS OF HYPERTENSION: Primary | ICD-10-CM

## 2023-12-27 PROBLEM — E66.812 CLASS 2 SEVERE OBESITY DUE TO EXCESS CALORIES WITH SERIOUS COMORBIDITY IN ADULT (H): Status: ACTIVE | Noted: 2023-12-27

## 2023-12-27 PROBLEM — E66.01 CLASS 2 SEVERE OBESITY DUE TO EXCESS CALORIES WITH SERIOUS COMORBIDITY IN ADULT (H): Status: ACTIVE | Noted: 2023-12-27

## 2023-12-27 PROCEDURE — 99204 OFFICE O/P NEW MOD 45 MIN: CPT | Mod: VID | Performed by: INTERNAL MEDICINE

## 2023-12-27 ASSESSMENT — PAIN SCALES - GENERAL: PAINLEVEL: SEVERE PAIN (7)

## 2023-12-27 NOTE — PROGRESS NOTES
Virtual Visit Details    Type of service:  Video Visit   Video Start Time:  2.09 pm  Video End Time: 2.32 pm    Originating Location (pt. Location): Home    Distant Location (provider location):  Off-site  Platform used for Video Visit: Ben

## 2023-12-27 NOTE — PATIENT INSTRUCTIONS
24 hour blood pressure montior to be scheduled  1 month follow up with Joanne Rojas ( med renal) or Brenda Carrington ([facility] renal or )- labs prior

## 2023-12-27 NOTE — PROGRESS NOTES
Nephrology Initial Consult  December 27, 2023      Chris Delcid MRN:[id number] YOB: 1965  Date of Admission:(Not on file)  Primary care provider: Zainab Oneil  Requesting physician: Jenelle Crowley, *      REASON FOR CONSULT: hypertension       HISTORY OF PRESENT ILLNESS:    Patient has a history of multiple strokes in October 2020, November 2021, August 2022.  He does have a history of autonomic neuropathy, orthostatic hypotension, longstanding diabetes with retinopathy and peripheral neuropathy.  Recently he presented to the emergency room for not feeling well and it was found that his blood pressures were as high as 208/112 mmHg.  Some changes to his medications were made and he was started on labetalol 10 mg Cozaar 50 mg of nifedipine 60 mg in the evening.  Eventually his nifedipine was decreased to 30 mg at bedtime on discharge.  Since his hospital admission, his blood pressures have been somewhat better at night 123/69, 135/73 at night 140/82 mm of Hg.  In the middle of the day there are episodes where his blood pressure spikes up to 180/100 mmHg and he takes the as needed hydralazine 10 mg.  He feels significantly tired after that.  His blood pressure does improve in response to that.  He still does have some orthostatic symptoms occasionally.  He used to be on midodrine in the past for his autonomic dysfunction, and this had been discontinued after his stroke.  Currently he is taking his Cozaar in the morning, hydralazine prn in the evening and nifedipine 30 mg at night.  He has stopped taking his labetalol due to bradycardia and hypotensive episodes.  Denies any lower extremity edema.  He does report occasional short of breath when his blood pressures are high.  He reports of being diabetic for last 15 years, initially was on insulin and metformin and now remains only on metformin and glipizide since his blood sugars have improved significantly.    PAST MEDICAL  HISTORY:  Reviewed with patient on 12/27/2023   As per HPI    MEDICATIONS:  Reviewed with the patient in detail    ALLERGIES:    Reviewed with the patient in detail    REVIEW OF SYSTEMS:  A comprehensive of systems was negative except as noted above.    SOCIAL HISTORY:   Reviewed with patient, no smoking and no alcohol use     FAMILY MEDICAL HISTORY:   Reviewed, no family history of need for dialysis, transplant or CKD    PHYSICAL EXAM:   Vital signs:Ht 1.829 m (6')   Wt 122.5 kg (270 lb)   BMI 36.62 kg/m      Physical Exam    ASSESSMENT AND RECOMMENDATIONS:     # recent hypertensive crisis  # h/o autonomic neuropathy   # orthostatic hypotension  # Ischemic CVA x 3,  hemiparesis, dysarthria on brilinta and statin    He likely has autonomic neuropathy form his long standing DM, ? Spinal cord lesion. From his history I see that he tends to have orthostatic hypotension and recently has had significant hypertensive episodes. It has been difficult for him to time his medications. He currently remains on losartan 50 mg daily and Nifedipine 30 mg which he takes in the evening. He checks his blood pressures around 4 pm and they are occasionally high requiring prn hydralazine 10 mg daily.     We discussed that it is a better idea to maintain his blood pressure goal between 140-160 mm of Hg systolic and  mm of Hg diastolic since he is at risk of hypotension.    --For now, I recommend taking antihypertensives at 8 am in the morning and 6 pm in the evening.   --will order 24 hour ambulatory blood pressure monitoring to study the pattern of his blood pressures on a daily basis.   --f/up in clinic in 1 month and will go over the plan for his antihypertensives   --I also reassured them that it is okay to not check blood pressure in the afternoon if patient is asymptomatic and continue to with his current antihypertensives at 8 am and 6 pm    F/up in 1 month     Jenelle Crowley MD

## 2023-12-27 NOTE — NURSING NOTE
Is the patient currently in the state of [place]? YES    Visit mode:VIDEO    If the visit is dropped, the patient can be reconnected by: VIDEO VISIT: Text to cell phone:   Telephone Information:   Mobile [phone]       Will anyone else be joining the visit? NO  (If patient encounters technical issues they should call [phone]    How would you like to obtain your AVS? MyChart    Are changes needed to the allergy or medication list? No    Reason for visit: Consult    Ashley PEREZ

## 2024-01-02 ENCOUNTER — TELEPHONE (OUTPATIENT)
Dept: FAMILY MEDICINE | Facility: CLINIC | Age: 59
End: 2024-01-02

## 2024-01-08 RX ORDER — LOSARTAN POTASSIUM 50 MG/1
50 TABLET ORAL EVERY EVENING
Qty: 30 TABLET | Refills: 1 | Status: SHIPPED | OUTPATIENT
Start: 2024-01-08 | End: 2024-02-02

## 2024-01-08 RX ORDER — HYDRALAZINE HYDROCHLORIDE 10 MG/1
10-20 TABLET, FILM COATED ORAL EVERY 6 HOURS PRN
Qty: 60 TABLET | Refills: 0 | Status: SHIPPED | OUTPATIENT
Start: 2024-01-08

## 2024-01-09 ENCOUNTER — ANCILLARY PROCEDURE (OUTPATIENT)
Dept: CARDIOLOGY | Facility: CLINIC | Age: 59
End: 2024-01-09
Attending: INTERNAL MEDICINE
Payer: COMMERCIAL

## 2024-01-09 DIAGNOSIS — R03.0 ELEVATED BP WITHOUT DIAGNOSIS OF HYPERTENSION: ICD-10-CM

## 2024-01-09 PROCEDURE — 93784 AMBL BP MNTR W/SOFTWARE: CPT | Performed by: INTERNAL MEDICINE

## 2024-01-14 ENCOUNTER — HEALTH MAINTENANCE LETTER (OUTPATIENT)
Age: 59
End: 2024-01-14

## 2024-01-16 ENCOUNTER — TELEPHONE (OUTPATIENT)
Dept: FAMILY MEDICINE | Facility: CLINIC | Age: 59
End: 2024-01-16
Payer: COMMERCIAL

## 2024-01-16 NOTE — TELEPHONE ENCOUNTER
"Contacted patient who reports Jackie, home care nurse with [employer] is currently with patient.  Jackie reports current BP of 167/101.  Patient reports feeling \"flush\" and tightness in his legs.  Jackie further explained that he has hydralazine 10 mg tablets with PRN instructions as follows-    Systolic 180 or above = 1 tab every 6 hours prn  Systolic 200 or above = 2 tabs every 6 hours prn      Jackie additionally explained patient use to have systolic of 160 to trigger 10 mg hydralazine but that it was changed as his blood pressures are very labile.  "

## 2024-01-16 NOTE — TELEPHONE ENCOUNTER
No changes to regimen if asymptomatic. In looking in chart, agree with the change in hydralazine parameters as he was also having issues with hypotension the last time he saw Dr. Brownlee.     Looks like she wanted him to see nephrology to help manage blood pressure. That visit is coming up end of January to discuss further.

## 2024-01-16 NOTE — TELEPHONE ENCOUNTER
Left message for Barbara identifying writer/title, patient/, and that writer calling to get additional information regarding symptoms.

## 2024-01-16 NOTE — TELEPHONE ENCOUNTER
Barbara with [facility] calling to report elevated BP at today's visit:   BP: 176/116   Pulse: 91  BP: 181/122   Pulse: 92  BP: 166/112   Pulse: 91  BP: 167/109   Pulse: 90  Patient is asymptomatic, pushing fluids, tried different cuffs, tried different positions, patient took BP meds 1 hour ago    Holter monitor last week so this is a known issue      Call patient back with recommendations along with homecare nurse

## 2024-01-16 NOTE — TELEPHONE ENCOUNTER
Barbara calling back.    Patient is asymptomatic - no symptoms. He is acting normally, no concerns.

## 2024-01-18 ENCOUNTER — TELEPHONE (OUTPATIENT)
Dept: FAMILY MEDICINE | Facility: CLINIC | Age: 59
End: 2024-01-18
Payer: COMMERCIAL

## 2024-01-18 NOTE — TELEPHONE ENCOUNTER
FYI - Status Update    Who is Calling: nurseJackie from [facility]      Update: Per RN, His BP has been elevated and the TRN medication he is on seems to not be working for him. RN would like a call back to talk more about this.     Does caller want a call/response back: Yes     Could we send this information to you in [employer] or would you prefer to receive a phone call?:   No preference   Okay to leave a detailed message?: Yes at Other phone number:  RAULITO Mejia [phone]

## 2024-01-18 NOTE — TELEPHONE ENCOUNTER
Contacted nurse Jackie to obtain more information.  Jackie explains that patient has extreme labile blood pressures, swings from high to low have been challenging and resulted in falls.  Has hydralazine PRN 10 mg q 6 hours with sig- 1 tablet for systolic over 160 and 2 tablets for systolic over 200.  Chart review reflects labetalol has been on hold/discontinued due to labile pressures, HR, and falls.        Jackie further explained he has apt with new doctor on Monday, an internist, to evaluate his BP and options.  Nephrology also involved.  Jackie requesting hydralazine PRN be altered to have 0.5 tab to 1 tab to 1.5 to 2 tabs with interval (45 min- hour?)  Seeking more flexibility with range of dosing.  Informed that PCP is out of office this week but it will be routed to her for colleague to review.

## 2024-01-19 NOTE — TELEPHONE ENCOUNTER
Consulted with Yessica Gutierrez, informed pressure was reported as 207/113 yesterday during call.  Given patient's labile history regarding BP as well as appointment with internist 1-22-24, provider does not wish to make any changes.

## 2024-01-19 NOTE — TELEPHONE ENCOUNTER
Can you call and get clarification, how high are the BP. Is the BP coming down with medications at all? Since I have never met the patient and Dr. Brownlee is out I just need to make sure I am understanding what they are wanting before I make recommendations.

## 2024-01-19 NOTE — TELEPHONE ENCOUNTER
Contacted Jackie home care RN and reviewed message below.  Jackie further explained he is independent and his SO is an RN.  Patient to follow up with internist on Monday.

## 2024-01-22 ENCOUNTER — OFFICE VISIT (OUTPATIENT)
Dept: INTERNAL MEDICINE | Facility: CLINIC | Age: 59
End: 2024-01-22
Payer: COMMERCIAL

## 2024-01-22 ENCOUNTER — LAB (OUTPATIENT)
Dept: LAB | Facility: CLINIC | Age: 59
End: 2024-01-22
Payer: COMMERCIAL

## 2024-01-22 VITALS
DIASTOLIC BLOOD PRESSURE: 100 MMHG | OXYGEN SATURATION: 96 % | HEART RATE: 53 BPM | WEIGHT: 270 LBS | BODY MASS INDEX: 36.57 KG/M2 | TEMPERATURE: 97.8 F | HEIGHT: 72 IN | SYSTOLIC BLOOD PRESSURE: 140 MMHG | RESPIRATION RATE: 16 BRPM

## 2024-01-22 DIAGNOSIS — Z23 NEED FOR SHINGLES VACCINE: ICD-10-CM

## 2024-01-22 DIAGNOSIS — I1A.0 RESISTANT HYPERTENSION: ICD-10-CM

## 2024-01-22 DIAGNOSIS — E11.43 DIABETIC GASTROPARESIS (H): ICD-10-CM

## 2024-01-22 DIAGNOSIS — Z29.11 NEED FOR VACCINATION AGAINST RESPIRATORY SYNCYTIAL VIRUS: ICD-10-CM

## 2024-01-22 DIAGNOSIS — Z11.59 NEED FOR HEPATITIS C SCREENING TEST: ICD-10-CM

## 2024-01-22 DIAGNOSIS — G90.3 NEUROGENIC ORTHOSTATIC HYPOTENSION (H): ICD-10-CM

## 2024-01-22 DIAGNOSIS — K31.84 DIABETIC GASTROPARESIS (H): ICD-10-CM

## 2024-01-22 DIAGNOSIS — I95.1 ORTHOSTATIC HYPOTENSION: ICD-10-CM

## 2024-01-22 DIAGNOSIS — I42.8 NONISCHEMIC CARDIOMYOPATHY (H): ICD-10-CM

## 2024-01-22 DIAGNOSIS — E11.69 TYPE 2 DIABETES MELLITUS WITH OTHER SPECIFIED COMPLICATION, WITHOUT LONG-TERM CURRENT USE OF INSULIN (H): ICD-10-CM

## 2024-01-22 DIAGNOSIS — Z11.4 SCREENING FOR HIV (HUMAN IMMUNODEFICIENCY VIRUS): ICD-10-CM

## 2024-01-22 DIAGNOSIS — I10 BENIGN ESSENTIAL HYPERTENSION: Primary | ICD-10-CM

## 2024-01-22 DIAGNOSIS — Z12.11 SCREEN FOR COLON CANCER: ICD-10-CM

## 2024-01-22 DIAGNOSIS — Z86.73 HISTORY OF TIA (TRANSIENT ISCHEMIC ATTACK) AND STROKE: ICD-10-CM

## 2024-01-22 LAB
HCV AB SERPL QL IA: NONREACTIVE
HIV 1+2 AB+HIV1 P24 AG SERPL QL IA: NONREACTIVE

## 2024-01-22 PROCEDURE — 86803 HEPATITIS C AB TEST: CPT

## 2024-01-22 PROCEDURE — 80048 BASIC METABOLIC PNL TOTAL CA: CPT

## 2024-01-22 PROCEDURE — 99214 OFFICE O/P EST MOD 30 MIN: CPT

## 2024-01-22 PROCEDURE — 87389 HIV-1 AG W/HIV-1&-2 AB AG IA: CPT

## 2024-01-22 PROCEDURE — 36415 COLL VENOUS BLD VENIPUNCTURE: CPT

## 2024-01-22 RX ORDER — RESPIRATORY SYNCYTIAL VIRUS VACCINE 120MCG/0.5
0.5 KIT INTRAMUSCULAR ONCE
Qty: 1 EACH | Refills: 0 | Status: CANCELLED | OUTPATIENT
Start: 2024-01-22 | End: 2024-01-22

## 2024-01-22 NOTE — PROGRESS NOTES
Assessment & Plan     (I10) Benign essential hypertension  (primary encounter diagnosis)  Comment: Patient was seen in clinic today to establish care and discuss hypertension. Patient has history of ischemic stroke 2020, 2022. He was hospitalized in December for hypertensive crisis and continues to have issue with high blood pressure. He also complains of having blood pressures that will be low in am when he wakes up making it hard to do normal tasks. It has interfered with his at home therapy. Discussed cardiology referral due to patient complex medical history. He is also being followed by nephrology his kidney function is noted to be WNL. Also discussed need for vaccines this visit. Patient will get vaccines in future. Discussed looking at medications to review what changes could be recommended, let patient and spouse know that cardiology would be best resource for medications due to complexity.  Plan: Adult Cardiology Eval  Referral        Future    (Z86.73) History of TIA (transient ischemic attack )and stroke  Comment: Patient had last stroke in September 2022.  Plan: Continue current medication regime.    (E11.69) type 2 diabetes mellitus with other specified complication, without long-term current use of insulin.   Comment: Chronic, stable  A1C 6.3  Plan: Continue current medication regime.    (I95.1) Orthostatic hypotension  Comment: Continues to have issues with orthostatic hypertension. Discussed need to see cardiologist to further assess.  Plan: Adult Cardiology Eval  Referral        Future    (I42.8) Nonischemic cardiomyopathy (H)  Comment: Patient has been seen by cardiology in the past. Discussed referral at today's visit to help further assess.   Plan: Adult Cardiology Eval  Referral        Future    Review of external notes as documented elsewhere in note  60 minutes spent by me on the date of the encounter doing chart review, history and exam, documentation and further  activities per the note      Amy Winn is a 58 year old, presenting for the following health issues:  Establish Care        1/22/2024    11:18 AM   Additional Questions   Roomed by Starr ARAUZ     Via the Health Maintenance questionnaire, the patient has reported the following services have been completed -Eye Exam, this information has been sent to the abstraction team.  [email or web address]       Hypertension Follow-up    Do you check your blood pressure regularly outside of the clinic? Yes   Are you following a low salt diet? Yes  Are your blood pressures ever more than 140 on the top number (systolic) OR more   than 90 on the bottom number (diastolic), for example 140/90? Yes    Hospital Follow-up Visit:    Hospital/Nursing Home/IP Rehab Facility: [facility]  Date of Admission: 12/10/23  Date of Discharge: 12/15/23  Reason(s) for Admission: Hypertensive Crisis   Orthostatic hypotension  Essential hypertension  History of cerebrovascular accident  Hemiparesis affecting dominant side as late effect of stroke (H)  Ataxia  Dysphagia   Dysarthria as late effect of stroke.  Hyperlipidemia  Type 2 diabetes mellitus  Diabetic Retinopathy  Diabetic neuropathy  Nonischemic cardiomyopathy  Chronic low back pain  Gastroesophageal reflux disease with esophagitis  Restless legs syndrome (RLS)  ADITI  Right antecubital cellulitis    Was your hospitalization related to COVID-19? No   Problems taking medications regularly:  None  Medication changes since discharge: Raised parameters for hydralazine to 180 from 160   Problems adhering to non-medication therapy:  None    Summary of hospitalization:    [facility] discharge summary reviewed  Diagnostic Tests/Treatments reviewed.  Follow up needed: Discussed cardiology referral.  Other Healthcare Providers Involved in Patient s Care:         Homecare and Specialist appointment - Nephrology follow up   Update since discharge: fluctuating course.          Plan of care communicated with patient and family                 Review of Systems  CONSTITUTIONAL: NEGATIVE for fever, chills, change in weight  ENT/MOUTH: NEGATIVE for ear, mouth and throat problems  RESP: NEGATIVE for significant cough or SOB  CV: NEGATIVE for chest pain, palpitations or peripheral edema      Objective    BP (!) 140/100   Pulse 53   Temp 97.8  F (36.6  C) (Temporal)   Resp 16   Ht 1.829 m (6')   Wt 122.5 kg (270 lb)   SpO2 96%   BMI 36.62 kg/m    Body mass index is 36.62 kg/m .  [unfilled]             Signed Electronically by: MAGDA Simmons CNP      Amy Winn is a 58 year old, presenting for the following health issues:  [facility] Care        1/22/2024    11:18 AM   Additional Questions   Roomed by Starr ARAUZ     Via the [employer] Maintenance questionnaire, the patient has reported the following services have been completed -Eye Exam, this information has been sent to the abstraction team.  History of Present Illness       Hypertension: He presents for follow up of hypertension.  He does check blood pressure  regularly outside of the clinic. Outside blood pressures have been over 140/90. He follows a low salt diet.     He eats 2-3 servings of fruits and vegetables daily.He consumes 0 sweetened beverage(s) daily.He exercises with enough effort to increase his heart rate 9 or less minutes per day.  He exercises with enough effort to increase his heart rate 3 or less days per week.   He is taking medications regularly.        Objective    BP (!) 140/100   Pulse 53   Temp 97.8  F (36.6  C) (Temporal)   Resp 16   Ht 1.829 m (6')   Wt 122.5 kg (270 lb)   SpO2 96%   BMI 36.62 kg/m    Body mass index is 36.62 kg/m .  Signed Electronically by: MAGDA Simmons CNP

## 2024-01-23 ENCOUNTER — PATIENT OUTREACH (OUTPATIENT)
Dept: NEPHROLOGY | Facility: CLINIC | Age: 59
End: 2024-01-23
Payer: COMMERCIAL

## 2024-01-23 NOTE — PROGRESS NOTES
1/23- Called [facility] center in regards to the following message sent to clinic.    Karsten Silva Home Care Nurse [phone]   [facility] to return my call.  Akash RAMIRES RN  Nephrology care coordinator  [facility]

## 2024-01-23 NOTE — PROGRESS NOTES
Nephrology Progress Note  January 29, 2023            REASON FOR VISIT: hypertension       HISTORY OF PRESENT ILLNESS:    Patient has a history of multiple strokes in October 2020, November 2021, August 2022.  He does have a history of autonomic neuropathy, orthostatic hypotension, longstanding diabetes with retinopathy and peripheral neuropathy.  Recently he presented to the emergency room for not feeling well and it was found that his blood pressures were as high as 208/112 mmHg.  Some changes to his medications were made and he was started on labetalol 10 mg Cozaar 50 mg of nifedipine 60 mg in the evening.  Eventually his nifedipine was decreased to 30 mg at bedtime on discharge.  Since his hospital admission, his blood pressures have been somewhat better at night 123/69, 135/73 at night 140/82 mm of Hg.  In the middle of the day there are episodes where his blood pressure spikes up to 180/100 mmHg and he takes the as needed hydralazine 10 mg.  He feels significantly tired after that.  His blood pressure does improve in response to that.  He still does have some orthostatic symptoms occasionally.  He used to be on midodrine in the past for his autonomic dysfunction, and this had been discontinued after his stroke.  Currently he is taking his Cozaar 50 mg in the morning, hydralazine 10 mg q 6 hrs prn in the evening and nifedipine 30 mg at night.  He has stopped taking his labetalol due to bradycardia and hypotensive episodes.    He had 24 hour ambulatory monitor completed showing overall average /80 with high systolic 178, diastolic 100 and low systolic 94, diastolic 59.   He continues to have orthostatic symptoms. This morning he fell when getting out of bed with a blood pressure of 90s/ before taking losartan.   Denies any lower extremity edema.  He does report occasional shortness of breath with exertion, denies orthopnea  He reports of being diabetic for last 15 years, initially was on insulin and  "metformin and now remains only on metformin and glipizide since his blood sugars have improved significantly. He has history of preserved kidney function,     PAST MEDICAL HISTORY:  Reviewed with patient on 01/23/2024   As per HPI    MEDICATIONS:  Reviewed with the patient in detail    ALLERGIES:    Reviewed with the patient in detail    REVIEW OF SYSTEMS:  A comprehensive of systems was negative except as noted above.    SOCIAL HISTORY:   Reviewed with patient, no smoking and no alcohol use     FAMILY MEDICAL HISTORY:   Reviewed, no family history of need for dialysis, transplant or CKD    PHYSICAL EXAM:   Vital signs:BP (!) 161/101   Pulse 79   Resp 18   Ht 1.854 m (6' 1\")   Wt 121.1 kg (267 lb)   BMI 35.23 kg/m      Physical Exam  General: pleasant, awake, answers questions appropriately  Skin: warm and dry, no visible rash  Heart: RRR  Lungs: CTA  Extremities: no LE edema     ASSESSMENT AND RECOMMENDATIONS:     # HTN  #proteinuria   # h/o autonomic neuropathy   # orthostatic hypotension  # Ischemic CVA x 3,  hemiparesis, dysarthria on brilinta and statin    He likely has autonomic neuropathy form his long standing DM, ? Spinal cord lesion. From his history he tends to have orthostatic hypotension and recently has had significant hypertensive episodes. It has been difficult for him to time his medications. He currently remains on losartan 50 mg daily and Nifedipine 30 mg which he takes in the evening. He checks his blood pressures around 4 pm and they are occasionally high requiring prn hydralazine 10 mg daily.     We discussed that it is a better idea to maintain his blood pressure goal between 140-160 mm of Hg systolic and  mm of Hg diastolic since he is at risk of hypotension.    He completed 24 hour ambulatory BP monitor and is scheduled for ECHO today. He has follow up with cardiology on Friday.     --For now, okay to take nifedipine in the morning at 8 am losartan in the evening at 6 pm. " Radiofrequency abalation of median branchs at the levels of C4, C5 and C6}bilateral  on 10/14/2019    Patient relates current medications are helping the pain. Patient reports taking pain medications as prescribed, denies obtaining medications from different sources and denies use of illegal drugs. Patient denies side effects from medications like nausea, vomiting, constipation or drowsiness. Patient reports current activities of daily living ar possible due to medications and would like to continue them. ACTIVITY/SOCIAL/EMOTIONAL:  Sleep Pattern: 7 hours per night.  generally restful sleep  Home Exercises: daily none  Activity:increased  Emotional Issues: normal.   Currently seeing a Psychiatrist or Psychologist:  No        Past Medical History:   Diagnosis Date    Arthritis     Enlarged prostate     GERD (gastroesophageal reflux disease)     Heart attack ([facility] [address]     11/29/2019-[facility]- Dr. Molly Darnell Hx of blood clots     2008- right leg    Hypertension     IBS (irritable bowel syndrome)     MI (myocardial infarction) ([facility] [address] 11/29/2019    Pancreatitis     Prostate cancer ([facility] [address]     Wears prescription eyeglasses     Wellness examination     Dr. Laney Angulo       Past Surgical History:   Procedure Laterality Date    BACK SURGERY      CARDIAC CATHETERIZATION  12/02/2019    CARPAL TUNNEL RELEASE Left     CERVICAL FUSION  2006    C6 & C7    HERNIA REPAIR Left 12/18/2019    XI LAPAROSCOPIC ROBOTIC INGUINAL HERNIA REPAIR WITH MESH performed by Red Kearns MD at [address] Bilateral     [address]  12/18/2019    LAPAROSCOPIC ROBOTIC INGUINAL HERNIA REPAIR WITH MESH     KIDNEY DONATION Right     PROSTATECTOMY  12/18/2019    LAPAROSCOPIC ROBOTIC PROSTATECTOMY, PELVIC LYMPHNODE DISSECTION      PROSTATECTOMY N/A 12/18/2019    XI LAPAROSCOPIC ROBOTIC PROSTATECTOMY, PELVIC LYMPHNODE DISSECTION  (DR. Bridger Guadalupe TO WORK 1ST) performed by Vincent Freedman MD at   --would continue losartan if able due to proteinuria   --follow up in 6 weeks with me.   --I also reassured them that it is okay to not check blood pressure in the afternoon if patient is asymptomatic     F/up in 6 weeks     Brenda Carrington PA-C     Visit length 15 minutes. An additional 15 minutes were spent on date of service in chart review, documentation, and other activities as noted.                  [facility]    SHOULDER SURGERY Left     TONSILLECTOMY         Family History   Problem Relation Age of Onset    Kidney Disease Mother     Cancer Father     Heart Attack Sister     Diabetes Paternal Grandmother     Heart Disease Paternal Grandmother        Social History     Socioeconomic History    Marital status:      Spouse name: None    Number of children: None    Years of education: None    Highest education level: None   Occupational History    Occupation: RETIRED   Social Needs    Financial resource strain: None    Food insecurity     Worry: None     Inability: None    Transportation needs     Medical: None     Non-medical: None   Tobacco Use    Smoking status: Former Smoker     Last attempt to quit: 3/4/1985     Years since quittin.6    Smokeless tobacco: Never Used   Substance and Sexual Activity    Alcohol use: No    Drug use: No    Sexual activity: None   Lifestyle    Physical activity     Days per week: None     Minutes per session: None    Stress: None   Relationships    Social connections     Talks on phone: None     Gets together: None     Attends Church service: None     Active member of club or organization: None     Attends meetings of clubs or organizations: None     Relationship status: None    Intimate partner violence     Fear of current or ex partner: None     Emotionally abused: None     Physically abused: None     Forced sexual activity: None   Other Topics Concern    None   Social History Narrative    None       Allergies   Allergen Reactions    Oxycodone Itching    Percocet [Oxycodone-Acetaminophen] Itching       Current Outpatient Medications on File Prior to Encounter   Medication Sig Dispense Refill    Naproxen Sodium (ALEVE PO) Take by mouth      aspirin 81 MG EC tablet Take 81 mg by mouth daily      docusate sodium (COLACE, DULCOLAX) 100 MG CAPS Take 100 mg by mouth 2 times daily 30 capsule 0    cyclobenzaprine (FLEXERIL) 10 MG tablet Take 10 mg by mouth daily as needed       nortriptyline (PAMELOR) 25 MG capsule Take 25 mg by mouth 2 times daily      losartan (COZAAR) 100 MG tablet Take 100 mg by mouth daily      omeprazole (PRILOSEC) 20 MG delayed release capsule Take 20 mg by mouth 2 times daily      Glucosamine-Chondroitin (OSTEO BI-FLEX REGULAR STRENGTH PO) Take by mouth 2 times daily       No current facility-administered medications on file prior to encounter. Review of Systems   Constitutional: Negative. Negative for activity change, appetite change, chills, fever and weight loss. HENT: Negative. Negative for congestion and hearing loss. Eyes: Negative. Negative for photophobia, pain and visual disturbance. Respiratory: Negative. Negative for shortness of breath and wheezing. Cardiovascular: Negative. Negative for chest pain. Gastrointestinal: Negative. Negative for abdominal pain, blood in stool, constipation and nausea. Endocrine: Negative. Negative for cold intolerance and polyuria. Genitourinary: Negative. Negative for dysuria and hematuria. Musculoskeletal: Positive for arthralgias, myalgias and neck pain. Skin: Negative. Negative for rash. Allergic/Immunologic: Negative. Negative for environmental allergies and immunocompromised state. Neurological: Negative. Negative for weakness, numbness and headaches. Hematological: Negative. Psychiatric/Behavioral: Negative. Negative for self-injury, sleep disturbance and suicidal ideas. The patient is not nervous/anxious. Physical Exam  Skin:                  DATA:  LAB.:  12/19/2019  7:14 AM - Hasmukh, Jonathanpn Incoming Lab Results From [facility]     Component  Value  Ref Range & Units  Status  Collected  Lab    Glucose  107High    70 - 99 mg/dL  Final  12/19/2019  6:22 AM  [facility]    BUN  11  8 - 23 mg/dL  Final  12/19/2019  6:22 AM  [address]  1. 27High    0.70 - 1.20 mg/dL  Final 12/19/2019  6:22 AM  [address]    Bun/Cre Ratio  NOT REPORTED  9 - 20  Final  12/19/2019  6:22 AM  [address]    Calcium  8.4Low    8.6 - 10.4 mg/dL  Final  12/19/2019  6:22 AM  [address]    Sodium  140  135 - 144 mmol/L  Final  12/19/2019  6:22 AM  [address]    Potassium  4.0  3.7 - 5.3 mmol/L  Final  12/19/2019  6:22 AM  [address]    Chloride  108High    98 - 107 mmol/L  Final  12/19/2019  6:22 AM  [address]    CO2  23  20 - 31 mmol/L  Final  12/19/2019  6:22 AM  [address]    Anion Gap  9  9 - 17 mmol/L  Final  12/19/2019  6:22 AM  [address]    GFR Non-  57Low    >60 mL/min  Final  12/19/2019  6:22 AM  [address]    GFR African American  >60  >60 mL/min  Final  12/19/2019  6:22 AM  [address]    GFR Comment         Final          X-Ray reports:  EXAMINATION:   MRI OF THE CERVICAL SPINE WITHOUT CONTRAST 11/12/2018 4:03 pm       TECHNIQUE:   Multiplanar multisequence MRI of the cervical spine was performed without the   administration of intravenous contrast.       COMPARISON:   None. HISTORY:   ORDERING SYSTEM PROVIDED HISTORY: Neck pain   TECHNOLOGIST PROVIDED HISTORY:   Ordering Physician Provided Reason for Exam: patient states neck pain for   about 2 months radiates down both arms       FINDINGS:   BONES/ALIGNMENT: There is normal alignment of the spine. The vertebral body   heights are maintained. The bone marrow signal appears unremarkable. SPINAL CORD: No abnormal cord signal is seen. SOFT TISSUES: No paraspinal mass identified. C2-C3: There is no significant disc protrusion, spinal canal stenosis. Moderate left neural foraminal narrowing. C3-C4: There is no significant disc protrusion, spinal canal stenosis. Moderate left neural foraminal narrowing.        C4-C5: There is no significant disc protrusion, spinal canal stenosis. Moderate left neural foraminal narrowing. C5-C6: There is no significant disc protrusion, spinal canal stenosis. Moderate bilateral neural foraminal narrowing. C6-C7: There is no significant disc protrusion, spinal canal stenosis. No   significant neural foraminal narrowing. Anterior plate and screws cause   local magnetic susceptibility artifact. C7-T1: There is no significant disc protrusion, spinal canal stenosis or   neural foraminal narrowing. Impression   Anterior cervical disc fusion C6-7. Multilevel neural foraminal narrowing as   above. Clinical  impression:  1. Osteoarthritis of spine with radiculopathy, cervical region    2. Arthropathy of cervical facet joint    3. Status post cervical spinal fusion    4. Degenerative disc disease, cervical    5. Cervical post-laminectomy syndrome        Plan of care:  Patient is doing well with good pain relief after the  last procedure. Patient is able to increase the activity levels. Patient does not complain much pain and is overall satisfied with the pain relief. As patient is doing well at this time no further interventions are needed. Counselling/Preventive measures for pain  Control:    [x]  Spine strengthening exercises are discussed with patient in detail. Patient is encouraged to exercise and counseled extensively on importance of exercise and strengthening the muscles to help the pain. Patient is counseled on importance of exercise and,core strengthening. Some  specific exercises to strengthen the abdominal muscles and low back muscles Were discussed. Also aquatic (water) physical therapy and benefits were explained to patient. including \" Water supports the body and minimizes the effect of gravity, making it easier for patients to start an exercise program.\"   The following important principles were discussed with patient:  1.  Limit Bed Rest--Studies show that people with short-term low-back pain who rest feel more pain and have a harder time with daily tasks than those who stay active. 2. Keep Exercising-patient is advised to stay away from strenuous activities like gardening and avoid whatever motion caused the pain in the first place.   3. Maintain Good Posture-Exercises  to maintain good posture were shown to patient. 4. To do exercises learned in PT regularly. []Information (handout) on exercise was  given to patient. [x]The patient's questions were answered to the best of my abilities. At this time as the patient is doing well we will see the patient as needed. Patient is advised to contact the pain clinic if the pain returns are if our services are needed again        PDMP Monitoring:    Last PDMP Miah Li as Reviewed [facility]):  Review User Review Instant Review Result   Kelly More 10/1/2020  5:45 AM Reviewed PDMP [1]         Decision Making Process : Patient's health history and referral records thoroughly reviewed before focused physical examination and discussion with patient. I have spent 15 mins. Over 50% of today's visit is spent on examining the patient and counseling and coordinating the care. Level of complexity of date to be reviewed is Moderate. The chart date reviewed include the following: Imaging Reports. Summary of Care. Time spent reviewing with patient the below reports:   Medication safety, Treatment options. Level of diagnosis and management options of this case is multiple: involving the following management options: Interventions as needed, medication management as appropriate, future visits, activity modification, heat/ice as needed, Urine drug screen as required. [x]The patient's questions were answered to the best of my abilities. This note was created using voice recognition software. There may be inaccuracies of transcription  that are inadvertently overlooked prior to the signature.   There is any questions about the transcription please contact me. Return as needed with physician/ CNP  for further plan of treatment. Due to the COVID-19 pandemic and the appropriate interventions by Paul Washington, our non-urgent pain management patients will not be seen in the office at this time for their protection and the protection of our staff. To offer continuity of care, their prescriptions will be escribed this month after a careful chart review and review of their OARRS report  Pursuant to the emergency declaration under the [address] and [place] [address] waiver authority and the [employer] Act, this Virtual Visit was conducted, with patient's consent, to reduce the patient's risk of exposure to COVID-19 and provide continuity of care for an established patient. Services were provided through a video synchronous discussion virtually to substitute for in-person appointment. \"  Documentation:  I communicated with the patient and/or health care decision maker about plan of care  Details of this discussion including any medical advice provided: Total Time: minutes: 11-20 minutes    I affirm this is a Patient Initiated Episode with an Established Patient who has not had a related appointment within my department in the past 7 days or scheduled within the next 24 hours.     Electronically signed by Brady Warner MD on 10/1/2020 at 5:46 AM

## 2024-01-24 ENCOUNTER — PATIENT OUTREACH (OUTPATIENT)
Dept: NEPHROLOGY | Facility: CLINIC | Age: 59
End: 2024-01-24
Payer: COMMERCIAL

## 2024-01-24 DIAGNOSIS — I1A.0 RESISTANT HYPERTENSION: Primary | ICD-10-CM

## 2024-01-24 LAB
ANION GAP SERPL CALCULATED.3IONS-SCNC: 16 MMOL/L (ref 7–15)
BUN SERPL-MCNC: 17.1 MG/DL (ref 8–23)
CALCIUM SERPL-MCNC: 9.9 MG/DL (ref 8.8–10.2)
CHLORIDE SERPL-SCNC: 101 MMOL/L (ref 98–107)
CREAT SERPL-MCNC: 0.96 MG/DL (ref 0.67–1.17)
DEPRECATED HCO3 PLAS-SCNC: 22 MMOL/L (ref 22–29)
EGFRCR SERPLBLD CKD-EPI 2021: 90 ML/MIN/1.73M2
GLUCOSE SERPL-MCNC: 166 MG/DL (ref 70–99)
POTASSIUM SERPL-SCNC: 4.6 MMOL/L (ref 3.4–5.3)
SODIUM SERPL-SCNC: 139 MMOL/L (ref 135–145)

## 2024-01-24 NOTE — PROGRESS NOTES
1/24- Called Jackie in regards to the following message.  Jackie is calling to let care team know that patient is having a lot of unmanaged BP swings and would like to speak with care team. Please reach out. Thank you  Jackie- Interim Home Care Nurse [phone]    No answer. LVM to return my call.  Akash RAMIRES RN  Nephrology care coordinator  [facility] returned my call and stated that bp's are extremely laybile.  He see's Brenda on the 29th and will address bp issues.  The following is a list of home readings.  152/101  167/101  185/98  210/113    Jackie stated that they treat HTN w/PRN hydralazine. Sometimes it works and sometime's it has no effect. Routed to Brenda for update.  Akash RAMIRES RN  Nephrology care coordinator  [facility]

## 2024-01-29 ENCOUNTER — ANCILLARY PROCEDURE (OUTPATIENT)
Dept: CARDIOLOGY | Facility: CLINIC | Age: 59
End: 2024-01-29
Attending: FAMILY MEDICINE
Payer: COMMERCIAL

## 2024-01-29 ENCOUNTER — OFFICE VISIT (OUTPATIENT)
Dept: NEPHROLOGY | Facility: CLINIC | Age: 59
End: 2024-01-29
Payer: COMMERCIAL

## 2024-01-29 VITALS
SYSTOLIC BLOOD PRESSURE: 161 MMHG | DIASTOLIC BLOOD PRESSURE: 101 MMHG | BODY MASS INDEX: 35.39 KG/M2 | RESPIRATION RATE: 18 BRPM | HEART RATE: 79 BPM | WEIGHT: 267 LBS | HEIGHT: 73 IN

## 2024-01-29 DIAGNOSIS — I1A.0 RESISTANT HYPERTENSION: Primary | ICD-10-CM

## 2024-01-29 DIAGNOSIS — I95.1 ORTHOSTATIC HYPOTENSION: ICD-10-CM

## 2024-01-29 DIAGNOSIS — R80.1 PERSISTENT PROTEINURIA: ICD-10-CM

## 2024-01-29 LAB — LVEF ECHO: NORMAL

## 2024-01-29 PROCEDURE — 93306 TTE W/DOPPLER COMPLETE: CPT | Performed by: INTERNAL MEDICINE

## 2024-01-29 PROCEDURE — 99214 OFFICE O/P EST MOD 30 MIN: CPT | Performed by: PHYSICIAN ASSISTANT

## 2024-01-29 NOTE — PATIENT INSTRUCTIONS
Start taking losartan in the evening and nifedipine in the morning.   Continue good hydration, low sodium diet.   Avoid ibuprofen/aleve.   Labs and follow up in 6-8 weeks with Brenda Carrington,

## 2024-01-30 ENCOUNTER — TELEPHONE (OUTPATIENT)
Dept: FAMILY MEDICINE | Facility: CLINIC | Age: 59
End: 2024-01-30
Payer: COMMERCIAL

## 2024-01-30 NOTE — PROGRESS NOTES
CARDIOLOGY CONSULT    REASON FOR CONSULT: hypertension    PRIMARY CARE PHYSICIAN:  YAAKOV BETANCUR    HISTORY OF PRESENT ILLNESS:  58-year-old male seen for hypertension. He has hypertension, dyslipidemia, orthostatic hypotension, CVA, expressive aphasia, diabetes type 2, ADITI on CPAP, statin intolerance.    Loop recorder was placed around 2022.    Echo 2020 showed EF 60%, no valve disease.    24-hour blood pressure monitor January 2024 showed average 145/80.    Echo January 2024 showed EF 60%, no valve disease.    He has had hypertension several years, initially treated with losartan.  Historically he has had some orthostatic hypotension requiring midodrine.  December he was admitted with hypertensive urgency.  He was changed to labetalol, losartan, and nifedipine.  He had significant blood pressure fluctuation with this with low readings into the 70s and readings up to 170.  Most recently he has been taking nifedipine in the morning and losartan in the evening.  Blood pressure averages about 140, but he does have some lows typically in the middle of the day now.  He denies any chest pain or lower extremity edema.  He does some light activity, he is somewhat limited in his ambulation due to previous stroke.    PAST MEDICAL HISTORY:  Past Medical History:   Diagnosis Date    Chronic low back pain 5/29/2014    Overview:  Follow at [facility] in [place]. Follow at [facility] in [place].    Essential hypertension 6/7/2003    Hyperlipidemia 3/4/2011    Orthostatic hypotension 12/26/2014    Overview:  Secondary to diabetic autonomic dysfunction?  Started midodrine Dec, 2014 Secondary to diabetic autonomic dysfunction?  Started midodrine Dec, 2014    Statin intolerance 2/13/2020    Type 2 diabetes mellitus (H) 12/26/2014    Type 2 diabetes, uncontrolled, with autonomic neuropathy (HCC)       MEDICATIONS:  Current Outpatient Medications   Medication    blood glucose monitoring (SOFTCLIX) lancets     Blood Glucose Monitoring Suppl (ACCU-CHEK GUIDE) w/Device KIT    BRILINTA 90 MG tablet    famotidine (PEPCID) 40 MG tablet    glipiZIDE (GLUCOTROL) 5 MG tablet    hydrALAZINE (APRESOLINE) 10 MG tablet    hypromellose (ARTIFICIAL TEARS) 0.5 % SOLN ophthalmic solution    KROGER BLOOD GLUCOSE TEST test strip    losartan (COZAAR) 50 MG tablet    lovastatin (MEVACOR) 40 MG tablet    metFORMIN (GLUCOPHAGE-XR) 500 MG 24 hr tablet    NIFEdipine ER (ADALAT CC) 30 MG 24 hr tablet     No current facility-administered medications for this visit.       ALLERGIES:  Allergies   Allergen Reactions    Aspirin Other (See Comments)     : Ringing in ears    Atorvastatin      Other reaction(s): Myalgias    Cortisone Hives    Fludrocortisone      Other reaction(s): Hypertension  head pounding at night    Hydrochlorothiazide      Other reaction(s): Syncope  per note 2012    Pcn [Penicillins] Hives    Simvastatin      Other reaction(s): Other (See Comments)  drowsy       SOCIAL HISTORY:  I have reviewed this patient's social history and updated it with pertinent information if needed. Chris Delcid  reports that he has never smoked. His smokeless tobacco use includes snuff. He reports that he does not drink alcohol and does not use drugs.    FAMILY HISTORY:  I have reviewed this patient's family history and updated it with pertinent information if needed.   Family History   Problem Relation Age of Onset    Diabetes Mother          age 49 of pneumonia    Diabetes Father     Heart Failure Father     Coronary Artery Disease Father     Myocardial Infarction Father         Stents    Hypertension Father     No Known Problems Sister     No Known Problems Brother     Esophageal Cancer Brother        REVIEW OF SYSTEMS:  Constitutional:  No weight loss, fever, chills  HEENT:  Eyes:  No visual loss, blurred vision, double vision or yellow sclerae. No hearing loss, sneezing, congestion, runny nose or sore throat.  Skin:  No rash or  "itching.  Cardiovascular: per HPI  Respiratory: per HPI  GI:  No anorexia, nausea, vomiting or diarrhea. No abdominal pain or blood.  :  No dysurea, hematuria  Neurologic:  No headache, paralysis, ataxia, numbness or tingling in the extremities. No change in bowel or bladder control.  Musculoskeletal:  No muscle pain  Hematologic:  No bleeding or bruising.  Lymphatics:  No enlarged nodes. No history of splenectomy.  Endocrine:  No reports of sweating, cold or heat intolerance. No polyuria or polydipsia.  Allergies:  No history of asthma, hives, eczema or rhinitis.    PHYSICAL EXAM:  BP (!) 197/133 (BP Location: Left arm)   Pulse 89   Resp 14   Ht 1.854 m (6' 1\")   Wt 122 kg (269 lb)   SpO2 100%   BMI 35.49 kg/m      Constitutional: awake, alert, no distress  Eyes: PERRL, sclera nonicteric  ENT: trachea midline  Respiratory: Lungs clear  Cardiovascular: Regular rate and rhythm, no murmur  GI: nondistended, nontender, bowel sounds present  Lymph/Hematologic: no lymphadenopathy  Skin: dry, no rash  Musculoskeletal: good muscle tone, strength 5/5 in upper and lower extremities  Neurologic: no focal deficits  Neuropsychiatric: appropriate affact    DATA:  Labs:   January 2024: Potassium 4.6, creatinine 0.9  Recent Labs   Lab Test 12/20/23  1346   CHOL 155   HDL 50   LDL 86   TRIG 96     ASSESSMENT:  58-year-old male seen for hypertension.  His blood pressure is quite volatile, if average is high, but he has some lows.  We talked about the difficulty of finding the right combination of timing of blood pressure medications.  For now nifedipine will be stopped and he will take losartan twice daily.  Possibly and nifedipine is causing some low readings.  Lower dose carvedilol may be reasonable for next step, his heart rate does run in the 80s to 90s.  Renal artery ultrasound will be done.    His [employer] loop recorder has not been checked for some time.  He is interested in establishing with a device clinic, this " can be followed if battery is still active.    RECOMMENDATIONS:  1.  Hypertension  -Discontinue nifedipine  -Change losartan to 50 mg twice daily  -Consider low-dose carvedilol as next therapy  -Renal artery ultrasound    2.  Loop recorder  -Established with device clinic    Follow-up in about 3 weeks.    John Villa MD  Cardiology - [facility]  Pager:  [phone]  Text Page  February 2, 2024

## 2024-01-30 NOTE — TELEPHONE ENCOUNTER
FYI - Status Update    Who is Calling: nurseNohemy    Update: Pt had a fall on 01/29 in the morning in the hallway, no injury, he just felt like his BP dropped. Nephology saw him yesterday and they told him no plan until he sees cardiology on Friday     Does caller want a call/response back: Yes     Could we send this information to you in [facility] or would you prefer to receive a phone call?:   Patient would prefer a phone call   Okay to leave a detailed message?: Yes at Other phone number:    [phone], Does not need to call her back unless you have any questions.

## 2024-02-02 ENCOUNTER — OFFICE VISIT (OUTPATIENT)
Dept: CARDIOLOGY | Facility: CLINIC | Age: 59
End: 2024-02-02
Payer: COMMERCIAL

## 2024-02-02 VITALS
HEART RATE: 89 BPM | BODY MASS INDEX: 35.65 KG/M2 | WEIGHT: 269 LBS | HEIGHT: 73 IN | OXYGEN SATURATION: 100 % | RESPIRATION RATE: 14 BRPM | DIASTOLIC BLOOD PRESSURE: 80 MMHG | SYSTOLIC BLOOD PRESSURE: 145 MMHG

## 2024-02-02 DIAGNOSIS — I10 BENIGN ESSENTIAL HYPERTENSION: Primary | ICD-10-CM

## 2024-02-02 DIAGNOSIS — Z95.818 IMPLANTABLE LOOP RECORDER PRESENT: ICD-10-CM

## 2024-02-02 DIAGNOSIS — I16.9 HYPERTENSIVE CRISIS: ICD-10-CM

## 2024-02-02 PROCEDURE — 99204 OFFICE O/P NEW MOD 45 MIN: CPT | Performed by: INTERNAL MEDICINE

## 2024-02-02 RX ORDER — LOSARTAN POTASSIUM 50 MG/1
50 TABLET ORAL 2 TIMES DAILY
Qty: 30 TABLET | Refills: 1 | Status: SHIPPED | OUTPATIENT
Start: 2024-02-02 | End: 2024-02-21

## 2024-02-02 ASSESSMENT — PAIN SCALES - GENERAL: PAINLEVEL: MODERATE PAIN (5)

## 2024-02-02 NOTE — PATIENT INSTRUCTIONS
Will do ultrasound of the kidneys.  This assesses for any plaque buildup or blockage in the artery going to the kidneys that may be contributing to high blood pressure.    Stop nifedipine.    Take losartan 50mg twice daily.    Will schedule appointment with device nurse to change loop recorder to [employer].

## 2024-02-02 NOTE — LETTER
2/2/2024    YAAKOV FAUSTO BETANCUR, DO  [address] [id number]    RE: Chris Delcid       Dear Colleague,     I had the pleasure of seeing Chris Delcid in the [facility].  CARDIOLOGY CONSULT    REASON FOR CONSULT: hypertension    PRIMARY CARE PHYSICIAN:  YAAKOV BETANCUR    HISTORY OF PRESENT ILLNESS:  58-year-old male seen for hypertension. He has hypertension, dyslipidemia, orthostatic hypotension, CVA, expressive aphasia, diabetes type 2, ADITI on CPAP, statin intolerance.    Loop recorder was placed around 2022.    Echo 2020 showed EF 60%, no valve disease.    24-hour blood pressure monitor January 2024 showed average 145/80.    Echo January 2024 showed EF 60%, no valve disease.    He has had hypertension several years, initially treated with losartan.  Historically he has had some orthostatic hypotension requiring midodrine.  December he was admitted with hypertensive urgency.  He was changed to labetalol, losartan, and nifedipine.  He had significant blood pressure fluctuation with this with low readings into the 70s and readings up to 170.  Most recently he has been taking nifedipine in the morning and losartan in the evening.  Blood pressure averages about 140, but he does have some lows typically in the middle of the day now.  He denies any chest pain or lower extremity edema.  He does some light activity, he is somewhat limited in his ambulation due to previous stroke.    PAST MEDICAL HISTORY:  Past Medical History:   Diagnosis Date    Chronic low back pain 5/29/2014    Overview:  Follow at [facility] in [place]. Follow at [facility] in [place].    Essential hypertension 6/7/2003    Hyperlipidemia 3/4/2011    Orthostatic hypotension 12/26/2014    Overview:  Secondary to diabetic autonomic dysfunction?  Started midodrine Dec, 2014 Secondary to diabetic autonomic dysfunction?  Started midodrine Dec, 2014    Statin intolerance 2/13/2020    Type 2  diabetes mellitus (H) 2014    Type 2 diabetes, uncontrolled, with autonomic neuropathy (HCC)       MEDICATIONS:  Current Outpatient Medications   Medication    blood glucose monitoring (SOFTCLIX) lancets    Blood Glucose Monitoring Suppl (ACCU-CHEK GUIDE) w/Device KIT    BRILINTA 90 MG tablet    famotidine (PEPCID) 40 MG tablet    glipiZIDE (GLUCOTROL) 5 MG tablet    hydrALAZINE (APRESOLINE) 10 MG tablet    hypromellose (ARTIFICIAL TEARS) 0.5 % SOLN ophthalmic solution    KROGER BLOOD GLUCOSE TEST test strip    losartan (COZAAR) 50 MG tablet    lovastatin (MEVACOR) 40 MG tablet    metFORMIN (GLUCOPHAGE-XR) 500 MG 24 hr tablet    NIFEdipine ER (ADALAT CC) 30 MG 24 hr tablet     No current facility-administered medications for this visit.       ALLERGIES:  Allergies   Allergen Reactions    Aspirin Other (See Comments)     : Ringing in ears    Atorvastatin      Other reaction(s): Myalgias    Cortisone Hives    Fludrocortisone      Other reaction(s): Hypertension  head pounding at night    Hydrochlorothiazide      Other reaction(s): Syncope  per note 2012    Pcn [Penicillins] Hives    Simvastatin      Other reaction(s): Other (See Comments)  drowsy       SOCIAL HISTORY:  I have reviewed this patient's social history and updated it with pertinent information if needed. Chris Delcid  reports that he has never smoked. His smokeless tobacco use includes snuff. He reports that he does not drink alcohol and does not use drugs.    FAMILY HISTORY:  I have reviewed this patient's family history and updated it with pertinent information if needed.   Family History   Problem Relation Age of Onset    Diabetes Mother          age 49 of pneumonia    Diabetes Father     Heart Failure Father     Coronary Artery Disease Father     Myocardial Infarction Father         Stents    Hypertension Father     No Known Problems Sister     No Known Problems Brother     Esophageal Cancer Brother        REVIEW OF  "SYSTEMS:  Constitutional:  No weight loss, fever, chills  HEENT:  Eyes:  No visual loss, blurred vision, double vision or yellow sclerae. No hearing loss, sneezing, congestion, runny nose or sore throat.  Skin:  No rash or itching.  Cardiovascular: per HPI  Respiratory: per HPI  GI:  No anorexia, nausea, vomiting or diarrhea. No abdominal pain or blood.  :  No dysurea, hematuria  Neurologic:  No headache, paralysis, ataxia, numbness or tingling in the extremities. No change in bowel or bladder control.  Musculoskeletal:  No muscle pain  Hematologic:  No bleeding or bruising.  Lymphatics:  No enlarged nodes. No history of splenectomy.  Endocrine:  No reports of sweating, cold or heat intolerance. No polyuria or polydipsia.  Allergies:  No history of asthma, hives, eczema or rhinitis.    PHYSICAL EXAM:  BP (!) 197/133 (BP Location: Left arm)   Pulse 89   Resp 14   Ht 1.854 m (6' 1\")   Wt 122 kg (269 lb)   SpO2 100%   BMI 35.49 kg/m      Constitutional: awake, alert, no distress  Eyes: PERRL, sclera nonicteric  ENT: trachea midline  Respiratory: Lungs clear  Cardiovascular: Regular rate and rhythm, no murmur  GI: nondistended, nontender, bowel sounds present  Lymph/Hematologic: no lymphadenopathy  Skin: dry, no rash  Musculoskeletal: good muscle tone, strength 5/5 in upper and lower extremities  Neurologic: no focal deficits  Neuropsychiatric: appropriate affact    DATA:  Labs:   January 2024: Potassium 4.6, creatinine 0.9  Recent Labs   Lab Test 12/20/23  1346   CHOL 155   HDL 50   LDL 86   TRIG 96     ASSESSMENT:  58-year-old male seen for hypertension.  His blood pressure is quite volatile, if average is high, but he has some lows.  We talked about the difficulty of finding the right combination of timing of blood pressure medications.  For now nifedipine will be stopped and he will take losartan twice daily.  Possibly and nifedipine is causing some low readings.  Lower dose carvedilol may be reasonable for " next step, his heart rate does run in the 80s to 90s.  Renal artery ultrasound will be done.    His [employer] loop recorder has not been checked for some time.  He is interested in establishing with a device clinic, this can be followed if battery is still active.    RECOMMENDATIONS:  1.  Hypertension  -Discontinue nifedipine  -Change losartan to 50 mg twice daily  -Consider low-dose carvedilol as next therapy  -Renal artery ultrasound    2.  Loop recorder  -Established with device clinic    Follow-up in about 3 weeks.    John Villa MD  Cardiology - [facility]  Pager:  [phone]  Text Page  February 2, 2024        Thank you for allowing me to participate in the care of your patient.      Sincerely,     John Villa MD     [facility]  cc:   No referring provider defined for this encounter.

## 2024-02-19 ENCOUNTER — TELEPHONE (OUTPATIENT)
Dept: NEPHROLOGY | Facility: CLINIC | Age: 59
End: 2024-02-19
Payer: COMMERCIAL

## 2024-02-19 NOTE — PROGRESS NOTES
CARDIOLOGY VISIT    REASON FOR VISIT: Hypertension    SUBJECTIVE:  58-year-old male seen for hypertension. He has dyslipidemia, orthostatic hypotension, CVA, expressive aphasia, diabetes type 2, ADITI on CPAP, statin intolerance.    Tilt table from [facility] 2016 report:  Abnormal study.  Marked cardiovagal, cardiovascular adrenergic and postganglionic sympathetic sudomotor abnormalities were noted.  Results are compatible with a severe autonomic neuropathy as can occur with primary systemic amyloidosis, diabetes and other etiologies.  An effect from medications (Midodrine, Oxycodone) on results is possible.     COMMENTS   Heart rate responses during deep breathing and Valsalva maneuver were obscurred by arrhythmia but appeared attenuated.    (2) QSWEAT was normal in the forearm but was decreased (with a distal gradient) in the lower extremity.   VALSALVA AND TILT   (1) Severe orthostatic hypotension was detected immediately with head-up tilt.  Heart rate increment was attenuated.   (2) Rshn-ju-syrl blood pressure responses to the Valsalva maneuver showed loss of late phase II and phase IV with a prolonged BP recovery.      Loop recorder was placed around 2022.     Echo 2020 showed EF 60%, no valve disease.     24-hour blood pressure monitor January 2024 showed average 145/80.     Echo January 2024 showed EF 60%, no valve disease.    His main issue has been large fluctuations in blood pressure, readings often 70 in the morning, 150 in the afternoon.  On last visit nifedipine was stopped, he has been taking losartan 50 mg twice daily.  He still runs low blood pressures in the morning, they are higher in the afternoon.    MEDICATIONS:  Current Outpatient Medications   Medication    blood glucose monitoring (SOFTCLIX) lancets    Blood Glucose Monitoring Suppl (ACCU-CHEK GUIDE) w/Device KIT    BRILINTA 90 MG tablet    famotidine (PEPCID) 40 MG tablet    glipiZIDE (GLUCOTROL) 5 MG tablet    hydrALAZINE (APRESOLINE) 10 MG  "tablet    hypromellose (ARTIFICIAL TEARS) 0.5 % SOLN ophthalmic solution    KROGER BLOOD GLUCOSE TEST test strip    losartan (COZAAR) 50 MG tablet    lovastatin (MEVACOR) 40 MG tablet    metFORMIN (GLUCOPHAGE-XR) 500 MG 24 hr tablet     No current facility-administered medications for this visit.       ALLERGIES:  Allergies   Allergen Reactions    Aspirin Other (See Comments)     : Ringing in ears    Atorvastatin      Other reaction(s): Myalgias    Cortisone Hives    Fludrocortisone      Other reaction(s): Hypertension  head pounding at night    Hydrochlorothiazide      Other reaction(s): Syncope  per note 08/28/2012    Pcn [Penicillins] Hives    Simvastatin      Other reaction(s): Other (See Comments)  drowsy       REVIEW OF SYSTEMS:  Constitutional:  No weight loss, fever, chills  HEENT:  Eyes:  No visual loss, blurred vision, double vision or yellow sclerae. No hearing loss, sneezing, congestion, runny nose or sore throat.  Skin:  No rash or itching.  Cardiovascular: per HPI  Respiratory: per HPI  GI:  No anorexia, nausea, vomiting or diarrhea. No abdominal pain or blood.  :  No dysurea, hematuria  Neurologic:  No headache, paralysis, ataxia, numbness or tingling in the extremities. No change in bowel or bladder control.  Musculoskeletal:  No muscle pain  Hematologic:  No bleeding or bruising.  Lymphatics:  No enlarged nodes. No history of splenectomy.  Endocrine:  No reports of sweating, cold or heat intolerance. No polyuria or polydipsia.  Allergies:  No history of asthma, hives, eczema or rhinitis.    PHYSICAL EXAM:  BP (!) 187/102   Pulse (!) 44   Resp 16   Ht 1.854 m (6' 1\")   Wt 122.2 kg (269 lb 6.4 oz)   SpO2 100%   BMI 35.54 kg/m    Constitutional: awake, alert, no distress  Eyes: PERRL, sclera nonicteric  ENT: trachea midline  Respiratory: Lungs clear  Cardiovascular: Regular rate and rhythm, no murmurs  GI: nondistended, nontender, bowel sounds present  Lymph/Hematologic: no " lymphadenopathy  Skin: dry, no rash  Musculoskeletal: good muscle tone, strength 5/5 in upper and lower extremities  Neurologic: no focal deficits  Neuropsychiatric: appropriate affact    DATA:  Lab: January 2024: Potassium 4.6, creatinine 0.9  Recent Labs   Lab Test 12/20/23  1346   CHOL 155   HDL 50   LDL 86   TRIG 96     ASSESSMENT:  58-year-old male seen for hypertension and orthostatic hypotension.  His blood pressure continues to fluctuate quite a bit, running low in the morning, and slightly high in the afternoon.  There is probably some component of autonomic dysfunction.  He is also fairly sedentary because of his previous stroke.  He seemed to do quite well on lisinopril in the evening, which he took before his hypertensive urgency last January.  Therefore he will try going back on this.    RECOMMENDATIONS:  1.  Hypertension with orthostatic hypotension  -Stop losartan, go back on lisinopril 10 mg in the evening    Patient can call within a few weeks to report blood pressure.  Follow-up in about 2 months.    John Villa MD  Cardiology - [facility]  Pager:  [phone]  Text Page  February 21, 2024

## 2024-02-19 NOTE — TELEPHONE ENCOUNTER
Called an LMTC(left mess to call)  To set up follow-up appt with Brenda COMBS @ [facility].3/25/24 @120pm  Hold was placed in schedule until we hear back from pt    Chloe Cooper CMA

## 2024-02-20 ENCOUNTER — TELEPHONE (OUTPATIENT)
Dept: CARDIOLOGY | Facility: CLINIC | Age: 59
End: 2024-02-20

## 2024-02-20 ENCOUNTER — ANCILLARY PROCEDURE (OUTPATIENT)
Dept: CARDIOLOGY | Facility: CLINIC | Age: 59
End: 2024-02-20
Attending: INTERNAL MEDICINE
Payer: COMMERCIAL

## 2024-02-20 DIAGNOSIS — I63.9 CEREBROVASCULAR ACCIDENT (H): ICD-10-CM

## 2024-02-20 DIAGNOSIS — Z95.818 IMPLANTABLE LOOP RECORDER PRESENT: Primary | ICD-10-CM

## 2024-02-20 DIAGNOSIS — Z95.818 IMPLANTABLE LOOP RECORDER PRESENT: ICD-10-CM

## 2024-02-20 LAB
MDC_IDC_MSMT_BATTERY_STATUS: NORMAL
MDC_IDC_PG_IMPLANT_DTM: NORMAL
MDC_IDC_PG_MFG: NORMAL
MDC_IDC_PG_MODEL: NORMAL
MDC_IDC_PG_SERIAL: NORMAL
MDC_IDC_PG_TYPE: NORMAL
MDC_IDC_SESS_CLINIC_NAME: NORMAL
MDC_IDC_SESS_DTM: NORMAL
MDC_IDC_SESS_TYPE: NORMAL
MDC_IDC_SET_ZONE_DETECTION_BEATS_DENOMINATOR: 12 {BEATS}
MDC_IDC_SET_ZONE_DETECTION_BEATS_DENOMINATOR: 16 {BEATS}
MDC_IDC_SET_ZONE_DETECTION_BEATS_NUMERATOR: 12 {BEATS}
MDC_IDC_SET_ZONE_DETECTION_BEATS_NUMERATOR: 16 {BEATS}
MDC_IDC_SET_ZONE_DETECTION_INTERVAL: 2000 MS
MDC_IDC_SET_ZONE_DETECTION_INTERVAL: 350 MS
MDC_IDC_SET_ZONE_DETECTION_INTERVAL: 5000 MS
MDC_IDC_SET_ZONE_STATUS: NORMAL
MDC_IDC_SET_ZONE_TYPE: NORMAL
MDC_IDC_SET_ZONE_VENDOR_TYPE: NORMAL
MDC_IDC_STAT_AT_BURDEN_PERCENT: 0 %
MDC_IDC_STAT_AT_DTM_END: NORMAL
MDC_IDC_STAT_AT_DTM_START: NORMAL
MDC_IDC_STAT_EPISODE_RECENT_COUNT: 0
MDC_IDC_STAT_EPISODE_RECENT_COUNT_DTM_END: NORMAL
MDC_IDC_STAT_EPISODE_RECENT_COUNT_DTM_START: NORMAL
MDC_IDC_STAT_EPISODE_TOTAL_COUNT: 0
MDC_IDC_STAT_EPISODE_TOTAL_COUNT_DTM_END: NORMAL
MDC_IDC_STAT_EPISODE_TOTAL_COUNT_DTM_START: NORMAL
MDC_IDC_STAT_EPISODE_TYPE: NORMAL

## 2024-02-20 PROCEDURE — 93298 REM INTERROG DEV EVAL SCRMS: CPT | Performed by: INTERNAL MEDICINE

## 2024-02-20 NOTE — TELEPHONE ENCOUNTER
Patient is scheduled for an in-clinic loop recorder check today.  Called patient to see if he would like to switch this to a remote check per our normal clinic protocol.  Patient stated that he would like to do that.  In-clinic loop check changed to a remote.      RAULITO Ahn

## 2024-02-21 ENCOUNTER — MYC MEDICAL ADVICE (OUTPATIENT)
Dept: NEPHROLOGY | Facility: CLINIC | Age: 59
End: 2024-02-21

## 2024-02-21 ENCOUNTER — OFFICE VISIT (OUTPATIENT)
Dept: CARDIOLOGY | Facility: CLINIC | Age: 59
End: 2024-02-21
Attending: INTERNAL MEDICINE
Payer: COMMERCIAL

## 2024-02-21 VITALS
RESPIRATION RATE: 16 BRPM | HEART RATE: 44 BPM | HEIGHT: 73 IN | OXYGEN SATURATION: 100 % | BODY MASS INDEX: 35.7 KG/M2 | WEIGHT: 269.4 LBS | SYSTOLIC BLOOD PRESSURE: 187 MMHG | DIASTOLIC BLOOD PRESSURE: 102 MMHG

## 2024-02-21 DIAGNOSIS — I10 BENIGN ESSENTIAL HYPERTENSION: ICD-10-CM

## 2024-02-21 PROCEDURE — 99214 OFFICE O/P EST MOD 30 MIN: CPT | Performed by: INTERNAL MEDICINE

## 2024-02-21 RX ORDER — LISINOPRIL 10 MG/1
10 TABLET ORAL DAILY
Qty: 30 TABLET | Refills: 3 | Status: SHIPPED | OUTPATIENT
Start: 2024-02-21 | End: 2024-03-25

## 2024-02-21 NOTE — LETTER
2/21/2024    YAAKOV BETANCUR, DO  [address] [address]    RE: Chris Delcid       Dear Colleague,     I had the pleasure of seeing Chris Delcid in the [facility].  CARDIOLOGY VISIT    REASON FOR VISIT: Hypertension    SUBJECTIVE:  58-year-old male seen for hypertension. He has dyslipidemia, orthostatic hypotension, CVA, expressive aphasia, diabetes type 2, ADITI on CPAP, statin intolerance.     Loop recorder was placed around 2022.     Echo 2020 showed EF 60%, no valve disease.     24-hour blood pressure monitor January 2024 showed average 145/80.     Echo January 2024 showed EF 60%, no valve disease.    His main issue has been large fluctuations in blood pressure, readings often 70 in the morning, 150 in the afternoon.  On last visit nifedipine was stopped, he has been taking losartan 50 mg twice daily.  He still runs low blood pressures in the morning, they are higher in the afternoon.    MEDICATIONS:  Current Outpatient Medications   Medication    blood glucose monitoring (SOFTCLIX) lancets    Blood Glucose Monitoring Suppl (ACCU-CHEK GUIDE) w/Device KIT    BRILINTA 90 MG tablet    famotidine (PEPCID) 40 MG tablet    glipiZIDE (GLUCOTROL) 5 MG tablet    hydrALAZINE (APRESOLINE) 10 MG tablet    hypromellose (ARTIFICIAL TEARS) 0.5 % SOLN ophthalmic solution    [employer] BLOOD GLUCOSE TEST test strip    losartan (COZAAR) 50 MG tablet    lovastatin (MEVACOR) 40 MG tablet    metFORMIN (GLUCOPHAGE-XR) 500 MG 24 hr tablet     No current facility-administered medications for this visit.       ALLERGIES:  Allergies   Allergen Reactions    Aspirin Other (See Comments)     : Ringing in ears    Atorvastatin      Other reaction(s): Myalgias    Cortisone Hives    Fludrocortisone      Other reaction(s): Hypertension  head pounding at night    Hydrochlorothiazide      Other reaction(s): Syncope  per note 08/28/2012    Pcn [Penicillins] Hives    Simvastatin      Other reaction(s): Other  "(See Comments)  drowsy       REVIEW OF SYSTEMS:  Constitutional:  No weight loss, fever, chills  HEENT:  Eyes:  No visual loss, blurred vision, double vision or yellow sclerae. No hearing loss, sneezing, congestion, runny nose or sore throat.  Skin:  No rash or itching.  Cardiovascular: per HPI  Respiratory: per HPI  GI:  No anorexia, nausea, vomiting or diarrhea. No abdominal pain or blood.  :  No dysurea, hematuria  Neurologic:  No headache, paralysis, ataxia, numbness or tingling in the extremities. No change in bowel or bladder control.  Musculoskeletal:  No muscle pain  Hematologic:  No bleeding or bruising.  Lymphatics:  No enlarged nodes. No history of splenectomy.  Endocrine:  No reports of sweating, cold or heat intolerance. No polyuria or polydipsia.  Allergies:  No history of asthma, hives, eczema or rhinitis.    PHYSICAL EXAM:  BP (!) 187/102   Pulse (!) 44   Resp 16   Ht 1.854 m (6' 1\")   Wt 122.2 kg (269 lb 6.4 oz)   SpO2 100%   BMI 35.54 kg/m    Constitutional: awake, alert, no distress  Eyes: PERRL, sclera nonicteric  ENT: trachea midline  Respiratory: Lungs clear  Cardiovascular: Regular rate and rhythm, no murmurs  GI: nondistended, nontender, bowel sounds present  Lymph/Hematologic: no lymphadenopathy  Skin: dry, no rash  Musculoskeletal: good muscle tone, strength 5/5 in upper and lower extremities  Neurologic: no focal deficits  Neuropsychiatric: appropriate affact    DATA:  Lab: January 2024: Potassium 4.6, creatinine 0.9  Recent Labs   Lab Test 12/20/23  1346   CHOL 155   HDL 50   LDL 86   TRIG 96     ASSESSMENT:  58-year-old male seen for hypertension and orthostatic hypotension.  His blood pressure continues to fluctuate quite a bit, running low in the morning, and slightly high in the afternoon.  There is probably some component of autonomic dysfunction.  He is also fairly sedentary because of his previous stroke.  He seemed to do quite well on lisinopril in the evening, which he " took before his hypertensive urgency last January.  Therefore he will try going back on this.    RECOMMENDATIONS:  1.  Hypertension with orthostatic hypotension  -Stop losartan, go back on lisinopril 10 mg in the evening    Patient can call within a few weeks to report blood pressure.  Follow-up in about 2 months.    John Villa MD  Cardiology - [facility]  Pager:  [phone]  Text Page  February 21, 2024      Thank you for allowing me to participate in the care of your patient.      Sincerely,     John Villa MD     [facility]  cc:   John Villa MD

## 2024-02-21 NOTE — PATIENT INSTRUCTIONS
Stop losartan.    Start lisinopril 10mg daily in the evening.    Call the cardiology clinic in about 2 weeks with blood pressure numbers.

## 2024-02-21 NOTE — TELEPHONE ENCOUNTER
Sent patient a My Chart message to confirm the receipt of the VM on 02.19.2024 for the appointment on 03.25.2024 in [place] with Brenda Carrington PA-C. Will schedule when response is received. 02.21.2024 MCE

## 2024-03-13 ENCOUNTER — HOSPITAL ENCOUNTER (OUTPATIENT)
Dept: CARDIOLOGY | Facility: CLINIC | Age: 59
Discharge: HOME | End: 2024-03-13
Attending: INTERNAL MEDICINE | Admitting: INTERNAL MEDICINE
Payer: COMMERCIAL

## 2024-03-13 DIAGNOSIS — I10 BENIGN ESSENTIAL HYPERTENSION: ICD-10-CM

## 2024-03-13 PROCEDURE — 93975 VASCULAR STUDY: CPT | Mod: 26 | Performed by: INTERNAL MEDICINE

## 2024-03-13 PROCEDURE — 76770 US EXAM ABDO BACK WALL COMP: CPT | Mod: 26 | Performed by: INTERNAL MEDICINE

## 2024-03-13 PROCEDURE — 93975 VASCULAR STUDY: CPT

## 2024-03-20 ENCOUNTER — MYC MEDICAL ADVICE (OUTPATIENT)
Dept: NEPHROLOGY | Facility: CLINIC | Age: 59
End: 2024-03-20
Payer: COMMERCIAL

## 2024-03-20 DIAGNOSIS — I63.9 LEFT PONTINE CEREBROVASCULAR ACCIDENT (H): Primary | ICD-10-CM

## 2024-03-24 ENCOUNTER — HEALTH MAINTENANCE LETTER (OUTPATIENT)
Age: 59
End: 2024-03-24

## 2024-03-25 ENCOUNTER — VIRTUAL VISIT (OUTPATIENT)
Dept: NEPHROLOGY | Facility: CLINIC | Age: 59
End: 2024-03-25
Payer: COMMERCIAL

## 2024-03-25 DIAGNOSIS — I1A.0 RESISTANT HYPERTENSION: Primary | ICD-10-CM

## 2024-03-25 DIAGNOSIS — R80.9 ALBUMINURIA: ICD-10-CM

## 2024-03-25 PROCEDURE — 99214 OFFICE O/P EST MOD 30 MIN: CPT | Mod: 95 | Performed by: PHYSICIAN ASSISTANT

## 2024-03-25 RX ORDER — LISINOPRIL 5 MG/1
5 TABLET ORAL 2 TIMES DAILY
Qty: 180 TABLET | Refills: 3 | Status: SHIPPED | OUTPATIENT
Start: 2024-03-25 | End: 2024-09-17

## 2024-03-25 NOTE — PATIENT INSTRUCTIONS
Try taking lisinopril 5 mg at lunch time and 5 mg in the evening as you are.   Check blood pressure daily and keep log. Nurse will call in 2 weeks for report.  Labs on 5/10.   Continue good hydration, low sodium diet.   Avoid ibuprofen/aleve.   Follow up in 6-8 weeks with Brenda Carrington.

## 2024-03-25 NOTE — PROGRESS NOTES
Nephrology Progress Note  March 25, 2024        Virtual video visit:  Start time: 1:22 pm  End time: 1:32 pm    REASON FOR VISIT: hypertension       HISTORY OF PRESENT ILLNESS:    Patient has a history of multiple strokes in October 2020, November 2021, August 2022.  He does have a history of autonomic neuropathy, orthostatic hypotension, longstanding diabetes with retinopathy and peripheral neuropathy.  Recently he presented to the emergency room for not feeling well and it was found that his blood pressures were as high as 208/112 mmHg.  Some changes to his medications were made and he was started on labetalol 10 mg Cozaar 50 mg of nifedipine 60 mg in the evening.  Eventually his nifedipine was decreased to 30 mg at bedtime on discharge.  Since his hospital admission, his blood pressures have been somewhat better at night 123/69, 135/73 at night 140/82 mm of Hg.  In the middle of the day there are episodes where his blood pressure spikes up to 180/100 mmHg and he takes the as needed hydralazine 10 mg.  He feels significantly tired after that.  His blood pressure does improve in response to that.  He still does have some orthostatic symptoms occasionally.  He used to be on midodrine in the past for his autonomic dysfunction, and this had been discontinued after his stroke.  Currently he is taking his lisinopril 10 mg every evening, hydralazine 10 mg q 6 hrs prn in the evening. He has stopped taking his labetalol due to bradycardia and hypotensive episodes.    He has been following with cardiology who initially stopped nifedipine then switched losartan to lisinopril as this worked for him in the past.     He had 24 hour ambulatory monitor completed showing overall average /80 with high systolic 178, diastolic 100 and low systolic 94, diastolic 59.     ECHO 1/29/2024  Interpretation Summary     Technically difficult study.Extremely poor acoustic windows.  Global and regional left ventricular function is  normal with an EF of 60-65%.  Right ventricular function, chamber size, wall motion, and thickness are  normal.  The inferior vena cava is normal.  No pericardial effusion is present.      He continues to have orthostatic symptoms. This morning he fell when getting out of bed with a blood pressure of 90s/ before taking losartan.     Denies any lower extremity edema.  He does report occasional shortness of breath with exertion, denies orthopnea. He has no n/v/d.     He reports of being diabetic for last 15 years, initially was on insulin and metformin and now remains only on metformin and glipizide since his blood sugars have improved significantly. He has history of preserved kidney function,     PAST MEDICAL HISTORY:  Reviewed with patient on 03/25/2024   As per HPI    MEDICATIONS:  Reviewed with the patient in detail    ALLERGIES:    Reviewed with the patient in detail    REVIEW OF SYSTEMS:  A comprehensive of systems was negative except as noted above.    SOCIAL HISTORY:   Reviewed with patient, no smoking and no alcohol use     FAMILY MEDICAL HISTORY:   Reviewed, no family history of need for dialysis, transplant or CKD    PHYSICAL EXAM:   Vital signs:There were no vitals taken for this visit.    Physical Exam  General: pleasant, awake, answers questions appropriately  None    ASSESSMENT AND RECOMMENDATIONS:     # HTN  #proteinuria   # h/o autonomic neuropathy   # orthostatic hypotension  # Ischemic CVA x 3,  hemiparesis, dysarthria on brilinta and statin    He likely has autonomic neuropathy form his long standing DM, ? Spinal cord lesion. From his history he tends to have orthostatic hypotension and recently has had significant hypertensive episodes. It has been difficult for him to time his medications. He has been following with cardiology and nifedipine was initially stopped while switching losartan to 50 mg BID. He continued to have labile BP's and losartan was discontinued, and lisinopril resumed at 10 mg  every evening.   He is now taking lisinopril 5 mg BID.     He had a renal duplex US which was negative for HITESH.        We discussed that it is a better idea to maintain his blood pressure goal between 140-160 mm of Hg systolic and  mm of Hg diastolic since he is at risk of hypotension    He completed 24 hour ambulatory BP monitor and ECHO.   He has follow up with  cardiology on 5/10/24.    Discussed timing of lisinopril will try taking 5 mg at lunch time and 5 mg in the evening as BP's are 130s/ in the morning usually before taking medications and higher in the afternoons ~ 160s-170s/       --labs and follow up in 6-8 weeks with me.     Brenda Carrington PA-C     Visit length 10 minutes. An additional 20 minutes were spent on date of service in chart review, documentation, and other activities as noted.

## 2024-04-10 DIAGNOSIS — K21.00 GASTROESOPHAGEAL REFLUX DISEASE WITH ESOPHAGITIS, UNSPECIFIED WHETHER HEMORRHAGE: Primary | ICD-10-CM

## 2024-04-10 RX ORDER — FAMOTIDINE 40 MG/1
40 TABLET, FILM COATED ORAL
Qty: 180 TABLET | Refills: 3 | Status: SHIPPED | OUTPATIENT
Start: 2024-04-10

## 2024-04-10 RX ORDER — LOVASTATIN 40 MG
40 TABLET ORAL AT BEDTIME
Qty: 90 TABLET | Refills: 3 | Status: SHIPPED | OUTPATIENT
Start: 2024-04-10

## 2024-04-10 RX ORDER — LOVASTATIN 40 MG
40 TABLET ORAL AT BEDTIME
Qty: 90 TABLET | Refills: 3 | OUTPATIENT
Start: 2024-04-10

## 2024-04-10 NOTE — TELEPHONE ENCOUNTER
Last office visit: 02/21/2024 With Doctor Allen  Next office visit: 05/10/2024   Last filled: 12/10/2023    Lupe Berman MA, RMA  4/10/2024 10:46 AM

## 2024-04-10 NOTE — TELEPHONE ENCOUNTER
Last Office Visit: 02/21/24 Dr. Linder  Next Office Visit: 05/10/24 Dr. Villa  Last Fill Date: 01/08/24  Candi Cherry MA Cardiology   4/10/2024 11:04 AM

## 2024-04-10 NOTE — TELEPHONE ENCOUNTER
Not being managed or ever filled by cardiology. Med listed as historical in chart.   Will forward to PCP.    Lisette Fan RN

## 2024-05-17 DIAGNOSIS — E11.43 TYPE 2 DIABETES MELLITUS WITH DIABETIC AUTONOMIC NEUROPATHY, WITHOUT LONG-TERM CURRENT USE OF INSULIN (H): Primary | ICD-10-CM

## 2024-05-17 RX ORDER — METFORMIN HCL 500 MG
1000 TABLET, EXTENDED RELEASE 24 HR ORAL 2 TIMES DAILY
Qty: 360 TABLET | Refills: 3 | Status: SHIPPED | OUTPATIENT
Start: 2024-05-17 | End: 2024-09-17

## 2024-05-21 ENCOUNTER — ANCILLARY PROCEDURE (OUTPATIENT)
Dept: CARDIOLOGY | Facility: CLINIC | Age: 59
End: 2024-05-21
Attending: INTERNAL MEDICINE
Payer: COMMERCIAL

## 2024-05-21 DIAGNOSIS — I63.9 CEREBROVASCULAR ACCIDENT (H): ICD-10-CM

## 2024-05-21 DIAGNOSIS — Z95.818 IMPLANTABLE LOOP RECORDER PRESENT: ICD-10-CM

## 2024-05-21 PROCEDURE — 93298 REM INTERROG DEV EVAL SCRMS: CPT | Performed by: INTERNAL MEDICINE

## 2024-05-23 LAB
MDC_IDC_PG_IMPLANT_DTM: NORMAL
MDC_IDC_PG_MFG: NORMAL
MDC_IDC_PG_MODEL: NORMAL
MDC_IDC_PG_SERIAL: NORMAL
MDC_IDC_PG_TYPE: NORMAL
MDC_IDC_SESS_CLINIC_NAME: NORMAL
MDC_IDC_SESS_DTM: NORMAL
MDC_IDC_SESS_TYPE: NORMAL

## 2024-06-21 DIAGNOSIS — R29.90 STROKE-LIKE SYMPTOMS: ICD-10-CM

## 2024-06-21 DIAGNOSIS — I69.359 HEMIPARESIS AFFECTING DOMINANT SIDE AS LATE EFFECT OF STROKE (H): Primary | ICD-10-CM

## 2024-06-21 RX ORDER — TICAGRELOR 90 MG/1
90 TABLET ORAL 2 TIMES DAILY
Qty: 180 TABLET | Refills: 0 | Status: SHIPPED | OUTPATIENT
Start: 2024-06-21

## 2024-06-21 NOTE — TELEPHONE ENCOUNTER
Received fax from [facility]'s pharmacy for brilinta refill. MAYE Ivory with nephrology did not order this medication. Writer will forward to PCP through [facility] (looks like they also have a PCP through [facility]?) to address.Brilinta was prescribed for his hx of CVA's.

## 2024-06-21 NOTE — TELEPHONE ENCOUNTER
Patient needs to have follow up with Neurology and have refills through the specialty.   3- month bridge refill sent

## 2024-06-21 NOTE — TELEPHONE ENCOUNTER
Spoke with patient and wife on refill. They stated they do not see neurology. I instructed to follow up in 3 months with PCP

## 2024-08-11 ENCOUNTER — HEALTH MAINTENANCE LETTER (OUTPATIENT)
Age: 59
End: 2024-08-11

## 2024-08-14 DIAGNOSIS — E11.9 TYPE 2 DIABETES MELLITUS WITHOUT COMPLICATION, WITHOUT LONG-TERM CURRENT USE OF INSULIN (H): Primary | ICD-10-CM

## 2024-08-14 RX ORDER — GLIPIZIDE 5 MG/1
5 TABLET ORAL
Qty: 60 TABLET | Refills: 2 | Status: SHIPPED | OUTPATIENT
Start: 2024-08-14 | End: 2024-09-17

## 2024-08-14 NOTE — TELEPHONE ENCOUNTER
Medication Question or Refill    Contacts       Contact Date/Time Type Contact Phone/Fax    08/14/2024 09:02 AM CDT Phone (Incoming) YOSSI CASTRO (Emergency Contact) [phone] (H)            What medication are you calling about (include dose and sig)?: glipiZIDE (GLUCOTROL) 5 MG tablet     Preferred Pharmacy:     [id number] - [place], [place] - [address]  [address]  [facility] [address]  Phone: [phone] Fax: [phone]      Controlled Substance Agreement on file:   CSA -- Patient Level:    CSA: None found at the patient level.       Who prescribed the medication?: ?    Do you need a refill? Yes    When did you use the medication last? Today, has enough til Thursday evening    Patient offered an appointment? No - Patient has appointment scheduled with Dr. Ceballos on Friday, August 30th    Do you have any questions or concerns?  No    Could we send this information to you in [facility] or would you prefer to receive a phone call?:   Patient would prefer a phone call   Okay to leave a detailed message?: Yes at Home number on file [phone] (home)

## 2024-08-27 ENCOUNTER — APPOINTMENT (OUTPATIENT)
Dept: CARDIOLOGY | Facility: CLINIC | Age: 59
End: 2024-08-27
Attending: INTERNAL MEDICINE
Payer: COMMERCIAL

## 2024-08-27 PROCEDURE — 93298 REM INTERROG DEV EVAL SCRMS: CPT | Performed by: INTERNAL MEDICINE

## 2024-08-28 ENCOUNTER — TELEPHONE (OUTPATIENT)
Dept: INTERNAL MEDICINE | Facility: CLINIC | Age: 59
End: 2024-08-28
Payer: COMMERCIAL

## 2024-08-28 ENCOUNTER — MYC MEDICAL ADVICE (OUTPATIENT)
Dept: FAMILY MEDICINE | Facility: CLINIC | Age: 59
End: 2024-08-28

## 2024-08-28 DIAGNOSIS — I69.359 HEMIPARESIS AFFECTING DOMINANT SIDE AS LATE EFFECT OF STROKE (H): ICD-10-CM

## 2024-08-28 DIAGNOSIS — R29.90 STROKE-LIKE SYMPTOMS: ICD-10-CM

## 2024-08-28 NOTE — TELEPHONE ENCOUNTER
Patient Quality Outreach    Patient is due for the following:   Hypertension -  BP check    Next Steps:   Schedule a nurse only visit for Blood pressure check    Type of outreach:    Sent [employer] message.    Next Steps:  Reach out within 90 days via [employer].    Max number of attempts reached: Yes. Will try again in 90 days if patient still on fail list.    Questions for provider review:    None           Starr Cerda, [facility]  Chart routed to closing chart .

## 2024-09-17 ENCOUNTER — OFFICE VISIT (OUTPATIENT)
Dept: FAMILY MEDICINE | Facility: CLINIC | Age: 59
End: 2024-09-17
Payer: COMMERCIAL

## 2024-09-17 DIAGNOSIS — I1A.0 RESISTANT HYPERTENSION: ICD-10-CM

## 2024-09-17 DIAGNOSIS — E11.69 TYPE 2 DIABETES MELLITUS WITH OTHER SPECIFIED COMPLICATION, WITHOUT LONG-TERM CURRENT USE OF INSULIN (H): Primary | ICD-10-CM

## 2024-09-17 DIAGNOSIS — Z12.11 SCREEN FOR COLON CANCER: ICD-10-CM

## 2024-09-17 DIAGNOSIS — I10 HTN, GOAL BELOW 140/90: ICD-10-CM

## 2024-09-17 DIAGNOSIS — K31.84 DIABETIC GASTROPARESIS (H): ICD-10-CM

## 2024-09-17 DIAGNOSIS — I63.9 LEFT PONTINE CEREBROVASCULAR ACCIDENT (H): ICD-10-CM

## 2024-09-17 DIAGNOSIS — Z86.73 HISTORY OF STROKE: ICD-10-CM

## 2024-09-17 DIAGNOSIS — G47.33 OSA (OBSTRUCTIVE SLEEP APNEA): ICD-10-CM

## 2024-09-17 DIAGNOSIS — E11.43 DIABETIC GASTROPARESIS (H): ICD-10-CM

## 2024-09-17 LAB — HBA1C MFR BLD: 5.9 % (ref 0–5.6)

## 2024-09-17 PROCEDURE — 83036 HEMOGLOBIN GLYCOSYLATED A1C: CPT | Performed by: FAMILY MEDICINE

## 2024-09-17 PROCEDURE — 90673 RIV3 VACCINE NO PRESERV IM: CPT | Performed by: FAMILY MEDICINE

## 2024-09-17 PROCEDURE — 90677 PCV20 VACCINE IM: CPT | Performed by: FAMILY MEDICINE

## 2024-09-17 PROCEDURE — G0008 ADMIN INFLUENZA VIRUS VAC: HCPCS | Performed by: FAMILY MEDICINE

## 2024-09-17 PROCEDURE — 99214 OFFICE O/P EST MOD 30 MIN: CPT | Mod: 25 | Performed by: FAMILY MEDICINE

## 2024-09-17 PROCEDURE — 80069 RENAL FUNCTION PANEL: CPT | Performed by: FAMILY MEDICINE

## 2024-09-17 PROCEDURE — G0009 ADMIN PNEUMOCOCCAL VACCINE: HCPCS | Performed by: FAMILY MEDICINE

## 2024-09-17 PROCEDURE — 36415 COLL VENOUS BLD VENIPUNCTURE: CPT | Performed by: FAMILY MEDICINE

## 2024-09-17 PROCEDURE — 99207 PR FOOT EXAM NO CHARGE: CPT | Performed by: FAMILY MEDICINE

## 2024-09-17 RX ORDER — LISINOPRIL 5 MG/1
5 TABLET ORAL 2 TIMES DAILY
Qty: 180 TABLET | Refills: 3 | Status: SHIPPED | OUTPATIENT
Start: 2024-09-17

## 2024-09-17 RX ORDER — GLIPIZIDE 5 MG/1
5 TABLET ORAL
Qty: 180 TABLET | Refills: 1 | Status: SHIPPED | OUTPATIENT
Start: 2024-09-17

## 2024-09-17 RX ORDER — METFORMIN HCL 500 MG
1000 TABLET, EXTENDED RELEASE 24 HR ORAL 2 TIMES DAILY
Qty: 360 TABLET | Refills: 1 | Status: SHIPPED | OUTPATIENT
Start: 2024-09-17

## 2024-09-17 ASSESSMENT — PAIN SCALES - GENERAL: PAINLEVEL: EXTREME PAIN (8)

## 2024-09-17 NOTE — PROGRESS NOTES
"  Assessment & Plan     Type 2 diabetes mellitus with other specified complication, without long-term current use of insulin (H)  Well controlled  - HEMOGLOBIN A1C  - FOOT EXAM  - PRIMARY CARE FOLLOW-UP SCHEDULING  - HEMOGLOBIN A1C  - metFORMIN (GLUCOPHAGE XR) 500 MG 24 hr tablet  Dispense: 360 tablet; Refill: 1  - glipiZIDE (GLUCOTROL) 5 MG tablet  Dispense: 180 tablet; Refill: 1  - PRIMARY CARE FOLLOW-UP SCHEDULING    HTN, goal below 140/90   Take medications regularly, can continue holding hydralazine due to low BP when takes  - lisinopril (ZESTRIL) 5 MG tablet  Dispense: 180 tablet; Refill: 3  - Renal panel  - PRIMARY CARE FOLLOW-UP SCHEDULING    Diabetic gastroparesis (H)  - HEMOGLOBIN A1C  - HEMOGLOBIN A1C    Screen for colon cancer  - Fecal colorectal cancer screen (FIT)  - Fecal colorectal cancer screen (FIT)    History of stroke: with dysarthria   -  dysarthria with gait unsteadiness from stroke. Using walker    ADITI (obstructive sleep apnea)   - apparently not using CPAP, says was told to stop    Left pontine cerebrovascular accident (H)   - residual speech and balance defects    Resistant hypertension  - Renal panel      BMI  Estimated body mass index is 37.38 kg/m  as calculated from the following:    Height as of this encounter: 1.8 m (5' 10.87\").    Weight as of this encounter: 121.1 kg (267 lb).   Weight management plan: Discussed healthy diet and exercise guidelines      See Patient Instructions    Amy Winn is a 58 year old, presenting for the following health issues:  Recheck Medication and Diabetes  Pa is a 59 yo M with dm2, cva, rls, aditi, icm, hypotension, gastroparesis      HTN:  Not taking hydralazine due to low BP      9/17/2024     1:27 PM   Additional Questions   Roomed by Leatha RALPH   Accompanied by self     History of Present Illness       Diabetes:   He presents for follow up of diabetes.  He is checking home blood glucose a few times a week.   He checks blood glucose before meals. "  Blood glucose is never over 200 and never under 70.  When his blood glucose is low, the patient is asymptomatic for confusion, blurred vision, lethargy and reports not feeling dizzy, shaky, or weak.   He has no concerns regarding his diabetes at this time.  He is having numbness in feet and redness, sores, or blisters on feet.            Hypertension: He presents for follow up of hypertension.  He does not check blood pressure  regularly outside of the clinic. Outpatient blood pressures have not been over 140/90. He follows a low salt diet.     He eats 0-1 servings of fruits and vegetables daily.He consumes 0 sweetened beverage(s) daily.He exercises with enough effort to increase his heart rate 10 to 19 minutes per day.  He exercises with enough effort to increase his heart rate 3 or less days per week.   He is taking medications regularly.         Diabetes Follow-up   Well controlled   How often are you checking your blood sugar? A few times a week  What time of day are you checking your blood sugars (select all that apply)?  Before meals  Have you had any blood sugars above 200?  No  Have you had any blood sugars below 70?  No  What symptoms do you notice when your blood sugar is low?  None  What concerns do you have today about your diabetes? None   Do you have any of these symptoms? (Select all that apply)  No numbness or tingling in feet.  No redness, sores or blisters on feet.  No complaints of excessive thirst.  No reports of blurry vision.  No significant changes to weight.      Hyperlipidemia Follow-Up   On Lovastation, says not taking, encouraged him to take to prevent CAD  Are you regularly taking any medication or supplement to lower your cholesterol?   Yes- Lovastatin but not taking  Are you having muscle aches or other side effects that you think could be caused by your cholesterol lowering medication?  No    Hypertension Follow-up     Does not take Hydralazine. Take Lisinopril regularly  Do you check  "your blood pressure regularly outside of the clinic? Occasional   Are you following a low salt diet? Yes  Are your blood pressures ever more than 140 on the top number (systolic) OR more   than 90 on the bottom number (diastolic), for example 140/90? No    BP Readings from Last 2 Encounters:   09/17/24 138/87   02/21/24 (!) 187/102     Hemoglobin A1C (%)   Date Value   09/17/2024 5.9 (H)   12/10/2023 6.3 (H)   10/23/2020 6.1 (H)     LDL Cholesterol Calculated (mg/dL)   Date Value   12/20/2023 86     ADITI:   CPAP was stopped per patient,       Review of Systems  Constitutional, HEENT, cardiovascular, pulmonary, gi and gu systems are negative, except as otherwise noted.      Objective    /87 (BP Location: Left arm, Cuff Size: Adult Regular)   Pulse 77   Temp 97.4  F (36.3  C) (Temporal)   Resp 19   Ht 1.8 m (5' 10.87\")   Wt 121.1 kg (267 lb)   SpO2 98%   BMI 37.38 kg/m    Body mass index is 37.38 kg/m .  Physical Exam   GENERAL: alert and no distress  EYES: Eyes grossly normal to inspection, PERRL and conjunctivae and sclerae normal  HENT: ear canals and TM's normal, nose and mouth without ulcers or lesions  NECK: no adenopathy, no asymmetry, masses, or scars  RESP: lungs clear to auscultation - no rales, rhonchi or wheezes  CV: regular rate and rhythm, no murmur, click or rub, no peripheral edema  ABDOMEN: soft, nontender,  no masses and bowel sounds normal  MS: Unsteady balance, uses walker  SKIN: no suspicious lesions or rashes  NEURO: Normal strength and tone, mentation intact and speech with dysarthria  PSYCH: mentation appears normal, affect normal/bright  Diabetic foot exam: normal DP and PT pulses, no trophic changes or ulcerative lesions, and normal sensory exam    Signed Electronically by: Kiel Ceballos MD    "

## 2024-09-18 VITALS
TEMPERATURE: 97.4 F | WEIGHT: 267 LBS | SYSTOLIC BLOOD PRESSURE: 138 MMHG | HEART RATE: 77 BPM | BODY MASS INDEX: 37.38 KG/M2 | RESPIRATION RATE: 19 BRPM | OXYGEN SATURATION: 98 % | DIASTOLIC BLOOD PRESSURE: 87 MMHG | HEIGHT: 71 IN

## 2024-09-18 LAB
ALBUMIN SERPL BCG-MCNC: 4.3 G/DL (ref 3.5–5.2)
ANION GAP SERPL CALCULATED.3IONS-SCNC: 12 MMOL/L (ref 7–15)
BUN SERPL-MCNC: 23.2 MG/DL (ref 8–23)
CALCIUM SERPL-MCNC: 9.5 MG/DL (ref 8.8–10.4)
CHLORIDE SERPL-SCNC: 104 MMOL/L (ref 98–107)
CREAT SERPL-MCNC: 1.13 MG/DL (ref 0.67–1.17)
EGFRCR SERPLBLD CKD-EPI 2021: 74 ML/MIN/1.73M2
GLUCOSE SERPL-MCNC: 104 MG/DL (ref 70–99)
HCO3 SERPL-SCNC: 24 MMOL/L (ref 22–29)
PHOSPHATE SERPL-MCNC: 3.7 MG/DL (ref 2.5–4.5)
POTASSIUM SERPL-SCNC: 4.5 MMOL/L (ref 3.4–5.3)
SODIUM SERPL-SCNC: 140 MMOL/L (ref 135–145)

## 2024-09-20 ENCOUNTER — TELEPHONE (OUTPATIENT)
Dept: FAMILY MEDICINE | Facility: CLINIC | Age: 59
End: 2024-09-20
Payer: COMMERCIAL

## 2024-09-20 NOTE — TELEPHONE ENCOUNTER
Called patient. Left voice message to return call at [phone].    JAYLEN Alvarado RN  [facility]

## 2024-09-20 NOTE — TELEPHONE ENCOUNTER
Patient results: (plse notify patient)   A1c is 5.9 which is great   Renal panel is normal.    Plan:   Continue with current treatment plan   Recheck in 3 months

## 2024-09-20 NOTE — TELEPHONE ENCOUNTER
Patient/family called back and RN relayed provider's message. Patient/family verbalized understanding.     Cat Quarles RN, BSN  [employer]: [place]

## 2024-09-20 NOTE — TELEPHONE ENCOUNTER
Test Results    Contacts       Contact Date/Time Type Contact Phone/Fax    09/20/2024 10:47 AM CDT Phone (Incoming) Pa Delcid (Self) [phone] (H)     results            Who ordered the test:  Dr. Ceballos    Type of test: Lab    Date of test:  9.17.24    Where was the test performed:  [facility]    What are your questions/concerns?:  Patient calling for recent lab results.    Could we send this information to you in [facility] or would you prefer to receive a phone call?:   Patient would prefer a phone call   Okay to leave a detailed message?: Yes at Home number on file [phone] (home)

## 2024-10-21 RX ORDER — TICAGRELOR 90 MG/1
90 TABLET ORAL 2 TIMES DAILY
Qty: 180 TABLET | Refills: 0 | Status: SHIPPED | OUTPATIENT
Start: 2024-10-21

## 2024-10-21 NOTE — TELEPHONE ENCOUNTER
Medication Question or Refill    Contacts       Contact Date/Time Type Contact Phone/Fax    10/21/2024 12:09 PM CDT Phone (Incoming) YSOSI CASTRO (Emergency Contact) [phone] (H)            What medication are you calling about (include dose and sig)?: BRILINTA 90 MG tablet     Preferred Pharmacy:   [id number] - [place], [place] - [address]  [address] [address] [place] [address]  Phone: [phone] Fax: [phone]    Controlled Substance Agreement on file:   CSA -- Patient Level:    CSA: None found at the patient level.       Who prescribed the medication?: Dr. Ceballos    Do you need a refill? Yes    When did you use the medication last? This morning    Patient offered an appointment? No    Do you have any questions or concerns?  Yes: Patient needs refill ASAP as he takes this twice a day.    Could we send this information to you in [facility] or would you prefer to receive a phone call?:   Patient would prefer a phone call   Okay to leave a detailed message?: Yes at Cell number on file:    Telephone Information:   Mobile [phone]

## 2024-10-21 NOTE — TELEPHONE ENCOUNTER
Pt was last seen by Tucker 9/17/24. Please review and approve if okay.    Medication was last filled by Brody Rosa MD.    Lupe Calderon RN

## 2024-11-27 ENCOUNTER — ANCILLARY PROCEDURE (OUTPATIENT)
Dept: CARDIOLOGY | Facility: CLINIC | Age: 59
End: 2024-11-27
Attending: INTERNAL MEDICINE
Payer: COMMERCIAL

## 2024-11-27 DIAGNOSIS — I63.9 CEREBROVASCULAR ACCIDENT (H): ICD-10-CM

## 2024-11-27 DIAGNOSIS — Z95.818 IMPLANTABLE LOOP RECORDER PRESENT: ICD-10-CM

## 2024-11-27 PROCEDURE — 93298 REM INTERROG DEV EVAL SCRMS: CPT | Performed by: INTERNAL MEDICINE

## 2024-12-22 ENCOUNTER — HEALTH MAINTENANCE LETTER (OUTPATIENT)
Age: 59
End: 2024-12-22

## 2025-01-26 ENCOUNTER — HEALTH MAINTENANCE LETTER (OUTPATIENT)
Age: 60
End: 2025-01-26

## 2025-02-11 ENCOUNTER — APPOINTMENT (OUTPATIENT)
Dept: CT IMAGING | Facility: CLINIC | Age: 60
DRG: 064 | End: 2025-02-11
Attending: NURSE PRACTITIONER
Payer: COMMERCIAL

## 2025-02-11 ENCOUNTER — APPOINTMENT (OUTPATIENT)
Dept: CT IMAGING | Facility: CLINIC | Age: 60
DRG: 064 | End: 2025-02-11
Attending: EMERGENCY MEDICINE
Payer: COMMERCIAL

## 2025-02-11 ENCOUNTER — HOSPITAL ENCOUNTER (INPATIENT)
Facility: CLINIC | Age: 60
DRG: 064 | End: 2025-02-11
Attending: EMERGENCY MEDICINE | Admitting: STUDENT IN AN ORGANIZED HEALTH CARE EDUCATION/TRAINING PROGRAM
Payer: COMMERCIAL

## 2025-02-11 ENCOUNTER — APPOINTMENT (OUTPATIENT)
Dept: CT IMAGING | Facility: CLINIC | Age: 60
DRG: 064 | End: 2025-02-11
Payer: COMMERCIAL

## 2025-02-11 ENCOUNTER — APPOINTMENT (OUTPATIENT)
Dept: CARDIOLOGY | Facility: CLINIC | Age: 60
DRG: 064 | End: 2025-02-11
Payer: COMMERCIAL

## 2025-02-11 ENCOUNTER — APPOINTMENT (OUTPATIENT)
Dept: MRI IMAGING | Facility: CLINIC | Age: 60
DRG: 064 | End: 2025-02-11
Payer: COMMERCIAL

## 2025-02-11 DIAGNOSIS — E78.00 PURE HYPERCHOLESTEROLEMIA: ICD-10-CM

## 2025-02-11 DIAGNOSIS — Z86.73 PERSONAL HISTORY OF TRANSIENT CEREBRAL ISCHEMIA: Primary | ICD-10-CM

## 2025-02-11 DIAGNOSIS — R53.1 GENERALIZED WEAKNESS: ICD-10-CM

## 2025-02-11 DIAGNOSIS — Z78.9 STATIN INTOLERANCE: ICD-10-CM

## 2025-02-11 DIAGNOSIS — I63.9 CEREBROVASCULAR ACCIDENT (CVA), UNSPECIFIED MECHANISM (H): ICD-10-CM

## 2025-02-11 DIAGNOSIS — R55 SYNCOPE, UNSPECIFIED SYNCOPE TYPE: ICD-10-CM

## 2025-02-11 DIAGNOSIS — I16.9 HYPERTENSIVE CRISIS: ICD-10-CM

## 2025-02-11 LAB
ALBUMIN UR-MCNC: 100 MG/DL
ANION GAP SERPL CALCULATED.3IONS-SCNC: 15 MMOL/L (ref 7–15)
APPEARANCE UR: CLEAR
ATRIAL RATE - MUSE: 89 BPM
BASOPHILS # BLD AUTO: 0.1 10E3/UL (ref 0–0.2)
BASOPHILS NFR BLD AUTO: 1 %
BILIRUB UR QL STRIP: NEGATIVE
BUN SERPL-MCNC: 13.6 MG/DL (ref 8–23)
CALCIUM SERPL-MCNC: 9.1 MG/DL (ref 8.8–10.4)
CHLORIDE SERPL-SCNC: 102 MMOL/L (ref 98–107)
CHOLEST SERPL-MCNC: 195 MG/DL
COLOR UR AUTO: YELLOW
CREAT SERPL-MCNC: 0.98 MG/DL (ref 0.67–1.17)
CRP SERPL-MCNC: 5.43 MG/L
DIASTOLIC BLOOD PRESSURE - MUSE: NORMAL MMHG
EGFRCR SERPLBLD CKD-EPI 2021: 88 ML/MIN/1.73M2
EOSINOPHIL # BLD AUTO: 0.2 10E3/UL (ref 0–0.7)
EOSINOPHIL NFR BLD AUTO: 3 %
ERYTHROCYTE [DISTWIDTH] IN BLOOD BY AUTOMATED COUNT: 13.1 % (ref 10–15)
ERYTHROCYTE [SEDIMENTATION RATE] IN BLOOD BY WESTERGREN METHOD: 14 MM/HR (ref 0–20)
EST. AVERAGE GLUCOSE BLD GHB EST-MCNC: 200 MG/DL
FLUAV RNA SPEC QL NAA+PROBE: NEGATIVE
FLUBV RNA RESP QL NAA+PROBE: NEGATIVE
GLUCOSE BLDC GLUCOMTR-MCNC: 189 MG/DL (ref 70–99)
GLUCOSE BLDC GLUCOMTR-MCNC: 313 MG/DL (ref 70–99)
GLUCOSE BLDC GLUCOMTR-MCNC: 325 MG/DL (ref 70–99)
GLUCOSE SERPL-MCNC: 322 MG/DL (ref 70–99)
GLUCOSE UR STRIP-MCNC: >1000 MG/DL
HBA1C MFR BLD: 8.6 %
HCO3 SERPL-SCNC: 24 MMOL/L (ref 22–29)
HCT VFR BLD AUTO: 42.1 % (ref 40–53)
HDLC SERPL-MCNC: 49 MG/DL
HGB BLD-MCNC: 14.7 G/DL (ref 13.3–17.7)
HGB UR QL STRIP: NEGATIVE
HOLD SPECIMEN: NORMAL
IMM GRANULOCYTES # BLD: 0 10E3/UL
IMM GRANULOCYTES NFR BLD: 0 %
INTERPRETATION ECG - MUSE: NORMAL
KETONES UR STRIP-MCNC: 10 MG/DL
LDLC SERPL CALC-MCNC: 127 MG/DL
LEUKOCYTE ESTERASE UR QL STRIP: NEGATIVE
LVEF ECHO: NORMAL
LYMPHOCYTES # BLD AUTO: 1.2 10E3/UL (ref 0.8–5.3)
LYMPHOCYTES NFR BLD AUTO: 19 %
MCH RBC QN AUTO: 29.5 PG (ref 26.5–33)
MCHC RBC AUTO-ENTMCNC: 34.9 G/DL (ref 31.5–36.5)
MCV RBC AUTO: 84 FL (ref 78–100)
MONOCYTES # BLD AUTO: 0.5 10E3/UL (ref 0–1.3)
MONOCYTES NFR BLD AUTO: 8 %
MUCOUS THREADS #/AREA URNS LPF: PRESENT /LPF
NEUTROPHILS # BLD AUTO: 4.6 10E3/UL (ref 1.6–8.3)
NEUTROPHILS NFR BLD AUTO: 70 %
NITRATE UR QL: NEGATIVE
NONHDLC SERPL-MCNC: 146 MG/DL
NRBC # BLD AUTO: 0 10E3/UL
NRBC BLD AUTO-RTO: 0 /100
P AXIS - MUSE: 49 DEGREES
PH UR STRIP: 6 [PH] (ref 5–7)
PLATELET # BLD AUTO: 334 10E3/UL (ref 150–450)
POTASSIUM SERPL-SCNC: 3.4 MMOL/L (ref 3.4–5.3)
PR INTERVAL - MUSE: 208 MS
QRS DURATION - MUSE: 88 MS
QT - MUSE: 384 MS
QTC - MUSE: 467 MS
R AXIS - MUSE: -14 DEGREES
RBC # BLD AUTO: 4.99 10E6/UL (ref 4.4–5.9)
RBC URINE: 4 /HPF
RSV RNA SPEC NAA+PROBE: NEGATIVE
SARS-COV-2 RNA RESP QL NAA+PROBE: NEGATIVE
SODIUM SERPL-SCNC: 141 MMOL/L (ref 135–145)
SP GR UR STRIP: 1.03 (ref 1–1.03)
SYSTOLIC BLOOD PRESSURE - MUSE: NORMAL MMHG
T AXIS - MUSE: 27 DEGREES
TRIGL SERPL-MCNC: 94 MG/DL
TROPONIN T SERPL HS-MCNC: 23 NG/L
TROPONIN T SERPL HS-MCNC: 28 NG/L
UROBILINOGEN UR STRIP-MCNC: 3 MG/DL
VENTRICULAR RATE- MUSE: 89 BPM
WBC # BLD AUTO: 6.7 10E3/UL (ref 4–11)
WBC URINE: 1 /HPF

## 2025-02-11 PROCEDURE — A9585 GADOBUTROL INJECTION: HCPCS | Performed by: NURSE PRACTITIONER

## 2025-02-11 PROCEDURE — 255N000002 HC RX 255 OP 636: Performed by: NURSE PRACTITIONER

## 2025-02-11 PROCEDURE — 80048 BASIC METABOLIC PNL TOTAL CA: CPT | Performed by: EMERGENCY MEDICINE

## 2025-02-11 PROCEDURE — 250N000009 HC RX 250: Performed by: NURSE PRACTITIONER

## 2025-02-11 PROCEDURE — 250N000011 HC RX IP 250 OP 636: Performed by: NURSE PRACTITIONER

## 2025-02-11 PROCEDURE — 250N000013 HC RX MED GY IP 250 OP 250 PS 637

## 2025-02-11 PROCEDURE — G0378 HOSPITAL OBSERVATION PER HR: HCPCS

## 2025-02-11 PROCEDURE — 85652 RBC SED RATE AUTOMATED: CPT | Performed by: NURSE PRACTITIONER

## 2025-02-11 PROCEDURE — 93306 TTE W/DOPPLER COMPLETE: CPT

## 2025-02-11 PROCEDURE — 74177 CT ABD & PELVIS W/CONTRAST: CPT

## 2025-02-11 PROCEDURE — 99223 1ST HOSP IP/OBS HIGH 75: CPT

## 2025-02-11 PROCEDURE — 83036 HEMOGLOBIN GLYCOSYLATED A1C: CPT

## 2025-02-11 PROCEDURE — 87637 SARSCOV2&INF A&B&RSV AMP PRB: CPT

## 2025-02-11 PROCEDURE — 250N000011 HC RX IP 250 OP 636

## 2025-02-11 PROCEDURE — 84484 ASSAY OF TROPONIN QUANT: CPT | Performed by: EMERGENCY MEDICINE

## 2025-02-11 PROCEDURE — 81001 URINALYSIS AUTO W/SCOPE: CPT

## 2025-02-11 PROCEDURE — 255N000002 HC RX 255 OP 636

## 2025-02-11 PROCEDURE — 36415 COLL VENOUS BLD VENIPUNCTURE: CPT | Performed by: EMERGENCY MEDICINE

## 2025-02-11 PROCEDURE — 85025 COMPLETE CBC W/AUTO DIFF WBC: CPT | Performed by: EMERGENCY MEDICINE

## 2025-02-11 PROCEDURE — 99207 PR NO BILLABLE SERVICE THIS VISIT: CPT | Performed by: NURSE PRACTITIONER

## 2025-02-11 PROCEDURE — 36415 COLL VENOUS BLD VENIPUNCTURE: CPT | Performed by: NURSE PRACTITIONER

## 2025-02-11 PROCEDURE — 70496 CT ANGIOGRAPHY HEAD: CPT

## 2025-02-11 PROCEDURE — 93005 ELECTROCARDIOGRAM TRACING: CPT | Performed by: EMERGENCY MEDICINE

## 2025-02-11 PROCEDURE — 250N000009 HC RX 250

## 2025-02-11 PROCEDURE — 99285 EMERGENCY DEPT VISIT HI MDM: CPT | Mod: 25 | Performed by: EMERGENCY MEDICINE

## 2025-02-11 PROCEDURE — 258N000003 HC RX IP 258 OP 636: Performed by: NURSE PRACTITIONER

## 2025-02-11 PROCEDURE — A9585 GADOBUTROL INJECTION: HCPCS

## 2025-02-11 PROCEDURE — 250N000012 HC RX MED GY IP 250 OP 636 PS 637

## 2025-02-11 PROCEDURE — 93306 TTE W/DOPPLER COMPLETE: CPT | Mod: 26 | Performed by: INTERNAL MEDICINE

## 2025-02-11 PROCEDURE — 86140 C-REACTIVE PROTEIN: CPT | Performed by: NURSE PRACTITIONER

## 2025-02-11 PROCEDURE — 99285 EMERGENCY DEPT VISIT HI MDM: CPT | Performed by: EMERGENCY MEDICINE

## 2025-02-11 PROCEDURE — 83718 ASSAY OF LIPOPROTEIN: CPT

## 2025-02-11 PROCEDURE — 250N000013 HC RX MED GY IP 250 OP 250 PS 637: Performed by: EMERGENCY MEDICINE

## 2025-02-11 PROCEDURE — 70553 MRI BRAIN STEM W/O & W/DYE: CPT

## 2025-02-11 PROCEDURE — 93010 ELECTROCARDIOGRAM REPORT: CPT | Performed by: EMERGENCY MEDICINE

## 2025-02-11 PROCEDURE — 82962 GLUCOSE BLOOD TEST: CPT

## 2025-02-11 PROCEDURE — 70450 CT HEAD/BRAIN W/O DYE: CPT

## 2025-02-11 RX ORDER — LISINOPRIL 5 MG/1
5 TABLET ORAL 2 TIMES DAILY
Status: DISCONTINUED | OUTPATIENT
Start: 2025-02-11 | End: 2025-02-12

## 2025-02-11 RX ORDER — DEXTROSE MONOHYDRATE 25 G/50ML
25-50 INJECTION, SOLUTION INTRAVENOUS
Status: DISCONTINUED | OUTPATIENT
Start: 2025-02-11 | End: 2025-02-14 | Stop reason: HOSPADM

## 2025-02-11 RX ORDER — GADOBUTROL 604.72 MG/ML
10 INJECTION INTRAVENOUS ONCE
Status: COMPLETED | OUTPATIENT
Start: 2025-02-11 | End: 2025-02-11

## 2025-02-11 RX ORDER — HYDRALAZINE HYDROCHLORIDE 10 MG/1
10-20 TABLET, FILM COATED ORAL EVERY 6 HOURS PRN
Status: DISCONTINUED | OUTPATIENT
Start: 2025-02-11 | End: 2025-02-11

## 2025-02-11 RX ORDER — FAMOTIDINE 20 MG/1
40 TABLET, FILM COATED ORAL 2 TIMES DAILY
Status: DISCONTINUED | OUTPATIENT
Start: 2025-02-11 | End: 2025-02-14 | Stop reason: HOSPADM

## 2025-02-11 RX ORDER — NICOTINE POLACRILEX 4 MG
15-30 LOZENGE BUCCAL
Status: DISCONTINUED | OUTPATIENT
Start: 2025-02-11 | End: 2025-02-14 | Stop reason: HOSPADM

## 2025-02-11 RX ORDER — IOPAMIDOL 755 MG/ML
121 INJECTION, SOLUTION INTRAVASCULAR ONCE
Status: DISCONTINUED | OUTPATIENT
Start: 2025-02-11 | End: 2025-02-11

## 2025-02-11 RX ORDER — GLIPIZIDE 5 MG/1
5 TABLET ORAL
Status: DISCONTINUED | OUTPATIENT
Start: 2025-02-11 | End: 2025-02-12

## 2025-02-11 RX ORDER — GADOBUTROL 604.72 MG/ML
11 INJECTION INTRAVENOUS ONCE
Status: COMPLETED | OUTPATIENT
Start: 2025-02-11 | End: 2025-02-11

## 2025-02-11 RX ORDER — FAMOTIDINE 20 MG/1
40 TABLET, FILM COATED ORAL ONCE
Status: COMPLETED | OUTPATIENT
Start: 2025-02-11 | End: 2025-02-11

## 2025-02-11 RX ORDER — ATORVASTATIN CALCIUM 10 MG/1
10 TABLET, FILM COATED ORAL DAILY
Status: DISCONTINUED | OUTPATIENT
Start: 2025-02-11 | End: 2025-02-14 | Stop reason: HOSPADM

## 2025-02-11 RX ORDER — IOPAMIDOL 755 MG/ML
95 INJECTION, SOLUTION INTRAVASCULAR ONCE
Status: COMPLETED | OUTPATIENT
Start: 2025-02-11 | End: 2025-02-11

## 2025-02-11 RX ORDER — PROCHLORPERAZINE MALEATE 5 MG/1
10 TABLET ORAL EVERY 6 HOURS PRN
Status: DISCONTINUED | OUTPATIENT
Start: 2025-02-11 | End: 2025-02-14 | Stop reason: HOSPADM

## 2025-02-11 RX ORDER — ONDANSETRON 2 MG/ML
4 INJECTION INTRAMUSCULAR; INTRAVENOUS EVERY 6 HOURS PRN
Status: DISCONTINUED | OUTPATIENT
Start: 2025-02-11 | End: 2025-02-14 | Stop reason: HOSPADM

## 2025-02-11 RX ORDER — AMOXICILLIN 250 MG
2 CAPSULE ORAL 2 TIMES DAILY PRN
Status: DISCONTINUED | OUTPATIENT
Start: 2025-02-11 | End: 2025-02-14 | Stop reason: HOSPADM

## 2025-02-11 RX ORDER — POLYETHYLENE GLYCOL 3350 17 G/17G
17 POWDER, FOR SOLUTION ORAL 2 TIMES DAILY PRN
Status: DISCONTINUED | OUTPATIENT
Start: 2025-02-11 | End: 2025-02-14 | Stop reason: HOSPADM

## 2025-02-11 RX ORDER — MAGNESIUM HYDROXIDE/ALUMINUM HYDROXICE/SIMETHICONE 120; 1200; 1200 MG/30ML; MG/30ML; MG/30ML
30 SUSPENSION ORAL ONCE
Status: COMPLETED | OUTPATIENT
Start: 2025-02-11 | End: 2025-02-11

## 2025-02-11 RX ORDER — ONDANSETRON 4 MG/1
4 TABLET, ORALLY DISINTEGRATING ORAL EVERY 6 HOURS PRN
Status: DISCONTINUED | OUTPATIENT
Start: 2025-02-11 | End: 2025-02-14 | Stop reason: HOSPADM

## 2025-02-11 RX ORDER — ACETAMINOPHEN 325 MG/1
650 TABLET ORAL EVERY 4 HOURS PRN
Status: DISCONTINUED | OUTPATIENT
Start: 2025-02-11 | End: 2025-02-14 | Stop reason: HOSPADM

## 2025-02-11 RX ORDER — AMOXICILLIN 250 MG
1 CAPSULE ORAL 2 TIMES DAILY PRN
Status: DISCONTINUED | OUTPATIENT
Start: 2025-02-11 | End: 2025-02-14 | Stop reason: HOSPADM

## 2025-02-11 RX ORDER — LABETALOL HYDROCHLORIDE 5 MG/ML
10 INJECTION, SOLUTION INTRAVENOUS EVERY 6 HOURS PRN
Status: DISCONTINUED | OUTPATIENT
Start: 2025-02-11 | End: 2025-02-11

## 2025-02-11 RX ORDER — IOPAMIDOL 755 MG/ML
67 INJECTION, SOLUTION INTRAVASCULAR ONCE
Status: COMPLETED | OUTPATIENT
Start: 2025-02-11 | End: 2025-02-11

## 2025-02-11 RX ORDER — METFORMIN HYDROCHLORIDE 500 MG/1
1000 TABLET, EXTENDED RELEASE ORAL 2 TIMES DAILY
Status: DISCONTINUED | OUTPATIENT
Start: 2025-02-11 | End: 2025-02-14 | Stop reason: HOSPADM

## 2025-02-11 RX ADMIN — GADOBUTROL 10 ML: 604.72 INJECTION INTRAVENOUS at 18:07

## 2025-02-11 RX ADMIN — IOPAMIDOL 95 ML: 755 INJECTION, SOLUTION INTRAVENOUS at 17:55

## 2025-02-11 RX ADMIN — HYDRALAZINE HYDROCHLORIDE 10 MG: 10 TABLET ORAL at 08:18

## 2025-02-11 RX ADMIN — ONDANSETRON 4 MG: 4 TABLET, ORALLY DISINTEGRATING ORAL at 10:55

## 2025-02-11 RX ADMIN — SODIUM CHLORIDE 70 ML: 9 INJECTION, SOLUTION INTRAVENOUS at 17:55

## 2025-02-11 RX ADMIN — TICAGRELOR 90 MG: 90 TABLET ORAL at 13:05

## 2025-02-11 RX ADMIN — METFORMIN HYDROCHLORIDE 1000 MG: 500 TABLET, EXTENDED RELEASE ORAL at 13:04

## 2025-02-11 RX ADMIN — ATORVASTATIN CALCIUM 10 MG: 10 TABLET, FILM COATED ORAL at 13:05

## 2025-02-11 RX ADMIN — TICAGRELOR 90 MG: 90 TABLET ORAL at 20:33

## 2025-02-11 RX ADMIN — SODIUM CHLORIDE 100 ML: 9 INJECTION, SOLUTION INTRAVENOUS at 14:12

## 2025-02-11 RX ADMIN — LISINOPRIL 5 MG: 5 TABLET ORAL at 08:18

## 2025-02-11 RX ADMIN — METFORMIN HYDROCHLORIDE 1000 MG: 500 TABLET, EXTENDED RELEASE ORAL at 20:32

## 2025-02-11 RX ADMIN — IOPAMIDOL 67 ML: 755 INJECTION, SOLUTION INTRAVENOUS at 14:11

## 2025-02-11 RX ADMIN — FAMOTIDINE 40 MG: 20 TABLET, FILM COATED ORAL at 13:05

## 2025-02-11 RX ADMIN — POLYETHYLENE GLYCOL 3350 17 G: 17 POWDER, FOR SOLUTION ORAL at 13:04

## 2025-02-11 RX ADMIN — FAMOTIDINE 40 MG: 20 TABLET, FILM COATED ORAL at 20:32

## 2025-02-11 RX ADMIN — ALUMINUM HYDROXIDE, MAGNESIUM HYDROXIDE, AND SIMETHICONE 30 ML: 200; 200; 20 SUSPENSION ORAL at 04:16

## 2025-02-11 RX ADMIN — INSULIN ASPART 4 UNITS: 100 INJECTION, SOLUTION INTRAVENOUS; SUBCUTANEOUS at 18:23

## 2025-02-11 RX ADMIN — GADOBUTROL 11 ML: 604.72 INJECTION INTRAVENOUS at 10:01

## 2025-02-11 RX ADMIN — FAMOTIDINE 40 MG: 20 TABLET, FILM COATED ORAL at 04:16

## 2025-02-11 RX ADMIN — GLIPIZIDE 5 MG: 5 TABLET ORAL at 16:51

## 2025-02-11 RX ADMIN — SENNOSIDES AND DOCUSATE SODIUM 2 TABLET: 50; 8.6 TABLET ORAL at 13:04

## 2025-02-11 RX ADMIN — SODIUM CHLORIDE 50 ML: 9 INJECTION, SOLUTION INTRAVENOUS at 18:08

## 2025-02-11 ASSESSMENT — ACTIVITIES OF DAILY LIVING (ADL)
ADLS_ACUITY_SCORE: 58
ADLS_ACUITY_SCORE: 56
ADLS_ACUITY_SCORE: 58
ADLS_ACUITY_SCORE: 56
ADLS_ACUITY_SCORE: 58
ADLS_ACUITY_SCORE: 56
ADLS_ACUITY_SCORE: 59
ADLS_ACUITY_SCORE: 48
ADLS_ACUITY_SCORE: 56
ADLS_ACUITY_SCORE: 58
ADLS_ACUITY_SCORE: 56
ADLS_ACUITY_SCORE: 56
ADLS_ACUITY_SCORE: 48
ADLS_ACUITY_SCORE: 58
ADLS_ACUITY_SCORE: 56

## 2025-02-11 NOTE — ED NOTES
[facility]   Admission Handoff    The patient is Chris Delcid, 59 year old who arrived in the ED by AMBULANCE from home with a complaint of Fall and Syncope  . The patient's current symptoms are new and during this time the symptoms have remained the same. In the ED, patient was diagnosed with   Final diagnoses:   Generalized weakness   Syncope, unspecified syncope type         Needed?: No    Allergies:    Allergies   Allergen Reactions    Hydrochlorothiazide Other (See Comments)     Other reaction(s): Syncope    per note 08/28/2012    PN: per note 08/28/2012    PN: per note 08/28/2012   Other reaction(s): Syncope   per note 08/28/2012    Penicillins Hives and Angioedema    Cortisone Hives and Swelling    Aspirin Other (See Comments)     : Ringing in ears    PN: : Ringing in ears    PN: : Ringing in ears   : Ringing in ears    Atorvastatin Muscle Pain (Myalgia)     Other reaction(s): Myalgias    Fludrocortisone Other (See Comments)     Other reaction(s): Hypertension    head pounding at night    PN: head pounding, palpitations, tremor   Other reaction(s): Hypertension   head pounding at night    PN: head pounding, palpitations, tremor    No Clinical Screening - See Comments      No latex allergy-dcm    Rosuvastatin Muscle Pain (Myalgia)    Simvastatin Other (See Comments)     Other reaction(s): Other (See Comments)    drowsy    PN: drowsy    PN: drowsy   Other reaction(s): Other (See Comments)   drowsy       Past Medical Hx:   Past Medical History:   Diagnosis Date    Chronic low back pain 5/29/2014    Overview:  Follow at [facility] in [place]. Follow at [facility] in [place].    Essential hypertension 6/7/2003    Hyperlipidemia 3/4/2011    Orthostatic hypotension 12/26/2014    Overview:  Secondary to diabetic autonomic dysfunction?  Started midodrine Dec, 2014 Secondary to diabetic autonomic dysfunction?  Started midodrine Dec, 2014    Statin intolerance  2/13/2020    Type 2 diabetes mellitus (H) 12/26/2014    Type 2 diabetes, uncontrolled, with autonomic neuropathy (HCC)       Initial vitals were: BP: (!) 181/108  Pulse: 90  Temp: 98.3  F (36.8  C)  Resp: 18  SpO2: 97 %   Recent vital Signs: BP (!) 186/137   Pulse 90   Temp 98.3  F (36.8  C) (Oral)   Resp 11   SpO2 98%     Elimination Status: Continent: Yes and wears briefs     Activity Level: 2 assist    Fall Status: Reason for falls risk:  Mobility  bed/chair alarm on, nonskid shoes/slippers when out of bed, and arm band in place    Baseline Mental status: WDL  Current Mental Status changes: at basesline    Infection present or suspected this encounter: no  Sepsis suspected: No    Isolation type:     Bariatric equipment needed?: No    In the ED these meds were given:   Medications   alum & mag hydroxide-simethicone (MAALOX) suspension 30 mL (30 mLs Oral $Given 2/11/25 0416)   famotidine (PEPCID) tablet 40 mg (40 mg Oral $Given 2/11/25 0416)       Drips running?  No    Home pump  No    Current LDAs: Peripheral IV: Site L AC; Gauge 18  none     Results:   Labs/Imaging  Ordered and Resulted from Time of ED Arrival Up to the Time of Departure from the ED  Results for orders placed or performed during the hospital encounter of 02/11/25 (from the past 24 hours)   [place] Draw    Narrative    The following orders were created for panel order [place] Draw.  Procedure                               Abnormality         Status                     ---------                               -----------         ------                     Extra Blue Top Tube[[id number]]                              Final result               Extra Green Top (Lithium...[[id number]]                      Final result               Extra Purple Top Tube[[id number]]                            Final result                 Please view results for these tests on the individual orders.   Basic metabolic panel   Result Value Ref Range    Sodium 141 135 - 145  mmol/L    Potassium 3.4 3.4 - 5.3 mmol/L    Chloride 102 98 - 107 mmol/L    Carbon Dioxide (CO2) 24 22 - 29 mmol/L    Anion Gap 15 7 - 15 mmol/L    Urea Nitrogen 13.6 8.0 - 23.0 mg/dL    Creatinine 0.98 0.67 - 1.17 mg/dL    GFR Estimate 88 >60 mL/min/1.73m2    Calcium 9.1 8.8 - 10.4 mg/dL    Glucose 322 (H) 70 - 99 mg/dL   CBC with Platelets & Differential    Narrative    The following orders were created for panel order CBC with Platelets & Differential.  Procedure                               Abnormality         Status                     ---------                               -----------         ------                     CBC with platelets and d...[[id number]]                      Final result                 Please view results for these tests on the individual orders.   Extra Blue Top Tube   Result Value Ref Range    Hold Specimen JIC    Extra Green Top (Lithium Heparin) Tube   Result Value Ref Range    Hold Specimen JIC    Extra Purple Top Tube   Result Value Ref Range    Hold Specimen     CBC with platelets and differential   Result Value Ref Range    WBC Count 6.7 4.0 - 11.0 10e3/uL    RBC Count 4.99 4.40 - 5.90 10e6/uL    Hemoglobin 14.7 13.3 - 17.7 g/dL    Hematocrit 42.1 40.0 - 53.0 %    MCV 84 78 - 100 fL    MCH 29.5 26.5 - 33.0 pg    MCHC 34.9 31.5 - 36.5 g/dL    RDW 13.1 10.0 - 15.0 %    Platelet Count 334 150 - 450 10e3/uL    % Neutrophils 70 %    % Lymphocytes 19 %    % Monocytes 8 %    % Eosinophils 3 %    % Basophils 1 %    % Immature Granulocytes 0 %    NRBCs per 100 WBC 0 <1 /100    Absolute Neutrophils 4.6 1.6 - 8.3 10e3/uL    Absolute Lymphocytes 1.2 0.8 - 5.3 10e3/uL    Absolute Monocytes 0.5 0.0 - 1.3 10e3/uL    Absolute Eosinophils 0.2 0.0 - 0.7 10e3/uL    Absolute Basophils 0.1 0.0 - 0.2 10e3/uL    Absolute Immature Granulocytes 0.0 <=0.4 10e3/uL    Absolute NRBCs 0.0 10e3/uL   Troponin T, High Sensitivity   Result Value Ref Range    Troponin T, High Sensitivity 28 (H) <=22 ng/L   EKG  12-lead, tracing only   Result Value Ref Range    Systolic Blood Pressure  mmHg    Diastolic Blood Pressure  mmHg    Ventricular Rate 89 BPM    Atrial Rate 89 BPM    WV Interval 208 ms    QRS Duration 88 ms     ms    QTc 467 ms    P Axis 49 degrees    R AXIS -14 degrees    T Axis 27 degrees    Interpretation ECG       Sinus rhythm with occasional Premature ventricular complexes and Premature atrial complexes  Septal infarct , age undetermined  Inferior infarct , age undetermined  Abnormal ECG  No previous ECGs available     CT Head w/o Contrast    Narrative    EXAM: CT HEAD W/O CONTRAST  LOCATION: [facility]  DATE: 2/11/2025    INDICATION: Fall, unsteady gait  COMPARISON:  MRI brain 10/22/2020.  TECHNIQUE: Routine CT Head without IV contrast. Multiplanar reformats. Dose reduction techniques were used.    FINDINGS:  INTRACRANIAL CONTENTS: No intracranial hemorrhage, extraaxial collection, or mass effect.  No CT evidence of acute infarct. Severe presumed chronic small vessel ischemic changes.  Interval development of chronic lacunar infarcts in the deep gray nuclei,   new since 2020. Mild to moderate generalized volume loss. No hydrocephalus.     VISUALIZED ORBITS/SINUSES/MASTOIDS: No intraorbital abnormality. No significant paranasal sinus mucosal disease. No middle ear or mastoid effusion.    BONES/SOFT TISSUES: No acute abnormality.      Impression    IMPRESSION:  1.  No CT evidence for acute intracranial process.  2.  Brain atrophy and presumed chronic microvascular ischemic changes, significantly progressed since 2020.     Troponin T, High Sensitivity   Result Value Ref Range    Troponin T, High Sensitivity 23 (H) <=22 ng/L       For the majority of the shift this patient's behavior was Green     Cardiac Rhythm: Normal Sinus  Pt needs tele? No  Skin/wound Issues: None    Code Status: Full Code    Pain control: fair    Nausea control: pt had none    Abnormal labs/tests/findings  requiring intervention:     Patient tested for COVID 19 prior to admission: YES     OBS brochure/video discussed/provided to patient/family: Yes     Family present during ED course? Yes     Family Comments/Social Situation comments:     Tasks needing completion: None    Christi Campos RN

## 2025-02-11 NOTE — CONSULTS
"  [facility]    Stroke Telephone Note    I was called by Sera Nunez PA-C on 02/11/25 regarding patient Chris Delcid. The patient is a 59 year old male with history significant for prior strokes, DM2, HLD, HTN.  He is on Brilinta.  He was admitted 2/11 for generalized weakness and syncope.  MRI brain on admission revealed multifocal punctate infarcts.  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    Vitals  BP: (!) 144/90   Pulse: 85   Resp: 18   Temp: 98.2  F (36.8  C)   Weight: 112.8 kg (248 lb 10.9 oz)    Impression  1. Multifocal acute and subacute infarcts due to undetermined etiology  2. Concern for age-indeterminate right vertebral artery dissection.  This would not explain his multifocal infarcts.    Recommendations  - Use orderset: \"Ischemic Stroke Routine Admission\" or \"Ischemic Stroke No Thrombolytics/No Thrombectomy ICU Admission\"  - Place Neurology IP Stroke Consult order   - Neurochecks and Vital Signs every 4 hours   - Permissive HTN; goal SBP < 220 mmHg  - Statin: High intensity, goal LDL 40-70  - MRA Brain without contrast  - MRA Neck with and without contrast  - TTE (with Bubble Study if age 60 yrs or less)  - Telemetry, EKG  - Bedside Glucose Monitoring  - A1c, Lipid Panel, Troponin x 3  - PT/OT/SLP  - Stroke Education  - Euthermia, Euglycemia  - Continue Brilinta  - CT CAP to evaluate for occult malignancy    Case discussed with vascular neurology attending Dr. De La Cruz.    My recommendations are based on the information provided over the phone by Chris Delcid's in-person providers. They are not intended to replace the clinical judgment of his in-person providers. I was not requested to personally see or examine the patient at this time.     Miriam Bundy, CNP  Vascular Neurology    To page me or covering stroke neurology team member, click here: [facility]  Choose \"On Call\" tab at " "top, then select \"NEUROLOGY/ALL SITES\" from middle drop-down box, press Enter, then look for \"stroke\" or \"telestroke\" for your site.    "

## 2025-02-11 NOTE — ED TRIAGE NOTES
Slid to the ground when getting to the bathroom this morning. Increasing falls the last couple of days. Hypertensive. Been feeling like he's getting sick and passing out few times a day the last few days.

## 2025-02-11 NOTE — PROGRESS NOTES
Skin affirmation note    Admitting nurse completed full skin assessment, Rebel score and Rebel interventions. This writer agrees with the initial skin assessment findings.

## 2025-02-11 NOTE — ED PROVIDER NOTES
History     Chief Complaint   Patient presents with    Fall    Syncope     HPI  Chris Delcid is a 59 year old male with a history of prior stroke on Brilinta who presents for evaluation of frequent falls.  The patient's significant other reports that he has been more unsteady on his feet with his walker in the evenings over the past 3 to 4 days.  He has had a couple falls.  The first 1 happened in the middle of the night when he got up to use the bathroom and he seemed to have tripped over some stuff on the ground.  He does not think he hit his head and thinks he landed on the pillow but is not certain.  He has not had headaches since then.  However since then in the evenings he seems to be doing worse.  During the days his wife reports that he has been up and riding the riding snowblower on occasional even and has been able to get around with his walker.  However she notes that he has been more and more sedentary over the past 6 months.  No fevers.  He says he does have some heartburn right now and this happens intermittently.  No shortness of breath or cough.  No nausea, vomiting, or abdominal pain.  He is trouble with moving the right side of his body since his stroke and oftentimes will have some leg drop.    Then tonight his symptoms were much worse.  He was in the bathroom and was getting help to transfer from his walker to the toilet seat when he could have slid in between the walker and the toilet seat, he landed on the ground and then had a short loss of consciousness where he went backwards.  His wife reports that he was unresponsive for several minutes before he then started to come to and talk to her and answer questions.  He does not remember this.  He remembers being on the ground and he remembers getting wedged between the toilet and the walker.  He is feeling better now.  Just reports feeling tired.    Allergies:  Allergies   Allergen Reactions    Hydrochlorothiazide Other (See Comments)      Other reaction(s): Syncope    per note 08/28/2012    PN: per note 08/28/2012    PN: per note 08/28/2012   Other reaction(s): Syncope   per note 08/28/2012    Penicillins Hives and Angioedema    Cortisone Hives and Swelling    Aspirin Other (See Comments)     : Ringing in ears    PN: : Ringing in ears    PN: : Ringing in ears   : Ringing in ears    Atorvastatin Muscle Pain (Myalgia)     Other reaction(s): Myalgias    Fludrocortisone Other (See Comments)     Other reaction(s): Hypertension    head pounding at night    PN: head pounding, palpitations, tremor   Other reaction(s): Hypertension   head pounding at night    PN: head pounding, palpitations, tremor    No Clinical Screening - See Comments      No latex allergy-dcm    Rosuvastatin Muscle Pain (Myalgia)    Simvastatin Other (See Comments)     Other reaction(s): Other (See Comments)    drowsy    PN: drowsy    PN: drowsy   Other reaction(s): Other (See Comments)   drowsy       Problem List:    Patient Active Problem List    Diagnosis Date Noted    Generalized weakness 02/11/2025     Priority: Medium    Syncope, unspecified syncope type 02/11/2025     Priority: Medium    Class 2 severe obesity due to excess calories with serious comorbidity in adult (H) 12/27/2023     Priority: Medium    Hypertensive crisis 12/11/2023     Priority: Medium    Hypertensive urgency 12/10/2023     Priority: Medium    Gastroesophageal reflux disease with esophagitis 12/10/2023     Priority: Medium    Restless legs syndrome (RLS) 12/10/2023     Priority: Medium    ADITI on CPAP 02/08/2022     Priority: Medium    Bigeminy 11/05/2021     Priority: Medium    Nonischemic cardiomyopathy (H) 11/05/2021     Priority: Medium    HTN (hypertension) 11/05/2021     Priority: Medium    Neurogenic orthostatic hypotension (H) 11/05/2021     Priority: Medium    Hemiparesis affecting dominant side as late effect of stroke (H) 10/30/2020     Priority: Medium     Formatting of this note might be different  from the original. Left pontine CVA 10/22/20      Dysarthria as late effect of stroke 10/30/2020     Priority: Medium     Formatting of this note might be different from the original. Left pontine stroke October 22, 2020      History of cerebrovascular accident 10/26/2020     Priority: Medium     Formatting of this note might be different from the original. Left pontine 10/22/20 [facility]      Ataxia 10/22/2020     Priority: Medium    Dysarthria 10/22/2020     Priority: Medium    Stroke-like symptoms 10/22/2020     Priority: Medium    Left pontine cerebrovascular accident (H) 10/22/2020     Priority: Medium    Old pontine infarct without late effect 10/22/2020     Priority: Medium    Expressive aphasia 10/22/2020     Priority: Medium    Statin intolerance 02/13/2020     Priority: Medium    Diabetic gastroparesis (H) 07/06/2016     Priority: Medium     Formatting of this note might be different from the original. Clinical diagnosis.  see 7/6/16 progress note      Orthostatic hypotension 12/26/2014     Priority: Medium     Overview:   Secondary to diabetic autonomic dysfunction?  Started midodrine Dec, 2014  Secondary to diabetic autonomic dysfunction?  Started midodrine Dec, 2014      Type 2 diabetes mellitus (H) 12/26/2014     Priority: Medium     Type 2 diabetes, uncontrolled, with autonomic neuropathy (HCC)      Long term current use of insulin (H) 12/26/2014     Priority: Medium    Chronic low back pain 05/29/2014     Priority: Medium     Overview:   Follow at [facility] in [place].  Follow at [facility] in [place].      Constipation 05/29/2014     Priority: Medium    Hyperlipidemia 03/04/2011     Priority: Medium    Pure hypercholesterolemia 03/04/2011     Priority: Medium    Disorder of sacrum 06/15/2006     Priority: Medium    Lumbago 04/26/2006     Priority: Medium    Impotence of organic origin 02/24/2005     Priority: Medium    Essential hypertension 06/07/2003     Priority: Medium         Past Medical History:    Past Medical History:   Diagnosis Date    Chronic low back pain 2014    Essential hypertension 2003    Hyperlipidemia 3/4/2011    Orthostatic hypotension 2014    Statin intolerance 2020    Type 2 diabetes mellitus (H) 2014       Past Surgical History:    Past Surgical History:   Procedure Laterality Date    FRACTURE TX, WRIST RT/LT Left     Multiple surgeries, eventually needed fusion    HC SHOULDER ARTHROSCOPY, DX Right     INTRAVITREAL INJECTION OU (BOTH EYES) Bilateral     Multiple injections    RETINAL REATTACHMENT         Family History:    Family History   Problem Relation Age of Onset    Diabetes Mother          age 49 of pneumonia    Diabetes Father     Heart Failure Father     Coronary Artery Disease Father     Myocardial Infarction Father         Stents    Hypertension Father     No Known Problems Sister     No Known Problems Brother     Esophageal Cancer Brother        Social History:  Marital Status:   [4]  Social History     Tobacco Use    Smoking status: Never     Passive exposure: Past    Smokeless tobacco: Current     Types: Snuff   Vaping Use    Vaping status: Never Used   Substance Use Topics    Alcohol use: No    Drug use: No        Medications:    blood glucose monitoring (SOFTCLIX) lancets  Blood Glucose Monitoring Suppl (ACCU-CHEK GUIDE) w/Device KIT  BRILINTA 90 MG tablet  famotidine (PEPCID) 40 MG tablet  glipiZIDE (GLUCOTROL) 5 MG tablet  hydrALAZINE (APRESOLINE) 10 MG tablet  hypromellose (ARTIFICIAL TEARS) 0.5 % SOLN ophthalmic solution  [employer] BLOOD GLUCOSE TEST test strip  lisinopril (ZESTRIL) 5 MG tablet  lovastatin (MEVACOR) 40 MG tablet  metFORMIN (GLUCOPHAGE XR) 500 MG 24 hr tablet          Review of Systems    Physical Exam   BP: (!) 181/108  Pulse: 90  Temp: 98.3  F (36.8  C)  Resp: 18  SpO2: 97 %      Physical Exam  Constitutional:       General: He is not in acute distress.     Appearance: He is  well-developed.   HENT:      Head: Normocephalic and atraumatic.   Cardiovascular:      Rate and Rhythm: Normal rate.   Pulmonary:      Effort: No respiratory distress.      Breath sounds: No stridor.   Abdominal:      General: There is no distension.      Palpations: Abdomen is soft.   Musculoskeletal:      Comments: Ecchymosis located over the proximal left thigh.  Normal range of motion of the major joints and long bones of all 4 extremities.   Skin:     General: Skin is warm and dry.   Neurological:      Mental Status: He is alert.         ED Course        Procedures              EKG Interpretation:      Interpreted by Matthew Ha MD  Time reviewed: 0314  Symptoms at time of EKG: Syncope   Rhythm: sinus   Rate: 89  Axis: Normal  Ectopy: premature ventricular contractions (infrequent)  Conduction: normal  ST Segments/ T Waves: No acute ischemic changes  Q Waves: v1, v2, III, and aVf  Comparison to prior: Unchanged    Clinical Impression: no acute changes      Critical Care time:  none              Results for orders placed or performed during the hospital encounter of 02/11/25 (from the past 24 hours)   [place] [facility]    Narrative    The following orders were created for panel order [place] [facility].  Procedure                               Abnormality         Status                     ---------                               -----------         ------                     Extra Blue Top Tube[[id number]]                              Final result               Extra Green Top (Lithium...[[id number]]                      Final result               Extra Purple Top Tube[[id number]]                            Final result                 Please view results for these tests on the individual orders.   Basic metabolic panel   Result Value Ref Range    Sodium 141 135 - 145 mmol/L    Potassium 3.4 3.4 - 5.3 mmol/L    Chloride 102 98 - 107 mmol/L    Carbon Dioxide (CO2) 24 22 - 29 mmol/L    Anion Gap 15 7 - 15 mmol/L     Urea Nitrogen 13.6 8.0 - 23.0 mg/dL    Creatinine 0.98 0.67 - 1.17 mg/dL    GFR Estimate 88 >60 mL/min/1.73m2    Calcium 9.1 8.8 - 10.4 mg/dL    Glucose 322 (H) 70 - 99 mg/dL   CBC with Platelets & Differential    Narrative    The following orders were created for panel order CBC with Platelets & Differential.  Procedure                               Abnormality         Status                     ---------                               -----------         ------                     CBC with platelets and d...[[id number]]                      Final result                 Please view results for these tests on the individual orders.   Extra Blue Top Tube   Result Value Ref Range    Hold Specimen JIC    Extra Green Top (Lithium Heparin) Tube   Result Value Ref Range    Hold Specimen JIC    Extra Purple Top Tube   Result Value Ref Range    Hold Specimen     CBC with platelets and differential   Result Value Ref Range    WBC Count 6.7 4.0 - 11.0 10e3/uL    RBC Count 4.99 4.40 - 5.90 10e6/uL    Hemoglobin 14.7 13.3 - 17.7 g/dL    Hematocrit 42.1 40.0 - 53.0 %    MCV 84 78 - 100 fL    MCH 29.5 26.5 - 33.0 pg    MCHC 34.9 31.5 - 36.5 g/dL    RDW 13.1 10.0 - 15.0 %    Platelet Count 334 150 - 450 10e3/uL    % Neutrophils 70 %    % Lymphocytes 19 %    % Monocytes 8 %    % Eosinophils 3 %    % Basophils 1 %    % Immature Granulocytes 0 %    NRBCs per 100 WBC 0 <1 /100    Absolute Neutrophils 4.6 1.6 - 8.3 10e3/uL    Absolute Lymphocytes 1.2 0.8 - 5.3 10e3/uL    Absolute Monocytes 0.5 0.0 - 1.3 10e3/uL    Absolute Eosinophils 0.2 0.0 - 0.7 10e3/uL    Absolute Basophils 0.1 0.0 - 0.2 10e3/uL    Absolute Immature Granulocytes 0.0 <=0.4 10e3/uL    Absolute NRBCs 0.0 10e3/uL   Troponin T, High Sensitivity   Result Value Ref Range    Troponin T, High Sensitivity 28 (H) <=22 ng/L   EKG 12-lead, tracing only   Result Value Ref Range    Systolic Blood Pressure  mmHg    Diastolic Blood Pressure  mmHg    Ventricular Rate 89 BPM    Atrial  Rate 89 BPM    WI Interval 208 ms    QRS Duration 88 ms     ms    QTc 467 ms    P Axis 49 degrees    R AXIS -14 degrees    T Axis 27 degrees    Interpretation ECG       Sinus rhythm with occasional Premature ventricular complexes and Premature atrial complexes  Septal infarct , age undetermined  Inferior infarct , age undetermined  Abnormal ECG  No previous ECGs available     CT Head w/o Contrast    Narrative    EXAM: CT HEAD W/O CONTRAST  LOCATION: [facility]  DATE: 2/11/2025    INDICATION: Fall, unsteady gait  COMPARISON:  MRI brain 10/22/2020.  TECHNIQUE: Routine CT Head without IV contrast. Multiplanar reformats. Dose reduction techniques were used.    FINDINGS:  INTRACRANIAL CONTENTS: No intracranial hemorrhage, extraaxial collection, or mass effect.  No CT evidence of acute infarct. Severe presumed chronic small vessel ischemic changes.  Interval development of chronic lacunar infarcts in the deep gray nuclei,   new since 2020. Mild to moderate generalized volume loss. No hydrocephalus.     VISUALIZED ORBITS/SINUSES/MASTOIDS: No intraorbital abnormality. No significant paranasal sinus mucosal disease. No middle ear or mastoid effusion.    BONES/SOFT TISSUES: No acute abnormality.      Impression    IMPRESSION:  1.  No CT evidence for acute intracranial process.  2.  Brain atrophy and presumed chronic microvascular ischemic changes, significantly progressed since 2020.     Troponin T, High Sensitivity   Result Value Ref Range    Troponin T, High Sensitivity 23 (H) <=22 ng/L       Medications   alum & mag hydroxide-simethicone (MAALOX) suspension 30 mL (30 mLs Oral $Given 2/11/25 0416)   famotidine (PEPCID) tablet 40 mg (40 mg Oral $Given 2/11/25 0416)       Assessments & Plan (with Medical Decision Making)   59-year-old male with a history of prior stroke on Brilinta who presents for evaluation after what sounds like a syncopal episode at home, he has had several falls and has  been more weak.  His EKG shows sinus rhythm without signs of ischemia or dysrhythmia.  Electrolytes are within normal limits.  Hemoglobin is normal, no signs of anemia.  White blood cell count is normal.  Troponin is stable x 2 here, no signs of coronary artery disease as cause of symptoms.  CT of the head is obtained, images interpreted independently as well as radiology reviewed, no signs of frontal hemorrhage or mass.  The patient was able to get up at the bedside here with very significant difficulty and seemed to be dragging his foot.  Given the patient's worsening status at home with this worsening function in his leg I believe it warrants further MRI investigation.  Also with the patient's syncopal episode at home believe he warrants admission to the hospital for cardiac monitoring.  I have discussed the case with the on-call hospitalist.  We discussed ongoing management the patient need for telemetry, PT and OT, follow-up with the MRI.  They have agreed to accept the patient to their service.    I have reviewed the nursing notes.    I have reviewed the findings, diagnosis, plan and need for follow up with the patient.           Medical Decision Making  The patient's presentation was of high complexity (a chronic illness severe exacerbation, progression, or side effect of treatment).    The patient's evaluation involved:  ordering and/or review of 3+ test(s) in this encounter (see separate area of note for details)  independent interpretation of testing performed by another health professional (see separate area of note for details)    The patient's management necessitated high risk (a decision regarding hospitalization).        New Prescriptions    No medications on file       Final diagnoses:   Generalized weakness   Syncope, unspecified syncope type       2/11/2025   [facility] EMERGENCY DEPT       Matthew Ha MD  02/11/25 0777

## 2025-02-11 NOTE — H&P
[facility]    History and Physical  Hospital Medicine       Date of Admission:  2/11/2025  Date of Service: 2/11/2025     Assessment & Plan   Chris Delcid is a 59 year old male with past medical history of type 2 DM, dyslipidemia, essential hypertension, orthostatic hypotension, and previous left pontine CVA with hemiparesis and expressive aphasia now presents on 2/11/2025 with generalized weakness and syncope  Being admitted for observation.     Generalized weakness  Syncope, unspecified syncope type    The patient presents to the ED with generalized weakness over last 3-4 days and episode of syncope. He has a history of weakness following his history of previous strokes. However, he reports increased weakness over the last few days and his partner reports several mechanical falls. She has been helping him go to the bathroom during the night, which he previously did not require help with. Evening PTA (2/10), his right leg locked up, and as his partner helped him sit down, he slumped unconscious and was reported out for 5-10 seconds before spontaneously coming to.     It is unclear the etiology of his current weakness and syncopal episode. He has a history of orthostatic hypotension and significant fluctuations in BP that could be the cause of his acute exacerbation of symptoms. Infectious etiology could also be the cause of a recrudescence of his stroke symptoms. However, he endorses no recent URI symptoms and RSV, influenza A/B, and COVID were all negative. UA pending. EKG performed in ED showed no signs of any arrhythmias that could have caused his syncopal episodes, and he has no history of a-fib. There were occasional PVCs and PACs. He also denied palpitations or chest fluttering. Symptoms are inconsistent with ACS or pulmonary embolus as the patient has no chest pain or SOB. The most likely etiology for the patient's acute exacerbation of his generalized weakness and syncope is  his history of orthostatic hypotension and significant fluctuations in BP.   - MRI brain ordered   - Telemetry ordered   - Urinalysis with reflex to culture  - PT, OT, care management consult  - Bladder scan pre & post void to assess for urinary retention contributing to syncopal events    Addendum:   Acute lacunar infarcts  High-grade stenosis vs occlusion of right vertebral artery  MRI shows multiple small acute lacunar infarcts in left thalamus, posterior left centrum semiovale, right occipital and right occipitotemporal white matter, and right frontal white matter; concerning for embolic source. Absent distal right vertebral artery flow void suggests occlusion vs proximal high-grade stenosis with slow flow (new since previous MRI 12/2023). Scattered small foci of chronic hemosiderin deposition from chronic microhemorrhage in bilateral cerebral hemispheres.   Stroke neurology consulted, recommends CTA head & neck to further eval stenosis, lipid panel, hemoglobin A1c, continuous telemetry, and ECHO. They recommend continuing Brillinta, and recommend against adding aspirin at this time.   - ECHO  - Continue PTA brillinta  - Hemoglobin A1c pending  - Lipid panel pending  - CTA head & neck with contrast ordered  - Permissive HTN SBP goal <220   - Discussed MRI findings at bedside with patient & wife. All questions answered.     History of left pontine cerebrovascular accident  Hemiparesis affecting dominant side as late effect of stroke   Expressive aphasia   The patient was seen in the ED 10/2020 for extremity weakness, word finding difficulty, and changes in his speech. On the MRI, there was found to be a small acute left pontine infarct with no hemorrhage or mass effect. The patient was managed inpatient with aspirin 325 mg and plavix 300 mg. He has residual balance and speech deficits since this infarct. He has had two strokes since, once 11/2021 and another 8/2022. Been on Brilinta since stroke in 2022.   -  Continue PTA Brilinta 90 mg every day     Hypertensive urgency  Most recent BP in ED were 186/137 and 192/107 on retake. Meets criteria for hypertensive urgency. Patient denies hypertensive emergency symptoms including confusion, vision changes, headaches, tinnitus, chest pain, and palpitations.   - Ordered PTA PRN hydralazine 10-20 mg tablet q6hr   - Ordered PRN labetalol injection 10 mg q6hr   - Continue PTA antihypertensives    Essential hypertension  Orthostatic hypotension   Seen by Dr. Dumont through [facility] cardiology 6/2023. Hospitalized 12/2023 for hypertensive crisis and frequent falls. Last saw cardiology through [facility] 2/2024. Has history of significant fluctuations in BP causing hypertension and orthostatic hypotension. He had a tilt table test through [facility] that showed severe orthostatic hypotension. Has previously reported orthostatic symptoms in the mornings and high BP in afternoons when he checks at home.   Currently on lisinopril 5 mg BID. Also has hydralazine PRN for high blood pressures at home. According to visit 9/17/24, he does not regularly take hydralazine due to symptoms of low pressures. He also has a loop recorder in place. Baseline BP average for patient is 150-160/.  - Continue PTA lisinopril 5 mg BID     Elevated Troponin  Troponin 28 ?  23. Low suspicion for ACS. No further monitoring.     Type 2 diabetes mellitus   Hyperglycemia  Chronically well controlled (hemoglobin A1c 5.9% 9/17/24). Hyperglycemic since presentation (glucose 322 2/11). Managed with Metformin 1000 mg BID and glipizide 5 mg BID.   - Continue PTA metformin 1000 mg BID   - Continue PTA glipizide 5 mg BID  - Medium sliding scale correction insulin  - Moderate consistent carb diet    Gastroesophageal Reflux  Stable. Takes famotidine (Pepcid) 40 mg BID.   - Continue PTA famotidine 40 mg BID     ADITI, no longer on BIPAP  Stable. Diagnosed 2/2022 following witnessed apneic episodes and polysomnogram 12/2021.  Patient reports stopping BIPAP therapy for his ADITI due to abdominal bloating. He reports a repeat polysomnogram after stopping BIPAP that showed minimal ADITI.     Dyslipidemia   Stable. Last lipid panel was unremarkable 12/30/2023. Takes lovastatin 40 mg every day.  - Continue PTA lovastatin     Chronic low back pain  Secondary to prior injury years ago. Follows at [facility] in [place].      Clinically Significant Risk Factors Present on Admission                 # Drug Induced Platelet Defect: home medication list includes an antiplatelet medication   # Hypertension: Noted on problem list      Diet:  Diabetic diet  DVT Prophylaxis: Ambulate every shift with assistance  Hodges Catheter: Not present  Code Status:  DNR/DNI  Lines: PIV    Disposition Plan   Medically Ready for Discharge: Anticipated Tomorrow     The patient's care was discussed with the Attending Physician, Dr. Solmoon Gold, bedside RN, and the patient .    Patient was seen in a shared visit between GLORIA Moeller and Rosy Cerrato PA-C.  Note was authored by GLORIA Moeller, and edited by Rosy Cerrato PA-C.      I was present with the PA student who participated in the service and in the documentation of the note. I have verified the history and personally performed the physical exam and medical decision making. I agree with the assessment and plan of care as documented in the note.     Sera Nunez PA-C        Primary Care Physician   Clinic - [facility] [phone]    History is obtained from the patient and his partner, Meryl, who are reliable historians, handoff from ER provider, and review of old records via the EMR.    History of Present Illness   Chris Delcid is a 59 year old male with past medical history of type 2 DM, dyslipidemia, essential hypertension, orthostatic hypotension, and previous left pontine CVA with hemiparesis and expressive aphasia now presents on 2/11/2025 with generalized  weakness and syncope.    The patient has a history of generalized weakness following his strokes. A few weeks ago, he noticed his general weakness progressing as he started to have decreased difficulty getting out of the tub.     Now, over the past few days, especially at night, his ambulation has got worse. He used to be able to go to the bathroom on his own in the night with using a walker. A few nights ago, he urinated before making it to the bathroom and slipped resulting in a mechanical fall. He has had at least 2-3 falls that felt were mechanical. His partner says he seems more fatigued than usual and is having increasing difficulty with his movements. She has now been helping him to the bathroom int he night to prevent more falls.     He has also been struggling with adequate blood pressure management. After hypertensive crisis 12/2023, he was on 3 blood pressure medications following discharge. He then started having episodes of hypotension resulting in a change to his BP meds. He now only takes the lisinopril 5 mg BID and has PRN hydralazine. His partner says he doesn't use the hydralazine frequently because it still seems to cause some orthostatic hypotensive symptoms.     Evening PTA (2/10) the patient's right leg stopped working. The patient's partner was helping him to the bathroom when it grew stiff, and he couldn't move it. Patient's wife helped him sit down, and as she did, he ended up slumping over in the bathroom unconscious. He was out for 5-10 seconds and then came to spontaneously. Patient says he recalls the event and he didn't feel any more lightheaded than usual prior to the event. Denies palpitations and chest pain. He passed out and came to and had some confusion but denies any dizziness, nausea or vomiting. His partner notes that he had some bloody mucus coming from his nose after the incident, and then he dry heaved. He has been dry heaving in the mornings for a long time, but it has  become more days than not recently. No hematemesis. Denies cough or URI symptoms. No recent sick contacts that patient or partner are aware of.    The patient also endorses some increasing heartburn in recent days. He's been taking more palomo seltzer & pepcid. He has some chronic difficulty swallowing from his previous strokes, but otherwise denies any changes. He was on BIPAP previously for ADITI, but he says it made him bloated and he normally sleeps on his stomach. He stopped BIPAP and reports a second sleep study that showed only mild ADITI not requiring BIPAP management.      Review of Systems    ROS: 10 point ROS neg other than the symptoms noted above in the HPI.     Past Medical History    Past Medical History:   Diagnosis Date    Chronic low back pain 5/29/2014    Overview:  Follow at [facility] in [place]. Follow at [facility] in [place].    Essential hypertension 6/7/2003    Hyperlipidemia 3/4/2011    Orthostatic hypotension 12/26/2014    Overview:  Secondary to diabetic autonomic dysfunction?  Started midodrine Dec, 2014 Secondary to diabetic autonomic dysfunction?  Started midodrine Dec, 2014    Statin intolerance 2/13/2020    Type 2 diabetes mellitus (H) 12/26/2014    Type 2 diabetes, uncontrolled, with autonomic neuropathy (HCC)     Past Surgical History   Past Surgical History:   Procedure Laterality Date    FRACTURE TX, WRIST RT/LT Left 1997    Multiple surgeries, eventually needed fusion    HC SHOULDER ARTHROSCOPY, DX Right 1992    INTRAVITREAL INJECTION OU (BOTH EYES) Bilateral     Multiple injections    RETINAL REATTACHMENT        Prior to Admission Medications   Prior to Admission Medications   Prescriptions Last Dose Informant Patient Reported? Taking?   BRILINTA 90 MG tablet   No No   Sig: Take 1 tablet (90 mg) by mouth 2 times daily.   Blood Glucose Monitoring Suppl (ACCU-CHEK GUIDE) w/Device KIT  Self Yes No   Sig: TEST BLOOD SUGAR ONCE DAILY   [facility] test  strip  Self Yes No   Sig: Test 1 times per day.   blood glucose monitoring (SOFTCLIX) lancets  Self Yes No   Sig: TEST BLOOD SUGAR ONCE DAILY   famotidine (PEPCID) 40 MG tablet   No No   Sig: Take 1 tablet (40 mg) by mouth 2 times daily   glipiZIDE (GLUCOTROL) 5 MG tablet   No No   Sig: Take 1 tablet (5 mg) by mouth 2 times daily (before meals).   hydrALAZINE (APRESOLINE) 10 MG tablet   No No   Sig: Take 1-2 tablets (10-20 mg) by mouth every 6 hours as needed for high blood pressure (take 1 tab for SBP > 160 mmHg or 2 tabs for SBP > 200 mmHg)   hypromellose (ARTIFICIAL TEARS) 0.5 % SOLN ophthalmic solution  Self Yes No   Sig: Place 1 drop into both eyes 4 times daily as needed for dry eyes   lisinopril (ZESTRIL) 5 MG tablet   No No   Sig: Take 1 tablet (5 mg) by mouth 2 times daily.   lovastatin (MEVACOR) 40 MG tablet   No No   Sig: Take 1 tablet (40 mg) by mouth at bedtime   Patient not taking: Reported on 9/17/2024   metFORMIN (GLUCOPHAGE XR) 500 MG 24 hr tablet   No No   Sig: Take 2 tablets (1,000 mg) by mouth 2 times daily.      Facility-Administered Medications: None     Allergies   Allergies   Allergen Reactions    Hydrochlorothiazide Other (See Comments)     Other reaction(s): Syncope    per note 08/28/2012    PN: per note 08/28/2012    PN: per note 08/28/2012   Other reaction(s): Syncope   per note 08/28/2012    Penicillins Hives and Angioedema    Cortisone Hives and Swelling    Aspirin Other (See Comments)     : Ringing in ears    PN: : Ringing in ears    PN: : Ringing in ears   : Ringing in ears    Atorvastatin Muscle Pain (Myalgia)     Other reaction(s): Myalgias    Fludrocortisone Other (See Comments)     Other reaction(s): Hypertension    head pounding at night    PN: head pounding, palpitations, tremor   Other reaction(s): Hypertension   head pounding at night    PN: head pounding, palpitations, tremor    No Clinical Screening - See Comments      No latex allergy-dcm    Rosuvastatin Muscle Pain  (Myalgia)    Simvastatin Other (See Comments)     Other reaction(s): Other (See Comments)    drowsy    PN: drowsy    PN: drowsy   Other reaction(s): Other (See Comments)   drowsy     Family History    Family History   Problem Relation Age of Onset    Diabetes Mother          age 49 of pneumonia    Diabetes Father     Heart Failure Father     Coronary Artery Disease Father     Myocardial Infarction Father         Stents    Hypertension Father     No Known Problems Sister     No Known Problems Brother     Esophageal Cancer Brother      Social History   Social History     Socioeconomic History    Marital status:      Physical Exam   BP (!) 186/137   Pulse 90   Temp 98.3  F (36.8  C) (Oral)   Resp 11   SpO2 98%      Weight: 248 lbs 10.86 oz Body mass index is 34.81 kg/m .     Constitutional: Alert, cooperative, no apparent distress, appears nontoxic  Eyes: Eyes are clear. Difficulty tracking finger with EOMs.  HENT: Oropharynx is clear and moist. Tongue is midline with no deviation.  Cardiovascular: Regular rate and rhythm, normal S1 and S2, and no murmur noted. Good peripheral pulses in wrists bilaterally. No lower extremity edema.  Respiratory: Clear to auscultation bilaterally. Unlabored breathing on RA  GI: Soft, non-tender, normal bowel sounds.  Musculoskeletal: Normal muscle bulk and tone.  Skin: Warm and dry, no rashes.   Neurologic: Cranial nerves are grossly intact.  is symmetric. Strength is 4/5 bilaterally. Mild pronator drift on right side. Speech slightly gargled, no slurring.     Data   Data reviewed today:     I have personally reviewed the following data over the past 24 hrs:    6.7  \   14.7   / 334     141 102 13.6 /  322 (H)   3.4 24 0.98 \     Trop: 23 (H) BNP: N/A         Recent Results (from the past 24 hours)   CT Head w/o Contrast    Narrative    EXAM: CT HEAD W/O CONTRAST  LOCATION: [facility]  DATE: 2025    INDICATION: Fall, unsteady  gait  COMPARISON:  MRI brain 10/22/2020.  TECHNIQUE: Routine CT Head without IV contrast. Multiplanar reformats. Dose reduction techniques were used.    FINDINGS:  INTRACRANIAL CONTENTS: No intracranial hemorrhage, extraaxial collection, or mass effect.  No CT evidence of acute infarct. Severe presumed chronic small vessel ischemic changes.  Interval development of chronic lacunar infarcts in the deep gray nuclei,   new since 2020. Mild to moderate generalized volume loss. No hydrocephalus.     VISUALIZED ORBITS/SINUSES/MASTOIDS: No intraorbital abnormality. No significant paranasal sinus mucosal disease. No middle ear or mastoid effusion.    BONES/SOFT TISSUES: No acute abnormality.      Impression    IMPRESSION:  1.  No CT evidence for acute intracranial process.  2.  Brain atrophy and presumed chronic microvascular ischemic changes, significantly progressed since 2020.

## 2025-02-12 ENCOUNTER — APPOINTMENT (OUTPATIENT)
Dept: MRI IMAGING | Facility: CLINIC | Age: 60
DRG: 064 | End: 2025-02-12
Attending: NURSE PRACTITIONER
Payer: COMMERCIAL

## 2025-02-12 ENCOUNTER — APPOINTMENT (OUTPATIENT)
Dept: SPEECH THERAPY | Facility: CLINIC | Age: 60
DRG: 064 | End: 2025-02-12
Payer: COMMERCIAL

## 2025-02-12 ENCOUNTER — APPOINTMENT (OUTPATIENT)
Dept: OCCUPATIONAL THERAPY | Facility: CLINIC | Age: 60
DRG: 064 | End: 2025-02-12
Payer: COMMERCIAL

## 2025-02-12 ENCOUNTER — APPOINTMENT (OUTPATIENT)
Dept: PHYSICAL THERAPY | Facility: CLINIC | Age: 60
DRG: 064 | End: 2025-02-12
Payer: COMMERCIAL

## 2025-02-12 PROBLEM — Z86.73 PERSONAL HISTORY OF TRANSIENT CEREBRAL ISCHEMIA: Status: ACTIVE | Noted: 2025-02-12

## 2025-02-12 LAB
ALBUMIN SERPL BCG-MCNC: 3.9 G/DL (ref 3.5–5.2)
ALP SERPL-CCNC: 88 U/L (ref 40–150)
ALT SERPL W P-5'-P-CCNC: 19 U/L (ref 0–70)
ANION GAP SERPL CALCULATED.3IONS-SCNC: 14 MMOL/L (ref 7–15)
AST SERPL W P-5'-P-CCNC: 18 U/L (ref 0–45)
BILIRUB DIRECT SERPL-MCNC: <0.2 MG/DL (ref 0–0.3)
BILIRUB SERPL-MCNC: 0.6 MG/DL
BUN SERPL-MCNC: 13.9 MG/DL (ref 8–23)
CALCIUM SERPL-MCNC: 9.3 MG/DL (ref 8.8–10.4)
CHLORIDE SERPL-SCNC: 101 MMOL/L (ref 98–107)
CREAT SERPL-MCNC: 1.05 MG/DL (ref 0.67–1.17)
EGFRCR SERPLBLD CKD-EPI 2021: 81 ML/MIN/1.73M2
ERYTHROCYTE [DISTWIDTH] IN BLOOD BY AUTOMATED COUNT: 13.2 % (ref 10–15)
GLUCOSE BLDC GLUCOMTR-MCNC: 186 MG/DL (ref 70–99)
GLUCOSE BLDC GLUCOMTR-MCNC: 188 MG/DL (ref 70–99)
GLUCOSE BLDC GLUCOMTR-MCNC: 189 MG/DL (ref 70–99)
GLUCOSE BLDC GLUCOMTR-MCNC: 207 MG/DL (ref 70–99)
GLUCOSE SERPL-MCNC: 211 MG/DL (ref 70–99)
HCO3 SERPL-SCNC: 26 MMOL/L (ref 22–29)
HCT VFR BLD AUTO: 42.8 % (ref 40–53)
HGB BLD-MCNC: 14.6 G/DL (ref 13.3–17.7)
MCH RBC QN AUTO: 29.4 PG (ref 26.5–33)
MCHC RBC AUTO-ENTMCNC: 34.1 G/DL (ref 31.5–36.5)
MCV RBC AUTO: 86 FL (ref 78–100)
PLATELET # BLD AUTO: 352 10E3/UL (ref 150–450)
POTASSIUM SERPL-SCNC: 3.6 MMOL/L (ref 3.4–5.3)
PROT SERPL-MCNC: 6.9 G/DL (ref 6.4–8.3)
RBC # BLD AUTO: 4.97 10E6/UL (ref 4.4–5.9)
SODIUM SERPL-SCNC: 141 MMOL/L (ref 135–145)
WBC # BLD AUTO: 6.8 10E3/UL (ref 4–11)

## 2025-02-12 PROCEDURE — 255N000002 HC RX 255 OP 636: Performed by: NURSE PRACTITIONER

## 2025-02-12 PROCEDURE — 85730 THROMBOPLASTIN TIME PARTIAL: CPT | Performed by: NURSE PRACTITIONER

## 2025-02-12 PROCEDURE — 92610 EVALUATE SWALLOWING FUNCTION: CPT | Mod: GN | Performed by: SPEECH-LANGUAGE PATHOLOGIST

## 2025-02-12 PROCEDURE — 97535 SELF CARE MNGMENT TRAINING: CPT | Mod: GO

## 2025-02-12 PROCEDURE — 250N000013 HC RX MED GY IP 250 OP 250 PS 637: Performed by: STUDENT IN AN ORGANIZED HEALTH CARE EDUCATION/TRAINING PROGRAM

## 2025-02-12 PROCEDURE — G0378 HOSPITAL OBSERVATION PER HR: HCPCS

## 2025-02-12 PROCEDURE — 84450 TRANSFERASE (AST) (SGOT): CPT | Performed by: STUDENT IN AN ORGANIZED HEALTH CARE EDUCATION/TRAINING PROGRAM

## 2025-02-12 PROCEDURE — 85306 CLOT INHIBIT PROT S FREE: CPT | Performed by: NURSE PRACTITIONER

## 2025-02-12 PROCEDURE — 86146 BETA-2 GLYCOPROTEIN ANTIBODY: CPT | Performed by: NURSE PRACTITIONER

## 2025-02-12 PROCEDURE — 70549 MR ANGIOGRAPH NECK W/O&W/DYE: CPT

## 2025-02-12 PROCEDURE — 85027 COMPLETE CBC AUTOMATED: CPT

## 2025-02-12 PROCEDURE — 96375 TX/PRO/DX INJ NEW DRUG ADDON: CPT

## 2025-02-12 PROCEDURE — 120N000001 HC R&B MED SURG/OB

## 2025-02-12 PROCEDURE — 97162 PT EVAL MOD COMPLEX 30 MIN: CPT | Mod: GP

## 2025-02-12 PROCEDURE — 97530 THERAPEUTIC ACTIVITIES: CPT | Mod: GP

## 2025-02-12 PROCEDURE — A9585 GADOBUTROL INJECTION: HCPCS | Performed by: NURSE PRACTITIONER

## 2025-02-12 PROCEDURE — G0426 INPT/ED TELECONSULT50: HCPCS | Mod: G0 | Performed by: NURSE PRACTITIONER

## 2025-02-12 PROCEDURE — 70544 MR ANGIOGRAPHY HEAD W/O DYE: CPT

## 2025-02-12 PROCEDURE — 96374 THER/PROPH/DIAG INJ IV PUSH: CPT

## 2025-02-12 PROCEDURE — 85300 ANTITHROMBIN III ACTIVITY: CPT | Performed by: NURSE PRACTITIONER

## 2025-02-12 PROCEDURE — 86147 CARDIOLIPIN ANTIBODY EA IG: CPT | Performed by: NURSE PRACTITIONER

## 2025-02-12 PROCEDURE — 97165 OT EVAL LOW COMPLEX 30 MIN: CPT | Mod: GO

## 2025-02-12 PROCEDURE — 85303 CLOT INHIBIT PROT C ACTIVITY: CPT | Performed by: NURSE PRACTITIONER

## 2025-02-12 PROCEDURE — 99232 SBSQ HOSP IP/OBS MODERATE 35: CPT | Performed by: STUDENT IN AN ORGANIZED HEALTH CARE EDUCATION/TRAINING PROGRAM

## 2025-02-12 PROCEDURE — 36415 COLL VENOUS BLD VENIPUNCTURE: CPT

## 2025-02-12 PROCEDURE — 250N000011 HC RX IP 250 OP 636

## 2025-02-12 PROCEDURE — 250N000013 HC RX MED GY IP 250 OP 250 PS 637

## 2025-02-12 PROCEDURE — 250N000012 HC RX MED GY IP 250 OP 636 PS 637

## 2025-02-12 PROCEDURE — 36415 COLL VENOUS BLD VENIPUNCTURE: CPT | Performed by: NURSE PRACTITIONER

## 2025-02-12 PROCEDURE — 85390 FIBRINOLYSINS SCREEN I&R: CPT | Mod: 26 | Performed by: PATHOLOGY

## 2025-02-12 PROCEDURE — 82310 ASSAY OF CALCIUM: CPT

## 2025-02-12 PROCEDURE — 250N000013 HC RX MED GY IP 250 OP 250 PS 637: Performed by: NURSE PRACTITIONER

## 2025-02-12 PROCEDURE — 82962 GLUCOSE BLOOD TEST: CPT

## 2025-02-12 RX ORDER — LORAZEPAM 1 MG/1
1 TABLET ORAL
Status: COMPLETED | OUTPATIENT
Start: 2025-02-12 | End: 2025-02-12

## 2025-02-12 RX ORDER — ASPIRIN 81 MG/1
81 TABLET, CHEWABLE ORAL DAILY
Status: DISCONTINUED | OUTPATIENT
Start: 2025-02-12 | End: 2025-02-14 | Stop reason: HOSPADM

## 2025-02-12 RX ORDER — EZETIMIBE 10 MG/1
10 TABLET ORAL AT BEDTIME
Status: DISCONTINUED | OUTPATIENT
Start: 2025-02-12 | End: 2025-02-14 | Stop reason: HOSPADM

## 2025-02-12 RX ORDER — GADOBUTROL 604.72 MG/ML
10 INJECTION INTRAVENOUS ONCE
Status: COMPLETED | OUTPATIENT
Start: 2025-02-12 | End: 2025-02-12

## 2025-02-12 RX ORDER — GLIPIZIDE 5 MG/1
5 TABLET ORAL
Status: DISCONTINUED | OUTPATIENT
Start: 2025-02-13 | End: 2025-02-13

## 2025-02-12 RX ORDER — GLIPIZIDE 10 MG/1
10 TABLET ORAL
Status: DISCONTINUED | OUTPATIENT
Start: 2025-02-13 | End: 2025-02-13

## 2025-02-12 RX ORDER — LISINOPRIL 5 MG/1
5 TABLET ORAL 2 TIMES DAILY
Status: DISCONTINUED | OUTPATIENT
Start: 2025-02-12 | End: 2025-02-14 | Stop reason: HOSPADM

## 2025-02-12 RX ADMIN — TICAGRELOR 90 MG: 90 TABLET ORAL at 09:29

## 2025-02-12 RX ADMIN — METFORMIN HYDROCHLORIDE 1000 MG: 500 TABLET, EXTENDED RELEASE ORAL at 21:27

## 2025-02-12 RX ADMIN — METFORMIN HYDROCHLORIDE 1000 MG: 500 TABLET, EXTENDED RELEASE ORAL at 09:28

## 2025-02-12 RX ADMIN — ONDANSETRON 4 MG: 4 TABLET, ORALLY DISINTEGRATING ORAL at 01:00

## 2025-02-12 RX ADMIN — ASPIRIN 81 MG 81 MG: 81 TABLET ORAL at 17:57

## 2025-02-12 RX ADMIN — LISINOPRIL 5 MG: 5 TABLET ORAL at 18:54

## 2025-02-12 RX ADMIN — GLIPIZIDE 5 MG: 5 TABLET ORAL at 17:57

## 2025-02-12 RX ADMIN — FAMOTIDINE 40 MG: 20 TABLET, FILM COATED ORAL at 09:28

## 2025-02-12 RX ADMIN — INSULIN ASPART 1 UNITS: 100 INJECTION, SOLUTION INTRAVENOUS; SUBCUTANEOUS at 17:57

## 2025-02-12 RX ADMIN — ATORVASTATIN CALCIUM 10 MG: 10 TABLET, FILM COATED ORAL at 09:29

## 2025-02-12 RX ADMIN — INSULIN ASPART 1 UNITS: 100 INJECTION, SOLUTION INTRAVENOUS; SUBCUTANEOUS at 13:11

## 2025-02-12 RX ADMIN — LORAZEPAM 1 MG: 1 TABLET ORAL at 15:09

## 2025-02-12 RX ADMIN — ONDANSETRON 4 MG: 2 INJECTION, SOLUTION INTRAMUSCULAR; INTRAVENOUS at 06:39

## 2025-02-12 RX ADMIN — TICAGRELOR 90 MG: 90 TABLET ORAL at 21:28

## 2025-02-12 RX ADMIN — FAMOTIDINE 40 MG: 20 TABLET, FILM COATED ORAL at 21:28

## 2025-02-12 RX ADMIN — EZETIMIBE 10 MG: 10 TABLET ORAL at 21:28

## 2025-02-12 RX ADMIN — PROCHLORPERAZINE EDISYLATE 10 MG: 5 INJECTION INTRAMUSCULAR; INTRAVENOUS at 08:05

## 2025-02-12 RX ADMIN — GLIPIZIDE 5 MG: 5 TABLET ORAL at 06:28

## 2025-02-12 RX ADMIN — GADOBUTROL 10 ML: 604.72 INJECTION INTRAVENOUS at 15:58

## 2025-02-12 ASSESSMENT — ACTIVITIES OF DAILY LIVING (ADL)
ADLS_ACUITY_SCORE: 50
DRESSING/BATHING_DIFFICULTY: NO
DOING_ERRANDS_INDEPENDENTLY_DIFFICULTY: NO
FALL_HISTORY_WITHIN_LAST_SIX_MONTHS: YES
TOILETING_ISSUES: NO
WALKING_OR_CLIMBING_STAIRS_DIFFICULTY: YES
ADLS_ACUITY_SCORE: 50
ADLS_ACUITY_SCORE: 42
ADLS_ACUITY_SCORE: 41
ADLS_ACUITY_SCORE: 41
NUMBER_OF_TIMES_PATIENT_HAS_FALLEN_WITHIN_LAST_SIX_MONTHS: 4
ADLS_ACUITY_SCORE: 41
ADLS_ACUITY_SCORE: 42
DIFFICULTY_EATING/SWALLOWING: NO
ADLS_ACUITY_SCORE: 50
WEAR_GLASSES_OR_BLIND: NO
ADLS_ACUITY_SCORE: 56
ADLS_ACUITY_SCORE: 41
ADLS_ACUITY_SCORE: 41
ADLS_ACUITY_SCORE: 42
ADLS_ACUITY_SCORE: 50
ADLS_ACUITY_SCORE: 50
CHANGE_IN_FUNCTIONAL_STATUS_SINCE_ONSET_OF_CURRENT_ILLNESS/INJURY: YES
ADLS_ACUITY_SCORE: 50
WALKING_OR_CLIMBING_STAIRS: STAIR CLIMBING DIFFICULTY, ASSISTANCE 1 PERSON
CONCENTRATING,_REMEMBERING_OR_MAKING_DECISIONS_DIFFICULTY: NO
ADLS_ACUITY_SCORE: 50

## 2025-02-12 NOTE — PROGRESS NOTES
[facility] [place]    Patient admitted to room 311 at approximately 0930 via wheel chair from emergency room. Patient was accompanied by significant other.     Verbal SBAR report received from Christi CABEZAS prior to patient arrival.     Patient ambulated to bed with one assist. Patient alert and oriented X 3. The patient is not having any pain.  . Admission vital signs: Blood pressure (!) 143/104, pulse 82, temperature 98.2  F (36.8  C), temperature source Oral, resp. rate 16, weight 112.8 kg (248 lb 10.9 oz), SpO2 97%. Patient and significant other was oriented to plan of care, call light, bed controls, tv, telephone, bathroom, and visiting hours.     Risk Assessment    The following safety risks were identified during admission: fall and skin. Yellow risk band applied: YES.     Skin Initial Assessment    This writer admitted this patient and completed a full skin assessment and Rebel score in the Adult PCS flowsheet.   Photo documentation of skin problem and/or wound competed via [employer] application (located under Media):  N/A    Appropriate interventions initiated as needed.     Secondary skin check completed by Jillian CABEZAS.    Rebel Risk Assessment  Sensory Perception: 3-->slightly limited  Moisture: 3-->occasionally moist  Activity: 3-->walks occasionally  Mobility: 3-->slightly limited  Nutrition: 3-->adequate  Friction and Shear: 3-->no apparent problem  Rebel Score: 18  Moisture Interventions: Encourage regular toileting  Mattress: Standard gel/foam mattress (IsoFlex, Atmos Air, etc.)  Bed Frame: Standard width and length    Education    Patient has a [place] to Observation order: Yes  Observation education completed and documented: Yes      Shanta Mitchell RN

## 2025-02-12 NOTE — CONSULTS
"[facility]    Stroke Consult Note    Reason for Consult:  stroke    Chief Complaint: Fall and Syncope       HPI  Chris Delcid is a 59 year old male with history significant for DM2, HLD, prior strokes (see below), labile BP in the setting of HTN and autonomic neuropathy (primarily hypertensive but occasionally takes midodrine for hypotension), ADITI (not compliant with CPAP). He was admitted 25 for evaluation of falls and episodes of unresponsiveness. History is obtained from Pa and his partner, Meryl. He reports that for a couple of weeks, he has been \"tipping over\" and felt generally weak. Meryl notes that he has a right foot drop at baseline, and this has been more profound in the past few weeks. He has been falling forward due to the right leg \"sticking\". No head trauma. Around 1230 AM the day of arrival, Meryl walked him to the bathroom and his right leg \"stopped\". He sat down on his walker, \"went limp\" and slid off the walker. He was unresponsive for <10 seconds. When he woke, he was \"dazed\" appearing and confused. She denies concern for seizure-like activity.     For the past year, has been limiting his activity due to concern for hypotension and falling. He has been more fatigued and less active for the past year as well. In the past few weeks, Meryl noticed he has been struggling to use his new phone.  Memory seems intact but struggles with new tasks. He denies history of headaches/migraines. He denies personal history of clotting disorders. His mother had a stroke at 49 and was also diabetic. His father  in his 60s (doesn't think it was stroke/cardiac related). His maternal grandfather also had a stroke in his 40s.     Meryl thinks he has been missing some doses of medications. Brilinta was last filled in October for 90 days, but he has some left. She noticed this towards the end of January, and has been more closely monitoring his medications and he seems to " be compliant the last few weeks.    MRI brain demonstrated multiple acute lacunar infarcts in both cerebral hemispheres.  Today on exam, he reports being very fatigued as he has not been sleeping well.  He denies new focal neurologic deficits today.    Stroke History   10/2020 - L pontine stroke, not on aspirin PTA. Discharged on aspirin + Plavix x21 days followed by aspirin monotherapy.   11/12/2021 - L and R centrum semiovale infarcts. Chronic pontine infarcts (new from prior). Discharged on DAPT with Plavix monotherapy thereafter.  11/30/21 - Progression of R centrum semiovale infarct  8/24/2022 - Acute R hemipons, possible late subacute right centrum semiovale infarcts. Switched to Brilinta monotherapy    LINQ implanted 5/2022     Stroke Evaluation Summarized    MRI/Head CT 1.  Multiple small foci of FLAIR hyperintense diffusion restriction compatible with acute lacunar infarcts in the left thalamus, the posterior left centrum semiovale ovale, the right occipital and right occipitotemporal white matter, and the right frontal white matter. Scattered distribution involving multiple vascular territories suggests an embolic etiology.  2.  Absent distal right vertebral artery flow void suggests occlusion versus proximal high-grade stenosis with slow flow. This is new since previous.  3.  Severe burden chronic small vessel ischemic change with multiple chronic lacunar infarcts in the cuca, bilateral thalami, and inferior right basal ganglia. This is further accompanied by a moderate diffuse parenchymal volume loss.  4.  Scattered small foci of chronic hemosiderin deposition from chronic microhemorrhage in the bilateral cerebral hemispheres.   Intracranial Vasculature CTA head: Poor enhancement of the intracranial right vertebral artery.  Moderate to severe focal stenosis of the left P2 branch   MRA:   Cervical Vasculature Right vertebral artery poorly opacified at origin with scattered areas of mild reopacification of  the neck, concerning for age-indeterminate dissection  MRA:     Echocardiogram EF 65 to 70%, no wall motion abnormalities, moderate to severe concentric LVH, normal left atrium   EKG/Telemetry Sinus with occasional PVCs   Other Testing CT CAP:  1.  Cholelithiasis and distended gallbladder. In the setting of right upper quadrant pain or elevated bilirubin, consider gallbladder ultrasound to exclude cholecystitis.  2.  Scattered tiny pulmonary nodules, not well assessed due to motion artifact. Consider follow-up 12 month chest CT per Fleischner study guidelines.  3.  Moderate prostatic hypertrophy     LDL  2/11/2025: 127 mg/dL   A1C  2/11/2025: 8.6 %   Troponin 2/11/2025: 23 ng/L       Impression  1. Multifocal acute subcortical infarcts. He has had recurrent primarily subcortical infarcts and progression of microvascular disease over the past five years. This has occurred in the setting of uncontrolled vascular risk factors (HTN, HLD, DM, ADITI), however, will pursue further evaluation for atypical stroke etiologies given recurrent nature and family history.     2. Right vertebral artery occlusion, new from prior imaging. Concern for dissection vs rapidly progressing atherosclerotic disease.     Recommendations   Secondary Stroke Prevention  - Currently on Brilinta 90 mg BID. Will add aspirin 81 mg daily. Intolerance noted (ringing in ears), will monitor for side effects. Duration of DAPT TBD, awaiting MRA. Need to balance risk/benefit given chronic microhemorrhages  - , on lovastatin 40 mg daily PTA. Documented intolerance to atorvastatin, rosuvastatin, simvastatin. Recommend addition of Zetia now and outpatient referral to vascular medicine for consideration of PCSK9 inhibitor and further evaluation of hyperlipidemia (ordered).   - Tighter long term glucose control needed to achieve goal A1c <7.0  - Permissive HTN today, then slow titration to long term goal BP <130/80 with tighter control associated with  "decreased overall CV risk, if tolerated. Discussed the importance of home BP monitoring and keeping a log for PCP.  - PT/OT/SLP  - Stroke education. Discussed stroke warning signs (BE FAST) and need for emergent presentation if symptoms occur  - ADITI screen: Re-establish care with sleep medicine  - Consideration for outpatient genetic referral given family history of stroke in the young, can be further discussed at outpatient stroke followup  - Continue routine ILR interrogations    Diagnostic Evaluation  - MRA to further evaluate for dissection  - Outpatient NIVIA  - Hypercoag workup pending     Patient Follow-up    - in the next 1-2 week(s) with PCP  - in 4-6 weeks with any stroke [facility] ([phone])    Thank you for this consult. We will continue to follow.     Miriam Bundy, CNP  Vascular Neurology    To page me or covering stroke neurology team member, click here: [facility]  Choose \"On Call\" tab at top, then select \"NEUROLOGY/ALL SITES\" from middle drop-down box, press Enter, then look for \"stroke\" or \"telestroke\" for your site.  _____________________________________________________    Clinically Significant Risk Factors Present on Admission                 # Drug Induced Platelet Defect: home medication list includes an antiplatelet medication   # Hypertension: Noted on problem list          # DMII: A1C = 8.6 % (Ref range: <5.7 %) within past 6 months               Past Medical History    Past Medical History:   Diagnosis Date    Chronic low back pain 5/29/2014    Overview:  Follow at [facility] in [place]. Follow at [facility] in [place].    Essential hypertension 6/7/2003    Hyperlipidemia 3/4/2011    Orthostatic hypotension 12/26/2014    Overview:  Secondary to diabetic autonomic dysfunction?  Started midodrine Dec, 2014 Secondary to diabetic autonomic dysfunction?  Started midodrine Dec, 2014    Statin intolerance 2/13/2020    Type 2 diabetes mellitus (H) 12/26/2014    Type 2 diabetes, " uncontrolled, with autonomic neuropathy (HCC)     Medications   Home Meds  Prior to Admission medications    Medication Sig Start Date End Date Taking? Authorizing Provider   blood glucose monitoring (SOFTCLIX) lancets TEST BLOOD SUGAR ONCE DAILY 4/13/23  Yes Reported, Patient   Blood Glucose Monitoring Suppl (ACCU-CHEK GUIDE) w/Device KIT TEST BLOOD SUGAR ONCE DAILY 4/13/23  Yes Reported, Patient   BRILINTA 90 MG tablet Take 1 tablet (90 mg) by mouth 2 times daily. 2/7/25  Yes Kiel Ceballos MD   famotidine (PEPCID) 40 MG tablet Take 1 tablet (40 mg) by mouth 2 times daily 4/10/24  Yes Zainab Oneil DO   glipiZIDE (GLUCOTROL) 5 MG tablet Take 1 tablet (5 mg) by mouth 2 times daily (before meals). 9/17/24  Yes Kiel Ceballos MD   hydrALAZINE (APRESOLINE) 10 MG tablet Take 1-2 tablets (10-20 mg) by mouth every 6 hours as needed for high blood pressure (take 1 tab for SBP > 160 mmHg or 2 tabs for SBP > 200 mmHg) 1/8/24  Yes Zainab Oneil DO   hypromellose (ARTIFICIAL TEARS) 0.5 % SOLN ophthalmic solution Place 1 drop into both eyes 4 times daily as needed for dry eyes   Yes Reported, Patient   [employer] BLOOD GLUCOSE TEST test strip Test 1 times per day. 4/13/23  Yes Reported, Patient   lisinopril (ZESTRIL) 5 MG tablet Take 1 tablet (5 mg) by mouth 2 times daily. 9/17/24  Yes Kiel Ceballos MD   lovastatin (MEVACOR) 40 MG tablet Take 1 tablet (40 mg) by mouth at bedtime 4/10/24  Yes Zainab Oneil DO   metFORMIN (GLUCOPHAGE XR) 500 MG 24 hr tablet Take 2 tablets (1,000 mg) by mouth 2 times daily. 9/17/24  Yes Kiel Ceballos MD       Scheduled Meds  Current Facility-Administered Medications   Medication Dose Route Frequency Provider Last Rate Last Admin    atorvastatin (LIPITOR) tablet 10 mg  10 mg Oral Daily Sera Nunez PA-C   10 mg at 02/12/25 0929    famotidine (PEPCID) tablet 40 mg  40 mg Oral BID Sera Nunez PA-C   40 mg at 02/12/25  0928    glipiZIDE (GLUCOTROL) tablet 5 mg  5 mg Oral BID AC Sera Nunez PA-C   5 mg at 02/12/25 0628    insulin aspart (NovoLOG) injection (RAPID ACTING)  1-7 Units Subcutaneous TID AC Sera Nunez PA-C   1 Units at 02/12/25 1311    insulin aspart (NovoLOG) injection (RAPID ACTING)  1-5 Units Subcutaneous At Bedtime Sera Nunez PA-C        [Held by provider] lisinopril (ZESTRIL) tablet 5 mg  5 mg Oral BID Sera Nunez PA-C   5 mg at 02/11/25 0818    metFORMIN (GLUCOPHAGE XR) 24 hr tablet 1,000 mg  1,000 mg Oral BID Sera Nunez PA-C   1,000 mg at 02/12/25 0928    sodium chloride (PF) 0.9% PF flush 3 mL  3 mL Intracatheter Q8H Yg Gallardo MD   3 mL at 02/12/25 0638    ticagrelor (BRILINTA) tablet 90 mg  90 mg Oral BID Sera Nunez PA-C   90 mg at 02/12/25 0929       Infusion Meds  Current Facility-Administered Medications   Medication Dose Route Frequency Provider Last Rate Last Admin       Allergies   Allergies   Allergen Reactions    Hydrochlorothiazide Other (See Comments)     Syncope per note 08/28/2012        Penicillins Hives and Angioedema    Cortisone Hives and Swelling    Aspirin Other (See Comments)     Ringing in ears    Atorvastatin Muscle Pain (Myalgia)    No Clinical Screening - See Comments      No latex allergy-dcm    Rosuvastatin Muscle Pain (Myalgia)    Simvastatin Other (See Comments)     Drowsy          Fludrocortisone Palpitations and Other (See Comments)     Hypertension, head pounding at night, tremor             PHYSICAL EXAMINATION   Temp:  [97.5  F (36.4  C)-98  F (36.7  C)] 97.9  F (36.6  C)  Pulse:  [78-89] 89  Resp:  [16] 16  BP: (143-227)/() 227/110  SpO2:  [95 %-97 %] 95 %    Neuro Exam  Mental Status:  follows commands, naming and repetition normal, drowsy, moderate dysarthria, speech slow at times  Cranial Nerves:  visual fields intact (tested by nurse), EOMI with normal smooth pursuit, facial sensation intact and  symmetric (tested by nurse), facial movements symmetric, hearing not formally tested but intact to conversation, tongue protrusion midline  Motor:  no abnormal movements, mild drift in RUE, no drift in legs  Reflexes:  unable to test (telestroke)  Sensory:   impaired L sided sensation vs some L sensory neglect although findings not reproducible on every attempt  Coordination:   FTN without dysmetria, ataxia with LLE HTS  Station/Gait:  unable to test due to telestroke    Stroke Scales    NIHSS  1a. Level of Consciousness 0-->Alert, keenly responsive   1b. LOC Questions 0-->Answers both questions correctly   1c. LOC Commands 0-->Performs both tasks correctly   2.   Best Gaze 0-->Normal   3.   Visual 0-->No visual loss   4.   Facial Palsy 0-->Normal symmetrical movements   5a. Motor Arm, Left 0-->No drift, limb holds 90 (or 45) degrees for full 10 secs   5b. Motor Arm, Right 1-->Drift, limb holds 90 (or 45) degrees, but drifts down before full 10 secs, does not hit bed or other support   6a. Motor Leg, Left 0-->No drift, leg holds 30 degree position for full 5 secs   6b. Motor Leg, right 0-->No drift, leg holds 30 degree position for full 5 secs   7.   Limb Ataxia 1-->Present in one limb   8.   Sensory 0-->Normal, no sensory loss   9.   Best Language 1-->Mild-to-moderate aphasia, some obvious loss of fluency or facility of comprehension, without significant limitation on ideas expressed or form of expression. Reduction of speech and/or comprehension, however, makes conversation. . . (see row details)   10. Dysarthria 1-->Mild-to-moderate dysarthria, patient slurs at least some words and, at worst, can be understood with some difficulty   11. Extinction and Inattention  1-->Visual, tactile, auditory, spatial, or personal inattention or extinction to bilateral simultaneous stimulation in one of the sensory modalities   Total 5 (02/12/25 6850)       Imaging  I personally reviewed all imaging; relevant findings per  HPI.    Labs Data   CBC  Recent Labs   Lab 02/12/25  0640 02/11/25  0308   WBC 6.8 6.7   RBC 4.97 4.99   HGB 14.6 14.7   HCT 42.8 42.1    334     Basic Metabolic Panel   Recent Labs   Lab 02/12/25  1158 02/12/25  0640 02/12/25  0225 02/11/25  1354 02/11/25  0308   NA  --  141  --   --  141   POTASSIUM  --  3.6  --   --  3.4   CHLORIDE  --  101  --   --  102   CO2  --  26  --   --  24   BUN  --  13.9  --   --  13.6   CR  --  1.05  --   --  0.98   * 211* 207*   < > 322*   JAMIE  --  9.3  --   --  9.1    < > = values in this interval not displayed.     Liver Panel  Recent Labs   Lab 02/12/25  0640   PROTTOTAL 6.9   ALBUMIN 3.9   BILITOTAL 0.6   ALKPHOS 88   AST 18   ALT 19     INR    Recent Labs   Lab Test 10/22/20  1815   INR 0.98           Stroke Consult Data Data   Telestroke Service Details  (for non-emergent stroke consult with tele)  Video start time 02/12/25   0947   Video end time 02/12/25   1047   Type of service telemedicine diagnostic assessment of acute neurological changes   Reason telemedicine is appropriate patient requires assessment with a specialist for diagnosis and treatment of neurological symptoms   Mode of transmission secure interactive audio and video communication per Jam   Originating site (patient location) [facility]    Distant site (provider location) [facility]       I have personally spent a total of 90 minutes providing care today, time spent in reviewing medical records and devising the plan as recorded above.

## 2025-02-12 NOTE — PROGRESS NOTES
"PRIMARY DIAGNOSIS:  Generalized weakness with falls  OUTPATIENT/OBSERVATION GOALS TO BE MET BEFORE DISCHARGE:  ADLs back to baseline: No    Activity and level of assistance: Up with assist of 1, gait belt and walker.  Patient c/o on-going, constant dizziness.      Pain status: Patient c/o On-going lower back pain-patient declines interventions.      Return to near baseline physical activity: No         Barriers to discharge: Yes, Patient has the following assessments/consults:  PT, OT, Neurology IP Stroke and Social work/care management.         Entered by: Malia Salmon RN 02/12/2025 6:58 AM   Patient is alert and oriented x4 with occasional confusion, patient stated, \"My girlfriend was here visiting tonight.\"  No visitors this shift.  Patient is impulsive with getting up and out of bed, does not use call light.  Patient c/o nausea \"Worse in the morning.\"  ? If nausea r/t when patient has coughing spells-witnessed thick cream-yellow colored sputum.  PRN Zofran oral administered x1, patient states, \"Did not help.\"  Zofran IV this morning.  Patient used bedside commode for urinary output in small amounts.  Unable to get pre void bladder scan d/t urgency/impulsiveness.  Patient hypertensive this shift.  MD aware.    "

## 2025-02-12 NOTE — PROGRESS NOTES
02/12/25 1108   Appointment Info   Signing Clinician's Name / Credentials (PT) Je Holder, PT, DPT   Rehab Comments (PT) Patient left lying supine with HOB elevated, needs within reach, RN aware, spouse and  present.   Quick Adds   Quick Adds Certification   Living Environment   People in Home spouse   Current Living Arrangements house   Home Accessibility stairs to enter home   Number of Stairs, Main Entrance 4   Stair Railings, Main Entrance railings safe and in good condition   Living Environment Comments has a deep tub with shower chair available   Self-Care   Equipment Currently Used at Home shower chair;commode chair;walker, rolling  (4WW)   Fall history within last six months yes   Number of times patient has fallen within last six months 4  (within the last 4 days, otherwise no falls in last 6 months)   Activity/Exercise/Self-Care Comment Patient was indep with mobility with 4WW, indep with bADL and basic meal prep prior to functional decline preceding admit.   General Information   Onset of Illness/Injury or Date of Surgery 02/11/25   Referring Physician Sera Nunez PA-C   Pertinent History of Current Problem (include personal factors and/or comorbidities that impact the POC) MRI shows multiple small acute lacunar infarcts in left thalamus, posterior left centrum semiovale, right occipital and right occipitotemporal white matter, and right frontal white matter; concerning for embolic source; History of left pontine cerebrovascular accident, Hemiparesis affecting dominant side as late effect of stroke, Expressive aphasia, Hypertensive urgency, Orthostatic hypotension   Existing Precautions/Restrictions fall  (significant orthostatic hypotension)   Range of Motion (ROM)   Range of Motion ROM is WFL  (AROM deficits due to apraxia)   Strength (Manual Muscle Testing)   Strength (Manual Muscle Testing) Deficits observed during functional mobility   Strength Comments R knee ext in sitting <3/5,  L knee ext in sitting 3+/5, B hip flexion in sitting 3+/5, L ankle DF/EHL 4/5, R ankle DF initially trace but following PROM patient able to activate against gravity and resist movement 4/5 (apraxic)   Bed Mobility   Bed Mobility supine-sit;sit-supine   Supine-Sit [place] (Bed Mobility) minimum assist (75% patient effort);verbal cues;nonverbal cues (demo/gesture)   Sit-Supine [place] (Bed Mobility) minimum assist (75% patient effort)   Assistive Device (Bed Mobility) bed rails  (HOB elevated)   Transfers   Transfers sit-stand transfer   Sit-Stand Transfer   Sit-Stand [place] (Transfers) minimum assist (75% patient effort);2 person assist;verbal cues;nonverbal cues (demo/gesture)   Assistive Device (Sit-Stand Transfers) walker, front-wheeled  (elevated bed height)   Gait/Stairs (Locomotion)   Comment, (Gait/Stairs) not appropriate to assess gait/stairs due to symptomatic orthostatic hypotension   Coordination   Coordination Comments impaired BLE coordination, potentially due to apraxia   Clinical Impression   Criteria for Skilled Therapeutic Intervention Yes, treatment indicated   PT Diagnosis (PT) impaired functional mobility   Influenced by the following impairments impaired balance, impaired strength, impaired activity tolerance, impaired coordination, motor apraxia, symptomatic orthostatic hypotension   Functional limitations due to impairments impaired bed mobility, impaired transfers, impaired gait   Clinical Presentation (PT Evaluation Complexity) evolving   Clinical Presentation Rationale clinical judgement   Clinical Decision Making (Complexity) high complexity   Planned Therapy Interventions (PT) balance training;bed mobility training;gait training;home exercise program;neuromuscular re-education;patient/family education;stair training;strengthening;transfer training;progressive activity/exercise   Risk & Benefits of therapy have been explained evaluation/treatment results reviewed;care  plan/treatment goals reviewed;participants included;patient   PT Total Evaluation Time   PT Eval, Moderate Complexity Minutes (90055) 12   Therapy Certification   Start of care date 02/12/25   Certification date from 02/12/25   Certification date to 02/19/25   Medical Diagnosis multiple small acute lacunar infarcts in left thalamus, posterior left centrum semiovale, right occipital and right occipitotemporal white matter, and right frontal white matter; concerning for embolic source   Physical Therapy Goals   PT Frequency Daily   PT Predicted Duration/Target Date for Goal Attainment 02/19/25   PT Goals Bed Mobility;Transfers;Gait;Stairs   PT: Bed Mobility Modified independent;Supine to/from sit  (with bed rail)   PT: Transfers Supervision/stand-by assist;Sit to/from stand;Bed to/from chair;Assistive device   PT: Gait Supervision/stand-by assist;Rolling walker;50 feet  (4WW)   PT: Stairs Supervision/stand-by assist;4 stairs  (one rail)   Interventions   Interventions Quick Adds Therapeutic Activity   Therapeutic Activity   Therapeutic Activities: dynamic activities to improve functional performance Minutes (76277) 18   Symptoms Noted During/After Treatment Dizziness;Fatigue   Treatment Detail/Skilled Intervention Patient appearing tired/fatigued prior to PT/OT eval. Patient appears to present with dysarthria instead of expressive aphasia. Patient with difficulty remaining in standing due to dizziness and significant orthostatic hypotension (vitals taken on second transfer) - seated /100, standing (had to return to sit due to dizziness with BP finishing in sitting) 109/73. Patient with difficulty maintaining upright posture in standing, worse with worsening dizziness/orthostatic hypotension. Did not attempt to mobilize away from the EOB due to orthostatic hypotension and faitgue. Patient able to scoot in sitting to hospitals with CGA for safety, no LOB noted. Patient leaning heavily to the R once in bed, able to  follow directions to adjust positioning but returns to leaning right at times. Spouse notes at home he used to be leaning left. Discussed TCU vs ARC with patient and spouse, spouse is up for any continued therapies as she knows he is not able to function safely at home at this time.   PT Discharge Planning   PT Plan Daily (Wed) - coordination, balance, *monitor orthostatic hypotension*, progress mobility as able with FWW (uses 4WW at home), apraxic and dysarthric, activity tolerance   PT Discharge Recommendation (DC Rec) (S)  Acute Rehab Center-Motivated patient will benefit from intensive, interdisciplinary therapy.  Anticipate will be able to tolerate 3 hours of therapy per day;Transitional Care Facility   PT Rationale for DC Rec Patient is not safe to go home at this time due to symptomatic orthostatic hypotension and deficits in all extremities related to acute infarcts. Patient is currently limited by dizziness due to symptomatic orthostatic hypotension, which is limiting his ability to mobilize. Pending energy levels and ability to tolerate 3 hours of therapy per day, patient does appear to be a good candidate for ARC due to prior functional level of mod indep with 4WW and ability to be home alone all day, as well as deficits from new infarcts. Spouse is involved and knowledgable and in agreement that patient will require further therapies prior to return home. If ARC is unable to admit patient, recommend TCU.   PT Brief overview of current status Rose supine>sit HOB elevated, Rose sit>supine, assist for positioning in bed, sit<>stand minAx2 FWW, symptomatic orthostatic hypotension present (sitting 150/100, standing 109/73), apraxic and dysarthric   PT Total Distance Amb During Session (feet) 0   Physical Therapy Time and Intention   Timed Code Treatment Minutes 18   Total Session Time (sum of timed and untimed services) 30     [facility]                                                                                    OUTPATIENT PHYSICAL THERAPY    PLAN OF TREATMENT FOR OUTPATIENT REHABILITATION   Patient's Last Name, First Name, Chris Lewis YOB: 1965   Provider's Name   [facility]   Medical Record No.  [id number]     Onset Date: 02/11/25 Start of Care Date: 02/12/25     Medical Diagnosis:  multiple small acute lacunar infarcts in left thalamus, posterior left centrum semiovale, right occipital and right occipitotemporal white matter, and right frontal white matter; concerning for embolic source               PT Diagnosis:  impaired functional mobility Certification Dates:  From: 02/12/25  To: 02/19/25       See note for plan of treatment, functional goals, and certification details.    I CERTIFY THE NEED FOR THESE SERVICES FURNISHED UNDER        THIS PLAN OF TREATMENT AND WHILE UNDER MY CARE (Physician co-signature of this document indicates review and certification of the therapy plan).

## 2025-02-12 NOTE — PROGRESS NOTES
[facility]    Medicine Progress Note - Hospitalist Service    Date of Admission:  2025    Assessment & Plan   Chris Delcid is a 59 year old male with past medical history of type 2 DM, dyslipidemia, essential hypertension, orthostatic hypotension, and previous left pontine CVA with hemiparesis and expressive aphasia now presents on 2025 with generalized weakness and syncope. FTH multiple acute subcortical infarctions.     # Generalized weakness  # Syncope    The patient presents to the ED with generalized weakness over last 3-4 days and episode of syncope. He has a history of weakness following his history of previous strokes. However, he reports increased weakness over the last few days and his partner reports several mechanical falls. She has been helping him go to the bathroom during the night, which he previously did not require help with. Evening PTA (2/10), his right leg locked up, and as his partner helped him sit down, he slumped unconscious and was reported out for 5-10 seconds before spontaneously coming to.     It is unclear the etiology of his current weakness and syncopal episode. He has a history of orthostatic hypotension and significant fluctuations in BP that could be the cause of his acute exacerbation of symptoms. On MRI found to have multiple acute subcortical infarctions.   - PT, OT, care management consult, current recommendation if for acute rehabilitation center     # Multiple acute subcortical infarcts   # New vertebral artery occlusion   Patient has had recurrent infarcts and progression of his microvascular disease over the last 5 years. These have occurred in the setting of his HTN and diabetes but are unusual. Notable patients mother  of stroke at 49 as did his maternal grandfather in his 40's. Expanded work up for potential contributing causes started during this hospitalization under the guidance of stroke neurology.   - Stroke neurology  consulted and following, appreciate recommendations   - Continued Brillinta 90 mg Bid   - Started ASA 81, duration pending MRA  - Continued PTA lovastatin given patient intolerance to other statins, added Zetia today and plan for outpatient referral to vascular medicine for possible PCSK9 start   - Allowing permissive hypertension today with plan to slowly start anti-hypertensive to reach goal of 130 SBP outpatient   - Would consider genetic referral given family history to be discussed at neurology follow up   - Expanded hypercoagulability laboratory work up pending   - Plan for outpatient [facility]     # History of left pontine cerebrovascular accident  # Hemiparesis affecting dominant side as late effect of stroke   # Expressive aphasia   The patient was seen in the ED 10/2020 for extremity weakness, word finding difficulty, and changes in his speech. On the MRI, there was found to be a small acute left pontine infarct with no hemorrhage or mass effect. The patient was managed inpatient with aspirin 325 mg and plavix 300 mg. He has residual balance and speech deficits since this infarct. He has had two strokes since, once 11/2021 and another 8/2022. Been on Brilinta since stroke in 2022.   - Continue PTA Brilinta 90 mg every day     # Hypertensive urgency  Most recent BP in ED were 186/137 and 192/107 on retake. Meets criteria for hypertensive urgency. Patient denies hypertensive emergency symptoms including confusion, vision changes, headaches, tinnitus, chest pain, and palpitations.   - Continue PTA lisinopril, allowing for hypertension with recent stroke     # Essential hypertension  # Orthostatic hypotension   Seen by Dr. Dumont through [facility] cardiology 6/2023. Hospitalized 12/2023 for hypertensive crisis and frequent falls. Last saw cardiology through [facility] 2/2024. Has history of significant fluctuations in BP causing hypertension and orthostatic hypotension. He had a tilt table test through [facility] that  showed severe orthostatic hypotension. Has previously reported orthostatic symptoms in the mornings and high BP in afternoons when he checks at home.   Currently on lisinopril 5 mg BID. Also has hydralazine PRN for high blood pressures at home. According to visit 9/17/24, he does not regularly take hydralazine due to symptoms of low pressures. He also has a loop recorder in place. Baseline BP average for patient is 150-160/.  - Continue PTA lisinopril 5 mg BID     # Type 2 diabetes mellitus   # Hyperglycemia  Chronically well controlled (hemoglobin A1c 5.9% 9/17/24). Hyperglycemic since presentation (glucose 322 2/11). Managed with Metformin 1000 mg BID and glipizide 5 mg BID.   - Continue PTA metformin 1000 mg BID   - Increased PTA glipizide 10 mg am and 5 mg before supper  - Medium sliding scale correction insulin  - Moderate consistent carb diet    # Gastroesophageal Reflux  Stable. Takes famotidine (Pepcid) 40 mg BID.   - Continue PTA famotidine 40 mg BID     # ADITI, no longer on BIPAP  Stable. Diagnosed 2/2022 following witnessed apneic episodes and polysomnogram 12/2021. Patient reports stopping BIPAP therapy for his ADITI due to abdominal bloating. He reports a repeat polysomnogram after stopping BIPAP that showed minimal DAITI.     # Dyslipidemia   Stable. Last lipid panel was unremarkable 12/30/2023. Takes lovastatin 40 mg every day.  - Continue PTA lovastatin     # Chronic low back pain  Secondary to prior injury years ago. Follows at [facility] in [place].    # Elevated Troponin  Troponin 28 ?  23. Low suspicion for ACS. No further monitoring.         Diet: Combination Diet Moderate Consistent Carb (60 g CHO per Meal) Diet    DVT Prophylaxis: Ambulate every shift  Hodges Catheter: Not present  Lines: None     Cardiac Monitoring: ACTIVE order. Indication: Syncope- low cardiac risk (24 hours)  Code Status: No CPR- Do NOT Intubate      Clinically Significant Risk Factors Present on Admission                  # Drug Induced Platelet Defect: home medication list includes an antiplatelet medication   # Hypertension: Noted on problem list          # DMII: A1C = 8.6 % (Ref range: <5.7 %) within past 6 months               Social Drivers of Health    Tobacco Use: High Risk (9/17/2024)    Patient History     Smoking Tobacco Use: Never     Smokeless Tobacco Use: Current     Passive Exposure: Past   Transportation Needs: High Risk (2/11/2025)    Transportation Needs     Within the past 12 months, has lack of transportation kept you from medical appointments, getting your medicines, non-medical meetings or appointments, work, or from getting things that you need?: Yes    [facility]    Social Connections          Disposition Plan     Medically Ready for Discharge: Anticipated Tomorrow     Yg Gallardo MD  Hospitalist Service  [facility]  Securely message with [employer] (more info)  Text page via [employer] Paging/Directory   ______________________________________________________________________    Interval History   No acute events overnight. Did decline MRI scans overnight given anxiety with scans. Agreeable with PRN ativan. States that he feels ok this evening. Glad to be eating supper and remains interested in higher intensity rehabilitation outpatient if this is possible      Physical Exam   Vital Signs: Temp: 97.9  F (36.6  C) Temp src: Oral BP: (!) 227/110 Pulse: 89   Resp: 16 SpO2: 95 % O2 Device: None (Room air)    Weight: 248 lbs 10.86 oz    General Appearance: Awake and alert, laying back in bed, in no  acute distress   Respiratory: Breathing easily on room air   Cardiovascular: Regular rate and rhythm, extremities warm and well perfused   GI: Abdomen soft, non-tender, and non-distended   Other: Appropriate mood and affect      Medical Decision Making       45 MINUTES SPENT BY ME on the date of  service doing chart review, history, exam, documentation & further activities per the note.      Data     I have personally reviewed the following data over the past 24 hrs:    6.8  \   14.6   / 352     141 101 13.9 /  189 (H)   3.6 26 1.05 \     ALT: 19 AST: 18 AP: 88 TBILI: 0.6   ALB: 3.9 TOT PROTEIN: 6.9 LIPASE: N/A     Procal: N/A CRP: N/A Lactic Acid: N/A         Imaging results reviewed over the past 24 hrs:   Recent Results (from the past 24 hours)   CT Chest/Abdomen/Pelvis w Contrast    Narrative    EXAM: CT CHEST/ABDOMEN/PELVIS W CONTRAST  LOCATION: [facility]  DATE: 2/11/2025    INDICATION*: evaluate for occult malignancy; status post fall, syncope, heartburn, fatigue; stroke  COMPARISON: None.  TECHNIQUE: CT scan of the chest, abdomen, and pelvis was performed following injection of IV contrast. Multiplanar reformats were obtained.   CONTRAST: 70 mL Isovue 370  Limitations: Motion artifact, high KV/high-dose technique    FINDINGS:   LUNGS AND PLEURA: There are scattered 2 to 3 mm right-sided predominant pulmonary nodules, not well assessed due to motion artifact. No lobar consolidation, pleural effusions or pneumothorax..    MEDIASTINUM/AXILLAE: Global cardiomegaly. No significant mediastinal or hilar adenopathy. Mild calcification in the region of the aortic valve.    CORONARY ARTERY CALCIFICATION: Moderate.    HEPATOBILIARY: Cholelithiasis. Distended gallbladder. No wall thickening or adjacent inflammatory stranding. No ductal dilatation.    PANCREAS: Unremarkable    SPLEEN: Unremarkable    ADRENAL GLANDS: Unremarkable    KIDNEYS/BLADDER: Symmetric uptake and excretion of contrast. Normal bladder contour.    BOWEL: No bowel obstruction    LYMPH NODES: No significant retroperitoneal adenopathy.    VASCULATURE: No abdominal aortic aneurysm. Patent portal vein.    PELVIC ORGANS: Moderate prostatic hypertrophy.    MUSCULOSKELETAL: No suspicious lytic or blastic lesions. 75% L1  compression fracture, age indeterminate. Moderate bilateral gynecomastia.      Impression    IMPRESSION:  1.  Cholelithiasis and distended gallbladder. In the setting of right upper quadrant pain or elevated bilirubin, consider gallbladder ultrasound to exclude cholecystitis.  2.  Scattered tiny pulmonary nodules, not well assessed due to motion artifact. Consider follow-up 12 month chest CT per Fleischner study guidelines.  3.  Additional findings as above.

## 2025-02-12 NOTE — PROGRESS NOTES
Notified MD Ochoa at 11:08  PM regarding changes in vital signs. /116, no orders for BP control.           Orders  No new orders with CVA patient will treat if SBP>220, continue to assess.   .

## 2025-02-12 NOTE — PROGRESS NOTES
[facility]                                                                                   OUTPATIENT OCCUPATIONAL THERAPY    PLAN OF TREATMENT FOR OUTPATIENT REHABILITATION   Patient's Last Name, First Name, Chris Lewis YOB: 1965   Provider's Name   [facility]   Medical Record No.  [id number]     Onset Date: 02/11/25 Start of Care Date: 02/12/25     Medical Diagnosis:  weakness               OT Diagnosis:  weakness Certification Dates:  From: 02/12/25  To: 02/19/25     See note for plan of treatment, functional goals, and certification details.    I CERTIFY THE NEED FOR THESE SERVICES FURNISHED UNDER        THIS PLAN OF TREATMENT AND WHILE UNDER MY CARE (Physician co-signature of this document indicates review and certification of the therapy plan).                              02/12/25 1100   Appointment Info   Signing Clinician's Name / Credentials (OT) Karlie Brunner OTR/L   Quick Adds   Quick Adds Certification   Living Environment   People in Home spouse   Current Living Arrangements house   Home Accessibility stairs to enter home   Number of Stairs, Main Entrance 4   Stair Railings, Main Entrance railings safe and in good condition   Transportation Anticipated family or friend will provide   Living Environment Comments has a deep tub   Self-Care   Equipment Currently Used at Home shower chair;commode chair;walker, rolling   Fall history within last six months yes   Number of times patient has fallen within last six months 4   Activity/Exercise/Self-Care Comment Pt was ind for dressing and transfers with 4WW   Instrumental Activities of Daily Living (IADL)   IADL Comments Pt was able to warm up food in microwave   General Information   Onset of Illness/Injury or Date of Surgery 02/11/25   Referring Physician Sera Perkins PA-C   Patient/Family Therapy Goal Statement (OT) would like to get steadier on feet   Additional  Occupational Profile Info/Pertinent History of Current Problem per chart review of neuro :The patient is a 59 year old male with history significant for prior strokes, DM2, HLD, HTN.  He is on Brilinta.  He was admitted 2/11 for generalized weakness and syncope.  MRI brain on admission revealed multifocal punctate infarcts.  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   Existing Precautions/Restrictions fall   General Observations and Info Pt states very fatigued today, eyes red often shutting them   Cognitive Status Examination   Cognitive Status Comments difficult to assess with wife answering most questions with given his permission due to extreme fatigue of falls and CVAs and lack of sleep. Will continue to assess, dysarthria noted more then expressive aphasia   Range of Motion Comprehensive   Comment, General Range of Motion appears limited R sh vs L of approx 1/2 AROM sh flex   Coordination   Upper Extremity Coordination Left UE impaired;Right UE impaired   Gross Motor Coordination B UE appear min impaired   Bed Mobility   Bed Mobility supine-sit   Supine-Sit [place] (Bed Mobility) minimum assist (75% patient effort)   Transfers   Transfers sit-stand transfer   Sit-Stand Transfer   Sit-Stand [place] (Transfers) minimum assist (75% patient effort);2 person assist   Sit/Stand Transfer Comments CGA -min A x2 to stand with pt becoming dizzy needing to sit   Activities of Daily Living   Additional Documentation   (Pt requires assist with all BADLS at this time)   Clinical Impression   Criteria for Skilled Therapeutic Interventions Met (OT) Yes, treatment indicated   OT Diagnosis weakness   Influenced by the following impairments orthostatic Hypo tension   OT Problem List-Impairments impacting ADL problems related to;activity tolerance impaired;balance;cognition;mobility;strength   Assessment of Occupational Performance 1-3  Performance Deficits   Identified Performance Deficits dressing, transfers, mobility   Planned Therapy Interventions (OT) ADL retraining;balance training;neuromuscular re-education;ROM;strengthening;transfer training;progressive activity/exercise   Clinical Decision Making Complexity (OT) problem focused assessment/low complexity   Risk & Benefits of therapy have been explained evaluation/treatment results reviewed;care plan/treatment goals reviewed;risks/benefits reviewed;current/potential barriers reviewed;participants voiced agreement with care plan;participants included;patient;spouse/significant other   Clinical Impression Comments Pt appears to be a good OT skilled care canidate for increased ind and safey with BADLs, transfers and mobility while monitoring B/P and for neuro re ed   OT Total Evaluation Time   OT Eval, Low Complexity Minutes (73468) 20   Therapy Certification   Medical Diagnosis weakness   Start of Care Date 02/12/25   Certification date from 02/12/25   Certification date to 02/19/25   OT Goals   Therapy Frequency (OT) 6 times/week   OT Predicted Duration/Target Date for Goal Attainment 02/19/25   OT Goals Hygiene/Grooming;Toilet Transfer/Toileting;Cognition;OT Goal 1   OT: Hygiene/Grooming supervision/stand-by assist   OT: Toilet Transfer/Toileting Minimal assist;toilet transfer;using adaptive equipment   OT: Cognitive Patient/caregiver will verbalize understanding of cognitive assessment results/recommendations as needed for safe discharge planning   OT: Goal 1 Pt will require Ax 1 sit to stand   Interventions   Interventions Quick Adds Self-Care/Home Management;Therapeutic Activity;Therapeutic Procedures/Exercise   Self-Care/Home Management   Self-Care/Home Mgmt/ADL, Compensatory, Meal Prep Minutes (81768) 10   Symptoms Noted During/After Treatment (Meal Preparation/Planning Training) fatigue   Treatment Detail/Skilled Intervention Pt stating extremely fatigued today after falls, CVA and  lack of sleep and per wife in room. Pt seen with PT for co tx due to level of assist and for safety with mult CVAs  and recent scattered CVAs. Pt required CGA to min A x2 to stand only [place] approx 2-3 min x2 needing to sit due to dizziness. B/P 150/100 seated and 109/73 after standing again in sitting with B/P cuff lagging taking reading approx 3 min. Nursing advised to take manually for accuracy.   OT Discharge Planning   OT Plan POC 1/6 g/h seated EOB, coord ex for BUEs, sit to stand, commode transfer with co tx   OT Discharge Recommendation (DC Rec) Acute Rehab Center-Motivated patient will benefit from intensive, interdisciplinary therapy.  Anticipate will be able to tolerate 3 hours of therapy per day;Transitional Care Facility   OT Rationale for DC Rec Recommend [facility] at this time if pt is not so fatigued for increased ind and safety with transfers and mobility and fall prevention as being below baseline for BADLS and IADLs   OT Brief overview of current status CGA- Min A x2 to stand EOB   Total Session Time   Timed Code Treatment Minutes 10   Total Session Time (sum of timed and untimed services) 30

## 2025-02-12 NOTE — PROGRESS NOTES
Impression: Pt is upright in bed for evaluation with wife at bedside. Pt is significantly fatigued. Wife reports no change to swallow, speech, or language. She reports concern about cognitive changes. OT will address. Oral mech reveals reduced ROM for lingual and labial structures as well as impaired voicing. He also has a significant number of missing teeth. Pt reports they avoid certain food times (dry and crumbly foods) but that he is able to manage to masticate most solids. PO trials provided of thin by cup and straw, pudding, and cracker. No overt s/s of aspiration noted on any texture. Pt demonstrates reduced language skills and dysarthria, which Wife reports is at baseline from last stroke. Recommend regular diet and thin liquids. No further ST need identified at this time as Wife reports Pt is at baseline.     Bedside Swallow Evaluation  02/12/25   Appointment Info   Signing Clinician's Name / Credentials (SLP) Rita Buckley MA, CCC/SLP   General Information   Onset of Illness/Injury or Date of Surgery 02/11/25   Referring Physician Miriam Bundy, CNP   Patient/Family Therapy Goal Statement (SLP) did not state   Pertinent History of Current Problem Chris Delcid is a 59 year old male with past medical history of type 2 DM, dyslipidemia, essential hypertension, orthostatic hypotension, and previous left pontine CVA with hemiparesis and expressive aphasia now presents on 2/11/2025 with generalized weakness and syncope  Being admitted for observation.   General Observations Pt is upright in bed for evaluation. Wife is present. He reports is very tired. Wife comments that language, speech, and swallow are baseline with residual deficits from prior stroke. Wife reports that she has concerns that his cognition seems to be worse with this stroke. OT plans to address cognition.   Type of Evaluation   Type of Evaluation Swallow Evaluation   Oral Motor   Oral Musculature generally intact   Mucosal Quality  adequate   Dentition (Oral Motor)   Dentition (Oral Motor) other (see comments);significant number of missing teeth  (Wife reports he avoids certain foods but generally is able to chew a regular diet and thin liquids)   Facial Symmetry (Oral Motor)   Facial Symmetry (Oral Motor) WNL   Lip Function (Oral Motor)   Lip Range of Motion (Oral Motor) protrusion impairment;retraction impairment   Protrusion, Lip Range of Motion moderate impairment   Retraction, Lip Range of Motion moderate impairment   Tongue Function (Oral Motor)   Tongue ROM (Oral Motor) protrusion is impaired   Protrusion, Tongue ROM Impairment (Oral Motor) severe impairment   Jaw Function (Oral Motor)   Jaw Function (Oral Motor) range of motion impairment   Jaw Range of Motion Impairment minimal impairment   Cough/Swallow/Gag Reflex (Oral Motor)   Soft Palate/Velum (Oral Motor) WNL   Vocal Quality/Secretion Management (Oral Motor)   Vocal Quality (Oral Motor) harsh;monopitch;other (see comments)  (wife reports is at baseline from prior stroke. Volume impaired.)   Secretion Management (Oral Motor) WNL   General Swallowing Observations   Past History of Dysphagia Wife reports pt had dysphagia after prior stroke and completed a course of therapy. Pt graduated to a regular diet and thin liquids. Wife reports pt avoids foods like rice, crackers, or toast (foods that are dry and crumbly). Wife reports the pt will sometimes have globus sensation. No concern for worsening swallow function reported.   Respiratory Support room air   Current Diet/Method of Nutritional Intake (General Swallowing Observations, NIS) thin liquids (level 0);regular diet   Swallowing Evaluation Clinical swallow evaluation   Clinical Swallow Evaluation   Feeding Assistance no assistance needed   Clinical Swallow Evaluation Textures Trialed thin liquids;pureed;solid foods   Clinical Swallow Eval: Thin Liquid Texture Trial   Mode of Presentation, Thin Liquids cup;straw;self-fed   Volume  of Liquid or Food Presented 3 sips   Oral Phase of Swallow WFL   Pharyngeal Phase of Swallow intact   Diagnostic Statement no overt s/s of aspiration   Clinical Swallow Evaluation: Puree Solid Texture Trial   Mode of Presentation, Puree spoon;self-fed   Volume of Puree Presented 2 tablespoons   Oral Phase, Puree WFL   Pharyngeal Phase, Puree intact   Diagnostic Statement no overt s/s of aspiration noted   Clinical Swallow Evaluation: Solid Food Texture Trial   Mode of Presentation self-fed   Volume Presented 1 bite bibiana cracker   Oral Phase delayed AP movement   Pharyngeal Phase intact   Diagnostic Statement no overt s/s of aspiration, no globus sensation reported, no oral residue   Esophageal Phase of Swallow   Patient reports or presents with symptoms of esophageal dysphagia No   Swallowing Recommendations   Diet Consistency Recommendations thin liquids (level 0);regular diet   Supervision Level for Intake distant supervision needed   Mode of Delivery Recommendations bolus size, small;slow rate of intake   Monitoring/Assistance Required (Eating/Swallowing) monitor for cough or change in vocal quality with intake;stop eating activities when fatigue is present   Recommended Feeding/Eating Techniques (Swallow Eval) maintain upright sitting position for eating;maintain upright posture during/after eating for 30 minutes;minimize distractions during oral intake   Medication Administration Recommendations, Swallowing (SLP) as tolerated   Instrumental Assessment Recommendations instrumental evaluation not recommended at this time   Comment, Swallowing Recommendations Pt is upright in bed for evaluation with wife at bedside. Pt is significantly fatigued. Wife reports no change to swallow, speech, or language. She reports concern about cognitive changes. OT will address. Oral mech reveals reduced ROM for lingual and labial structures as well as impaired voicing. He also has a significant number of missing teeth. Pt  reports they avoid certain food times (dry and crumbly foods) but that he is able to manage to masticate most solids. PO trials provided of thin by cup and straw, pudding, and cracker. No overt s/s of aspiration noted on any texture. Pt demonstrates reduced language skills and dysarthria, which Wife reports is at baseline from last stroke. Recommend regular diet and thin liquids. No further ST need identified at this time as Wife reports Pt is at baseline.   Clinical Impression   Criteria for Skilled Therapeutic Interventions Met (SLP Eval) Evaluation only;Current level of function same as previous level of function   SLP Diagnosis dysarthria   Activity Limitations Related to Problem List (SLP) communicate effectively   Risks & Benefits of therapy have been explained evaluation/treatment results reviewed;care plan/treatment goals reviewed;risks/benefits reviewed;current/potential barriers reviewed;participants voiced agreement with care plan;participants included;patient;spouse/significant other   SLP Total Evaluation Time   Eval: oral/pharyngeal swallow function, clinical swallow Minutes (17457) 13   SLP Time and Intention   Total Session Time (sum of timed and untimed services) 13

## 2025-02-13 ENCOUNTER — TELEPHONE (OUTPATIENT)
Dept: VASCULAR SURGERY | Facility: CLINIC | Age: 60
End: 2025-02-13
Payer: COMMERCIAL

## 2025-02-13 ENCOUNTER — APPOINTMENT (OUTPATIENT)
Dept: PHYSICAL THERAPY | Facility: CLINIC | Age: 60
DRG: 064 | End: 2025-02-13
Payer: COMMERCIAL

## 2025-02-13 VITALS
BODY MASS INDEX: 34.81 KG/M2 | WEIGHT: 248.68 LBS | OXYGEN SATURATION: 98 % | HEART RATE: 80 BPM | DIASTOLIC BLOOD PRESSURE: 100 MMHG | TEMPERATURE: 98 F | RESPIRATION RATE: 16 BRPM | SYSTOLIC BLOOD PRESSURE: 172 MMHG

## 2025-02-13 LAB
ANION GAP SERPL CALCULATED.3IONS-SCNC: 16 MMOL/L (ref 7–15)
AT III ACT/NOR PPP CHRO: 102 % (ref 85–135)
B2 GLYCOPROT1 IGG SERPL IA-ACNC: 3.3 U/ML
B2 GLYCOPROT1 IGM SERPL IA-ACNC: <2.4 U/ML
BUN SERPL-MCNC: 16.1 MG/DL (ref 8–23)
CALCIUM SERPL-MCNC: 9.3 MG/DL (ref 8.8–10.4)
CARDIOLIPIN IGG SER IA-ACNC: <2 GPL-U/ML
CARDIOLIPIN IGG SER IA-ACNC: NEGATIVE
CARDIOLIPIN IGM SER IA-ACNC: <2 MPL-U/ML
CARDIOLIPIN IGM SER IA-ACNC: NEGATIVE
CHLORIDE SERPL-SCNC: 102 MMOL/L (ref 98–107)
CREAT SERPL-MCNC: 1 MG/DL (ref 0.67–1.17)
DRVVT SCREEN RATIO: 0.83
EGFRCR SERPLBLD CKD-EPI 2021: 86 ML/MIN/1.73M2
ERYTHROCYTE [DISTWIDTH] IN BLOOD BY AUTOMATED COUNT: 13.2 % (ref 10–15)
GLUCOSE BLDC GLUCOMTR-MCNC: 161 MG/DL (ref 70–99)
GLUCOSE BLDC GLUCOMTR-MCNC: 182 MG/DL (ref 70–99)
GLUCOSE BLDC GLUCOMTR-MCNC: 197 MG/DL (ref 70–99)
GLUCOSE BLDC GLUCOMTR-MCNC: 220 MG/DL (ref 70–99)
GLUCOSE SERPL-MCNC: 192 MG/DL (ref 70–99)
HCO3 SERPL-SCNC: 22 MMOL/L (ref 22–29)
HCT VFR BLD AUTO: 44.2 % (ref 40–53)
HGB BLD-MCNC: 15.2 G/DL (ref 13.3–17.7)
INR PPP: 1.04 (ref 0.85–1.15)
LA PPP-IMP: NEGATIVE
LOCATION OF TASK: NORMAL
LUPUS INTERPRETATION: NORMAL
MCH RBC QN AUTO: 29.2 PG (ref 26.5–33)
MCHC RBC AUTO-ENTMCNC: 34.4 G/DL (ref 31.5–36.5)
MCV RBC AUTO: 85 FL (ref 78–100)
PLATELET # BLD AUTO: 350 10E3/UL (ref 150–450)
POTASSIUM SERPL-SCNC: 3.5 MMOL/L (ref 3.4–5.3)
PTT RATIO: 1
RBC # BLD AUTO: 5.21 10E6/UL (ref 4.4–5.9)
SODIUM SERPL-SCNC: 140 MMOL/L (ref 135–145)
THROMBIN TIME: 18.4 SECONDS (ref 13–19)
WBC # BLD AUTO: 7.7 10E3/UL (ref 4–11)

## 2025-02-13 PROCEDURE — 82565 ASSAY OF CREATININE: CPT | Performed by: STUDENT IN AN ORGANIZED HEALTH CARE EDUCATION/TRAINING PROGRAM

## 2025-02-13 PROCEDURE — 250N000013 HC RX MED GY IP 250 OP 250 PS 637

## 2025-02-13 PROCEDURE — 250N000013 HC RX MED GY IP 250 OP 250 PS 637: Performed by: STUDENT IN AN ORGANIZED HEALTH CARE EDUCATION/TRAINING PROGRAM

## 2025-02-13 PROCEDURE — 85041 AUTOMATED RBC COUNT: CPT | Performed by: STUDENT IN AN ORGANIZED HEALTH CARE EDUCATION/TRAINING PROGRAM

## 2025-02-13 PROCEDURE — 36415 COLL VENOUS BLD VENIPUNCTURE: CPT | Performed by: STUDENT IN AN ORGANIZED HEALTH CARE EDUCATION/TRAINING PROGRAM

## 2025-02-13 PROCEDURE — 97530 THERAPEUTIC ACTIVITIES: CPT | Mod: GP

## 2025-02-13 PROCEDURE — 250N000013 HC RX MED GY IP 250 OP 250 PS 637: Performed by: NURSE PRACTITIONER

## 2025-02-13 PROCEDURE — 120N000001 HC R&B MED SURG/OB

## 2025-02-13 PROCEDURE — 99232 SBSQ HOSP IP/OBS MODERATE 35: CPT | Performed by: STUDENT IN AN ORGANIZED HEALTH CARE EDUCATION/TRAINING PROGRAM

## 2025-02-13 RX ORDER — GLIPIZIDE 10 MG/1
10 TABLET ORAL
Status: DISCONTINUED | OUTPATIENT
Start: 2025-02-14 | End: 2025-02-14 | Stop reason: HOSPADM

## 2025-02-13 RX ORDER — TRAZODONE HYDROCHLORIDE 50 MG/1
50 TABLET ORAL AT BEDTIME
Status: DISCONTINUED | OUTPATIENT
Start: 2025-02-13 | End: 2025-02-14 | Stop reason: HOSPADM

## 2025-02-13 RX ADMIN — LISINOPRIL 5 MG: 5 TABLET ORAL at 18:41

## 2025-02-13 RX ADMIN — LISINOPRIL 5 MG: 5 TABLET ORAL at 09:09

## 2025-02-13 RX ADMIN — ATORVASTATIN CALCIUM 10 MG: 10 TABLET, FILM COATED ORAL at 09:09

## 2025-02-13 RX ADMIN — GLIPIZIDE 10 MG: 10 TABLET ORAL at 06:14

## 2025-02-13 RX ADMIN — TICAGRELOR 90 MG: 90 TABLET ORAL at 09:09

## 2025-02-13 RX ADMIN — ASPIRIN 81 MG 81 MG: 81 TABLET ORAL at 09:09

## 2025-02-13 RX ADMIN — EZETIMIBE 10 MG: 10 TABLET ORAL at 21:07

## 2025-02-13 RX ADMIN — Medication 3 MG: at 18:41

## 2025-02-13 RX ADMIN — TRAZODONE HYDROCHLORIDE 25 MG: 50 TABLET ORAL at 21:07

## 2025-02-13 RX ADMIN — METFORMIN HYDROCHLORIDE 1000 MG: 500 TABLET, EXTENDED RELEASE ORAL at 21:07

## 2025-02-13 RX ADMIN — INSULIN ASPART 1 UNITS: 100 INJECTION, SOLUTION INTRAVENOUS; SUBCUTANEOUS at 12:54

## 2025-02-13 RX ADMIN — FAMOTIDINE 40 MG: 20 TABLET, FILM COATED ORAL at 09:09

## 2025-02-13 RX ADMIN — TICAGRELOR 90 MG: 90 TABLET ORAL at 21:07

## 2025-02-13 RX ADMIN — FAMOTIDINE 40 MG: 20 TABLET, FILM COATED ORAL at 21:07

## 2025-02-13 RX ADMIN — METFORMIN HYDROCHLORIDE 1000 MG: 500 TABLET, EXTENDED RELEASE ORAL at 09:09

## 2025-02-13 RX ADMIN — INSULIN ASPART 1 UNITS: 100 INJECTION, SOLUTION INTRAVENOUS; SUBCUTANEOUS at 09:08

## 2025-02-13 ASSESSMENT — ACTIVITIES OF DAILY LIVING (ADL)
DEPENDENT_IADLS:: CLEANING;COOKING;LAUNDRY;SHOPPING;MEAL PREPARATION;MEDICATION MANAGEMENT;MONEY MANAGEMENT;TRANSPORTATION
ADLS_ACUITY_SCORE: 46
ADLS_ACUITY_SCORE: 42
ADLS_ACUITY_SCORE: 46
ADLS_ACUITY_SCORE: 46
ADLS_ACUITY_SCORE: 42
ADLS_ACUITY_SCORE: 46
ADLS_ACUITY_SCORE: 45
ADLS_ACUITY_SCORE: 45
ADLS_ACUITY_SCORE: 46
ADLS_ACUITY_SCORE: 43
ADLS_ACUITY_SCORE: 46
ADLS_ACUITY_SCORE: 45
ADLS_ACUITY_SCORE: 42
ADLS_ACUITY_SCORE: 46
ADLS_ACUITY_SCORE: 46
ADLS_ACUITY_SCORE: 45
ADLS_ACUITY_SCORE: 45
ADLS_ACUITY_SCORE: 46
ADLS_ACUITY_SCORE: 45
ADLS_ACUITY_SCORE: 46
ADLS_ACUITY_SCORE: 42

## 2025-02-13 NOTE — INTERIM SUMMARY
[facility] Pre-Admission Screen    Referral Source:  [facility] [place] [facility] CUP[facility]  Admit date to referring facility: 2/11/2025    Physical Medicine and Rehab Consult Completed: No    Rehab Diagnosis:    Stroke Ischemic 01.3 Bilateral Involvement; acute lacunar infarcts in the left thalamus, the posterior left centrum semiovale ovale, the right occipital and right occipitotemporal white matter, and the right frontal white matter, suspected embolic etiology    Justification for Acute Inpatient Rehabilitation  The patient is a 59 year old male with history significant for prior strokes, DM2, HLD, HTN.  He is on Brilinta.  He was admitted 2/11 for generalized weakness and syncope.  MRI brain on admission revealed multifocal punctate infarcts.  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 MRI shows  multiple small foci of FLAIR hyperintense diffusion restriction compatible with acute lacunar infarcts in the left thalamus, the posterior left centrum semiovale ovale, the right occipital and right occipitotemporal white matter, and the right frontal white matter. Scattered distribution involving multiple vascular territories suggests an embolic etiology. ***    Patient requires an intensive inpatient rehab program to address the following acute impairments:{ARC Acute Impairments:989003}    Current Active Medical Management Needs/Risks for Clinical Complications  The patient requires the high level of rehabilitation physician supervision that accompanies the provision of intensive rehabilitation therapy.  The patient needs the services of the rehabilitation physician to assess the patient medically and functionally and to modify the course of treatment as needed to maximize the patient's capacity to benefit from the rehabilitation process.  {ARC Med Mgmt Needs/Risks Clinical  "Complications:717547}    Past Medical/Surgical History   Surgery in the past 100 days: {Yes-No:521053}   Additional relevant past medical history: ***    Level of Functioning Prior to Admission:    LIVING ENVIRONMENT   People in Home: spouse   Current Living Arrangements: house   Home Accessibility: stairs to enter home    Number of Stairs, Main Entrance: 4    Stair Railings, Main Entrance: railings safe and in good condition             Transportation Anticipated: agency   Living Environment Comments: has a deep tub with shower chair available    SELF-CARE                     Equipment Currently Used at Home: cane, straight, grab bar, tub/shower, shower chair, commode chair, walker, rolling   Activity/Exercise/Self-Care Comment: Patient was indep with mobility with 4WW, indep with bADL and basic meal prep prior to functional decline preceding admit.    Additional Comments: ***    Level of Function: GG Scale (Section GG Functional Ability and Goals; CMS's ARTEAGA Version 3.0 Manual effective 10.1.2019):  PT Current Function Goals for Rehab   Bed Rolling {GG Scale:358642::\"6 Independent\"} {Goals for Rehab:827176}   Supine to Sit {GG Scale:449737::\"6 Independent\"} {Goals for Rehab:989490}   Sit to Stand 4 Supervision or touching assistance (CGA) 6 Independent   Transfer 4 Supervision or touching assistance (CGA) 6 Independent   Ambulation 4 Supervision or touching assistance (CGA) 6 Independent   Stairs {GG Scale:619169::\"6 Independent\"} {Goals for Rehab:411523}     OT Current Function Goals for Rehab   Feeding {GG Scale:302387::\"6 Independent\"} {Goals for Rehab:860202}   Grooming {GG Scale:389795::\"6 Independent\"} {Goals for Rehab:334253}   Bathing {GG Scale:850524::\"6 Independent\"} {Goals for Rehab:424713}   Upper Body Dressing {GG Scale:341408::\"6 Independent\"} {Goals for Rehab:825757}   Lower Body Dressing {GG Scale:730579::\"6 Independent\"} {Goals for Rehab:035237}   Toileting {GG Scale:690053::\"6 Independent\"} {Goals " "for Rehab:717219}   Toilet Transfer {GG Scale:174520::\"6 Independent\"} {Goals for Rehab:796322}   Tub/Shower Transfer {GG Scale:183647::\"6 Independent\"} {Goals for Rehab:867153}   Cognition Impaired Independent     SLP Current Function Goals for Rehab   Swallow Not Impaired Not applicable   Communication Impaired (baseline) Not applicable       Current Diet:  0-Thin, 7-Regular, and Diabetic    Summary Statement:  ***SLP notes that pt has baseline dysarthria from a past stroke however is below baseline for cognition and should therefor have a full cognitive assessment at [facility]. Pt ambulating with WW and CGA but only 5 ft forward and backward with dizziness noted and dragging RLE.     Expected Therapies and Services Required During Inpatient Rehab Admission  Intensity of Therapy: Patient requires intensive therapies not available in a lesser level of care. Patient is motivated, making gains, and can tolerate 3 hours of therapy a day.  Physical Therapy: 60 minutes per day, 6 days a week for 7 days  Occupational Therapy: 60 minutes per day, 6 days a week for 7 days  Speech and Language Therapy: 60 minutes per day, 6 days a week for 7 days  Rehabilitation Nursing Needs: Patient requires 24 hour Rehab Nursing to manage {ARC Nursing Needs:723088}.    Precautions/restrictions/special needs:   Precautions: {ARC Precautions:939815}   Restrictions: ***   Special Needs: {ARC Special Needs:937654}    Expected Level of Improvement: ***  Expected Length of time to achieve: ***    Anticipated Discharge Needs:  Anticipated Discharge Destination: Home  Anticipated Discharge Support: Family member  24/7 support available : {Yes-No:168886}  Identified caregiver(s):  Spouse  Anticipated Discharge Needs: Home with homecare and Home with outpatient therapy    Identified challenges/barriers:  ***    Liaison signature/date/time: ***    Physician statement of review and agreement:  I have reviewed and am in agreement of the need for IRF stay " to address above functional and medical needs. In addition to above statements address, Patient requires intensive active and ongoing therapeutic intervention and multiple therapies; Patient requires medical supervision; Expected to actively participate in the intensive rehab program; Sufficiently stable to actively participate; Expectation for measurable improvement in functional capacity or adaption to impairments.    MD signature/date/time:    Patient requires intensive therapies not available in a lesser level of care. Patient is motivated, making gains, and can tolerate 3 hours of therapy a day.  Physical Therapy: 60 minutes per day, 6 days a week for 7 days  Occupational Therapy: 60 minutes per day, 6 days a week for 7 days  Speech and Language Therapy: 60 minutes per day, 6 days a week for 7 days  Rehabilitation Nursing Needs: Patient requires 24 hour Rehab Nursing to manage vitals, medication education, carryover of new rehab techniques, care coordination, blood sugar management, diabetes education, assess neurologic status, stroke education, and provide safe environment for patient at falls risk.    Precautions/restrictions/special needs:   Precautions: fall precautions   Restrictions: NA   Special Needs: NA    Expected Level of Improvement: Anticipate with intensive therapies, close medical management, and rehabilitative nursing care the patient will improve strength, balance, tolerance to activity, safety to improve functional independence for safe return home  Expected Length of time to achieve: 7 days    Anticipated Discharge Needs:  Anticipated Discharge Destination: Home  Anticipated Discharge Support: Family member  24/7 support available : Unknown  Identified caregiver(s):  Spouse  Anticipated Discharge Needs: Home with homecare and Home with outpatient therapy    Identified challenges/barriers:  Stairs to enter home.    Liaison signature/date/time: Danny Lin, PT 2/14/2025 12:29 PM    Physician statement of review and agreement:  I have reviewed and am in agreement of the need for IRF stay to address above functional and medical needs. In addition to above statements address, Patient requires intensive active and ongoing therapeutic intervention and multiple therapies; Patient requires medical supervision; Expected to actively participate in the intensive rehab program; Sufficiently stable to actively participate; Expectation for  measurable improvement in functional capacity or adaption to impairments.    MD signature/date/time:

## 2025-02-13 NOTE — PLAN OF CARE
Problem: Stroke, Ischemic (Includes Transient Ischemic Attack)  Goal: Optimal Functional Ability  Intervention: Optimize Functional Ability  Recent Flowsheet Documentation  Taken 2/12/2025 2217 by Candelario Bradshaw, RN  Activity Management:   up to bedside commode   back to bed  Goal: Effective Urinary Elimination  Outcome: Progressing   Goal Outcome Evaluation:  Pt is alert with but forgetful. Able to pivot to commode but does not use call light and sets off alarm. Frequent urinary trips with minimal residual. Bps remain in the high 180s to 200s, tele provider was made aware. No changes were made.    One small episode of emesis occurred at 6am, pt stated he didn't feel nauseous and it had happened on the previous day.

## 2025-02-13 NOTE — CONSULTS
Care Management Initial Consult    General Information  Assessment completed with: VM-chart review, Patient, Pa and Significant other, Meryl  Type of CM/SW Visit: Offer D/C Planning    Primary Care Provider verified and updated as needed: Yes   Readmission within the last 30 days: no previous admission in last 30 days      Reason for Consult: discharge planning  Advance Care Planning: Advance Care Planning Reviewed: no concerns identified          Communication Assessment  Patient's communication style: spoken language (English or Bilingual)    Hearing Difficulty or Deaf: no   Wear Glasses or Blind: no    Cognitive  Cognitive/Neuro/Behavioral: .WDL except  Level of Consciousness: intermittent confusion  Arousal Level: opens eyes spontaneously  Orientation: oriented x 4, other (see comments) (Patient occasionally confused, thought his girlfriend was in the room.)  Mood/Behavior: cooperative  Best Language: 1 - Mild to moderate  Speech: slurred    Living Environment:   People in home: significant other  Meryl  Current living Arrangements: house      Able to return to prior arrangements: no       Family/Social Support:  Care provided by: self  Provides care for: no one     Support system: Significant Other       Meryl  Description of Support System: Supportive, Involved    Support Assessment: Adequate family and caregiver support    Current Resources:   Patient receiving home care services: No        Community Resources: None  Equipment currently used at home: cane, straight, grab bar, tub/shower, shower chair, commode chair, walker, rolling  Supplies currently used at home: Diabetic Supplies    Employment/Financial:  Employment Status:          Financial Concerns: none           Does the patient's insurance plan have a 3 day qualifying hospital stay waiver?  No    Lifestyle & Psychosocial Needs:  Social Drivers of Health     Food Insecurity: Low Risk  (2/11/2025)    Food Insecurity     Within the past 12 months,  did you worry that your food would run out before you got money to buy more?: No     Within the past 12 months, did the food you bought just not last and you didn t have money to get more?: No   Depression: Not at risk (1/22/2024)    PHQ-2     PHQ-2 Score: 0   Housing Stability: Low Risk  (2/11/2025)    Housing Stability     Do you have housing? : Yes     Are you worried about losing your housing?: No   Tobacco Use: High Risk (9/17/2024)    Patient History     Smoking Tobacco Use: Never     Smokeless Tobacco Use: Current     Passive Exposure: Past   Financial Resource Strain: Low Risk  (2/11/2025)    Financial Resource Strain     Within the past 12 months, have you or your family members you live with been unable to get utilities (heat, electricity) when it was really needed?: No   Alcohol Use: Not on file   Transportation Needs: High Risk (2/11/2025)    Transportation Needs     Within the past 12 months, has lack of transportation kept you from medical appointments, getting your medicines, non-medical meetings or appointments, work, or from getting things that you need?: Yes   Physical Activity: Not on file   Interpersonal Safety: Low Risk  (2/12/2025)    Interpersonal Safety     Do you feel physically and emotionally safe where you currently live?: Yes     Within the past 12 months, have you been hit, slapped, kicked or otherwise physically hurt by someone?: No     Within the past 12 months, have you been humiliated or emotionally abused in other ways by your partner or ex-partner?: No   Stress: Not on file   Social Connections: Unknown (5/15/2023)    [facility]    Social Connections     Frequency of Communication with Friends and Family: Not on file   Health Literacy: Not on file       Functional Status:  Prior to admission patient needed assistance:   Dependent ADLs:: Ambulation-walker, Bathing, Dressing, Eating,  Grooming, Positioning, Transfers, Toileting  Dependent IADLs:: Cleaning, Cooking, Laundry, Shopping, Meal Preparation, Medication Management, Money Management, Transportation  Assesssment of Functional Status: Not at  functional baseline    Mental Health Status:  Mental Health Status: No Current Concerns       Chemical Dependency Status:  Chemical Dependency Status: No Current Concerns             Values/Beliefs:  Spiritual, Cultural Beliefs, Jew Practices, Values that affect care: no               Discussed  Partnership in Safe Discharge Planning  document with patient/family: Yes    Additional Information:    Received Consult for discharge planning/disposition.     Patient is medically ready for discharge today.    Met with the Patient and Significant other, Meryl.  The Patient lives with Meryl in a 2 level house, he is able to live on one level.  At baseline he requires minimal assistance with ADLs/IADLs.  He uses a walker to ambulate.    Patient recently has been falling and more unsteady on his feet.  He was was in the bathroom and was getting help to transfer from his walker to the toilet seat when he slid in between the walker and the toilet seat, he landed on the ground and then had a short loss of consciousness where he went backwards. He has a history of prior stroke.  The medical team recommends ARU.  The Patient has been accepted at [facility] P) [phone] pending [employer] prior authorization.  Spoke with ARLET Abdi.  Per Trinidad, he will need to be stable off of IV BP meds and IV anti-emetics for 24 hours prior to discharge from [facility].    Plan:  [facility] pending [employer] prior authorization.    Next Steps:      [employer] Authorization approval  Arrange transportation      Anita Reardon RN

## 2025-02-13 NOTE — PLAN OF CARE
Goal Outcome Evaluation:      Plan of Care Reviewed With: patient, other (see comments) (Significant other)          Outcome Evaluation: ARU

## 2025-02-13 NOTE — TELEPHONE ENCOUNTER
Vascular Referral Intake    Appointment note (to be pasted into appt note. Also add where additional info is located ie: outside images pushed to PACS, in Epic, sent to HIM, etc): CONSULT Miriam Bundy CNP referring. statin intolerance, right vertebral artery dissection       Referred by Miriam Bundy CNP for   Z78.9 (ICD-10-CM) - Statin intolerance   E78.00 (ICD-10-CM) - Pure hypercholesterolemia       Specialty: Vascular Medicine    Specific Provider if Necessary:  Dr. Sivakumar Gilbert    Visit Type: New    Time Frame: Next Available    Testing/Imaging Needed Before Consult: None    Clotilde or bed needed: No    *Schedulers: Please send welcome letter to patient after appointment(s) have been scheduled*

## 2025-02-13 NOTE — PROGRESS NOTES
"Stroke team following peripherally for results of MRA which supported a dissection (does not explain all the strokes he has). Follow-up ordered to  for further testing given personal and family history of strokes in the young. Otherwise no changes from recommendations as outlined in vascular neurology consult note from yesterday. We will sign off. Please contact us with any questions.    Shanon Alvarado PA-C  Vascular Neurology    To page me or covering stroke neurology team member, click here: AMCOM  Choose \"On Call\" tab at top, then select \"NEUROLOGY/ALL SITES\" from middle drop-down box, press Enter, then look for \"stroke\" or \"telestroke\" for your site.   "

## 2025-02-13 NOTE — PROGRESS NOTES
[facility]    Medicine Progress Note - Hospitalist Service    Date of Admission:  2025    Assessment & Plan   Chris Delcid is a 59 year old male with past medical history of type 2 DM, dyslipidemia, essential hypertension, orthostatic hypotension, and previous left pontine CVA with hemiparesis and expressive aphasia now presents on 2025 with generalized weakness and syncope. FTH multiple acute subcortical infarctions.     # Generalized weakness  # Syncope    The patient presents to the ED with generalized weakness over last 3-4 days and episode of syncope. He has a history of weakness following his history of previous strokes. However, he reports increased weakness over the last few days and his partner reports several mechanical falls. She has been helping him go to the bathroom during the night, which he previously did not require help with. Evening PTA (2/10), his right leg locked up, and as his partner helped him sit down, he slumped unconscious and was reported out for 5-10 seconds before spontaneously coming to.     It is unclear the etiology of his current weakness and syncopal episode. He has a history of orthostatic hypotension and significant fluctuations in BP that could be the cause of his acute exacerbation of symptoms. On MRI found to have multiple acute subcortical infarctions.   - PT, OT, care management consult, current recommendation if for acute rehabilitation center     # Multiple acute subcortical infarcts   # New vertebral artery occlusion   Patient has had recurrent infarcts and progression of his microvascular disease over the last 5 years. These have occurred in the setting of his HTN and diabetes but are unusual. Notable patients mother  of stroke at 49 as did his maternal grandfather in his 40's. Expanded work up for potential contributing causes started during this hospitalization under the guidance of stroke neurology.   - Stroke neurology  consulted and following, appreciate recommendations   - Continued Brillinta 90 mg Bid   - Started ASA 81, duration pending MRA  - Continued PTA lovastatin given patient intolerance to other statins, added Zetia today and plan for outpatient referral to vascular medicine for possible PCSK9 start   - Allowing permissive hypertension today with plan to slowly start anti-hypertensive to reach goal of 130 SBP outpatient   - Would consider genetic referral given family history to be discussed at neurology follow up   - Expanded hypercoagulability laboratory work up unrevealing but with protien C and S pending   - Plan for outpatient [facility]     # History of left pontine cerebrovascular accident  # Hemiparesis affecting dominant side as late effect of stroke   # Expressive aphasia   The patient was seen in the ED 10/2020 for extremity weakness, word finding difficulty, and changes in his speech. On the MRI, there was found to be a small acute left pontine infarct with no hemorrhage or mass effect. The patient was managed inpatient with aspirin 325 mg and plavix 300 mg. He has residual balance and speech deficits since this infarct. He has had two strokes since, once 11/2021 and another 8/2022. Been on Brilinta since stroke in 2022.   - Continued PTA Brilinta 90 mg every day     # Hypertensive urgency  Most recent BP in ED were 186/137 and 192/107 on retake. Meets criteria for hypertensive urgency. Patient denies hypertensive emergency symptoms including confusion, vision changes, headaches, tinnitus, chest pain, and palpitations.   - Continue dPTA lisinopril, allowing for hypertension with recent stroke     # Essential hypertension  # Orthostatic hypotension   Seen by Dr. Dumont through [facility] cardiology 6/2023. Hospitalized 12/2023 for hypertensive crisis and frequent falls. Last saw cardiology through [facility] 2/2024. Has history of significant fluctuations in BP causing hypertension and orthostatic hypotension. He had a tilt  table test through [facility] that showed severe orthostatic hypotension. Has previously reported orthostatic symptoms in the mornings and high BP in afternoons when he checks at home.   Currently on lisinopril 5 mg BID. Also has hydralazine PRN for high blood pressures at home. According to visit 9/17/24, he does not regularly take hydralazine due to symptoms of low pressures. He also has a loop recorder in place. Baseline BP average for patient is 150-160/. Wide swings in blood pressure along with concern about changes from patient and family will plan to continue PTA at this time.   - Continued PTA lisinopril 5 mg BID     # Type 2 diabetes mellitus   # Hyperglycemia  Chronically well controlled (hemoglobin A1c 5.9% 9/17/24). Hyperglycemic since presentation (glucose 322 2/11). Managed with Metformin 1000 mg BID and glipizide 5 mg BID.   - Continue PTA metformin 1000 mg BID   - Increased PTA glipizide 10 mg BID AC   - Medium sliding scale correction insulin  - Moderate consistent carb diet    # Gastroesophageal Reflux  Stable. Takes famotidine (Pepcid) 40 mg BID.   - Continue PTA famotidine 40 mg BID     # ADITI, no longer on BIPAP  Stable. Diagnosed 2/2022 following witnessed apneic episodes and polysomnogram 12/2021. Patient reports stopping BIPAP therapy for his ADITI due to abdominal bloating. He reports a repeat polysomnogram after stopping BIPAP that showed minimal ADITI.     # Dyslipidemia   Stable. Last lipid panel was unremarkable 12/30/2023. Takes lovastatin 40 mg every day.  - Continue PTA lovastatin   - Continued ezetimibe started this admission     # Chronic low back pain  Secondary to prior injury years ago. Follows at [facility] in [place].    # Elevated Troponin  Troponin 28 ?  23. Low suspicion for ACS. No further monitoring.           Diet: Combination Diet Moderate Consistent Carb (60 g CHO per Meal) Diet    DVT Prophylaxis: Ambulate every shift  Hodges Catheter: Not present  Lines:  None     Cardiac Monitoring: ACTIVE order. Indication: Syncope- low cardiac risk (24 hours)  Code Status: No CPR- Do NOT Intubate      Clinically Significant Risk Factors                   # Hypertension: Noted on problem list           # DMII: A1C = 8.6 % (Ref range: <5.7 %) within past 6 months, PRESENT ON ADMISSION      # Financial/Environmental Concerns: none         Social Drivers of Health    Tobacco Use: High Risk (9/17/2024)    Patient History     Smoking Tobacco Use: Never     Smokeless Tobacco Use: Current     Passive Exposure: Past   Transportation Needs: High Risk (2/11/2025)    Transportation Needs     Within the past 12 months, has lack of transportation kept you from medical appointments, getting your medicines, non-medical meetings or appointments, work, or from getting things that you need?: Yes    Received from [employer] [facility] [employer] [employer]    Social Connections          Disposition Plan     Medically Ready for Discharge: Anticipated Tomorrow     Yg Gallardo MD  Hospitalist Service  [facility]  Securely message with [employer] (more info)  Text page via [employer] Paging/Directory   ______________________________________________________________________    Interval History   Some hypertension overnight. With wide swings in patients blood pressure. Patient states he feels ok. Agreeable to going to acute rehabilitation facility when available. Request medication to help with sleep tonight.     Physical Exam   Vital Signs: Temp: 97.8  F (36.6  C) Temp src: Oral BP: 139/84 Pulse: 94   Resp: 18 SpO2: 97 % O2 Device: None (Room air)    Weight: 248 lbs 10.86 oz    General Appearance:  Awake and alert, laying back in bed, in no  acute distress   Respiratory: Breathing easily on room air   Cardiovascular: Regular rate and rhythm, extremities warm and well perfused   GI: Abdomen soft, non-tender, and non-distended   Other:   Appropriate mood and affect      Medical Decision Making       45 MINUTES SPENT BY ME on the date of service doing chart review, history, exam, documentation & further activities per the note.      Data     I have personally reviewed the following data over the past 24 hrs:    7.7  \   15.2   / 350     140 102 16.1 /  161 (H)   3.5 22 1.00 \     INR:  N/A PTT:  N/A   D-dimer:  N/A Fibrinogen:  N/A       Imaging results reviewed over the past 24 hrs:   No results found for this or any previous visit (from the past 24 hours).

## 2025-02-14 ENCOUNTER — APPOINTMENT (OUTPATIENT)
Dept: OCCUPATIONAL THERAPY | Facility: CLINIC | Age: 60
DRG: 064 | End: 2025-02-14
Payer: COMMERCIAL

## 2025-02-14 ENCOUNTER — HOSPITAL ENCOUNTER (INPATIENT)
Facility: CLINIC | Age: 60
DRG: 057 | End: 2025-02-14
Attending: PHYSICAL MEDICINE & REHABILITATION | Admitting: PHYSICAL MEDICINE & REHABILITATION
Payer: COMMERCIAL

## 2025-02-14 VITALS
TEMPERATURE: 98.4 F | OXYGEN SATURATION: 98 % | WEIGHT: 248.68 LBS | DIASTOLIC BLOOD PRESSURE: 107 MMHG | BODY MASS INDEX: 34.81 KG/M2 | SYSTOLIC BLOOD PRESSURE: 186 MMHG | RESPIRATION RATE: 20 BRPM | HEART RATE: 90 BPM

## 2025-02-14 DIAGNOSIS — K21.00 GASTROESOPHAGEAL REFLUX DISEASE WITH ESOPHAGITIS, UNSPECIFIED WHETHER HEMORRHAGE: ICD-10-CM

## 2025-02-14 DIAGNOSIS — I63.413 CEREBROVASCULAR ACCIDENT (CVA) DUE TO BILATERAL EMBOLISM OF MIDDLE CEREBRAL ARTERIES (H): Primary | ICD-10-CM

## 2025-02-14 DIAGNOSIS — R41.0 DELIRIUM: ICD-10-CM

## 2025-02-14 DIAGNOSIS — M54.50 CHRONIC LOW BACK PAIN WITHOUT SCIATICA, UNSPECIFIED BACK PAIN LATERALITY: ICD-10-CM

## 2025-02-14 DIAGNOSIS — E87.6 HYPOKALEMIA: ICD-10-CM

## 2025-02-14 DIAGNOSIS — I10 HYPERTENSION, UNSPECIFIED TYPE: ICD-10-CM

## 2025-02-14 DIAGNOSIS — G89.29 CHRONIC LOW BACK PAIN WITHOUT SCIATICA, UNSPECIFIED BACK PAIN LATERALITY: ICD-10-CM

## 2025-02-14 PROBLEM — I63.9 STROKE (H): Status: ACTIVE | Noted: 2025-02-14

## 2025-02-14 LAB
ANION GAP SERPL CALCULATED.3IONS-SCNC: 15 MMOL/L (ref 7–15)
BUN SERPL-MCNC: 16.8 MG/DL (ref 8–23)
CALCIUM SERPL-MCNC: 9.3 MG/DL (ref 8.8–10.4)
CHLORIDE SERPL-SCNC: 100 MMOL/L (ref 98–107)
CREAT SERPL-MCNC: 0.93 MG/DL (ref 0.67–1.17)
EGFRCR SERPLBLD CKD-EPI 2021: >90 ML/MIN/1.73M2
ERYTHROCYTE [DISTWIDTH] IN BLOOD BY AUTOMATED COUNT: 13.2 % (ref 10–15)
FACTOR 2 INTERPRETATION: NORMAL
FACTOR V INTERPRETATION: NORMAL
GLUCOSE BLDC GLUCOMTR-MCNC: 141 MG/DL (ref 70–99)
GLUCOSE BLDC GLUCOMTR-MCNC: 143 MG/DL (ref 70–99)
GLUCOSE BLDC GLUCOMTR-MCNC: 143 MG/DL (ref 70–99)
GLUCOSE BLDC GLUCOMTR-MCNC: 175 MG/DL (ref 70–99)
GLUCOSE BLDC GLUCOMTR-MCNC: 183 MG/DL (ref 70–99)
GLUCOSE SERPL-MCNC: 195 MG/DL (ref 70–99)
HCO3 SERPL-SCNC: 22 MMOL/L (ref 22–29)
HCT VFR BLD AUTO: 42.6 % (ref 40–53)
HGB BLD-MCNC: 14.9 G/DL (ref 13.3–17.7)
LAB DIRECTOR COMMENTS: NORMAL
LAB DIRECTOR DISCLAIMER: NORMAL
LAB DIRECTOR INTERPRETATION: NORMAL
LAB DIRECTOR METHODOLOGY: NORMAL
LAB DIRECTOR RESULTS: NORMAL
LOCATION OF TASK: NORMAL
MCH RBC QN AUTO: 29.6 PG (ref 26.5–33)
MCHC RBC AUTO-ENTMCNC: 35 G/DL (ref 31.5–36.5)
MCV RBC AUTO: 85 FL (ref 78–100)
PLATELET # BLD AUTO: 322 10E3/UL (ref 150–450)
POTASSIUM SERPL-SCNC: 3.2 MMOL/L (ref 3.4–5.3)
PROT C ACT/NOR PPP CHRO: 107 % (ref 70–170)
PROT S FREE AG ACT/NOR PPP IA: 102 % (ref 70–148)
RBC # BLD AUTO: 5.03 10E6/UL (ref 4.4–5.9)
SODIUM SERPL-SCNC: 137 MMOL/L (ref 135–145)
SPECIMEN TYPE: NORMAL
WBC # BLD AUTO: 6.8 10E3/UL (ref 4–11)

## 2025-02-14 PROCEDURE — 36415 COLL VENOUS BLD VENIPUNCTURE: CPT | Performed by: STUDENT IN AN ORGANIZED HEALTH CARE EDUCATION/TRAINING PROGRAM

## 2025-02-14 PROCEDURE — 82310 ASSAY OF CALCIUM: CPT | Performed by: STUDENT IN AN ORGANIZED HEALTH CARE EDUCATION/TRAINING PROGRAM

## 2025-02-14 PROCEDURE — 128N000003 HC R&B REHAB

## 2025-02-14 PROCEDURE — 97530 THERAPEUTIC ACTIVITIES: CPT | Mod: GO

## 2025-02-14 PROCEDURE — 250N000013 HC RX MED GY IP 250 OP 250 PS 637: Performed by: STUDENT IN AN ORGANIZED HEALTH CARE EDUCATION/TRAINING PROGRAM

## 2025-02-14 PROCEDURE — 250N000013 HC RX MED GY IP 250 OP 250 PS 637: Performed by: NURSE PRACTITIONER

## 2025-02-14 PROCEDURE — 97535 SELF CARE MNGMENT TRAINING: CPT | Mod: GO

## 2025-02-14 PROCEDURE — G0452 MOLECULAR PATHOLOGY INTERPR: HCPCS | Mod: 26 | Performed by: PATHOLOGY

## 2025-02-14 PROCEDURE — 99222 1ST HOSP IP/OBS MODERATE 55: CPT | Mod: AI | Performed by: PHYSICAL MEDICINE & REHABILITATION

## 2025-02-14 PROCEDURE — 85014 HEMATOCRIT: CPT | Performed by: STUDENT IN AN ORGANIZED HEALTH CARE EDUCATION/TRAINING PROGRAM

## 2025-02-14 PROCEDURE — 250N000013 HC RX MED GY IP 250 OP 250 PS 637

## 2025-02-14 PROCEDURE — 250N000013 HC RX MED GY IP 250 OP 250 PS 637: Performed by: PHYSICAL MEDICINE & REHABILITATION

## 2025-02-14 PROCEDURE — 250N000013 HC RX MED GY IP 250 OP 250 PS 637: Performed by: PHYSICIAN ASSISTANT

## 2025-02-14 PROCEDURE — 81241 F5 GENE: CPT | Performed by: STUDENT IN AN ORGANIZED HEALTH CARE EDUCATION/TRAINING PROGRAM

## 2025-02-14 PROCEDURE — 99239 HOSP IP/OBS DSCHRG MGMT >30: CPT | Performed by: STUDENT IN AN ORGANIZED HEALTH CARE EDUCATION/TRAINING PROGRAM

## 2025-02-14 RX ORDER — ASPIRIN 81 MG/1
81 TABLET, CHEWABLE ORAL DAILY
Status: ON HOLD | DISCHARGE
Start: 2025-02-15

## 2025-02-14 RX ORDER — CALCIUM CARBONATE 500 MG/1
500-1000 TABLET, CHEWABLE ORAL 3 TIMES DAILY PRN
Status: DISCONTINUED | OUTPATIENT
Start: 2025-02-14 | End: 2025-02-21

## 2025-02-14 RX ORDER — EZETIMIBE 10 MG/1
10 TABLET ORAL AT BEDTIME
Status: DISCONTINUED | OUTPATIENT
Start: 2025-02-14 | End: 2025-02-25

## 2025-02-14 RX ORDER — LISINOPRIL 5 MG/1
5 TABLET ORAL 2 TIMES DAILY
Status: COMPLETED | OUTPATIENT
Start: 2025-02-14 | End: 2025-02-18

## 2025-02-14 RX ORDER — GLIPIZIDE 10 MG/1
10 TABLET ORAL
Status: ON HOLD | DISCHARGE
Start: 2025-02-14

## 2025-02-14 RX ORDER — CARBOXYMETHYLCELLULOSE SODIUM 5 MG/ML
1 SOLUTION/ DROPS OPHTHALMIC 4 TIMES DAILY PRN
Status: DISCONTINUED | OUTPATIENT
Start: 2025-02-14 | End: 2025-03-06 | Stop reason: HOSPADM

## 2025-02-14 RX ORDER — HYDRALAZINE HYDROCHLORIDE 10 MG/1
10-20 TABLET, FILM COATED ORAL EVERY 6 HOURS PRN
Status: ON HOLD | DISCHARGE
Start: 2025-02-14

## 2025-02-14 RX ORDER — FAMOTIDINE 20 MG/1
40 TABLET, FILM COATED ORAL 2 TIMES DAILY
Status: DISCONTINUED | OUTPATIENT
Start: 2025-02-14 | End: 2025-02-25

## 2025-02-14 RX ORDER — HYDRALAZINE HYDROCHLORIDE 10 MG/1
10 TABLET, FILM COATED ORAL EVERY 6 HOURS PRN
Status: DISCONTINUED | OUTPATIENT
Start: 2025-02-14 | End: 2025-02-17

## 2025-02-14 RX ORDER — ACETAMINOPHEN 325 MG/1
650 TABLET ORAL EVERY 4 HOURS PRN
Status: DISCONTINUED | OUTPATIENT
Start: 2025-02-14 | End: 2025-02-19

## 2025-02-14 RX ORDER — POTASSIUM CHLORIDE 1500 MG/1
20 TABLET, EXTENDED RELEASE ORAL 2 TIMES DAILY
Status: DISCONTINUED | OUTPATIENT
Start: 2025-02-14 | End: 2025-02-25

## 2025-02-14 RX ORDER — EZETIMIBE 10 MG/1
10 TABLET ORAL AT BEDTIME
Status: ON HOLD | DISCHARGE
Start: 2025-02-14

## 2025-02-14 RX ORDER — ASPIRIN 81 MG/1
81 TABLET, CHEWABLE ORAL DAILY
Status: DISCONTINUED | OUTPATIENT
Start: 2025-02-15 | End: 2025-03-06 | Stop reason: HOSPADM

## 2025-02-14 RX ORDER — TRAZODONE HYDROCHLORIDE 50 MG/1
25 TABLET ORAL AT BEDTIME
Status: ON HOLD | DISCHARGE
Start: 2025-02-14

## 2025-02-14 RX ORDER — ACETAMINOPHEN 325 MG/1
650 TABLET ORAL EVERY 4 HOURS PRN
Status: ON HOLD | DISCHARGE
Start: 2025-02-14

## 2025-02-14 RX ORDER — ATORVASTATIN CALCIUM 10 MG/1
10 TABLET, FILM COATED ORAL DAILY
Status: DISCONTINUED | OUTPATIENT
Start: 2025-02-15 | End: 2025-03-06 | Stop reason: HOSPADM

## 2025-02-14 RX ORDER — POLYETHYLENE GLYCOL 3350 17 G/17G
17 POWDER, FOR SOLUTION ORAL DAILY PRN
Status: ON HOLD | DISCHARGE
Start: 2025-02-14

## 2025-02-14 RX ORDER — ONDANSETRON 4 MG/1
4 TABLET, ORALLY DISINTEGRATING ORAL EVERY 6 HOURS PRN
Status: ON HOLD | DISCHARGE
Start: 2025-02-14

## 2025-02-14 RX ORDER — POLYETHYLENE GLYCOL 3350 17 G/17G
17 POWDER, FOR SOLUTION ORAL DAILY PRN
Status: DISCONTINUED | OUTPATIENT
Start: 2025-02-14 | End: 2025-03-06 | Stop reason: HOSPADM

## 2025-02-14 RX ORDER — METFORMIN HYDROCHLORIDE 500 MG/1
1000 TABLET, EXTENDED RELEASE ORAL 2 TIMES DAILY
Status: DISCONTINUED | OUTPATIENT
Start: 2025-02-14 | End: 2025-02-25

## 2025-02-14 RX ORDER — HYDRALAZINE HYDROCHLORIDE 10 MG/1
20 TABLET, FILM COATED ORAL EVERY 6 HOURS PRN
Status: DISCONTINUED | OUTPATIENT
Start: 2025-02-14 | End: 2025-02-17

## 2025-02-14 RX ORDER — FAMOTIDINE 20 MG/1
40 TABLET, FILM COATED ORAL
Status: DISCONTINUED | OUTPATIENT
Start: 2025-02-14 | End: 2025-02-14

## 2025-02-14 RX ORDER — DEXTROSE MONOHYDRATE 25 G/50ML
25-50 INJECTION, SOLUTION INTRAVENOUS
Status: DISCONTINUED | OUTPATIENT
Start: 2025-02-14 | End: 2025-03-06 | Stop reason: HOSPADM

## 2025-02-14 RX ORDER — NICOTINE POLACRILEX 4 MG
15-30 LOZENGE BUCCAL
Status: DISCONTINUED | OUTPATIENT
Start: 2025-02-14 | End: 2025-03-06 | Stop reason: HOSPADM

## 2025-02-14 RX ADMIN — POTASSIUM CHLORIDE 20 MEQ: 1500 TABLET, EXTENDED RELEASE ORAL at 20:37

## 2025-02-14 RX ADMIN — LISINOPRIL 5 MG: 5 TABLET ORAL at 20:37

## 2025-02-14 RX ADMIN — GLIPIZIDE 10 MG: 10 TABLET ORAL at 07:55

## 2025-02-14 RX ADMIN — INSULIN ASPART 1 UNITS: 100 INJECTION, SOLUTION INTRAVENOUS; SUBCUTANEOUS at 07:58

## 2025-02-14 RX ADMIN — FAMOTIDINE 40 MG: 20 TABLET, FILM COATED ORAL at 20:37

## 2025-02-14 RX ADMIN — EZETIMIBE 10 MG: 10 TABLET ORAL at 20:37

## 2025-02-14 RX ADMIN — ASPIRIN 81 MG 81 MG: 81 TABLET ORAL at 07:54

## 2025-02-14 RX ADMIN — ATORVASTATIN CALCIUM 10 MG: 10 TABLET, FILM COATED ORAL at 07:54

## 2025-02-14 RX ADMIN — METFORMIN HYDROCHLORIDE 1000 MG: 500 TABLET, EXTENDED RELEASE ORAL at 07:54

## 2025-02-14 RX ADMIN — CALCIUM CARBONATE (ANTACID) CHEW TAB 500 MG 1000 MG: 500 CHEW TAB at 21:40

## 2025-02-14 RX ADMIN — FAMOTIDINE 40 MG: 20 TABLET, FILM COATED ORAL at 07:54

## 2025-02-14 RX ADMIN — LISINOPRIL 5 MG: 5 TABLET ORAL at 07:54

## 2025-02-14 RX ADMIN — CALCIUM CARBONATE (ANTACID) CHEW TAB 500 MG 1000 MG: 500 CHEW TAB at 17:14

## 2025-02-14 RX ADMIN — TICAGRELOR 90 MG: 90 TABLET ORAL at 07:54

## 2025-02-14 RX ADMIN — METFORMIN ER 500 MG 1000 MG: 500 TABLET ORAL at 20:37

## 2025-02-14 RX ADMIN — Medication 10 MG: at 17:59

## 2025-02-14 RX ADMIN — TICAGRELOR 90 MG: 90 TABLET ORAL at 20:37

## 2025-02-14 RX ADMIN — Medication 25 MG: at 21:40

## 2025-02-14 RX ADMIN — INSULIN ASPART 1 UNITS: 100 INJECTION, SOLUTION INTRAVENOUS; SUBCUTANEOUS at 13:02

## 2025-02-14 ASSESSMENT — ACTIVITIES OF DAILY LIVING (ADL)
ADLS_ACUITY_SCORE: 43
ADLS_ACUITY_SCORE: 57
ADLS_ACUITY_SCORE: 43
ADLS_ACUITY_SCORE: 57
ADLS_ACUITY_SCORE: 43
ADLS_ACUITY_SCORE: 43
ADLS_ACUITY_SCORE: 57
DEPENDENT_IADLS:: CLEANING;COOKING;LAUNDRY;SHOPPING;MEAL PREPARATION;MEDICATION MANAGEMENT;MONEY MANAGEMENT;TRANSPORTATION
ADLS_ACUITY_SCORE: 43
ADLS_ACUITY_SCORE: 42
ADLS_ACUITY_SCORE: 43
ADLS_ACUITY_SCORE: 42
ADLS_ACUITY_SCORE: 43
ADLS_ACUITY_SCORE: 57
ADLS_ACUITY_SCORE: 57
ADLS_ACUITY_SCORE: 43
ADLS_ACUITY_SCORE: 42
ADLS_ACUITY_SCORE: 43
ADLS_ACUITY_SCORE: 43

## 2025-02-14 NOTE — PHARMACY-ADMISSION MEDICATION HISTORY
Pharmacist Admission Medication History    Admission medication history is complete. The information provided in this note is only as accurate as the sources available at the time of the update.    Information Source(s):  Medication history was completed by a medication scribe at [facility] on 2/12/25  via phone but no note was placed.    Changes made to PTA medication list:  Added: None  Deleted: None  Changed: None    Allergies reviewed with patient and updates made in EHR: no    Medication History Completed By: Nikhil Perea RPH 2/14/2025 3:43 PM    No outpatient medications have been marked as taking for the 2/14/25 encounter (Hospital Encounter).

## 2025-02-14 NOTE — PROGRESS NOTES
[facility] DISCHARGE NOTE    Patient discharged to acute rehab at 1:59 PM via wheel chair. Accompanied by significant other and staff. Discharge instructions reviewed with patient and significant other , opportunity offered to ask questions. Prescriptions sent to patients preferred pharmacy/rehab. All belongings sent with patient.    Report given to RAULITO Lanier at [facility] prior to discharge. All questions answered.    Cheyanne Ruffin RN

## 2025-02-14 NOTE — DISCHARGE SUMMARY
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   # History of left pontine cerebrovascular accident  # Hemiparesis affecting dominant side as late effect of stroke   # Expressive aphasia   # Essential hypertension  # Orthostatic hypotension   # Hypertensive urgency  # Type 2 diabetes mellitus   # Hyperglycemia  # Gastroesophageal Reflux  # ADITI, no longer on BIPAP  # Dyslipidemia   # Chronic low back pain  # Elevated Troponin    Clinically Significant Risk Factors     # DMII: A1C = 8.6 % (Ref range: <5.7 %) within past 6 months       Follow-ups Needed After Discharge   Follow-up Appointments       Follow Up and recommended labs and tests      Follow up with physician at acute rehabilitation, you will likely require ongoing titration to you blood pressure medications gradually over the next several months.                Unresulted Labs Ordered in the Past 30 Days of this Admission       No orders found from 1/12/2025 to 2/12/2025.        These results will be followed up by Yg Gallardo MD.    Discharge Disposition   Discharged to acute rehabilitation   Condition at discharge: Stable    Hospital Course   Chris Delcid is a 59 year old male with past medical history of type 2 DM, dyslipidemia, essential hypertension, orthostatic hypotension, and previous left pontine CVA with hemiparesis and expressive aphasia now presents on 2/11/2025 with generalized weakness and syncope. H multiple acute subcortical infarctions.     # Generalized weakness  # Syncope    The patient presents to the ED with generalized weakness over last 3-4 days and episode of syncope. He has a history of  weakness following his history of previous strokes. However, he reports increased weakness over the last few days and his partner reports several mechanical falls. She has been helping him go to the bathroom during the night, which he previously did not require help with. Evening PTA (2/10), his right leg locked up, and as his partner helped him sit down, he slumped unconscious and was reported out for 5-10 seconds before spontaneously coming to.     It is unclear the etiology of his current weakness and syncopal episode. He has a history of orthostatic hypotension and significant fluctuations in BP that could be the cause of his acute exacerbation of symptoms. On MRI found to have multiple acute subcortical infarctions.   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 These have occurred in the setting of his HTN and diabetes but are unusual. Notable patients mother  of stroke at 49 as did his maternal grandfather in his 40's. Expanded work up for potential contributing causes started during this hospitalization under the guidance of stroke neurology.  - Continued PTA Brilinta 90 mg every day   - Started ASA 81  - Continued PTA lovastatin given patient intolerance to other statins, added Zetia today and plan for outpatient referral to vascular medicine for possible PCSK9 start   -  Would plan to slowly up titrate antihypertensive regimen as allowed by patients orthostatics eventual goal of 130 SBP outpatient   - Would consider genetic referral given family history to be discussed at neurology follow up   - Expanded hypercoagulability laboratory work up unrevealing, factor 2 and 5 nutation analysis pending   - Plan for outpatient [facility]   - Follow up with  stroke neurology in 4-6 weeks     # History of left pontine cerebrovascular accident  # Hemiparesis affecting dominant side as late effect of stroke   # Expressive aphasia   For patients prior stroke in 10/2020 had extremity weakness, word finding difficulty, and changes in his speech. On the MRI, there was found to be a small acute left pontine infarct with no hemorrhage or mass effect. The patient was managed inpatient with aspirin 325 mg and plavix 300 mg. He has residual balance and speech deficits since this infarct. He has had two strokes since, once 11/2021 and another 8/2022. Been on Brilinta since stroke in 2022.      # Essential hypertension  # Orthostatic hypotension   # Hypertensive urgency  Patient denies hypertensive emergency symptoms including confusion, vision changes, headaches, tinnitus, chest pain, and palpitations. Follows with Dr. Dumont through [facility] cardiology 6/2023. Hospitalized 12/2023 for hypertensive crisis and frequent falls. Last saw cardiology through [facility] 2/2024. Has history of significant fluctuations in BP causing hypertension and orthostatic hypotension. He had a tilt table test through [facility] that showed severe orthostatic hypotension. Has previously reported orthostatic symptoms in the mornings and high BP in afternoons when he checks at home. PTA on lisinopril 5 mg BID. Also has hydralazine PRN for high blood pressures at home. According to visit 9/17/24, he does not regularly take hydralazine due to symptoms of low pressures. He also has a loop recorder in place. Baseline BP average for patient is 150-160/. Wide swings in blood pressure an historically difficult to balance control with orthostatics.   - Continued PTA lisinopril 5 mg BID   - Continued PTA PRN hydralazine with adjusted parameters     # Type 2 diabetes mellitus   # Hyperglycemia  Chronically well controlled (hemoglobin A1c 5.9% 9/17/24). Hyperglycemic since presentation (glucose 322 2/11). Managed  with Metformin 1000 mg BID and glipizide 5 mg BID.   - Continue PTA metformin 1000 mg BID   - Increased PTA glipizide 10 mg BID AC     # Gastroesophageal Reflux  Stable. Takes famotidine (Pepcid) 40 mg BID.   - Continued PTA famotidine 40 mg BID     # ADITI, no longer on BIPAP  Stable. Diagnosed 2/2022 following witnessed apneic episodes and polysomnogram 12/2021. Patient reports stopping BIPAP therapy for his ADITI due to abdominal bloating. He reports a repeat polysomnogram after stopping BIPAP that showed minimal ADITI.   - Consider repeating a sleep study outpatient     # Dyslipidemia   Takes lovastatin 40 mg every day.  - Continue PTA lovastatin   - Continued ezetimibe started this admission     # Chronic low back pain  Secondary to prior injury years ago. Follows at [facility] in [place].    # Elevated Troponin  Troponin 28 ?  23. Low suspicion for ACS. No further monitoring.     Consultations This Hospital Stay   PHYSICAL THERAPY ADULT IP CONSULT  OCCUPATIONAL THERAPY ADULT IP CONSULT  CARE MANAGEMENT / SOCIAL WORK IP CONSULT  CARE MANAGEMENT / SOCIAL WORK IP CONSULT  NEUROLOGY IP STROKE CONSULT  SPEECH LANGUAGE PATH ADULT IP CONSULT  PHYSICAL THERAPY ADULT IP CONSULT  OCCUPATIONAL THERAPY ADULT IP CONSULT    Code Status   No CPR- Do NOT Intubate    Time Spent on this Encounter   I, Yg Gallardo MD, personally saw the patient today and spent greater than 30 minutes discharging this patient.       Yg Gallardo MD  [facility] SURGICAL  [address] [facility] [address]  Phone: [phone]  Fax: [phone]  ______________________________________________________________________    Physical Exam   Vital Signs: Temp: 98.4  F (36.9  C) Temp src: Oral BP: (!) 186/107 Pulse: 90   Resp: 20 SpO2: 98 % O2 Device: None (Room air)    Weight: 248 lbs 10.86 oz  General Appearance:  Awake and alert, laying back in bed, in no  acute distress   Respiratory: Breathing easily on  room air   Cardiovascular: Regular rate and rhythm, extremities warm and well perfused   GI: Abdomen soft, non-tender, and non-distended   Other:  Appropriate mood and affect         Primary Care Physician   [facility]    Discharge Orders      Vascular Medicine Referral      Adult Genetics & Metabolism  Referral      Primary Care - Care Coordination Referral      Primary Care - Care Coordination Referral      General info for SNF    Length of Stay Estimate: Short Term Care: Estimated # of Days <30  Condition at Discharge: Improving  Level of care:skilled   Rehabilitation Potential: Excellent  Admission H&P remains valid and up-to-date: Yes  Recent Chemotherapy: N/A  Use Nursing Home Standing Orders: Yes     Follow Up and recommended labs and tests    Follow up with physician at acute rehabilitation, you will likely require ongoing titration to you blood pressure medications gradually over the next several months.     Reason for your hospital stay    You were hospitalized with a syncopal episode and home and found to have new strokes. You are being discharged to a acute rehabilitation facility to continue therapy.     Activity - Up ad reyna    Patient has chronically high blood pressure and orthostatics, anticipate blood pressure drop when sitting up or standing. If upper limit of diastolic pressure is needed for therapy would use 110.     Physical Therapy Adult Consult    Evaluate and treat as clinically indicated.    Reason:  Deconditioning     Occupational Therapy Adult Consult    Evaluate and treat as clinically indicated.    Reason:  Deconditioning     Diet    Follow this diet upon discharge: Current Diet:Orders Placed This Encounter      Combination Diet Moderate Consistent Carb (60 g CHO per Meal) Diet     Stroke Hospital Follow Up    Please be aware that coverage of these services is subject to the terms and limitations of your health insurance plan.  Call member services at  your health plan with any benefit or coverage questions.  [place] will call you to coordinate care as prescribed by your provider. If you don t hear from a representative within 2 business days, please call [phone].         Significant Results and Procedures   Results for orders placed or performed during the hospital encounter of 02/11/25   CT Head w/o Contrast    Narrative    EXAM: CT HEAD W/O CONTRAST  LOCATION: [facility]  DATE: 2/11/2025    INDICATION: Fall, unsteady gait  COMPARISON:  MRI brain 10/22/2020.  TECHNIQUE: Routine CT Head without IV contrast. Multiplanar reformats. Dose reduction techniques were used.    FINDINGS:  INTRACRANIAL CONTENTS: No intracranial hemorrhage, extraaxial collection, or mass effect.  No CT evidence of acute infarct. Severe presumed chronic small vessel ischemic changes.  Interval development of chronic lacunar infarcts in the deep gray nuclei,   new since 2020. Mild to moderate generalized volume loss. No hydrocephalus.     VISUALIZED ORBITS/SINUSES/MASTOIDS: No intraorbital abnormality. No significant paranasal sinus mucosal disease. No middle ear or mastoid effusion.    BONES/SOFT TISSUES: No acute abnormality.      Impression    IMPRESSION:  1.  No CT evidence for acute intracranial process.  2.  Brain atrophy and presumed chronic microvascular ischemic changes, significantly progressed since 2020.     MR Brain w/o & w Contrast    Narrative    EXAM: MR BRAIN W/O and W CONTRAST  LOCATION: [facility]  DATE: 2/11/2025    INDICATION: prior stroke, having more foot drop on the right, difficult getting around  COMPARISON: 10/22/2020 brain MRI.  CONTRAST: 11 ML GADAVIST  TECHNIQUE: Routine multiplanar multisequence head MRI without and with intravenous contrast.    FINDINGS:  INTRACRANIAL CONTENTS: Multiple small foci of FLAIR hyperintense diffusion restriction compatible with acute subcentimeter lacunar  infarcts seen in the left thalamus, the posterior left centrum semiovale ovale, the right occipital and right   occipitotemporal white matter, and the right frontal white matter. No abnormal parenchymal enhancement. Scattered small foci of susceptibility at the bilateral cerebral hemispheres compatible with multiple small foci of chronic hemosiderin deposition   from chronic microhemorrhage. Severe burden FLAIR hyperintense presumed chronic small vessel ischemic change, progressed since previous. Chronic lacunar infarcts in the cuca and bilateral thalami. Chronic lacunar infarcts also seen in the inferior right   basal ganglia. Moderate diffuse parenchymal volume loss. Ventricular size is in keeping with this volume loss. Absent distal right vertebral artery flow void suggests proximal high-grade stenosis and slow flow or occlusion. Cerebellar tonsils are   normally positioned.    SELLA: No abnormality accounting for technique.    OSSEOUS STRUCTURES/SOFT TISSUES: Normal marrow signal.    ORBITS: No abnormality accounting for technique.     SINUSES/MASTOIDS: Mucosal thickening primarily involving the ethmoid air cells. No middle ear or mastoid effusion.       Impression    IMPRESSION:  1.  Multiple small foci of FLAIR hyperintense diffusion restriction compatible with acute lacunar infarcts in the left thalamus, the posterior left centrum semiovale ovale, the right occipital and right occipitotemporal white matter, and the right   frontal white matter. Scattered distribution involving multiple vascular territories suggests an embolic etiology.  2.  Absent distal right vertebral artery flow void suggests occlusion versus proximal high-grade stenosis with slow flow. This is new since previous.    3.  Severe burden chronic small vessel ischemic change with multiple chronic lacunar infarcts in the cuca, bilateral thalami, and inferior right basal ganglia. This is further accompanied by a moderate diffuse parenchymal  volume loss.  4.  Scattered small foci of chronic hemosiderin deposition from chronic microhemorrhage in the bilateral cerebral hemispheres.     CTA Head Neck with Contrast    Narrative    EXAM: CTA HEAD NECK W CONTRAST  LOCATION: [facility]  DATE: 2/11/2025    INDICATION: Acute stroke with severe stenosis.  COMPARISON: CTA 10/22/2020.  CONTRAST: 67 mL Isovue 370  TECHNIQUE: Head and neck CT angiogram with IV contrast. Axial helical CT images of the head and neck vessels obtained during the arterial phase of intravenous contrast administration. Axial 2D reconstructed images and multiplanar 3D MIP reconstructed   images of the head and neck vessels were performed by the technologist. Dose reduction techniques were used. All stenosis measurements made according to NASCET criteria unless otherwise specified.    FINDINGS:   HEAD CTA:  Intracranial right vertebral artery is fairly poorly opacified. Left vertebral artery is widely patent supplying the basilar artery.    Moderate to severe stenosis of the distal left P2 segment with apparent opacification of the distal left PCA branches. Moderate focal stenosis of the mid left P2 segment. These are new since prior.    No definite vascular cutoff of the proximal MCAs, ACAs, right PCA. No intracranial aneurysm appreciated.    NECK CTA:  RIGHT CAROTID: The right common and internal carotid arteries are patent. Mild soft and calcified plaque at the carotid bifurcation and proximal ICA without significant stenosis.    LEFT CAROTID: The left common and internal carotid arteries are patent. Mild soft and calcified plaque at the carotid bifurcation and proximal ICA without significant stenosis.    VERTEBRAL ARTERIES: The right vertebral artery is poorly opacified at its origin and showed scattered areas of subtle re-opacification in its V1 and V2 segment. There is better opacification of the distal V3 segment, although the intracranial V4 segment   is  again poorly opacified. This is new since 10/22/2020 and is concerning for dissection which is age indeterminate. The left vertebral artery is widely patent throughout the neck without significant stenosis.    AORTIC ARCH: Classic aortic arch anatomy with no significant stenosis at the origin of the great vessels.    NONVASCULAR STRUCTURES: Unremarkable.      Impression    IMPRESSION:   HEAD CTA:   1.  Poor enhancement of the intracranial right vertebral artery which may be due to slow flow versus intracranial dissection. Left vertebral artery supplies the basilar artery.  2.  Moderate to severe focal stenosis of the distal left P2 branch with moderate focal stenosis of the mid left P2 branch. These are new since 10/22/2020.  3.  No definite vascular cutoff of the proximal ACAs, MCAs, or right PCA.    NECK CTA:  1.  Right vertebral artery is poorly opacified at its origin with scattered areas of mild re-opacification in the neck. This is new since 10/22/2020 and is concerning for dissection. Dissection is age indeterminate, but could be acute. Left vertebral   artery is widely patent in the neck supplying the basilar artery.  2.  Mild atherosclerotic disease at the carotid bifurcations without significant stenosis.   CT Chest/Abdomen/Pelvis w Contrast    Narrative    EXAM: CT CHEST/ABDOMEN/PELVIS W CONTRAST  LOCATION: [facility]  DATE: 2/11/2025    INDICATION*: evaluate for occult malignancy; status post fall, syncope, heartburn, fatigue; stroke  COMPARISON: None.  TECHNIQUE: CT scan of the chest, abdomen, and pelvis was performed following injection of IV contrast. Multiplanar reformats were obtained.   CONTRAST: 70 mL Isovue 370  Limitations: Motion artifact, high KV/high-dose technique    FINDINGS:   LUNGS AND PLEURA: There are scattered 2 to 3 mm right-sided predominant pulmonary nodules, not well assessed due to motion artifact. No lobar consolidation, pleural effusions or  pneumothorax..    MEDIASTINUM/AXILLAE: Global cardiomegaly. No significant mediastinal or hilar adenopathy. Mild calcification in the region of the aortic valve.    CORONARY ARTERY CALCIFICATION: Moderate.    HEPATOBILIARY: Cholelithiasis. Distended gallbladder. No wall thickening or adjacent inflammatory stranding. No ductal dilatation.    PANCREAS: Unremarkable    SPLEEN: Unremarkable    ADRENAL GLANDS: Unremarkable    KIDNEYS/BLADDER: Symmetric uptake and excretion of contrast. Normal bladder contour.    BOWEL: No bowel obstruction    LYMPH NODES: No significant retroperitoneal adenopathy.    VASCULATURE: No abdominal aortic aneurysm. Patent portal vein.    PELVIC ORGANS: Moderate prostatic hypertrophy.    MUSCULOSKELETAL: No suspicious lytic or blastic lesions. 75% L1 compression fracture, age indeterminate. Moderate bilateral gynecomastia.      Impression    IMPRESSION:  1.  Cholelithiasis and distended gallbladder. In the setting of right upper quadrant pain or elevated bilirubin, consider gallbladder ultrasound to exclude cholecystitis.  2.  Scattered tiny pulmonary nodules, not well assessed due to motion artifact. Consider follow-up 12 month chest CT per Fleischner study guidelines.  3.  Additional findings as above.   MRA Brain ([place]) wo Contrast    Narrative    EXAM: MRA BRAIN ([place]) W/O CONTRAST, MRA NECK (CAROTIDS) W/O and W CONTRAST  LOCATION: [facility]  DATE: 2/12/2025    INDICATION: Foot drop, greater on the right. Prior stroke. Evaluate for acute right vertebral artery dissection, please include fat sat dissection protocol.  COMPARISON: MRI brain and CTA [place] performed same day. MRI brain and CTA [place] and neck 10/22/2020.  CONTRAST:  TECHNIQUE:   1) 3D time-of-flight head MRA without intravenous contrast.  2) Neck MRA without and with IV contrast. Stenosis measurements made according to NASCET criteria unless otherwise  specified.    FINDINGS:  HEAD MRA:   ANTERIOR CIRCULATION: Assessment is degraded by motion artifact. Small caliber A1 segment left anterior cerebral artery. Allowing for motion artifact, there is otherwise normal flow-related enhancement within the intracranial internal carotid arteries,   anterior cerebral arteries, and middle cerebral arteries. No vascular cutoff, aneurysm, or flow-limiting stenosis.    POSTERIOR CIRCULATION: There is no flow related enhancement within the distalmost cervical or intracranial portions of the right vertebral artery. Incidental fenestration proximal basilar artery and stable compared to the 2020 prior. There is flow   related enhancement within the posterior inferior right cerebellar artery, presumably reflecting filling via collaterals.    Left vertebral artery, basilar artery, and posterior cerebral arteries are patent. Left vertebral artery is largely filled via the left posterior communicating artery. Right posterior communicating artery not identified. Mild multifocal areas of   narrowing are seen within the P1 and P2 segments of the left posterior cerebral artery.    NECK MRA:   RIGHT CAROTID: No measurable stenosis or dissection.    LEFT CAROTID: No measurable stenosis or dissection.    VERTEBRAL ARTERIES: Left vertebral artery is widely patent, without significant stenosis or findings for dissection. There is a small amount of flow-related enhancement within the distal cervical portion of the right vertebral artery without   visualization proximally. T1 shortening is seen within the vessel which remains concerning for dissection. Appearance is stable compared to the CTA of the neck performed earlier same day.    AORTIC ARCH: Visualized aortic arch and proximal great vessels are unremarkable.      Impression    IMPRESSION:    HEAD MRA:   1.  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 There is partial filling of the right posterior inferior  cerebellar artery, presumably via collaterals.    2.  Multifocal areas of narrowing are seen within the left posterior cerebral artery as seen on prior CTA.    NECK MRA:  1.  There is a small amount of flow-related enhancement within the distal cervical portion right vertebral artery, similar to the CTA neck performed earlier same day and concerning for right vertebral artery dissection.    2.  No significant stenosis either cervical carotid system or within the left vertebral artery.   MRA Neck (Carotids) wo & w Contrast    Narrative    EXAM: MRA BRAIN ([place]) W/O CONTRAST, MRA NECK (CAROTIDS) W/O and W CONTRAST  LOCATION: [facility]  DATE: 2/12/2025    INDICATION: Foot drop, greater on the right. Prior stroke. Evaluate for acute right vertebral artery dissection, please include fat sat dissection protocol.  COMPARISON: MRI brain and CTA [place] performed same day. MRI brain and CTA [place] and neck 10/22/2020.  CONTRAST:  TECHNIQUE:   1) 3D time-of-flight head MRA without intravenous contrast.  2) Neck MRA without and with IV contrast. Stenosis measurements made according to NASCET criteria unless otherwise specified.    FINDINGS:  HEAD MRA:   ANTERIOR CIRCULATION: Assessment is degraded by motion artifact. Small caliber A1 segment left anterior cerebral artery. Allowing for motion artifact, there is otherwise normal flow-related enhancement within the intracranial internal carotid arteries,   anterior cerebral arteries, and middle cerebral arteries. No vascular cutoff, aneurysm, or flow-limiting stenosis.    POSTERIOR CIRCULATION: There is no flow related enhancement within the distalmost cervical or intracranial portions of the right vertebral artery. Incidental fenestration proximal basilar artery and stable compared to the 2020 prior. There is flow   related enhancement within the posterior inferior right cerebellar artery, presumably reflecting filling  via collaterals.    Left vertebral artery, basilar artery, and posterior cerebral arteries are patent. Left vertebral artery is largely filled via the left posterior communicating artery. Right posterior communicating artery not identified. Mild multifocal areas of   narrowing are seen within the P1 and P2 segments of the left posterior cerebral artery.    NECK MRA:   RIGHT CAROTID: No measurable stenosis or dissection.    LEFT CAROTID: No measurable stenosis or dissection.    VERTEBRAL ARTERIES: Left vertebral artery is widely patent, without significant stenosis or findings for dissection. There is a small amount of flow-related enhancement within the distal cervical portion of the right vertebral artery without   visualization proximally. T1 shortening is seen within the vessel which remains concerning for dissection. Appearance is stable compared to the CTA of the neck performed earlier same day.    AORTIC ARCH: Visualized aortic arch and proximal great vessels are unremarkable.      Impression    IMPRESSION:    HEAD MRA:   1.  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 There is partial filling of the right posterior inferior cerebellar artery, presumably via collaterals.    2.  Multifocal areas of narrowing are seen within the left posterior cerebral artery as seen on prior CTA.    NECK MRA:  1.  There is a small amount of flow-related enhancement within the distal cervical portion right vertebral artery, similar to the CTA neck performed earlier same day and concerning for right vertebral artery dissection.    2.  No significant stenosis either cervical carotid system or within the left vertebral artery.   Echocardiogram Complete     Value    LVEF  65-70%    [facility]    [id number]  [id number]  [id number]  [id number]^BRYSON^TONY^JUNIOR     [facility]  Echocardiography Laboratory  [address] [place] [address]     Name: MARILYN STACK  MRN: [id number]  :  1965  Study Date: 02/11/2025 12:59 PM  Age: 59 yrs  Gender: Male  Patient Location: [facility]  Reason For Study: CVA  Ordering Physician: TONY MASSEY  Performed By: Miguelina Murray RDCS     BSA: 2.3 m2  Height: 71 in  Weight: 248 lb  HR: 85  BP: 190/106 mmHg  ______________________________________________________________________________  Procedure  Echocardiogram with two-dimensional, color and spectral Doppler.  ______________________________________________________________________________  Interpretation Summary     Left ventricular systolic function is normal.  The visual ejection fraction is 65-70%.  No regional wall motion abnormalities noted.  There is moderate to severe concentric left ventricular hypertrophy.  Grade I or early diastolic dysfunction.  No significant change compared to prior study from January 2024. The study was  technically difficult.  ______________________________________________________________________________  Left Ventricle  The left ventricle is normal in size. There is moderate to severe concentric  left ventricular hypertrophy. Left ventricular systolic function is normal.  The visual ejection fraction is 65-70%. Grade I or early diastolic  dysfunction. No regional wall motion abnormalities noted.     Right Ventricle  The right ventricle is normal size. The right ventricular systolic function is  normal.     Atria  Normal left atrial size. Right atrial size is normal.     Mitral Valve  The mitral valve leaflets are mildly thickened. There is mild mitral annular  calcification. There is trace mitral regurgitation.     Tricuspid Valve  No tricuspid regurgitation. Right ventricular systolic pressure could not be  approximated due to inadequate tricuspid regurgitation. IVC diameter <2.1 cm  collapsing >50% with sniff suggests a normal RA pressure of 3 mmHg.     Aortic Valve  There is mild trileaflet aortic sclerosis. No aortic stenosis is present.     Pulmonic Valve  The pulmonic  valve is not well visualized.     Vessels  Aortic root dilatation is present. (42mm).     Pericardium  There is no pericardial effusion.     Rhythm  Sinus rhythm was noted.  ______________________________________________________________________________  MMode/2D Measurements & Calculations     IVSd: 1.8 cm  LVIDd: 4.1 cm  LVIDs: 2.9 cm  LVPWd: 1.4 cm  FS: 29.0 %  LV mass(C)d: 259.0 grams  LV mass(C)dI: 112.1 grams/m2  Ao root diam: 4.2 cm  LA dimension: 3.9 cm  asc Aorta Diam: 3.9 cm  LA/Ao: 0.93  Ao root diam index Ht(cm/m): 2.3  Ao root diam index BSA (cm/m2): 1.8  Asc Ao diam index BSA (cm/m2): 1.7  Asc Ao diam index Ht(cm/m): 2.1  LA Volume (BP): 68.6 ml     LA Volume Index (BP): 29.7 ml/m2  RV Base: 3.5 cm  RWT: 0.68  TAPSE: 2.9 cm     Doppler Measurements & Calculations  MV E max luis: 72.8 cm/sec  MV A max luis: 105.1 cm/sec  MV E/A: 0.69  MV dec slope: 413.4 cm/sec2  MV dec time: 0.18 sec  PA acc time: 0.14 sec  E/E' av.6  Lateral E/e': 12.2  Medial E/e': 17.0  RV S Luis: 17.7 cm/sec     ______________________________________________________________________________  Report approved by: John Reynolds MD on 2025 02:32 PM             Discharge Medications   Current Discharge Medication List        START taking these medications    Details   acetaminophen (TYLENOL) 325 MG tablet Take 2 tablets (650 mg) by mouth every 4 hours as needed for mild pain or other (and adjunct with moderate or severe pain or per patient request).    Associated Diagnoses: Personal history of transient cerebral ischemia      aspirin (ASA) 81 MG chewable tablet Take 1 tablet (81 mg) by mouth daily.    Associated Diagnoses: Personal history of transient cerebral ischemia      ezetimibe (ZETIA) 10 MG tablet Take 1 tablet (10 mg) by mouth at bedtime.    Associated Diagnoses: Personal history of transient cerebral ischemia      ondansetron (ZOFRAN ODT) 4 MG ODT tab Take 1 tablet (4 mg) by mouth every 6 hours as needed.    Associated  Diagnoses: Personal history of transient cerebral ischemia      polyethylene glycol (MIRALAX) 17 GM/Dose powder Take 17 g by mouth daily as needed for constipation.    Associated Diagnoses: Personal history of transient cerebral ischemia      traZODone (DESYREL) 50 MG tablet Take 0.5 tablets (25 mg) by mouth at bedtime.    Associated Diagnoses: Personal history of transient cerebral ischemia           CONTINUE these medications which have CHANGED    Details   glipiZIDE (GLUCOTROL) 10 MG tablet Take 1 tablet (10 mg) by mouth 2 times daily (before meals).    Associated Diagnoses: Personal history of transient cerebral ischemia      hydrALAZINE (APRESOLINE) 10 MG tablet Take 1-2 tablets (10-20 mg) by mouth every 6 hours as needed for high blood pressure (take 1 tab for SBP > 180 mmHg or 2 tabs for SBP > 220 mmHg).    Associated Diagnoses: Hypertensive crisis           CONTINUE these medications which have NOT CHANGED    Details   blood glucose monitoring (SOFTCLIX) lancets TEST BLOOD SUGAR ONCE DAILY      Blood Glucose Monitoring Suppl (ACCU-CHEK GUIDE) w/Device KIT TEST BLOOD SUGAR ONCE DAILY      BRILINTA 90 MG tablet Take 1 tablet (90 mg) by mouth 2 times daily.  Qty: 180 tablet, Refills: 0    Associated Diagnoses: Hemiparesis affecting dominant side as late effect of stroke (H); Stroke-like symptoms      famotidine (PEPCID) 40 MG tablet Take 1 tablet (40 mg) by mouth 2 times daily  Qty: 180 tablet, Refills: 3    Associated Diagnoses: Gastroesophageal reflux disease with esophagitis, unspecified whether hemorrhage      hypromellose (ARTIFICIAL TEARS) 0.5 % SOLN ophthalmic solution Place 1 drop into both eyes 4 times daily as needed for dry eyes      [employer] BLOOD GLUCOSE TEST test strip Test 1 times per day.      lisinopril (ZESTRIL) 5 MG tablet Take 1 tablet (5 mg) by mouth 2 times daily.  Qty: 180 tablet, Refills: 3    Associated Diagnoses: HTN, goal below 140/90      lovastatin (MEVACOR) 40 MG tablet Take 1 tablet  (40 mg) by mouth at bedtime  Qty: 90 tablet, Refills: 3    Associated Diagnoses: Left pontine cerebrovascular accident (H)      metFORMIN (GLUCOPHAGE XR) 500 MG 24 hr tablet Take 2 tablets (1,000 mg) by mouth 2 times daily.  Qty: 360 tablet, Refills: 1    Associated Diagnoses: Type 2 diabetes mellitus with other specified complication, without long-term current use of insulin (H)           Allergies   Allergies   Allergen Reactions    Hydrochlorothiazide Other (See Comments)     Syncope per note 08/28/2012        Penicillins Hives and Angioedema    Cortisone Hives and Swelling    Aspirin Other (See Comments)     Ringing in ears    Atorvastatin Muscle Pain (Myalgia)    No Clinical Screening - See Comments      No latex allergy-dcm    Rosuvastatin Muscle Pain (Myalgia)    Simvastatin Other (See Comments)     Drowsy          Fludrocortisone Palpitations and Other (See Comments)     Hypertension, head pounding at night, tremor

## 2025-02-14 NOTE — PROGRESS NOTES
[id number]    Pt is stronger today than previous night. Continues to set off alarm and doesn't call. Medically ready for discharge.

## 2025-02-14 NOTE — PLAN OF CARE
Problem: Stroke, Ischemic (Includes Transient Ischemic Attack)  Goal: Optimal Coping  Outcome: Progressing  Goal: Effective Bowel Elimination  Outcome: Progressing  Goal: Optimal Cerebral Tissue Perfusion  Outcome: Progressing  Goal: Optimal Cognitive Function  Outcome: Progressing  Goal: Improved Communication Skills  Outcome: Progressing  Goal: Optimal Functional Ability  Outcome: Progressing  Goal: Optimal Nutrition Intake  Outcome: Progressing  Goal: Effective Oxygenation and Ventilation  Outcome: Progressing  Intervention: Optimize Oxygenation and Ventilation  Recent Flowsheet Documentation  Taken 2/14/2025 0200 by Malgorzata George RN  Head of Bed (HOB) Positioning: HOB at 20-30 degrees  Goal: Improved Sensorimotor Function  Outcome: Progressing  Goal: Safe and Effective Swallow  Outcome: Progressing  Goal: Effective Urinary Elimination  Outcome: Progressing   Goal Outcome Evaluation:      Plan of Care Reviewed With: patient    Overall Patient Progress: improvingOverall Patient Progress: improving    Outcome Evaluation: Pt is alert, oriented, forgetful. Assist 1. Denies pain, nausea, SOB. On RA.Plan inpatient rehab at d/

## 2025-02-14 NOTE — PROGRESS NOTES
Care Management Discharge Note    Discharge Date: 02/14/2025       Discharge Disposition: Acute Rehab    Discharge Services: Transportation Services    Discharge DME: None    Discharge Transportation: family or friend will provide    Private pay costs discussed: Not applicable    Does the patient's insurance plan have a 3 day qualifying hospital stay waiver?  No    PAS Confirmation Code: N/A  Patient/family educated on Medicare website which has current facility and service quality ratings: yes    Education Provided on the Discharge Plan: Yes  Persons Notified of Discharge Plans: Patient, Significant Other, [place], [facility] Admissions [phone]  Patient/Family in Agreement with the Plan: yes    Handoff Referral Completed: Yes, [facility] PCP: Internal handoff referral completed    Additional Information:    Plan:  [facility].  Transportation provided by Meryl RENAE.  Danny will provide Meryl with instructions once she arrives at ARU for admission.      Anita Reardon RN

## 2025-02-14 NOTE — PLAN OF CARE
Physical Therapy Discharge Summary    Reason for therapy discharge:    Discharged to acute rehabilitation facility.    Progress towards therapy goal(s). See [facility] on [facility] for goal details.  Goals partially met.  Barriers to achieving goals:   discharge from facility.    Therapy recommendation(s):    Continued therapy is recommended.  Rationale/Recommendations:  Recommend continued PT at ARU to maximize safe and indep mobility prior to return home, anticipate pt will be able to tolerate 3 hours of therapy.

## 2025-02-14 NOTE — H&P
[facility]   Acute Rehabilitation Unit  Admission History and Physical    CHIEF COMPLAINT   Stroke      HISTORY OF PRESENT ILLNESS  Chris Delcid is a 59 year old right hand dominant male with past medical history of HTN, orthostatic hypotension, HLD, DM II and prior strokes who presented on 2/11 with worsening fatigue, generalized weakness and syncope resulting in falls.   He had some functional decline since spring 2024, mainly due to orthostasis and frequent episodes of hypotension and syncope. They basically had to stop all of his BP meds.  Then few weeks before admission he was just excessively fatigue and was dragging his RLE. These changes were subtle and gradual. The night before admission he had some sudden changes with clear worsening weakness in RLE and another syncopal episode.     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 MRI shows  multiple small foci of FLAIR hyperintense diffusion restriction compatible with acute lacunar infarcts in the left thalamus, the posterior left centrum semiovale ovale, the right occipital and right occipitotemporal white matter, and the right frontal white matter. Scattered distribution involving multiple vascular territories suggested an embolic etiology. MRA was done to characterize for R vert pathology dissection versus athero changes. Other work-up as listed in A/P section.     During acute hospitalization, patient was seen and evaluated by PT, OT and SLP, who collectively recommended that patient would benefit from ongoing therapies in the acute inpatient rehabilitation setting.      In review of the therapy notes, he currently the patient requires A x 1 for all ADLs and functional mobility. He is noted to have decreaed safety awareness.     Upon arrival to the rehab unit, he was doing ok. His SO Meryl was present and very supportive. He denied  "any pain. Reported slurred speech, some memory issues and changes, right > left sided weakness, paresthesia primarily at RUE and RLE,and nausea when hungry or having heartburn. He was quite nervous and wanted to go home tonight. He has baseline issue with his vision to retinal disease but no new changes in his vision. Balance has been impaired but his mobility is mostly impacted by his orthostasis.     PAST MEDICAL HISTORY   Reviewed and updated in Epic.  Past Medical History:   Diagnosis Date    Chronic low back pain 5/29/2014    Overview:  Follow at [facility] in [place]. Follow at [facility] in [place].    Essential hypertension 6/7/2003    Hyperlipidemia 3/4/2011    Orthostatic hypotension 12/26/2014    Overview:  Secondary to diabetic autonomic dysfunction?  Started midodrine Dec, 2014 Secondary to diabetic autonomic dysfunction?  Started midodrine Dec, 2014    Statin intolerance 2/13/2020    Type 2 diabetes mellitus (H) 12/26/2014    Type 2 diabetes, uncontrolled, with autonomic neuropathy (HCC)       SURGICAL HISTORY  Reviewed and updated in Epic.  Past Surgical History:   Procedure Laterality Date    FRACTURE TX, WRIST RT/LT Left 1997    Multiple surgeries, eventually needed fusion    HC SHOULDER ARTHROSCOPY, DX Right 1992    INTRAVITREAL INJECTION OU (BOTH EYES) Bilateral     Multiple injections    RETINAL REATTACHMENT         SOCIAL HISTORY  Reviewed and updated in Epic.  Marital Status: single   Living situation: lives with his SO in a house; they have 3 DANIEL from the front door, and a walkout basement. Bedrooms and shower are on the main floor.   Family support: his SO works full time outside the house; she is very supportive   Vocational History: has been on disability since 1998 since he \"broke his back\". Was in heat and air conditioning business   Tobacco use: none   Alcohol use: none   Illicit drug use: none   Social History     Socioeconomic History    Marital status:      " Spouse name: Not on file    Number of children: Not on file    Years of education: Not on file    Highest education level: Not on file   Occupational History    Not on file   Tobacco Use    Smoking status: Never     Passive exposure: Past    Smokeless tobacco: Current     Types: Snuff   Vaping Use    Vaping status: Never Used   Substance and Sexual Activity    Alcohol use: No    Drug use: No    Sexual activity: Not on file   Other Topics Concern    Not on file   Social History Narrative    Not on file     Social Drivers of Health     Financial Resource Strain: Low Risk  (2/14/2025)    Financial Resource Strain     Within the past 12 months, have you or your family members you live with been unable to get utilities (heat, electricity) when it was really needed?: No   Food Insecurity: Low Risk  (2/14/2025)    Food Insecurity     Within the past 12 months, did you worry that your food would run out before you got money to buy more?: No     Within the past 12 months, did the food you bought just not last and you didn t have money to get more?: No   Transportation Needs: High Risk (2/14/2025)    Transportation Needs     Within the past 12 months, has lack of transportation kept you from medical appointments, getting your medicines, non-medical meetings or appointments, work, or from getting things that you need?: Yes   Physical Activity: Not on file   Stress: Not on file   Social Connections: Unknown (5/15/2023)    [facility]    Social Connections     Frequency of Communication with Friends and Family: Not on file   Interpersonal Safety: Low Risk  (2/14/2025)    Interpersonal Safety     Do you feel physically and emotionally safe where you currently live?: Yes     Within the past 12 months, have you been hit, slapped, kicked or otherwise physically hurt by someone?: No     Within the past 12 months, have you been humiliated or  emotionally abused in other ways by your partner or ex-partner?: No   Housing Stability: Low Risk  (2025)    Housing Stability     Do you have housing? : Yes     Are you worried about losing your housing?: No       FAMILY HISTORY  Reviewed and updated in Epic.  Family History   Problem Relation Age of Onset    Diabetes Mother          age 49 of pneumonia    Diabetes Father     Heart Failure Father     Coronary Artery Disease Father     Myocardial Infarction Father         Stents    Hypertension Father     No Known Problems Sister     No Known Problems Brother     Esophageal Cancer Brother          PRIOR FUNCTIONAL HISTORY   He has been using a 4ww at all times since Spring 2024 mainly due to issues with his orthostasis   Mod I with basic mobility and ADLs  Stopped driving after his second stroke due to balance issues; he also has visual deficits due to retinal detachment   Right hand-dominant   He was managing his meds and was taking them regularly; however Meryl thinks he missed some doses recently   He does some light household chores   Was a lot more active and has had clear functional decline over the past 8-10 months     MEDICATIONS  Scheduled meds  Medications Prior to Admission   Medication Sig Dispense Refill Last Dose/Taking    acetaminophen (TYLENOL) 325 MG tablet Take 2 tablets (650 mg) by mouth every 4 hours as needed for mild pain or other (and adjunct with moderate or severe pain or per patient request).       aspirin (ASA) 81 MG chewable tablet Take 1 tablet (81 mg) by mouth daily.       blood glucose monitoring (SOFTCLIX) lancets TEST BLOOD SUGAR ONCE DAILY       Blood Glucose Monitoring Suppl (ACCU-CHEK GUIDE) w/Device KIT TEST BLOOD SUGAR ONCE DAILY       BRILINTA 90 MG tablet Take 1 tablet (90 mg) by mouth 2 times daily. 180 tablet 0     ezetimibe (ZETIA) 10 MG tablet Take 1 tablet (10 mg) by mouth at bedtime.       famotidine (PEPCID) 40 MG tablet Take 1 tablet (40 mg) by mouth 2 times  daily 180 tablet 3     glipiZIDE (GLUCOTROL) 10 MG tablet Take 1 tablet (10 mg) by mouth 2 times daily (before meals).       hydrALAZINE (APRESOLINE) 10 MG tablet Take 1-2 tablets (10-20 mg) by mouth every 6 hours as needed for high blood pressure (take 1 tab for SBP > 180 mmHg or 2 tabs for SBP > 220 mmHg).       hypromellose (ARTIFICIAL TEARS) 0.5 % SOLN ophthalmic solution Place 1 drop into both eyes 4 times daily as needed for dry eyes       [employer] BLOOD GLUCOSE TEST test strip Test 1 times per day.       lisinopril (ZESTRIL) 5 MG tablet Take 1 tablet (5 mg) by mouth 2 times daily. 180 tablet 3     lovastatin (MEVACOR) 40 MG tablet Take 1 tablet (40 mg) by mouth at bedtime 90 tablet 3     metFORMIN (GLUCOPHAGE XR) 500 MG 24 hr tablet Take 2 tablets (1,000 mg) by mouth 2 times daily. 360 tablet 1     ondansetron (ZOFRAN ODT) 4 MG ODT tab Take 1 tablet (4 mg) by mouth every 6 hours as needed.       polyethylene glycol (MIRALAX) 17 GM/Dose powder Take 17 g by mouth daily as needed for constipation.       traZODone (DESYREL) 50 MG tablet Take 0.5 tablets (25 mg) by mouth at bedtime.          ALLERGIES     Allergies   Allergen Reactions    Hydrochlorothiazide Other (See Comments)     Syncope per note 08/28/2012        Penicillins Hives and Angioedema    Cortisone Hives and Swelling    Aspirin Other (See Comments)     Ringing in ears    Atorvastatin Muscle Pain (Myalgia)    No Clinical Screening - See Comments      No latex allergy-dcm    Rosuvastatin Muscle Pain (Myalgia)    Simvastatin Other (See Comments)     Drowsy          Fludrocortisone Palpitations and Other (See Comments)     Hypertension, head pounding at night, tremor            REVIEW OF SYSTEMS  A 10 point ROS was performed and negative unless otherwise noted in HPI.       PHYSICAL EXAM  VITAL SIGNS:  BP (!) 145/90 (BP Location: Left arm, Patient Position: Semi-Giron's, Cuff Size: Adult Regular)   Pulse 76   Ht 1.829 m (6')   Wt 105.6 kg (232 lb 12.9  oz)   BMI 31.57 kg/m    BMI:  Estimated body mass index is 31.57 kg/m  as calculated from the following:    Height as of this encounter: 1.829 m (6').    Weight as of this encounter: 105.6 kg (232 lb 12.9 oz).     General: NAD, sitting in chair   HEENT: NC/AT, dry MM  Pulmonary: non-labored on room air, lungs CTA bilaterally  Cardiovascular: slightly tachy, no murmurs appreciated  Abdominal: soft, non-tender, non-distended, bowel sounds present  Extremities: warm, well perfused, no edema in bilateral lower extremities, no tenderness in calves - clear muscle atrophy at both hands especially first dorsal interosseous muscles. L wrist with surgical scar and fused    MSK/neuro:   Mental Status:  alert but didn't answer orientation questions due to aphasia    Cranial Nerves: no clear facial asymmetry    Sensory: unable to fully test but sensation was diminished to LT in both hands and whole RUE and whole RLE    Strength: shoulder abd 4+/5, EF 4+/5, EE 4-/5 on left and 4/5 on right, L wrist was fused, R WF 4/5,  was able 4/5 b/l. HF was 2/5 on right and 3/5 on left, KE was also 3/5 with some minimal resistance, DF was 4/5 b/l    Speech: aphasic and dysarthric    Cognition: not able to remember the details of his care       LABS  CBC RESULTS:   Recent Labs   Lab Test 02/14/25  0536 02/13/25  0536 02/12/25  0640   WBC 6.8 7.7 6.8   RBC 5.03 5.21 4.97   HGB 14.9 15.2 14.6   HCT 42.6 44.2 42.8   MCV 85 85 86   MCH 29.6 29.2 29.4   MCHC 35.0 34.4 34.1   RDW 13.2 13.2 13.2    350 352       Last Basic Metabolic Panel:  Recent Labs   Lab Test 02/14/25  2139 02/14/25  1758 02/14/25  1202 02/14/25  0747 02/14/25  0536 02/13/25  0749 02/13/25  0536 02/12/25  1158 02/12/25  0640   NA  --   --   --   --  137  --  140  --  141   POTASSIUM  --   --   --   --  3.2*  --  3.5  --  3.6   CHLORIDE  --   --   --   --  100  --  102  --  101   CO2  --   --   --   --  22  --  22  --  26   ANIONGAP  --   --   --   --  15  --  16*  --   14   * 143* 143*   < > 195*   < > 192*   < > 211*   BUN  --   --   --   --  16.8  --  16.1  --  13.9   CR  --   --   --   --  0.93  --  1.00  --  1.05   GFRESTIMATED  --   --   --   --  >90  --  86  --  81   JAMIE  --   --   --   --  9.3  --  9.3  --  9.3    < > = values in this interval not displayed.             IMPRESSION/PLAN:  Chris Delcid is a 59 year old right hand dominant male with a past medical history of HTN, orthostatic hypotension, HLD, DM II and prior stroke who was admitted on 2/11 with worsening fatigue, weakness and syncope and was found to have multifocal acute subcortical infarcts .      Admission to acute inpatient rehab 2/14/25.    Impairment group code: Stroke Ischemic 01.3 Bilateral Involvement; acute lacunar infarcts in the left thalamus, the posterior left centrum semiovale ovale, the right occipital and right occipitotemporal white matter, and the right frontal white matter, suspected embolic etiology       PT, OT and SLP 60 minutes of each daily for 6 days per week for 7 days, in addition to rehab nursing and close management of physiatrist.      2. Impairment of ADL's:  OT to work on upper and lower body self care, dressing, toileting, bathing, energy conservation techniques with use of ADs as needed.    3. Impairment of mobility:   PT to work on gait exercises, strengthening, endurance buildup, transfers with use of walker as needed.    4. Impairment of cognition/language/swallow:   SLP for cognitive evaluation and treatment strategies for higher level cognitive deficits and memory impairment.    5. Rehab RN to administer medication, patient education on medication taking, VS monitoring, bowel regimen, glucose monitoring and management of orthostasis.       Medical Conditions  # Multiple acute lacunar infarcts in both cerebral hemispheres   # Right vertebral artery dissection    Stroke History   10/2020 - L pontine stroke, not on aspirin PTA. Discharged on aspirin + Plavix x21  days followed by aspirin monotherapy.   11/12/2021 - L and R centrum semiovale infarcts. Chronic pontine infarcts (new from prior). Discharged on DAPT with Plavix monotherapy thereafter.  11/30/21 - Progression of R centrum semiovale infarct  8/24/2022 - Acute R hemipons, possible late subacute right centrum semiovale infarcts. Switched to Brilinta monotherapy    CTA head showed poor enhancement of the intracranial right vertebral artery. Moderate to severe focal stenosis of the left P2 branch   MRA showed     HEAD MRA:   1.  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 There is partial filling of the right posterior inferior cerebellar artery, presumably via collaterals.  2.  Multifocal areas of narrowing are seen within the left posterior cerebral artery as seen on prior CTA.     NECK MRA:  1.  There is a small amount of flow-related enhancement within the distal cervical portion right vertebral artery, similar to the CTA neck performed earlier same day and concerning for right vertebral artery dissection.     2.  No significant stenosis either cervical carotid system or within the left vertebral artery.    CT CAP showed   1.  Cholelithiasis and distended gallbladder. In the setting of right upper quadrant pain or elevated bilirubin, consider gallbladder ultrasound to exclude cholecystitis.  2.  Scattered tiny pulmonary nodules, not well assessed due to motion artifact. Consider follow-up 12 month chest CT per Fleischner study guidelines.  3.  Moderate prostatic hypertrophy    TTE showed EF 65 to 70%, no wall motion abnormalities, moderate to severe concentric LVH, normal left atrium   LDL was 127  HgA1C was 8.6%    --secondary stroke prevention HTN, HLD, DM, ADITI   --outpatient NIVIA  --hypercoag workup pending   --continue brilinta 90 bid and ASA was added   --continue  lovastatin 40 mg daily and zetia was added   --outpatient genetic referral given family history of stroke in the  young, can be further discussed at outpatient stroke followup   --outpatient referral to vascular medicine for consideration of PCSK9 inhibitor and further evaluation of hyperlipidemia   --neurology follow up  in 4-6 weeks with any stroke ALEXIS ([phone])   --PM&R follow up in 8-10 weeks       # DM  # Polyneuropathy ?  # Autonomic dysfunction   Goal A1c <7.0   Was not checking his BG at home but has a glucometer as his BG was well controlled   --continue PTA metformin 1000 mg BID   --increased PTA glipizide 10 mg BID AC   --BG check qid       # HTN   # 20 year h/o orthostasis   Goal BP <130/80  Has had long standing issue with orthostatic hypotension even before diagnosis of HTN. Had a tilt table test through [facility] that showed severe orthostatic hypotension. Was followed by cardiology and has a loop recorder in place   Per his SO didn't tolerate abdominal binder and compression socks in the past; was also on midodrine for a while.   Currently on lisinopril 5mg bid and hydralazine prn   Plan to slowly up titrate antihypertensive regimen as allowed by patients orthostatics eventual goal of 130 SBP outpatient     # Bilateral hand muscle atrophy and paresthesia   Concerns for focal mononeuropathy vs cervical radiculopathy; doesn't seem to be explained by polyneuropathy only    Neurology follow up as outpatient    May need NCS/EMG for more evaluation     # HLD    2/11  Documented intolerance to atorvastatin, rosuvastatin, simvastatin   --continue lovastatin and zetia as above   --outpatient referral to vascular medicine for consideration of PCSK9 inhibitor and further evaluation of hyperlipidemia     # ADITI   # Insomnia   Was not using CPAP prior to admission  Diagnosed 2/2022 following witnessed apneic episodes and polysomnogram 12/2021. Patient reports stopping BIPAP therapy for his ADITI due to abdominal bloating. He reports a repeat polysomnogram after stopping BIPAP that showed minimal ADITI.   --sleep  medicine referral as outpatient to re-establish care     --continue trazodone 25mg at bedtime     # Chronic low back pain  Secondary to prior injury years ago. Follows at [facility] in [place].    Adjustment to disability:  Clinical psychology to eval and treat if indicated  FEN: consistent CHO diet   Bowel: on prn bowel meds; continent   Bladder: continent; will check PVRs  DVT Prophylaxis: on antiplatelet   GI Prophylaxis: on famotidine due to h/o GERD; added TUMS prn  Code: DNR/DNI - confirmed upon admission   Disposition: home with SO  ELOS:  7-10 days   Rehab prognosis:  fair   Follow up Appointments on Discharge: PCP in 1-2 weeks, cardiology and NIVIA, stroke neurology, vascular surgery, PM&R         Christie Cook MD  Physical Medicine & Rehabilitation

## 2025-02-14 NOTE — PLAN OF CARE
"Goal Outcome Evaluation:      Plan of Care Reviewed With: patient    Overall Patient Progress: improvingOverall Patient Progress: improving    Outcome Evaluation: Pt A&O, some disorientation to situation with frequent reminders for fall risk.  A1 GBW. Reports chronic back pain, medication offered and refused.    Anticipating discharge.    Visit Vitals  BP (!) 169/104 (BP Location: Left arm)   Pulse 90   Temp 97.6  F (36.4  C) (Oral)   Resp 18       Problem: Adult Inpatient Plan of Care  Goal: Plan of Care Review  Description: The Plan of Care Review/Shift note should be completed every shift.  The Outcome Evaluation is a brief statement about your assessment that the patient is improving, declining, or no change.  This information will be displayed automatically on your shift  note.  Outcome: Progressing  Flowsheets (Taken 2/14/2025 1127)  Outcome Evaluation: Pt A&O, some disorientation to situation with frequent reminders for fall risk.  A1 GBW. Reports chronic back pain, medication offered and refused.  Plan of Care Reviewed With: patient  Overall Patient Progress: improving  Goal: Patient-Specific Goal (Individualized)  Description: You can add care plan individualizations to a care plan. Examples of Individualization might be:  \"Parent requests to be called daily at 9am for status\", \"I have a hard time hearing out of my right ear\", or \"Do not touch me to wake me up as it startles  me\".  Outcome: Progressing  Goal: Absence of Hospital-Acquired Illness or Injury  Outcome: Progressing  Intervention: Identify and Manage Fall Risk  Recent Flowsheet Documentation  Taken 2/14/2025 0700 by Cheyanne El RN  Safety Promotion/Fall Prevention:   activity supervised   assistive device/personal items within reach   clutter free environment maintained   mobility aid in reach   nonskid shoes/slippers when out of bed   safety round/check completed   supervised activity  Intervention: Prevent Skin Injury  Recent " Flowsheet Documentation  Taken 2/14/2025 1029 by Cheyanne El RN  Body Position: weight shifting  Taken 2/14/2025 0700 by Cheyanne El RN  Body Position:   side-lying   right   position changed independently  Intervention: Prevent and Manage VTE (Venous Thromboembolism) Risk  Recent Flowsheet Documentation  Taken 2/14/2025 0700 by Cheyanne El RN  VTE Prevention/Management: (anticoagulation therapy)   other (see comments)   SCDs off (sequential compression devices)  Goal: Optimal Comfort and Wellbeing  Outcome: Progressing  Intervention: Monitor Pain and Promote Comfort  Recent Flowsheet Documentation  Taken 2/14/2025 0700 by Cheyanne El RN  Pain Management Interventions:   medication offered but refused   pain management plan reviewed with patient/caregiver   pillow support provided   repositioned   rest  Goal: Readiness for Transition of Care  Outcome: Progressing

## 2025-02-14 NOTE — INTERIM SUMMARY
Rehab Admissions:  Called the pt and significant other, Meryl, at the request of care management. Educated them in benefits of [facility] including intensive therapies, close medical management, and rehabilitative nursing care. Educated them in location of [facility]C, driving instructions, number for RN station, [facility]. All questions answered. SO states she will be able to provide support upon pt's return home. Plan is for her to transport him to [facility] leaving hospital at 2 PM today. Pt was accepted by PMR MD at [facility] and a provider call was completed.     Determination of admission is based upon the patient's need for an intensive, interdisciplinary approach to rehabilitation, their ability to progress, their ability to tolerate intensive therapies, their need for daily physician supervision, their need for twenty four hour nursing assistance, and their ability and willingness to participate in such a program.    Danny Lin CM  Rehab Liaison/  [facility]  2/14/2025    1:45 PM

## 2025-02-15 ENCOUNTER — APPOINTMENT (OUTPATIENT)
Dept: PHYSICAL THERAPY | Facility: CLINIC | Age: 60
DRG: 057 | End: 2025-02-15
Attending: PHYSICAL MEDICINE & REHABILITATION
Payer: COMMERCIAL

## 2025-02-15 ENCOUNTER — APPOINTMENT (OUTPATIENT)
Dept: MRI IMAGING | Facility: CLINIC | Age: 60
End: 2025-02-15
Payer: COMMERCIAL

## 2025-02-15 LAB
ALBUMIN SERPL BCG-MCNC: 3.9 G/DL (ref 3.5–5.2)
ALBUMIN UR-MCNC: 50 MG/DL
ALP SERPL-CCNC: 91 U/L (ref 40–150)
ALT SERPL W P-5'-P-CCNC: 17 U/L (ref 0–70)
ANION GAP SERPL CALCULATED.3IONS-SCNC: 15 MMOL/L (ref 7–15)
ANION GAP SERPL CALCULATED.3IONS-SCNC: 16 MMOL/L (ref 7–15)
APPEARANCE UR: CLEAR
APTT PPP: 28 SECONDS (ref 22–38)
AST SERPL W P-5'-P-CCNC: 14 U/L (ref 0–45)
BASE EXCESS BLDV CALC-SCNC: 1.6 MMOL/L (ref -3–3)
BASOPHILS # BLD AUTO: 0.1 10E3/UL (ref 0–0.2)
BASOPHILS NFR BLD AUTO: 1 %
BILIRUB SERPL-MCNC: 0.8 MG/DL
BILIRUB UR QL STRIP: NEGATIVE
BUN SERPL-MCNC: 16.9 MG/DL (ref 8–23)
BUN SERPL-MCNC: 17.6 MG/DL (ref 8–23)
CA-I BLD-MCNC: 4.8 MG/DL (ref 4.4–5.2)
CALCIUM SERPL-MCNC: 9.3 MG/DL (ref 8.8–10.4)
CALCIUM SERPL-MCNC: 9.6 MG/DL (ref 8.8–10.4)
CHLORIDE SERPL-SCNC: 102 MMOL/L (ref 98–107)
CHLORIDE SERPL-SCNC: 104 MMOL/L (ref 98–107)
COLOR UR AUTO: ABNORMAL
CREAT SERPL-MCNC: 0.98 MG/DL (ref 0.67–1.17)
CREAT SERPL-MCNC: 0.99 MG/DL (ref 0.67–1.17)
EGFRCR SERPLBLD CKD-EPI 2021: 87 ML/MIN/1.73M2
EGFRCR SERPLBLD CKD-EPI 2021: 88 ML/MIN/1.73M2
EOSINOPHIL # BLD AUTO: 0.3 10E3/UL (ref 0–0.7)
EOSINOPHIL NFR BLD AUTO: 3 %
ERYTHROCYTE [DISTWIDTH] IN BLOOD BY AUTOMATED COUNT: 13.2 % (ref 10–15)
FIBRINOGEN PPP-MCNC: 417 MG/DL (ref 170–510)
GLUCOSE BLD-MCNC: 171 MG/DL (ref 70–99)
GLUCOSE BLDC GLUCOMTR-MCNC: 132 MG/DL (ref 70–99)
GLUCOSE BLDC GLUCOMTR-MCNC: 143 MG/DL (ref 70–99)
GLUCOSE BLDC GLUCOMTR-MCNC: 183 MG/DL (ref 70–99)
GLUCOSE BLDC GLUCOMTR-MCNC: 216 MG/DL (ref 70–99)
GLUCOSE SERPL-MCNC: 173 MG/DL (ref 70–99)
GLUCOSE SERPL-MCNC: 174 MG/DL (ref 70–99)
GLUCOSE UR STRIP-MCNC: NEGATIVE MG/DL
HCO3 BLDV-SCNC: 26 MMOL/L (ref 21–28)
HCO3 SERPL-SCNC: 21 MMOL/L (ref 22–29)
HCO3 SERPL-SCNC: 22 MMOL/L (ref 22–29)
HCT VFR BLD AUTO: 43.3 % (ref 40–53)
HGB BLD-MCNC: 15.1 G/DL (ref 13.3–17.7)
HGB BLD-MCNC: 15.4 G/DL (ref 13.3–17.7)
HGB UR QL STRIP: NEGATIVE
HOLD SPECIMEN: NORMAL
HOLD SPECIMEN: NORMAL
IMM GRANULOCYTES # BLD: 0.1 10E3/UL
IMM GRANULOCYTES NFR BLD: 1 %
INR PPP: 0.98 (ref 0.85–1.15)
KETONES UR STRIP-MCNC: NEGATIVE MG/DL
LACTATE BLD-SCNC: 1.4 MMOL/L (ref 0.7–2)
LEUKOCYTE ESTERASE UR QL STRIP: NEGATIVE
LYMPHOCYTES # BLD AUTO: 2.1 10E3/UL (ref 0.8–5.3)
LYMPHOCYTES NFR BLD AUTO: 24 %
MAGNESIUM SERPL-MCNC: 1.7 MG/DL (ref 1.7–2.3)
MAGNESIUM SERPL-MCNC: 1.8 MG/DL (ref 1.7–2.3)
MCH RBC QN AUTO: 29.3 PG (ref 26.5–33)
MCHC RBC AUTO-ENTMCNC: 34.9 G/DL (ref 31.5–36.5)
MCV RBC AUTO: 84 FL (ref 78–100)
MONOCYTES # BLD AUTO: 0.8 10E3/UL (ref 0–1.3)
MONOCYTES NFR BLD AUTO: 9 %
NEUTROPHILS # BLD AUTO: 5.6 10E3/UL (ref 1.6–8.3)
NEUTROPHILS NFR BLD AUTO: 63 %
NITRATE UR QL: NEGATIVE
NRBC # BLD AUTO: 0 10E3/UL
NRBC BLD AUTO-RTO: 0 /100
O2/TOTAL GAS SETTING VFR VENT: 21 %
OXYHGB MFR BLDV: 80 % (ref 70–75)
PCO2 BLDV: 38 MM HG (ref 40–50)
PH BLDV: 7.44 [PH] (ref 7.32–7.43)
PH UR STRIP: 6.5 [PH] (ref 5–7)
PHOSPHATE SERPL-MCNC: 3.5 MG/DL (ref 2.5–4.5)
PLATELET # BLD AUTO: 370 10E3/UL (ref 150–450)
PO2 BLDV: 43 MM HG (ref 25–47)
POTASSIUM BLD-SCNC: 3.6 MMOL/L (ref 3.4–5.3)
POTASSIUM SERPL-SCNC: 3.4 MMOL/L (ref 3.4–5.3)
POTASSIUM SERPL-SCNC: 3.7 MMOL/L (ref 3.4–5.3)
PROT SERPL-MCNC: 7.1 G/DL (ref 6.4–8.3)
RBC # BLD AUTO: 5.16 10E6/UL (ref 4.4–5.9)
RBC URINE: 1 /HPF
SAO2 % BLDV: 81 % (ref 70–75)
SODIUM BLD-SCNC: 142 MMOL/L (ref 135–145)
SODIUM SERPL-SCNC: 139 MMOL/L (ref 135–145)
SODIUM SERPL-SCNC: 141 MMOL/L (ref 135–145)
SP GR UR STRIP: 1 (ref 1–1.03)
SPERM #/AREA URNS HPF: PRESENT /HPF
TROPONIN T SERPL HS-MCNC: 27 NG/L
UROBILINOGEN UR STRIP-MCNC: 2 MG/DL
WBC # BLD AUTO: 8.9 10E3/UL (ref 4–11)
WBC URINE: 1 /HPF

## 2025-02-15 PROCEDURE — 250N000013 HC RX MED GY IP 250 OP 250 PS 637: Performed by: PHYSICIAN ASSISTANT

## 2025-02-15 PROCEDURE — 84484 ASSAY OF TROPONIN QUANT: CPT

## 2025-02-15 PROCEDURE — 93005 ELECTROCARDIOGRAM TRACING: CPT

## 2025-02-15 PROCEDURE — 85730 THROMBOPLASTIN TIME PARTIAL: CPT

## 2025-02-15 PROCEDURE — 70553 MRI BRAIN STEM W/O & W/DYE: CPT

## 2025-02-15 PROCEDURE — 36415 COLL VENOUS BLD VENIPUNCTURE: CPT

## 2025-02-15 PROCEDURE — 36415 COLL VENOUS BLD VENIPUNCTURE: CPT | Performed by: PHYSICIAN ASSISTANT

## 2025-02-15 PROCEDURE — 84100 ASSAY OF PHOSPHORUS: CPT

## 2025-02-15 PROCEDURE — 93010 ELECTROCARDIOGRAM REPORT: CPT | Performed by: INTERNAL MEDICINE

## 2025-02-15 PROCEDURE — 255N000002 HC RX 255 OP 636

## 2025-02-15 PROCEDURE — 82310 ASSAY OF CALCIUM: CPT | Performed by: PHYSICIAN ASSISTANT

## 2025-02-15 PROCEDURE — 84295 ASSAY OF SERUM SODIUM: CPT | Performed by: PHYSICIAN ASSISTANT

## 2025-02-15 PROCEDURE — 250N000011 HC RX IP 250 OP 636

## 2025-02-15 PROCEDURE — 84132 ASSAY OF SERUM POTASSIUM: CPT

## 2025-02-15 PROCEDURE — 70553 MRI BRAIN STEM W/O & W/DYE: CPT | Mod: 26 | Performed by: RADIOLOGY

## 2025-02-15 PROCEDURE — 250N000009 HC RX 250

## 2025-02-15 PROCEDURE — 99233 SBSQ HOSP IP/OBS HIGH 50: CPT

## 2025-02-15 PROCEDURE — 85384 FIBRINOGEN ACTIVITY: CPT | Performed by: PHYSICAL MEDICINE & REHABILITATION

## 2025-02-15 PROCEDURE — 81003 URINALYSIS AUTO W/O SCOPE: CPT

## 2025-02-15 PROCEDURE — 84295 ASSAY OF SERUM SODIUM: CPT

## 2025-02-15 PROCEDURE — 83735 ASSAY OF MAGNESIUM: CPT

## 2025-02-15 PROCEDURE — 250N000013 HC RX MED GY IP 250 OP 250 PS 637: Performed by: PHYSICAL MEDICINE & REHABILITATION

## 2025-02-15 PROCEDURE — A9585 GADOBUTROL INJECTION: HCPCS

## 2025-02-15 PROCEDURE — 99233 SBSQ HOSP IP/OBS HIGH 50: CPT | Mod: GC | Performed by: PHYSICAL MEDICINE & REHABILITATION

## 2025-02-15 PROCEDURE — 80053 COMPREHEN METABOLIC PANEL: CPT

## 2025-02-15 PROCEDURE — 83735 ASSAY OF MAGNESIUM: CPT | Performed by: PHYSICIAN ASSISTANT

## 2025-02-15 PROCEDURE — 82947 ASSAY GLUCOSE BLOOD QUANT: CPT | Performed by: PHYSICIAN ASSISTANT

## 2025-02-15 PROCEDURE — 97163 PT EVAL HIGH COMPLEX 45 MIN: CPT | Mod: GP

## 2025-02-15 PROCEDURE — 85025 COMPLETE CBC W/AUTO DIFF WBC: CPT

## 2025-02-15 PROCEDURE — 85018 HEMOGLOBIN: CPT

## 2025-02-15 PROCEDURE — 97530 THERAPEUTIC ACTIVITIES: CPT | Mod: GP

## 2025-02-15 PROCEDURE — 250N000011 HC RX IP 250 OP 636: Mod: JZ | Performed by: STUDENT IN AN ORGANIZED HEALTH CARE EDUCATION/TRAINING PROGRAM

## 2025-02-15 PROCEDURE — 128N000003 HC R&B REHAB

## 2025-02-15 PROCEDURE — 85610 PROTHROMBIN TIME: CPT

## 2025-02-15 RX ORDER — LABETALOL HYDROCHLORIDE 5 MG/ML
20 INJECTION, SOLUTION INTRAVENOUS ONCE
Status: COMPLETED | OUTPATIENT
Start: 2025-02-15 | End: 2025-02-15

## 2025-02-15 RX ORDER — IOPAMIDOL 755 MG/ML
117 INJECTION, SOLUTION INTRAVASCULAR ONCE
Status: COMPLETED | OUTPATIENT
Start: 2025-02-15 | End: 2025-02-15

## 2025-02-15 RX ORDER — GADOBUTROL 604.72 MG/ML
10 INJECTION INTRAVENOUS ONCE
Status: COMPLETED | OUTPATIENT
Start: 2025-02-15 | End: 2025-02-15

## 2025-02-15 RX ORDER — IOPAMIDOL 755 MG/ML
500 INJECTION, SOLUTION INTRAVASCULAR ONCE
Status: DISCONTINUED | OUTPATIENT
Start: 2025-02-15 | End: 2025-03-06 | Stop reason: HOSPADM

## 2025-02-15 RX ORDER — ATORVASTATIN CALCIUM 10 MG/1
10 TABLET, FILM COATED ORAL DAILY
Status: DISCONTINUED | OUTPATIENT
Start: 2025-02-15 | End: 2025-02-15

## 2025-02-15 RX ADMIN — FAMOTIDINE 40 MG: 20 TABLET, FILM COATED ORAL at 20:30

## 2025-02-15 RX ADMIN — LISINOPRIL 5 MG: 5 TABLET ORAL at 19:20

## 2025-02-15 RX ADMIN — METFORMIN ER 500 MG 1000 MG: 500 TABLET ORAL at 10:18

## 2025-02-15 RX ADMIN — GADOBUTROL 10 ML: 604.72 INJECTION INTRAVENOUS at 09:40

## 2025-02-15 RX ADMIN — Medication 25 MG: at 20:30

## 2025-02-15 RX ADMIN — Medication 10 MG: at 10:15

## 2025-02-15 RX ADMIN — LABETALOL HYDROCHLORIDE 20 MG: 5 INJECTION, SOLUTION INTRAVENOUS at 03:15

## 2025-02-15 RX ADMIN — ASPIRIN 81 MG CHEWABLE TABLET 81 MG: 81 TABLET CHEWABLE at 10:18

## 2025-02-15 RX ADMIN — POTASSIUM CHLORIDE 20 MEQ: 1500 TABLET, EXTENDED RELEASE ORAL at 19:20

## 2025-02-15 RX ADMIN — FAMOTIDINE 40 MG: 20 TABLET, FILM COATED ORAL at 10:16

## 2025-02-15 RX ADMIN — TICAGRELOR 90 MG: 90 TABLET ORAL at 10:18

## 2025-02-15 RX ADMIN — CALCIUM CARBONATE (ANTACID) CHEW TAB 500 MG 1000 MG: 500 CHEW TAB at 15:46

## 2025-02-15 RX ADMIN — SODIUM CHLORIDE 100 ML: 9 INJECTION, SOLUTION INTRAVENOUS at 04:14

## 2025-02-15 RX ADMIN — LISINOPRIL 5 MG: 5 TABLET ORAL at 10:18

## 2025-02-15 RX ADMIN — METFORMIN ER 500 MG 1000 MG: 500 TABLET ORAL at 19:20

## 2025-02-15 RX ADMIN — Medication 10 MG: at 17:18

## 2025-02-15 RX ADMIN — IOPAMIDOL 117 ML: 755 INJECTION, SOLUTION INTRAVENOUS at 04:14

## 2025-02-15 RX ADMIN — POTASSIUM CHLORIDE 20 MEQ: 1500 TABLET, EXTENDED RELEASE ORAL at 10:18

## 2025-02-15 RX ADMIN — EZETIMIBE 10 MG: 10 TABLET ORAL at 20:30

## 2025-02-15 RX ADMIN — ATORVASTATIN CALCIUM 10 MG: 10 TABLET, FILM COATED ORAL at 10:18

## 2025-02-15 RX ADMIN — TICAGRELOR 90 MG: 90 TABLET ORAL at 20:30

## 2025-02-15 ASSESSMENT — ACTIVITIES OF DAILY LIVING (ADL)
ADLS_ACUITY_SCORE: 42
ADLS_ACUITY_SCORE: 44
ADLS_ACUITY_SCORE: 42
ADLS_ACUITY_SCORE: 44
ADLS_ACUITY_SCORE: 42
ADLS_ACUITY_SCORE: 44
ADLS_ACUITY_SCORE: 42
ADLS_ACUITY_SCORE: 44
BADLS,_PREVIOUS_FUNCTIONAL_LEVEL: USES DEVICE OR EQUIPMENT
ADLS_ACUITY_SCORE: 44
IADLS,_PREVIOUS_FUNCTIONAL_LEVEL: PARTIAL ASSISTANCE
ADLS_ACUITY_SCORE: 44

## 2025-02-15 NOTE — CONSULTS
[facility]    Stroke Telephone Note    I was called by Izzy Zavala on 02/15/25 regarding patient Chris Delcid. The patient is a 59 year old male  R handed  male with past medical history of HTN, HLD, DM2, ADITI and prior strokes who presented on 2/11 with worsening fatigue, generalized weakness, recurrent falls and syncope. MRI Brain showed acute lacunar infarcts in the left thalamus, the posterior left centrum semiovale ovale, the right occipital and right occipitotemporal white matter, and the right. Code stroke stroke called due to worsening confusion about 15 min ago when they got him up to use restroom. Patient has always had some amount to confusion prior to this, reportedly he is improving.     Vitals  BP: (!) 165/82 (Post labetalol)   Pulse: 80   Resp: 20   Temp: 98.2  F (36.8  C)   Weight: 105.6 kg (232 lb 12.9 oz)    Stroke Code Data (for stroke code without tele)  Stroke code activated 02/15/25  0257   Stroke provider first response 02/15/25  0300   Last known normal 02/15/25   (unknown)      Time of discovery (or onset of symptoms) 02/15/25   (unknown)   Head CT read by Stroke Neuro Provider 02/15/25  0401   Was stroke code de-escalated? Yes  02/15/25  0401     Imaging Findings  CT head: no hemorrhage   CTA head/neck: no lvo    Intravenous Thrombolysis  Not given due to:   - minor/isolated/quickly resolving symptoms    Endovascular Treatment  Not initiated due to absence of proximal vessel occlusion    Impression    Encephalopathy     Recommendations   MRI brain w/wo con   Delirium precautions     My recommendations are based on the information provided over the phone by Perezmackenzie Delcid's in-person providers. They are not intended to replace the clinical judgment of his in-person providers. I was not requested to personally see or examine the patient at this time.     The Stroke Staff is Dr. De La Cruz.    Gallito Tesfaye MD  Vascular Neurology  "Fellow    To page me or covering stroke neurology team member, click here: AMCOM  Choose \"On Call\" tab at top, then select \"NEUROLOGY/ALL SITES\" from middle drop-down box, press Enter, then look for \"stroke\" or \"telestroke\" for your site.   "

## 2025-02-15 NOTE — CODE/RAPID RESPONSE
"[facility] Critical Response Team   RRT Note  2/15/2025     Assessment & Plan   Code blue initially called by bedside staff. Upon evaluation code blue was canceled and stroke code activated. It was described patient was being assisted to the bathroom when \"he became weak\" with acute confusion/agitation and was assisted back to bed.  Upon my evaluation patient had no focal deficits. He had intermittent incomprehensible speech, confusion, orientated to self/location, following commands, and equal strength. Provider could only perform gross neuro examination with his ongoing confusion. Agitation now improved from when initially presenting to bedside. BP found to be 200/100. Discussed situation with stroke tele physician and given his recent admission for acute stroke; it was discussed to continue with stroke code and obtain appropriate CT examinations out of abundance of caution. Dr. Vazquez present and assisted with decision making throughout initial evaluation and stroke code activation. Upon CT image review now findings of new acute stroke. Worsening confusion appears not to be related to acute stroke findings; would recommend follow up MRI will defer to primary team PM&R. Differential dx may been exacerbated hospital acquired delirium; infectious markers not concerning at this time but will obtain UA/UC to be thorough.     INTERVENTIONS:  - Labs  - IV labetolol ordered  - Goal SBP < 180  - CT imaging; no acute findings  - Stroke code subsequently de-escalated  - Recommend MRI if exam not baseline in AM  - Recommend general medicine or neurology consult in AM for follow up regarding worsening delirium    Discussed with and defer further cares to Dr. Grady with Pm&R    Interval History     Chris Delcid is a 59 year old male with past medical history of type 2 DM, dyslipidemia, essential hypertension, orthostatic hypotension, and previous left pontine CVA " with hemiparesis and expressive aphasia now presents on 2/11/2025 with generalized weakness and syncope. FTH multiple acute subcortical infarctions. Now admitted to ARU for ongoing rehab.    Code Status: No CPR- Do NOT Intubate    Allergies   Allergies   Allergen Reactions    Hydrochlorothiazide Other (See Comments)     Syncope per note 08/28/2012        Penicillins Hives and Angioedema    Cortisone Hives and Swelling    Aspirin Other (See Comments)     Ringing in ears    Atorvastatin Muscle Pain (Myalgia)    No Clinical Screening - See Comments      No latex allergy-dcm    Rosuvastatin Muscle Pain (Myalgia)    Simvastatin Other (See Comments)     Drowsy          Fludrocortisone Palpitations and Other (See Comments)     Hypertension, head pounding at night, tremor          Physical Exam   Vital Signs with Ranges:  Temp:  [97.6  F (36.4  C)-98.4  F (36.9  C)] 98.4  F (36.9  C)  Pulse:  [76-97] 97  Resp:  [18-22] 22  BP: (145-203)/() 203/105  SpO2:  [98 %] 98 %  I/O last 3 completed shifts:  In: 480 [P.O.:480]  Out: 10 [Emesis/NG output:10]      EYES: PERRL, Anicteric sclera.   HEENT:  Normocephalic, atraumatic, trachea midline, Pupils PERRLA  CV: RRR, no gallops, rubs, or murmurs  PULM/CHEST: Clear breath sounds bilaterally without rhonchi, crackles or wheeze, symmetric chest rise  GI: normal bowel sounds, soft, obese, non-tender, no rebound tenderness or guarding, no masses  : Voids spontaneously  EXTREMITIES: No peripheral edema, moving all extremities, peripheral pulses intact  NEURO: Opening eyes spontaneously, following commands, confused, intermittent incomprehensible speech, and equal strength with gross motor examination. Exam limited to confusion  SKIN: No rashes, sores or ulcerations       Data         ABG:  -  Recent Labs   Lab 02/15/25  0246   O2PER 21.0       Troponin:    Recent Labs   Lab Test 10/23/20  0502   TROPI <0.015       IMAGING: (X-ray/CT/MRI)   No results found for this or any previous  "visit (from the past 24 hours).    CBC with Diff:  Recent Labs   Lab Test 02/15/25  0246 02/15/25  0245 02/13/25  0536 02/12/25  1143   WBC  --  8.9   < >  --    HGB 15.4 15.1   < >  --    MCV  --  84   < >  --    PLT  --  370   < >  --    INR  --   --   --  1.04    < > = values in this interval not displayed.        Lactic Acid:    Lab Results   Component Value Date    LACT 1.4 02/15/2025           Comprehensive Metabolic Panel:  Recent Labs   Lab 02/15/25  0246 02/14/25  0747 02/14/25  0536 02/12/25  1158 02/12/25  0640     --  137   < > 141   POTASSIUM 3.6  --  3.2*   < > 3.6   CHLORIDE  --   --  100   < > 101   CO2  --   --  22   < > 26   ANIONGAP  --   --  15   < > 14   *   < > 195*   < > 211*   BUN  --   --  16.8   < > 13.9   CR  --   --  0.93   < > 1.05   GFRESTIMATED  --   --  >90   < > 81   JAMIE  --   --  9.3   < > 9.3   PROTTOTAL  --   --   --   --  6.9   ALBUMIN  --   --   --   --  3.9   BILITOTAL  --   --   --   --  0.6   ALKPHOS  --   --   --   --  88   AST  --   --   --   --  18   ALT  --   --   --   --  19    < > = values in this interval not displayed.       INR:    Recent Labs   Lab Test 02/12/25  1143   INR 1.04       D-DIMER:  No results found for: \"DIMER\"    BNP:  No results found for: \"BNP\"    UA:  Recent Labs   Lab 02/11/25  1301   COLOR Yellow   APPEARANCE Clear   URINEGLC >1000*   URINEBILI Negative   URINEKETONE 10*   SG 1.034   UBLD Negative   URINEPH 6.0   PROTEIN 100*   NITRITE Negative   LEUKEST Negative   RBCU 4*   WBCU 1       Time Spent on this Encounter   I spent  60 minutes on the unit/floor managing the care of Chris Delcid. Over 50% of my time was spent counseling the patient and/or coordinating care regarding services listed in this note.        MAGDA Cavazos CNP   "

## 2025-02-15 NOTE — CONSULTS
Social Work: Initial Assessment with Discharge Plan    Patient Name: Chris Delcid  : 1965  Age: 59 year old  MRN: [id number]  Completed assessment with: Chart review and interview with patient.   Admitted to ARU: 2025    Presenting Information   Date of SW assessment: February 15, 2025  Health Care Directive: Provided education  Primary Health Care Agent: Patient/self.   Secondary Health Care Agent: In absence of HCD, per  [facility] policy, pt's [facility]  would be HCD agent.  Living Situation: Pt lives in a house with his significant other.   Previous Functional Status: Dependent ADLs:: Ambulation-walker, Bathing, Dressing, Eating, Grooming, Positioning, Transfers, Toileting  Dependent IADLs:: Cleaning, Cooking, Laundry, Shopping, Meal Preparation, Medication Management, Money Management, Transportation.  DME available: cane, straight, grab bar, tub/shower, shower chair, commode chair, walker, rolling. See therapy evaluation for more information   Patient and family understanding of hospitalization: Appropriate and Pleasant.  Cultural/Language/Spiritual Considerations: Pt is a 59-year-old white male,  and English- speaking.    Physical Health  Reason for admission:  Pt is a 59 year old male with history significant for prior strokes, DM2, HLD, HTN.  He is on Brilinta.  He was admitted  for generalized weakness and syncope.  MRI brain on admission revealed multifocal punctate infarcts.  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 MRI shows  multiple small foci of FLAIR hyperintense diffusion restriction compatible with acute lacunar infarcts in the left thalamus, the posterior left centrum semiovale ovale, the right occipital and right occipitotemporal white matter, and the right frontal white matter. Scattered distribution involving multiple vascular territories suggests an embolic etiology. Pt has a familial  "history of strokes, and so will benefit from OP follow up with  for further testing. His medical course has been complicated by significant hypertension, functional weakness. He is medically ready for transfer to [facility] for intensive therapies.        Provider Information   Primary Care Physician:Roni - JUNIOR [facility]   ARU HUC will schedule PCP apt at discharge.   : Pt denied.    Mental Health/Chemical Dependency:   Diagnosis: Pt stated that his MH is okay.  Alcohol/Tobacco/Narcotis: Pt stated that he would chew tobacco once in a while but denied alcohol usage.  Support/Services in Place: Pt denied  Services Needed/Recommended: [facility] and Health Psychology support while on ARU available.   Sexuality/Intimacy: Not discussed     Support System  Marital Status:    Family support: Significant other- Meryl  Other support available: Friend  Areas of fulfillment/lalo:     Community Resources  Current in home services: None reported  Previous services: None reported  Social support, individual psychotherapy, and medications   What associations, organizations or groups have been especially helpful to you in the past ?    Financial/Employment/Education  Employment Status: Disable  Income Source: Disability  Education: College-Master's Degree  Financial Concerns:  None reported  Insurance: [employer]     ------------------------------------------------------------------------------------------------------------  KRISH Pain Assessment    Pain Effect on Sleep  Over the past 5 days, how much of the time has pain made it hard for you to sleep at night?\"    3. Frequently    Pain Interference with Therapy Activities  \"Over the past 5 days, how often have you limited your participation in rehabilitation therapy sessions due to pain?\"  0. Does not apply - I have not had any pain or hurting in the past 5 days    Pain Interference with Day-to-Day Activities  \"Over the past 5 days, " "how often have you limited your day-to-day activities (excluding rehabilitation therapy sessions) because of pain?\"  3. Frequently  -------------------------------------------------------------------------------------------------------------    TRANSPORTATION:    Has lack of transportation kept you from medical appointments, meetings, work, or from getting things needed for daily living?  A. Yes, it has kept me from medical appointments or from getting my medications  B. Yes, it has kept me from non-medical meetings, appointments, work or from getting things that I need  C. No  X. Patient Unable to respond  Y. Patient declines to respond  -------------------------------------------------------------------------------------------------------------  Health Literacy:   How Often do you need to have someone help you when you read instructions, pamphlets, or other written material from your doctor or pharmacy?  0.       Never  1.       Rarely  2.       Sometimes  3.       Often  4.       Always  5.       Patient declines to respond  6.       Patient unable to respond  ------------------------------------------------------------------------------------------------------------    SOCIAL ISOLATION  How often do you feel lonely or isolated from those around you?  0.       Never  1.       Rarely  2.       Sometimes  3.       Often  4.       Always  5.       Patient declines to respond  6.       Patient unable to respond  -------------------------------------------------------------------------------------------------------------    Discharge Plan     Patient and family discharge goal: To go home.  Provided Education on discharge plan: Evaluations and discharge recommendations pending.   Patient agreeable to discharge plan:  Pending further discussion. Evaluations and discharge recommendations pending.   Provided education and attained signature for Medicare IM and IRF Patient Rights and Privacy Information provided to patient " : YES  Provided patient with [place] [facility] Resources: YES  Barriers to discharge: Stairs to enter home.    Discharge Recommendations   Disposition: home with homecare and with outpatient therapy.  Transportation Needs: Patient, family/friends, paid transport, insurance transport (if applicable)   Name of Transportation Company and Phone: TBD    Additional comments   Discharge TBD, ELOS 7 days . Evals and discharge needs pending.     SW met with pt at bedside and introduced self and role.     SW will continue to remain available for patient and family support, discharge planning, and access to resources.      Sierra Valdivia [facility], Acute Inpatient Rehab Unit-CASUAL  [facility] [address] [place] [address]  Phone: [phone]  Fax: [phone]

## 2025-02-15 NOTE — PROGRESS NOTES
[facility]   Acute Rehabilitation Unit  Daily progress note    INTERVAL HISTORY  Writer paged around 0300 about update on patient being weaker and non-responsive. Patient was found by nursing sitting at edge of bed on his way to the bathroom. Nursing assisted patient using gait belt and walker to the bathroom when it was noticed that patient was having weakness with standing and mobility. Patient was placed in wheelchair where he slumped to his side and became unresponsive. Patient was lifted back into bed where Code Blue was initially called but then was downgraded to RRT. ICU provider team recommended stroke code be called. Neurology evaluated patient at bedside. Vital signs showed /105 when resting in bed. CT imaging showed no hemorrhage or LVO. Lab work-up unremarkable. Patient was deemed not requiring transfer    This morning, nursing staff stated that patient had similar episode of being disoriented not being able to transfer from chair to bed.  Writer and Dr. Cook arrived at bedside to evaluate.  While in chair, patient was not responsive to questions.  Blood pressure noticeably lower than usual, 113/71.  Presentation likely related to either new stroke or related to orthostatic hypotension.  Requested nursing staff to assist with lifting patient back into bed.  Once patient was in bed and sitting up, patient became more responsive to questions.  Presentation likely related to positional orthostatic hypotension while sitting up in chair but MRI brain was ordered to rule out acute stroke.  MRI results showed no evidence of new infarcts.  Neurology evaluated results and signed off.  Discussed with nursing staff to encourage having patient stay in bed at this time to prevent further episodes of disorientation and confusion related to hypotension.     Functionally:  PT, OT, SLP evaluations pending    MEDICATIONS  Scheduled meds  Current Facility-Administered  Medications   Medication Dose Route Frequency Provider Last Rate Last Admin    aspirin (ASA) chewable tablet 81 mg  81 mg Oral Daily Nichelle Chavez PA   81 mg at 02/15/25 1018    atorvastatin (LIPITOR) tablet 10 mg  10 mg Oral Daily Nichelle Chavez PA   10 mg at 02/15/25 1018    ezetimibe (ZETIA) tablet 10 mg  10 mg Oral At Bedtime Nichelle Chavez PA   10 mg at 02/14/25 2037    famotidine (PEPCID) tablet 40 mg  40 mg Oral BID Nick Zavala MD   40 mg at 02/15/25 1016    glipiZIDE (GLUCOTROL) half-tab 10 mg  10 mg Oral BID AC Nichelle Chavez PA   10 mg at 02/15/25 1015    iopamidol (ISOVUE-370) solution 500 mL  500 mL Intravenous Once Dakotah Huggins APRN CNP        lisinopril (ZESTRIL) tablet 5 mg  5 mg Oral BID Nichelle Chavez PA   5 mg at 02/15/25 1018    metFORMIN (GLUCOPHAGE XR) 24 hr tablet 1,000 mg  1,000 mg Oral BID Nichelle Chavez PA   1,000 mg at 02/15/25 1018    potassium chloride marek ER (KLOR-CON M20) CR tablet 20 mEq  20 mEq Oral BID Nichelle Chavez PA   20 mEq at 02/15/25 1018    sodium chloride 0.9 % bag for CT scan flush use  100 mL As instructed Once Dakotah Huggins APRN CNP        ticagrelor (BRILINTA) tablet 90 mg  90 mg Oral BID Nichelle Chavez PA   90 mg at 02/15/25 1018    traZODone (DESYREL) half-tab 25 mg  25 mg Oral At Bedtime Nichelle Chavez PA   25 mg at 02/14/25 2140       PRN meds:  Current Facility-Administered Medications   Medication Dose Route Frequency Provider Last Rate Last Admin    acetaminophen (TYLENOL) tablet 650 mg  650 mg Oral Q4H PRN Nichelle Chavez PA        calcium carbonate (TUMS) chewable tablet 500-1,000 mg  500-1,000 mg Oral TID PRN Christie Cook MD   1,000 mg at 02/15/25 1546    carboxymethylcellulose PF (REFRESH PLUS) 0.5 % ophthalmic solution 1 drop  1 drop Both Eyes 4x Daily PRN Nick Zavala MD        glucose gel 15-30 g  15-30 g Oral Q15 Min PRN Nichelle Chavez PA        Or    dextrose 50 % injection  25-50 mL  25-50 mL Intravenous Q15 Min PRN Nichelle Chavez PA        Or    glucagon injection 1 mg  1 mg Subcutaneous Q15 Min PRN Nichelle Chavez PA        hydrALAZINE (APRESOLINE) tablet 10 mg  10 mg Oral Q6H PRN Nichelle Chavez PA        Or    hydrALAZINE (APRESOLINE) tablet 20 mg  20 mg Oral Q6H PRN Nichelle Chavez PA        polyethylene glycol (MIRALAX) powder 17 g  17 g Oral Daily PRN Nichelle Chavez PA         PHYSICAL EXAM  BP (!) 155/86 (BP Location: Right arm)   Pulse 88   Temp 98.9  F (37.2  C) (Oral)   Resp 18   Ht 1.829 m (6')   Wt 105.6 kg (232 lb 12.9 oz)   SpO2 100%   BMI 31.57 kg/m    General: Awake, alert, NAD, unresponsive while sitting up in chair.  Able to answer questions while sitting up in bed.  HEENT: Normocephalic, atraumatic, MMM  Cardiac: no LE edema  Pulm: breathing comfortably on RA  Abd: Soft, nontender, nondistended, BS+  MSK: Moving all extremities spontaneously   Neuro: Initially unresponsive while sitting up in chair.  Improved communication while sitting up in bed.  Aphasic and dysarthric.  Skin: No lesions/rashes appreciated on visible skin    LABS  Recent Results (from the past 24 hours)   Glucose by meter    Collection Time: 02/14/25  5:58 PM   Result Value Ref Range    GLUCOSE BY METER POCT 143 (H) 70 - 99 mg/dL   Glucose by meter    Collection Time: 02/14/25  9:39 PM   Result Value Ref Range    GLUCOSE BY METER POCT 141 (H) 70 - 99 mg/dL   Comprehensive metabolic panel    Collection Time: 02/15/25  2:45 AM   Result Value Ref Range    Sodium 141 135 - 145 mmol/L    Potassium 3.7 3.4 - 5.3 mmol/L    Carbon Dioxide (CO2) 22 22 - 29 mmol/L    Anion Gap 15 7 - 15 mmol/L    Urea Nitrogen 17.6 8.0 - 23.0 mg/dL    Creatinine 0.99 0.67 - 1.17 mg/dL    GFR Estimate 87 >60 mL/min/1.73m2    Calcium 9.6 8.8 - 10.4 mg/dL    Chloride 104 98 - 107 mmol/L    Glucose 174 (H) 70 - 99 mg/dL    Alkaline Phosphatase 91 40 - 150 U/L    AST 14 0 - 45 U/L    ALT 17 0 - 70  U/L    Protein Total 7.1 6.4 - 8.3 g/dL    Albumin 3.9 3.5 - 5.2 g/dL    Bilirubin Total 0.8 <=1.2 mg/dL   Magnesium    Collection Time: 02/15/25  2:45 AM   Result Value Ref Range    Magnesium 1.8 1.7 - 2.3 mg/dL   Phosphorus    Collection Time: 02/15/25  2:45 AM   Result Value Ref Range    Phosphorus 3.5 2.5 - 4.5 mg/dL   Partial thromboplastin time    Collection Time: 02/15/25  2:45 AM   Result Value Ref Range    aPTT 28 22 - 38 Seconds   INR    Collection Time: 02/15/25  2:45 AM   Result Value Ref Range    INR 0.98 0.85 - 1.15   Troponin T, High Sensitivity    Collection Time: 02/15/25  2:45 AM   Result Value Ref Range    Troponin T, High Sensitivity 27 (H) <=22 ng/L   CBC with platelets and differential    Collection Time: 02/15/25  2:45 AM   Result Value Ref Range    WBC Count 8.9 4.0 - 11.0 10e3/uL    RBC Count 5.16 4.40 - 5.90 10e6/uL    Hemoglobin 15.1 13.3 - 17.7 g/dL    Hematocrit 43.3 40.0 - 53.0 %    MCV 84 78 - 100 fL    MCH 29.3 26.5 - 33.0 pg    MCHC 34.9 31.5 - 36.5 g/dL    RDW 13.2 10.0 - 15.0 %    Platelet Count 370 150 - 450 10e3/uL    % Neutrophils 63 %    % Lymphocytes 24 %    % Monocytes 9 %    % Eosinophils 3 %    % Basophils 1 %    % Immature Granulocytes 1 %    NRBCs per 100 WBC 0 <1 /100    Absolute Neutrophils 5.6 1.6 - 8.3 10e3/uL    Absolute Lymphocytes 2.1 0.8 - 5.3 10e3/uL    Absolute Monocytes 0.8 0.0 - 1.3 10e3/uL    Absolute Eosinophils 0.3 0.0 - 0.7 10e3/uL    Absolute Basophils 0.1 0.0 - 0.2 10e3/uL    Absolute Immature Granulocytes 0.1 <=0.4 10e3/uL    Absolute NRBCs 0.0 10e3/uL   Fibrinogen activity    Collection Time: 02/15/25  2:45 AM   Result Value Ref Range    Fibrinogen Activity 417 170 - 510 mg/dL   Extra Red Top Tube    Collection Time: 02/15/25  2:45 AM   Result Value Ref Range    Hold Specimen JIC    EKG 12-lead, complete    Collection Time: 02/15/25  2:45 AM   Result Value Ref Range    Systolic Blood Pressure  mmHg    Diastolic Blood Pressure  mmHg    Ventricular Rate  99 BPM    Atrial Rate 99 BPM    IL Interval 188 ms    QRS Duration 92 ms     ms    QTc 474 ms    P Axis 70 degrees    R AXIS 1 degrees    T Axis 51 degrees    Interpretation ECG       ** Poor data quality, interpretation may be adversely affected  Sinus rhythm  Inferior infarct (cited on or before 11-Feb-2025)  Abnormal ECG  When compared with ECG of 11-Feb-2025 03:13,  Premature ventricular complexes are no longer Present  Premature atrial complexes are no longer Present  Criteria for Septal infarct are no longer Present     Venous Panel POCT    Collection Time: 02/15/25  2:46 AM   Result Value Ref Range    pH Venous POCT 7.44 (H) 7.32 - 7.43    pCO2 Venous POCT 38 (L) 40 - 50 mm Hg    pO2 Venous POCT 43 25 - 47 mm Hg    Bicarbonate Venous POCT 26 21 - 28 mmol/L    Sodium POCT 142 135 - 145 mmol/L    Potassium POCT 3.6 3.4 - 5.3 mmol/L    Hemoglobin POCT 15.4 13.3 - 17.7 g/dL    Oxyhemoglobin Venous POCT 80 (H) 70 - 75 %    Glucose Whole Blood POCT 171 (H) 70 - 99 mg/dL    Base Excess/Deficit (+/-) POCT 1.6 -3.0 - 3.0 mmol/L    Calcium, Ionized Whole Blood POCT 4.8 4.4 - 5.2 mg/dL    O2 Sat, Venous POCT 81 (H) 70 - 75 %    Lactic Acid POCT 1.4 0.7 - 2.0 mmol/L    FIO2 POCT 21.0 %   Glucose by meter    Collection Time: 02/15/25  3:13 AM   Result Value Ref Range    GLUCOSE BY METER POCT 183 (H) 70 - 99 mg/dL   UA with Microscopic reflex to Culture    Collection Time: 02/15/25  5:54 AM    Specimen: Urine, Clean Catch   Result Value Ref Range    Color Urine Light Yellow Colorless, Straw, Light Yellow, Yellow    Appearance Urine Clear Clear    Glucose Urine Negative Negative mg/dL    Bilirubin Urine Negative Negative    Ketones Urine Negative Negative mg/dL    Specific Gravity Urine 1.005 1.003 - 1.035    Blood Urine Negative Negative    pH Urine 6.5 5.0 - 7.0    Protein Albumin Urine 50 (A) Negative mg/dL    Urobilinogen Urine 2.0 Normal, 2.0 mg/dL    Nitrite Urine Negative Negative    Leukocyte Esterase Urine  Negative Negative    Sperm Urine Present (A) None Seen /HPF    RBC Urine 1 <=2 /HPF    WBC Urine 1 <=5 /HPF   Basic metabolic panel    Collection Time: 02/15/25  5:56 AM   Result Value Ref Range    Sodium 139 135 - 145 mmol/L    Potassium 3.4 3.4 - 5.3 mmol/L    Chloride 102 98 - 107 mmol/L    Carbon Dioxide (CO2) 21 (L) 22 - 29 mmol/L    Anion Gap 16 (H) 7 - 15 mmol/L    Urea Nitrogen 16.9 8.0 - 23.0 mg/dL    Creatinine 0.98 0.67 - 1.17 mg/dL    GFR Estimate 88 >60 mL/min/1.73m2    Calcium 9.3 8.8 - 10.4 mg/dL    Glucose 173 (H) 70 - 99 mg/dL   Magnesium    Collection Time: 02/15/25  5:56 AM   Result Value Ref Range    Magnesium 1.7 1.7 - 2.3 mg/dL   Extra Purple Top Tube    Collection Time: 02/15/25  5:56 AM   Result Value Ref Range    Hold Specimen JIC    Glucose by meter    Collection Time: 02/15/25  8:29 AM   Result Value Ref Range    GLUCOSE BY METER POCT 216 (H) 70 - 99 mg/dL       ASSESSMENT AND PLAN  Chris Delcid is a 59 year old right hand dominant male with a past medical history of HTN, orthostatic hypotension, HLD, DM II and prior stroke who was admitted on 2/11 with worsening fatigue, weakness and syncope and was found to have multifocal acute subcortical infarcts .  Admission to acute inpatient rehab 2/14/25.    Impairment group code: Stroke Ischemic 01.3 Bilateral Involvement; acute lacunar infarcts in the left thalamus, the posterior left centrum semiovale ovale, the right occipital and right occipitotemporal white matter, and the right frontal white matter, suspected embolic etiology     PT, OT and SLP 60 minutes of each daily for 6 days per week for 7 days, in addition to rehab nursing and close management of physiatrist.     2. Impairment of ADL's:  OT to work on upper and lower body self care, dressing, toileting, bathing, energy conservation techniques with use of ADs as needed.  3. Impairment of mobility:   PT to work on gait exercises, strengthening, endurance buildup, transfers with use  of walker as needed.  4. Impairment of cognition/language/swallow:   SLP for cognitive evaluation and treatment strategies for higher level cognitive deficits and memory impairment.  5. Rehab RN to administer medication, patient education on medication taking, VS monitoring, bowel regimen, glucose monitoring and management of orthostasis.      Medical Conditions  # Multiple acute lacunar infarcts in both cerebral hemispheres   # Right vertebral artery dissection  Stroke History   10/2020 - L pontine stroke, not on aspirin PTA. Discharged on aspirin + Plavix x21 days followed by aspirin monotherapy.   11/12/2021 - L and R centrum semiovale infarcts. Chronic pontine infarcts (new from prior). Discharged on DAPT with Plavix monotherapy thereafter.  11/30/21 - Progression of R centrum semiovale infarct  8/24/2022 - Acute R hemipons, possible late subacute right centrum semiovale infarcts. Switched to Brilinta monotherapy  CTA head showed poor enhancement of the intracranial right vertebral artery. Moderate to severe focal stenosis of the left P2 branch   MRA showed:  HEAD MRA:   1.  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 There is partial filling of the right posterior inferior cerebellar artery, presumably via collaterals.  2.  Multifocal areas of narrowing are seen within the left posterior cerebral artery as seen on prior CTA  NECK MRA:  1.  There is a small amount of flow-related enhancement within the distal cervical portion right vertebral artery, similar to the CTA neck performed earlier same day and concerning for right vertebral artery dissection.  2.  No significant stenosis either cervical carotid system or within the left vertebral artery.  CT CAP showed   1.  Cholelithiasis and distended gallbladder. In the setting of right upper quadrant pain or elevated bilirubin, consider gallbladder ultrasound to exclude cholecystitis.  2.  Scattered tiny pulmonary nodules, not well  assessed due to motion artifact. Consider follow-up 12 month chest CT per Fleischner study guidelines.  3.  Moderate prostatic hypertrophy  TTE showed EF 65 to 70%, no wall motion abnormalities, moderate to severe concentric LVH, normal left atrium   LDL was 127  HgA1C was 8.6%     --secondary stroke prevention HTN, HLD, DM, ADITI   --outpatient NIVIA  --hypercoag workup pending   --continue brilinta 90 bid and ASA was added   --continue  lovastatin 40 mg daily and zetia was added   --outpatient genetic referral given family history of stroke in the young, can be further discussed at outpatient stroke followup   --outpatient referral to vascular medicine for consideration of PCSK9 inhibitor and further evaluation of hyperlipidemia   --neurology follow up  in 4-6 weeks with any stroke ALEXIS ([phone])   --PM&R follow up in 8-10 weeks      #Unresponsive episode x2 on 2/15  In early morning 2/15,  patient was noted by nursing staff to be weaker and non-responsive. Patient was found by nursing sitting at edge of bed on his way to the bathroom. Nursing assisted patient using gait belt and walker to the bathroom when it was noticed that patient was having weakness with standing and mobility. Patient was placed in wheelchair where he slumped to his side and became unresponsive. Patient was lifted back into bed where Code Blue was initially called but then was downgraded to RRT. ICU provider team recommended stroke code be called. Neurology evaluated patient at bedside. Vital signs showed /105 when resting in bed. CT imaging showed no hemorrhage or LVO. Lab work-up unremarkable. Patient was deemed not requiring transfer to acute care. Later in the morning, nursing staff stated that patient had similar episode of being disoriented and not being able to transfer from chair to bed.  Rehab medical team arrived at bedside to evaluate.  While in chair, patient was not responsive to questions.  Blood pressure noticeably lower  than usual, 113/71.  Presentation was likely related to either new stroke or orthostatic hypotension.  Nursing staff to assisted with lifting patient back into bed.  Once patient was in bed and sitting up, patient became more responsive to questions.  Presentation likely related to positional orthostatic hypotension while sitting up in chair but MRI brain was ordered to rule out acute stroke.  MRI results showed no evidence of new infarcts.  Neurology evaluated results and confirmed no new strokes and signed off.    - Discussed with nursing staff to encourage having patient stay in bed at this time to prevent further episodes of disorientation and confusion related to orthostatic hypotension  -Continue to assess cognition and vital signs   -Low threshold to transfer to acute care if patient continues to demonstrate altered mental status through orthostatic hypotension     # HTN   # 20 year h/o orthostasis   Goal BP <130/80  Has had long standing issue with orthostatic hypotension even before diagnosis of HTN. Had a tilt table test through [facility] that showed severe orthostatic hypotension. Was followed by cardiology and has a loop recorder in place   Per his SO didn't tolerate abdominal binder and compression socks in the past; was also on midodrine for a while.   -Currently on lisinopril 5mg bid and hydralazine prn   -Plan to slowly up titrate antihypertensive regimen as allowed by patients orthostatics eventual goal of 130 SBP outpatient     # DM  # Polyneuropathy ?  # Autonomic dysfunction   Goal A1c <7.0   Was not checking his BG at home but has a glucometer as his BG was well controlled   --continue PTA metformin 1000 mg BID   --increased PTA glipizide 10 mg BID AC   --BG check qid      # Bilateral hand muscle atrophy and paresthesia   Concerns for focal mononeuropathy vs cervical radiculopathy; doesn't seem to be explained by polyneuropathy only    -Neurology follow up as outpatient    -May need NCS/EMG  for more evaluation      # HLD    2/11  Documented intolerance to atorvastatin, rosuvastatin, simvastatin   --continue lovastatin and zetia as above   --outpatient referral to vascular medicine for consideration of PCSK9 inhibitor and further evaluation of hyperlipidemia      # ADITI   # Insomnia   Was not using CPAP prior to admission  Diagnosed 2/2022 following witnessed apneic episodes and polysomnogram 12/2021. Patient reports stopping BIPAP therapy for his ADITI due to abdominal bloating. He reports a repeat polysomnogram after stopping BIPAP that showed minimal ADITI.   --sleep medicine referral as outpatient to re-establish care     --continue trazodone 25mg at bedtime      # Chronic low back pain  Secondary to prior injury years ago. Follows at [facility] in [place].     Adjustment to disability:  Clinical psychology to eval and treat if indicated  FEN: consistent CHO diet   Bowel: on prn bowel meds; continent   Bladder: continent; will check PVRs  DVT Prophylaxis: on antiplatelet   GI Prophylaxis: on famotidine due to h/o GERD; added TUMS prn  Code: DNR/DNI - confirmed upon admission   Disposition: home with SO  ELOS:  7-10 days  Rehab prognosis:  fair   Follow up Appointments on Discharge: PCP in 1-2 weeks, cardiology and NIVIA, stroke neurology, vascular surgery, PM&R       Patient reviewed with attending physician, Dr. Cook, who agrees with the assessment and plan.    Tomas Grady DO  N PM&R PGY-2  02/15/2025  Pager #: [phone]           I, Christie Cook, saw this patient with my resident Dr. Grady and agree with his findings and plan of care as documented in his note.     I personally reviewed the chart (vitals signs, medications, and labs).     I was present during the interview and exam, reviewed the plan as outlined and edited the above note.     I saw him multiple times today and talked to his SO over the phone and again in his room. Also had a long discussion with nursing and therapy  team. Orthostasis remains a major barrier for his mobility as noted above and in the therapy notes. Safety is another major issue given his aphasia and cognitive deficits, impulsivity and medical issues as above.   Eval not done today.     Christie Cook MD  Physical Medicine & Rehabilitation

## 2025-02-15 NOTE — PLAN OF CARE
Goal Outcome Evaluation:      Plan of Care Reviewed With: patient    Overall Patient Progress: no changeOverall Patient Progress: no change     Pt alert, disoriented to situation with intermittent confusion. Denies chest pain, shortness of breath, and numbness or tingling. Continent of both, LBM 2/14. Assist of 1 with walker and gait belt. Admission packet started, 1 PVR complete. Able to make needs known. Call light within reach, bed alarm on.

## 2025-02-15 NOTE — PROGRESS NOTES
"Brief Stroke Note:    Repeat MRI brain without evidence of new infarcts.  No further stroke evaluation indicated at this time, we will sign off.    MAGDA Gibson, CNP  Neurology  02/15/2025 10:22 AM  To page stroke neurology after hours or on a subsequent day, click here: [facility]  Choose \"On Call\" tab at top, then search dropdown box for \"Neurology Adult\" & press Enter, look for Neuro ICU/Stroke       "

## 2025-02-15 NOTE — PLAN OF CARE
[id number]    Goal Outcome Evaluation:      Plan of Care Reviewed With: patient    Overall Patient Progress: no changeOverall Patient Progress: no change     At start of shift during rounds, Pt. was A & O X 3 with intermittent confusion to situation, Pt is stable on room air. Responded adequately to clinical questions  asked. Denies N/T. N/V, CP, lightheadedness SOB or acute distress.  Activity: A1 with walker/GB. to transfer. Independent with repositioning in bed. Respiratory: Sats >90 on room air  Pt has mixed continence of bladder. No BM or discomfort with voiding.  Diet: Reg/Thin. Medications administered whole.  Pt. Slept most of the night except during cares.     Per report from Staff member who responded to the Pt's bed alarm while RN was with another Pt:   PT triggered bed alarm at about 0230 Hrs for trying to get out of bed to use the bathroom. Pt became heavily dependent of 2 staff members due to Pt not being able to continue walking to the restroom and became more confused and erratic. Pt was assisted to a wheelchair. Rapid response was initiated and then transferred to the bed using mechanical lift. Refer to significant event note for more details.  One time dose of Labetalol administered to manage elevated B/P. Pt transferred to CT for evaluation.   No acute findings from CT result. Pt continues to be restless and triggered bed alarm multiple times  New left PIVPIV in place  Stat Lab drawn   Precaution: Fall risk,  Bed alarm on. Call button placed within reach. Plan of care is ongoing.  On 1:1 monitoring with bed side attendant to ensure safety.

## 2025-02-15 NOTE — PLAN OF CARE
Goal Outcome Evaluation:    VS: Vital signs:  Temp: 98.2  F (36.8  C) Temp src: Oral BP: (!) 182/90 Pulse: 92   Resp: 20 SpO2: 100 % O2 Device: None (Room air)        O2: O2> 95% on RA, denies SOB, CP   Output: Voiding without any issues   Last BM: NA   Activity: A:2 with gb, liko lift   Skin: Intact, R iv   Pain: Denies pain   CMS: Disoriented, Denies n/t, n/v   Dressing: R piv   Diet: Reg/thin/whole   LDA: R piv   Equipment: Personal belongings, call light.   Plan: Continue to monitor=BP   Additional Info:  Significant other declined hydralazine for high BPs and is at pt's bedside. Sitter at bedside. Monitor BP

## 2025-02-15 NOTE — PROGRESS NOTES
02/15/25 7863   Appointment Info   Signing Clinician's Name / Credentials (PT) Rajesh Mejiamartha DPT   Living Environment   People in Home spouse   Current Living Arrangements house   Home Accessibility stairs to enter home   Number of Stairs, Main Entrance 3   Stair Railings, Main Entrance railing on right side (ascending)   Transportation Anticipated family or friend will provide   Living Environment Comments Pt lives in [place] with his spouse. All needs on main level. Needs to ambulate ~75' to get around. Spouse works full-time M-F when pt will be alone at home   Self-Care   Usual Activity Tolerance fair   Current Activity Tolerance poor   Regular Exercise No   Equipment Currently Used at Home cane, straight;walker, rolling;grab bar, tub/shower;shower chair   Fall history within last six months yes   Number of times patient has fallen within last six months 4   Activity/Exercise/Self-Care Comment Pt has 20 year history of orthostatic hypotension and back that limits his activity tolerance, but was functionally mod-I in the home. He uses his 4WW primarily if needing to sit when feeling dizzy, although has had a balance decline the last 8-10 months as well. 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 Heat reported causes more issues. Watches a lot of [employer]   Post-Acute Assessment Only   Post-Acute Functional Assessment See below   Previous Level of Function/Home Environm   Bathing, Previous Functional Level uses device or equipment   Grooming, Previous Functional Level independent   Dressing, Previous Functional Level independent   Eating/Feeding, Previous Functional Level independent   Toileting, Previous Functional Level independent   BADLs, Previous Functional Level uses device or equipment   IADLs, Previous Functional Level partial assistance   Bed Mobility, Previous Functional Level independent   Transfers, Previous Functional Level independent   Household Ambulation,  Previous Functional Level uses device or equipment   Stairs, Previous Functional Level uses device or equipment   Community Ambulation, Previous Functional Level uses device or equipment  (low community tolerance)   General Information   Onset of Illness/Injury or Date of Surgery 02/11/25   Referring Physician Dr. Jimenes   Patient/Family Therapy Goals Statement (PT) improve BP/medical stability. Be able to be home while spouse at work   Pertinent History of Current Problem (include personal factors and/or comorbidities that impact the POC) 59 year old right hand dominant male with a past medical history of HTN, orthostatic hypotension, HLD, DM II and prior stroke who was admitted on 2/11 with worsening fatigue, weakness and syncope and was found to have multifocal acute subcortical infarcts   General Observations Pt has reportedly had 3 syncopal episodes since admission. Pt has tried spandigrip, compression socks, and abdominal binders in the past without tolerance or benefit. Spouse at bedside and provides baseline.   Cognition   Cognitive Status Comments Requires repetition to follow one step commands and easily distractible. Does not appear to easily take education or understand deficits. Impulsive throughout   Pain Assessment   Patient Currently in Pain Yes, see Vital Sign flowsheet   Integumentary/Edema   Integumentary/Edema no deficits were identifed   Posture    Posture Forward head position   Posture Comments Sits with slumped posture, likely due to not feeling well. Unable to assess standing posture, immediate lack of tolerance with positional change   Range of Motion (ROM)   Range of Motion ROM is WFL   Strength (Manual Muscle Testing)   Strength Comments BLE appears grossly 5/5 but requires modified testing due to positional tolerance and command following. Pt/spouse reports when he has been up his RLE stops working, could be related to syncope.   Balance   Balance Comments Able to sit IND. Reqiures heavy  assist in standing, however need to further determine if balance deficit present or just syncopal   Sensory Examination   Sensory Perception Comments Inconsistent response throughout all sensory testing and responds as if grossly completely abscent B. Spouse reports neuropathy at baseline   Coordination   Coordination Comments Unable to functionally assess due to syncope   Muscle Tone   Muscle Tone Comments Tight at B ankle, but appears to be guarding   Clinical Impression   Criteria for Skilled Therapeutic Intervention Yes, treatment indicated   PT Diagnosis (PT) Impaired functional mobility   Influenced by the following impairments Orthostatic hypotension, cognition/command following, further functional deficits pending further evaluation   Functional limitations due to impairments Bed mobility, transfers, gait, stairs, ADL, community entry   Clinical Presentation (PT Evaluation Complexity) unstable   Clinical Presentation Rationale Medical instability grossly affecting pt presentation with further neuro deficit   Clinical Decision Making (Complexity) high complexity   Planned Therapy Interventions (PT) balance training;bed mobility training;gait training;groups;home exercise program;manual therapy techniques;motor coordination training;neuromuscular re-education;patient/family education;postural re-education;stair training;strengthening;stretching;transfer training;wheelchair management/propulsion training;progressive activity/exercise;risk factor education;home program guidelines   Risk & Benefits of therapy have been explained evaluation/treatment results reviewed;care plan/treatment goals reviewed;risks/benefits reviewed;current/potential barriers reviewed;participants voiced agreement with care plan;participants included;patient   Clinical Impression Comments Pt presents post-stroke. Limited eval over multiple attempts due to multiple syncopal episodes throughout the day. Pt complicated history with  hypertension and orthostatic hypotension greatly complicate presentation and mobility picture. At day of evaluation, pt is unsafe to leave his bed due to orthostatic hypotension that does not recover. Goals set for household mod-I gait, however pending medical picture may need to modify. At this time, cannot clinically accurate estimate and ELOS.   PT Total Evaluation Time   PT Gerber, High Complexity Minutes (38438) 15   Physical Therapy Goals   PT Frequency 6x/week   PT Predicted Duration/Target Date for Goal Attainment 02/25/25   PT Goals Bed Mobility;Transfers;Gait;Stairs;PT Goal 1;PT Goal 2   PT: Bed Mobility Independent   PT: Transfers Modified independent;Sit to/from stand;Bed to/from chair   PT: Gait Modified independent;50 feet   PT: Stairs Supervision/stand-by assist;3 stairs;Rail on right   PT: Goal 1 Car transfer SBA for community entry   PT: Goal 2 Pt/family will recall 3 fall preventions for safe discharge home.   Post Acute Settings Only   What unit is patient on? Acute Rehab   Chair/bed-to-chair Transfer: standard height (18 inches)   Describe performance Unable to sit in chair due to orthstatic hypotension   Roll Left and Right: flat no rail   Assistance Needed Supervision   Roll Left to Right CARE Score 4   Sit to Lying: flat no rail   Physical Assistance Level 50%-74%   Comment requires assist for postioning and at trunk and LE   Sit to Lying CARE Score 2   Lying to Sitting on Side of Bed: flat no rail   Assistance Needed Supervision   Lying to Sitting CARE Score 4   Sit to Stand: standard height (18 inches)   Physical Assistance Level 50%-74%   Comment varied between min and mod-A   Sitting to Standing CARE Score 2   Walk 10 Feet   Reason if not Attempted Safety concerns   Walk 10 Ft. CARE Score 88   Walk 50 Feet with Two Turns   Reason if not Attempted Safety concerns   Walk 50 Ft. CARE Score 88   Walk 150 Feet   Reason if not Attempted Safety concerns   Walk 150 Ft. CARE Score 88   Walking 10  Feet on Uneven Surfaces: Ability to walk on various surfaces (consider using small ramp)   Reason if not Attempted Safety concerns   Walking 10 Feet on Uneven Surfaces CARE Score 88   Wheel 50 Feet with Two Turns   Reason if not Attempted Safety concerns   Wheel 50 Feet with Two Turns CARE Score 88   Wheel 150 Feet   Reason if not Attempted Safety concerns   Wheel 150 Feet CARE Score 88   1 Step (Curb): 6 inches no rail   Reason if not Attempted Safety concerns   1 Step CARE Score 88   4 Steps: 6 inches   Reason if not Attempted Safety concerns   4 Steps CARE Score 88   12 Steps: 6 inches   Reason if not Attempted Safety concerns   12 Steps CARE Score 88   Picking Up Object: Ability to bend/stoop from standing (consider using a reacher)   Reason if not Attempted Safety concerns    CARE Score 88   Car Transfer: Ability to transfer in/out of car or van   Reason if not Attempted Safety concerns   Car Transfer CARE Score 88

## 2025-02-15 NOTE — PROGRESS NOTES
[id number]  1:1 bed side attendant continued as Pt had another similar episode or being disoriented and not able to safely transfer from the chair to bed.,with Pt being erratic and  not easily redirected. A3 with mechanical transfer back to bed. Provider updated, provider assessed in room, New orders for MRI was initiated. MRI was done. Awaiting result and follow-up orders. Pt is more calm, responsive and oriented after MRI scan. Pt's significant other is in room with Pt. Pt. Was nauseous without vomiting right after MRI scan.  Scheduled medications administered with water and tolerated. Charge nurse updated.

## 2025-02-15 NOTE — SIGNIFICANT EVENT
Pt set off bed alarm and was found sitting at EOB. He stood by himself before staff could get gait belt on. GB put on and pt walked a few steps with 2 RNs and a walker. Pt stopped following commands and started leaning over. Nurses immediately assisted pt to sit in W/C. Pt was very minimally responding and was slumped over to a side in the W/C. Rapid called. Pt was breathing with a pulse. Other floor RN called code blue which was deescalated back to rapid. Floor staff lifted patient via golvo from W/C back into bed. Rapid team responded. See note from ICU provider. PM&R on call provider updated.

## 2025-02-15 NOTE — CODE/RAPID RESPONSE
Pt Ax1 with gait belt and walker to BR, acute weakness upon standing/mobility.  Pt did not loose consciousness, able to follow commands, neuro checks done per ALEXIS and Hospitalist.  Pt sent to CT, LABS drawn and EKG performed.

## 2025-02-15 NOTE — PHARMACY-MEDICATION REGIMEN REVIEW
Pharmacy Medication Regimen Review  Chris Delcid is a 59 year old male who is currently in the Acute Rehab Unit.    Assessment: All medications have an appropriate indications, durations and no unnecessary use was found    Plan:   Continue current medications as ordered  Attending provider will be sent this note for review.  If there are any emergent issues noted above, pharmacist will contact provider directly by phone.      Pharmacy will periodically review the resident's medication regimen for any PRN medications not administered in > 72 hours and discontinue them. The pharmacist will discuss gradual dose reductions of psychopharmacologic medications with interdisciplinary team on a regular basis.    Please contact pharmacy if the above does not answer specific medication questions/concerns.    Background:  A pharmacist has reviewed all medications and pertinent medical history today.  Medications were reviewed for appropriate use and any irregularities found are listed with recommendations.      Current Facility-Administered Medications:     acetaminophen (TYLENOL) tablet 650 mg, 650 mg, Oral, Q4H PRN, Nichelle Chavez PA    aspirin (ASA) chewable tablet 81 mg, 81 mg, Oral, Daily, Nichelle Chavez PA    atorvastatin (LIPITOR) tablet 10 mg, 10 mg, Oral, Daily, Nichelle Chavez PA    calcium carbonate (TUMS) chewable tablet 500-1,000 mg, 500-1,000 mg, Oral, TID PRN, Christie Cook MD, 1,000 mg at 02/14/25 2140    carboxymethylcellulose PF (REFRESH PLUS) 0.5 % ophthalmic solution 1 drop, 1 drop, Both Eyes, 4x Daily PRN, Nick Zavala MD    glucose gel 15-30 g, 15-30 g, Oral, Q15 Min PRN **OR** dextrose 50 % injection 25-50 mL, 25-50 mL, Intravenous, Q15 Min PRN **OR** glucagon injection 1 mg, 1 mg, Subcutaneous, Q15 Min PRN, Nichelle Chavez PA    ezetimibe (ZETIA) tablet 10 mg, 10 mg, Oral, At Bedtime, Nichelle Chavez PA, 10 mg at 02/14/25 2037    famotidine (PEPCID) tablet 40 mg, 40  mg, Oral, BID, Nick Zavala MD, 40 mg at 02/14/25 2037    glipiZIDE (GLUCOTROL) half-tab 10 mg, 10 mg, Oral, BID AC, Nichelle Chavez PA, 10 mg at 02/14/25 1759    hydrALAZINE (APRESOLINE) tablet 10 mg, 10 mg, Oral, Q6H PRN **OR** hydrALAZINE (APRESOLINE) tablet 20 mg, 20 mg, Oral, Q6H PRN, Nichelle Chavez PA    iopamidol (ISOVUE-370) solution 500 mL, 500 mL, Intravenous, Once, Dakotah Huggins APRN CNP    lisinopril (ZESTRIL) tablet 5 mg, 5 mg, Oral, BID, Nichelle Chavez PA, 5 mg at 02/14/25 2037    metFORMIN (GLUCOPHAGE XR) 24 hr tablet 1,000 mg, 1,000 mg, Oral, BID, Nichelle Chavez PA, 1,000 mg at 02/14/25 2037    polyethylene glycol (MIRALAX) powder 17 g, 17 g, Oral, Daily PRN, Nichelle Chavez PA    potassium chloride marek ER (KLOR-CON M20) CR tablet 20 mEq, 20 mEq, Oral, BID, Nichelle Chavez PA, 20 mEq at 02/14/25 2037    [COMPLETED] iopamidol (ISOVUE-370) solution 117 mL, 117 mL, Intravenous, Once, 117 mL at 02/15/25 0414 **AND** sodium chloride 0.9 % bag for CT scan flush use, 100 mL, As instructed, Once, Dakotah Huggins APRN CNP    ticagrelor (BRILINTA) tablet 90 mg, 90 mg, Oral, BID, Nichelle Chavez PA, 90 mg at 02/14/25 2037    traZODone (DESYREL) half-tab 25 mg, 25 mg, Oral, At Bedtime, Nichelle Chavez PA, 25 mg at 02/14/25 2140  No current outpatient prescriptions on file.   PMH: T2DM, dyslipidemia, HTN, orthostatic hypotension, CVA  Stuart Olson, PharmD, BCPS

## 2025-02-15 NOTE — PROGRESS NOTES
Discharge Planner Post-Acute Rehab PT:     Discharge Plan: Home mod-I during day while spouse at work. HH PT    Precautions: *Orthostatic hypotension in combination and significant resting hypertension*  Has tried LE and abdominal compression in the past without benefit or tolerance  Do not worry about resting/supine BP hypertension unless diastolic >110    *Currently not safe to be OOB*  *Impulsive*    Current Status  Bed Mobility: Able to get to EOB unassisted, requires heavy assist to supine because pt will get syncopal sitting  Transfer: Mod-A to stand  Gait: Not safe due to syncope  Stairs: Not safe due to syncope  Balance: Able to sit unsupported. Unable to evaluate standing balance due to syncope    Outcome Measures: - not appropriate due to syncope    Assessment:  Pt presents post-stroke. Limited eval over multiple attempts due to multiple syncopal episodes throughout the day. Pt complicated history with hypertension and orthostatic hypotension greatly complicate presentation and mobility picture. At day of evaluation, pt is unsafe to leave his bed due to orthostatic hypotension that does not recover. Goals set for household mod-I gait, however pending medical picture may need to modify. At this time, cannot clinically accurate estimate and ELOS.     Other Barriers to Discharge (DME, Family Training, etc):   W/c - anticipate needed for community  Stairs - 3 DANIEL  Orthostatic hypotension - currently unable to safely be OOB

## 2025-02-16 ENCOUNTER — APPOINTMENT (OUTPATIENT)
Dept: SPEECH THERAPY | Facility: CLINIC | Age: 60
DRG: 057 | End: 2025-02-16
Attending: PHYSICIAN ASSISTANT
Payer: COMMERCIAL

## 2025-02-16 LAB
GLUCOSE BLDC GLUCOMTR-MCNC: 136 MG/DL (ref 70–99)
GLUCOSE BLDC GLUCOMTR-MCNC: 86 MG/DL (ref 70–99)

## 2025-02-16 PROCEDURE — 99233 SBSQ HOSP IP/OBS HIGH 50: CPT | Mod: GC | Performed by: PHYSICAL MEDICINE & REHABILITATION

## 2025-02-16 PROCEDURE — 250N000013 HC RX MED GY IP 250 OP 250 PS 637: Performed by: PHYSICIAN ASSISTANT

## 2025-02-16 PROCEDURE — 258N000003 HC RX IP 258 OP 636

## 2025-02-16 PROCEDURE — 250N000013 HC RX MED GY IP 250 OP 250 PS 637: Performed by: PHYSICAL MEDICINE & REHABILITATION

## 2025-02-16 PROCEDURE — 92523 SPEECH SOUND LANG COMPREHEN: CPT | Mod: GN | Performed by: SPEECH-LANGUAGE PATHOLOGIST

## 2025-02-16 PROCEDURE — 250N000011 HC RX IP 250 OP 636

## 2025-02-16 PROCEDURE — 250N000013 HC RX MED GY IP 250 OP 250 PS 637

## 2025-02-16 PROCEDURE — 128N000003 HC R&B REHAB

## 2025-02-16 RX ORDER — OLANZAPINE 10 MG/2ML
5 INJECTION, POWDER, FOR SOLUTION INTRAMUSCULAR ONCE
Status: COMPLETED | OUTPATIENT
Start: 2025-02-16 | End: 2025-02-16

## 2025-02-16 RX ORDER — PANTOPRAZOLE SODIUM 40 MG/1
40 TABLET, DELAYED RELEASE ORAL
Status: DISCONTINUED | OUTPATIENT
Start: 2025-02-16 | End: 2025-03-06 | Stop reason: HOSPADM

## 2025-02-16 RX ORDER — SODIUM CHLORIDE 9 MG/ML
INJECTION, SOLUTION INTRAVENOUS
Status: COMPLETED
Start: 2025-02-16 | End: 2025-02-16

## 2025-02-16 RX ORDER — QUETIAPINE FUMARATE 25 MG/1
25 TABLET, FILM COATED ORAL AT BEDTIME
Status: DISCONTINUED | OUTPATIENT
Start: 2025-02-16 | End: 2025-02-17

## 2025-02-16 RX ADMIN — OLANZAPINE 5 MG: 10 INJECTION, POWDER, FOR SOLUTION INTRAMUSCULAR at 01:40

## 2025-02-16 RX ADMIN — FAMOTIDINE 40 MG: 20 TABLET, FILM COATED ORAL at 10:28

## 2025-02-16 RX ADMIN — Medication 10 MG: at 16:33

## 2025-02-16 RX ADMIN — SODIUM CHLORIDE 1000 ML: 9 INJECTION, SOLUTION INTRAVENOUS at 12:02

## 2025-02-16 RX ADMIN — Medication 7.5 MG: at 10:28

## 2025-02-16 RX ADMIN — ATORVASTATIN CALCIUM 10 MG: 10 TABLET, FILM COATED ORAL at 10:28

## 2025-02-16 RX ADMIN — QUETIAPINE FUMARATE 25 MG: 25 TABLET ORAL at 21:06

## 2025-02-16 RX ADMIN — TICAGRELOR 90 MG: 90 TABLET ORAL at 10:28

## 2025-02-16 RX ADMIN — POTASSIUM CHLORIDE 20 MEQ: 1500 TABLET, EXTENDED RELEASE ORAL at 10:28

## 2025-02-16 RX ADMIN — METFORMIN ER 500 MG 1000 MG: 500 TABLET ORAL at 10:28

## 2025-02-16 RX ADMIN — LISINOPRIL 5 MG: 5 TABLET ORAL at 21:04

## 2025-02-16 RX ADMIN — TICAGRELOR 90 MG: 90 TABLET ORAL at 21:04

## 2025-02-16 RX ADMIN — LISINOPRIL 5 MG: 5 TABLET ORAL at 10:28

## 2025-02-16 RX ADMIN — EZETIMIBE 10 MG: 10 TABLET ORAL at 21:06

## 2025-02-16 RX ADMIN — ASPIRIN 81 MG CHEWABLE TABLET 81 MG: 81 TABLET CHEWABLE at 10:29

## 2025-02-16 RX ADMIN — FAMOTIDINE 40 MG: 20 TABLET, FILM COATED ORAL at 21:06

## 2025-02-16 ASSESSMENT — ACTIVITIES OF DAILY LIVING (ADL)
ADLS_ACUITY_SCORE: 49
ADLS_ACUITY_SCORE: 44
ADLS_ACUITY_SCORE: 49
ADLS_ACUITY_SCORE: 44
ADLS_ACUITY_SCORE: 44
ADLS_ACUITY_SCORE: 53
ADLS_ACUITY_SCORE: 44
ADLS_ACUITY_SCORE: 53
ADLS_ACUITY_SCORE: 44
ADLS_ACUITY_SCORE: 44
ADLS_ACUITY_SCORE: 49
ADLS_ACUITY_SCORE: 49
ADLS_ACUITY_SCORE: 44
ADLS_ACUITY_SCORE: 53
ADLS_ACUITY_SCORE: 49
ADLS_ACUITY_SCORE: 44
ADLS_ACUITY_SCORE: 49
ADLS_ACUITY_SCORE: 44
ADLS_ACUITY_SCORE: 49
ADLS_ACUITY_SCORE: 53
ADLS_ACUITY_SCORE: 44

## 2025-02-16 NOTE — PROGRESS NOTES
"   02/16/25 0845   Appointment Info   Signing Clinician's Name / Credentials (SLP) Ross Garay MS, CCC-SLP   General Information   Onset of Illness/Injury or Date of Surgery 02/11/25   Referring Physician Christie Cook MD   Pertinent History of Current Problem Per H&P: Patient is \"59 year old right hand dominant male with past medical history of HTN, orthostatic hypotension, HLD, DM II and prior strokes who presented on 2/11 with worsening fatigue, generalized weakness and syncope resulting in falls.   He had some functional decline since spring 2024, mainly due to orthostasis and frequent episodes of hypotension and syncope. They basically had to stop all of his BP meds.  Then few weeks before admission he was just excessively fatigue and was dragging his RLE. These changes were subtle and gradual. The night before admission he had some sudden changes with clear worsening weakness in RLE and another syncopal episode.      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 MRI shows  multiple small foci of FLAIR hyperintense diffusion restriction compatible with acute lacunar infarcts in the left thalamus, the posterior left centrum semiovale ovale, the right occipital and right occipitotemporal white matter, and the right frontal white matter. Scattered distribution involving multiple vascular territories suggested an embolic etiology. MRA was done to characterize for R vert pathology dissection versus athero changes. Other work-up as listed in A/P section.\" SLP consult received for evaluation and treatment as indicated.   General Observations Multiple CVAs in past, Left pontine CVA 2020, unknown locations 2021 and 2022. VFSS completed 11/14/2021 at [facility]: \"Episode of penetration into the tracheal vestibule with thin liquids. Otherwise, satisfactory passage of each barium consistency from the mouth into the esophagus.\" Stroke code called " "night of ARU admission due to syncopal episode, work up still being completed 02/15 a.m. Pt was made NPO during that time; returned to admission diet of regular solids and thin liquids. Acute SLP completed clinical swallow evaluation only 02/12, acute SLP documented wife reported swallowing was at baseline: (wife) \"reports no change to swallow, speech, language.\" Per coordination with ARU Physical Therapist, wife reported changes in pt's cognition since this CVA. Given history of expressive aphasia and unfamiliar to this SLP, language evaluation indicated prior to administering standardized cognitive-linguistic evaluation. Pt asleep when SLP arrived, poor sleep overnight per chart review; pt fatigued throughout SLP evaluation. Per interview with wife, who has been on unit since prior evening due to pt being combative with staff, nor coughing or throat clearing with liquid intake via straw; wife again reported pt's swallowing consistent to prior level of function. Significant number of missing teeth. SLP to monitor for need for clinical swallow evaluation.   Type of Evaluation   Type of Evaluation Speech, Language, Cognition   Dentition (Oral Motor)   Dentition (Oral Motor) significant number of missing teeth   Motor Speech   Speech Intelligibility (Motor Speech) word level;phrase/sentence level;conversational level   Comment, Motor Speech Assessment Speech intelligibility improved as alertness improved. Motor speech at baseline per wife   Word Level, Speech Intelligibility (Motor Speech) minimal impairment   Conversational Level, Speech Intelligibility (Motor Speech) other (see comments)  (mild)   Phrase/Sentence Level, Speech Intelligibility (Motor Speech) other (see comments)  (mild)   Western Aphasia Battery- Revised Bedside Record From   Spontaneous Speech Content Score (out of 10) 1   Spontaneous Speech Fluency Score (out of 10) 2   Auditory Verbal Comprehension Score (out of 10) 9   Sequential Commands Score " (out of 10) 4   Repetition Score (out of 10) 9   Object Naming Score (out of 10) 8   Bedside Aphasia Sum 33   WAB-R Bedside Aphasia Score 55   Auditory Comprehension   Follows Commands (Auditory Comprehension) 1-step command;2-step commands   Object Identification (Auditory Comprehension) field of 3   Yes/No Questions (Auditory Comprehension) biographical/personal questions;simple/factual questions;complex questions   Biographical/Personal Questions (Auditory Comprehension) intact   Simple/Factual Questions (Auditory Comprehension) intact   Complex Questions (Auditory Comprehension) impaired   Field of 3, Object Identification (Auditory Comprehension) impaired   1 Step, Follows Commands (Auditory Comprehension) intact;achieved with repetition   2 Step, Follows Commands (Auditory Comprehension) impaired   Verbal Expression   Conversational Speech (Verbal Expression) connected speech   Narrative Speech (Verbal Expression) picture description   Repetition Skills (Verbal Expression) words;phrases;sentences   Sentences, Repetition Skills (Verbal Expression) achieved with multiple trials   Words, Repetition Skills (Verbal Expression) intact   Phrases, Repetition Skills (Verbal Expression) intact   Picture Description, Narrative Speech (Verbal Expression) severe impairment   Connected Speech, Conversational (Verbal Expression) severe impairment   Reading Comprehension   Comment, Assessment (Reading Comprehension) Not evaluated this day   Written Language   Comment, Assessment (Written Language) Not evaluated this day   Cognition   Cognitive Function memory deficit;executive function deficit;attention deficit   Additional cognitive-linguistic evaluation indicated  Other (see comments)   Cognitive Status Exam Comments Standardized cognitive assessment not recommended at this time due to history of aphasia, tenuous status (per IDT, after SLP evaluation session pt with increased confusion, visual hallucinations). May consider  utilizing informal cog assessment tasks.   Orientation Status (Cognition) oriented to;person;situation   Executive Function Deficit (Cognition) moderate deficit   Attention Deficit (Cognition) arousal/alertness;moderate deficit   Memory Deficit (Cognition) moderate deficit   Clinical Impression   Criteria for Skilled Therapeutic Interventions Met (SLP Eval) Yes, treatment indicated   SLP Diagnosis Cognitive-linguistic impairment, at least moderate suspected.   Problem List (SLP) Hx of expressive aphasia, dysarthria   Activity Limitations Related to Problem List (SLP) Increased assist/supervision with iADLS   Risks & Benefits of therapy have been explained evaluation/treatment results reviewed;care plan/treatment goals reviewed;participants voiced agreement with care plan;participants included;patient;spouse/significant other   Clinical Impression Comments SLP: Mild dysarthria when pt alert, motor speech at baseline per wife's report. WAB-R Bedside administered, score of 55 consistent with moderate expressive and receptive aphasia; however, pt with poor sleep last night. Per wife's report, pt not sleeping well prior to CVA or in hospital. Pt also given medication last night related to combative behaviors and to promote sleep. Suspect language presenting lower than actual due to fatigue and medications. Standardized cognitive assessment not recommended at this time due to history of aphasia, tenuous status (per IDT, after SLP evaluation session pt with increased confusion, visual hallucinations). May consider utilizing informal cog assessment tasks. Skilled SLP services indicated to train pt and family in cognitive strategies to promote safety and independence.   SLP Total Evaluation Time   Eval: Sound production with lang comprehension and expression Minutes (89384) 60   SLP Goals   Therapy Frequency (SLP Eval) 6 times/week   SLP Predicted Duration/Target Date for Goal Attainment 02/27/25   SLP Goals SLP Goal  1;Language Comprehension;Communication   SLP: Improve language comprehension for interaction with caregivers/environment minimal assist   SLP: Communicate basic wants and needs verbally;minimal assist   SLP: Goal 1 Patient will maintain alertness, focus/attend to basic level task for 30 minutes with no cues from SLP to increase alertness.   SLP Discharge Planning   SLP Plan SLP: SCCAN as task, informally look at cog. Tx basic aud comp, verbal expression. Cog eval may be indicated if alertness improves; hx of expressive aphasia and dysarthria.   SLP Time and Intention   Total Session Time (sum of timed and untimed services) 60   SLP - Acute Rehab Center Time   Individual Time (minutes) - SLP 60   [facility] Total Session Time (minutes) - SLP 60   ARC Daily Total Session Time   SLP AR[facility] Daily Total Session Time 60   ARC Daily Rehab Total Minutes 60

## 2025-02-16 NOTE — PLAN OF CARE
Discharge Planner Post-Acute Rehab SLP:     Discharge Plan: home with significant other? Ongoing SLP    Precautions: 1:1 attendant, can be combative with cares.    Current Status:  Hearing: WFL  Vision: Retinal detachment, Readers  Communication: Mild dysarthria when pt alert, motor speech at baseline per wife's report. Hx expressive aphasia; WAB-R Bedside 02/16 moderate expressive/receptive deficits (suspect fatigue, medications impacting)  Cognition: At least moderate cognitive impairment suspected, SLP to consider informal assessment tasks given aphasia and level of alertness  Swallow: Regular solids/Thin liquids (0). ARU SLP to monitor for need for clinical swallow evaluation.    Assessment: Mild dysarthria when pt alert, motor speech at baseline per wife's report. WAB-R Bedside administered, score of 55 consistent with moderate expressive and receptive aphasia; however, pt with poor sleep last night. Per wife's report, pt not sleeping well prior to CVA or in hospital. Pt also given medication last night related to combative behaviors and to promote sleep. Suspect language presenting lower than actual due to fatigue and medications. Standardized cognitive assessment not recommended at this time due to history of aphasia, tenuous status (per IDT, after SLP evaluation session pt with increased confusion, visual hallucinations). May consider utilizing informal cog assessment tasks. Skilled SLP services indicated to train pt and family in cognitive strategies to promote safety and independence.     Other Barriers to Discharge (Family Training, etc): level of assist/supervision with iADLs?

## 2025-02-16 NOTE — PLAN OF CARE
Goal Outcome Evaluation:      Plan of Care Reviewed With: patient    Overall Patient Progress: no change    Patient had episode of paranoia and delirium this shift , seen by provider, plan of care discussed with significant other. Will start on scheduled Seroquel at bedtime, Trazodone discontinued . Was awake for meals ,had late breakfast ( ate 75%) , took few bites for lunch. Meds given late ,re approached multiple times  , able to take most except for Protonix and Glipizide x 1 pill (2.5mg). Had difficultly  taking Potassium, as patient was  likely getting full with food ,  tired,sleepy , will try to crush this med or offer Potassium packet next time. 0.9 NS 1000ml IV bolus infusing x 4 hrs .Unable to take accurate readings for BP as patient unable to keep still, provider aware. On fall, delirium precaution , provided uninterrupted time to rest.  With bedside attendant, will continue to monitor for significant changes .     Patient's most recent vital signs are:     Vital signs:  BP: 158/133(supine, patient moving), seen by provider  Temp: 98.3  HR: 98  RR: 18  SpO2: 94 %     Patient does not have new respiratory symptoms.  Patient does not have new sore throat.  Patient does not have a fever greater than 99.5.

## 2025-02-16 NOTE — PLAN OF CARE
Goal Outcome Evaluation:      Plan of Care Reviewed With: patient    Overall Patient Progress: no changeOverall Patient Progress: no change         Outcome Summary  Pt. observed sleeping at the start of the shift, around 1 am the sitter called for help when the patient became combative, tried t get out from bed, and throwing punches to the caregiver/ The RN calmly asked the patient if they were in pain or needed anything, the patient reported back pain, requested medication,but refused to take the medicine,pt. also refused brief change. The situation escalated to CN but was not effective.. On call MD for PRN medication for agitation, Pt. showed agitation again, prompting a code green activation. The on-call ordered Xyprexia and the wife arrived to help calm the patient. Pt. slept after medication and repositiong. Sitter at bedside maintained, call light within reach. Will continue with current POC.

## 2025-02-16 NOTE — PROGRESS NOTES
[facility]   Acute Rehabilitation Unit  Daily progress note    INTERVAL HISTORY  See note from earlier regarding early morning agitation and use of IM olanzapine.    Patient was seen laying in bed this morning.  Patient intermittently responding to questions.  Patient continues to be 1:1 bedside attendant. Wife at bedside.  Had long discussion with wife regarding agitation and how it can be seen in patients with stroke.  Discussed with wife about rationale regarding use of olanzapine last night for acute agitation management.  Discussed about options to use Seroquel as needed or scheduled to help with nighttime agitation and insomnia.  Wife states that patient has had from what seems like nightmares after starting trazodone.  Wife is agreeable with initiating Seroquel tonight and discontinuing trazodone.    Functionally:  PT, OT, SLP evaluations pending    MEDICATIONS  Scheduled meds  Current Facility-Administered Medications   Medication Dose Route Frequency Provider Last Rate Last Admin    aspirin (ASA) chewable tablet 81 mg  81 mg Oral Daily Nichelle Chavez PA   81 mg at 02/16/25 1029    atorvastatin (LIPITOR) tablet 10 mg  10 mg Oral Daily Nichelle Chavez PA   10 mg at 02/16/25 1028    ezetimibe (ZETIA) tablet 10 mg  10 mg Oral At Bedtime Nichelle Chavez PA   10 mg at 02/15/25 2030    famotidine (PEPCID) tablet 40 mg  40 mg Oral BID Nick Zavala MD   40 mg at 02/16/25 1028    glipiZIDE (GLUCOTROL) half-tab 10 mg  10 mg Oral BID AC Nichelle Chavez PA   7.5 mg at 02/16/25 1028    iopamidol (ISOVUE-370) solution 500 mL  500 mL Intravenous Once Dakotah Huggins APRN CNP        lisinopril (ZESTRIL) tablet 5 mg  5 mg Oral BID Nichelle Chavez PA   5 mg at 02/16/25 1028    metFORMIN (GLUCOPHAGE XR) 24 hr tablet 1,000 mg  1,000 mg Oral BID Nichelle Chavez PA   1,000 mg at 02/16/25 1028    pantoprazole (PROTONIX) EC tablet 40 mg  40 mg Oral [facility] AC  Christie Cook MD        potassium chloride marek ER (KLOR-CON M20) CR tablet 20 mEq  20 mEq Oral BID Nichelle Chavez PA   20 mEq at 02/16/25 1028    QUEtiapine (SEROquel) tablet 25 mg  25 mg Oral At Bedtime ToTomas DO        sodium chloride 0.9 % bag for CT scan flush use  100 mL As instructed Once Dakotah Huggins APRN CNP        sodium chloride 0.9% BOLUS 1,000 mL  1,000 mL Intravenous Once ToTomas  mL/hr at 02/16/25 1202 1,000 mL at 02/16/25 1202    ticagrelor (BRILINTA) tablet 90 mg  90 mg Oral BID Nichelle Chavez PA   90 mg at 02/16/25 1028       PRN meds:  Current Facility-Administered Medications   Medication Dose Route Frequency Provider Last Rate Last Admin    acetaminophen (TYLENOL) tablet 650 mg  650 mg Oral Q4H PRN Nichelle Chavez PA        calcium carbonate (TUMS) chewable tablet 500-1,000 mg  500-1,000 mg Oral TID PRN Christie Cook MD   1,000 mg at 02/15/25 1546    carboxymethylcellulose PF (REFRESH PLUS) 0.5 % ophthalmic solution 1 drop  1 drop Both Eyes 4x Daily PRN Nick Zavala MD        glucose gel 15-30 g  15-30 g Oral Q15 Min PRN Nichelle Chavez PA        Or    dextrose 50 % injection 25-50 mL  25-50 mL Intravenous Q15 Min PRN Nichelle Chavez PA        Or    glucagon injection 1 mg  1 mg Subcutaneous Q15 Min PRN Nichelle Chavez PA        hydrALAZINE (APRESOLINE) tablet 10 mg  10 mg Oral Q6H PRN Nichelle Chavez PA        Or    hydrALAZINE (APRESOLINE) tablet 20 mg  20 mg Oral Q6H PRN Nichelle Chavez PA        polyethylene glycol (MIRALAX) powder 17 g  17 g Oral Daily PRN Nichelle Chavez PA         PHYSICAL EXAM  BP (!) 158/133 (BP Location: Left arm)   Pulse 98   Temp 98.3  F (36.8  C) (Oral)   Resp 18   Ht 1.829 m (6')   Wt 105.6 kg (232 lb 12.9 oz)   SpO2 94%   BMI 31.57 kg/m    General: Awake, alert, NAD, resting in bed   HEENT: Normocephalic, atraumatic, MMM  Cardiac: no LE edema  Pulm: breathing comfortably on RA  Abd: Soft,  nontender, nondistended, BS+  MSK: Moving all extremities spontaneously   Neuro: Intermittently alert and oriented.  Aphasic and dysarthric.  Skin: No lesions/rashes appreciated on visible skin    LABS  Recent Results (from the past 24 hours)   Glucose by meter    Collection Time: 02/15/25  5:19 PM   Result Value Ref Range    GLUCOSE BY METER POCT 132 (H) 70 - 99 mg/dL   Glucose by meter    Collection Time: 02/15/25  9:52 PM   Result Value Ref Range    GLUCOSE BY METER POCT 143 (H) 70 - 99 mg/dL   Glucose by meter    Collection Time: 02/16/25  8:24 AM   Result Value Ref Range    GLUCOSE BY METER POCT 86 70 - 99 mg/dL   Glucose by meter    Collection Time: 02/16/25 11:12 AM   Result Value Ref Range    GLUCOSE BY METER POCT 136 (H) 70 - 99 mg/dL       ASSESSMENT AND PLAN  Chris CHIN Bhavin is a 59 year old right hand dominant male with a past medical history of HTN, orthostatic hypotension, HLD, DM II and prior stroke who was admitted on 2/11 with worsening fatigue, weakness and syncope and was found to have multifocal acute subcortical infarcts .  Admission to acute inpatient rehab 2/14/25.    Impairment group code: Stroke Ischemic 01.3 Bilateral Involvement; acute lacunar infarcts in the left thalamus, the posterior left centrum semiovale ovale, the right occipital and right occipitotemporal white matter, and the right frontal white matter, suspected embolic etiology     PT, OT and SLP 60 minutes of each daily for 6 days per week for 7 days, in addition to rehab nursing and close management of physiatrist.     2. Impairment of ADL's:  OT to work on upper and lower body self care, dressing, toileting, bathing, energy conservation techniques with use of ADs as needed.  3. Impairment of mobility:   PT to work on gait exercises, strengthening, endurance buildup, transfers with use of walker as needed.  4. Impairment of cognition/language/swallow:   SLP for cognitive evaluation and treatment strategies for higher level  cognitive deficits and memory impairment.  5. Rehab RN to administer medication, patient education on medication taking, VS monitoring, bowel regimen, glucose monitoring and management of orthostasis.      Medical Conditions  # Multiple acute lacunar infarcts in both cerebral hemispheres   # Right vertebral artery dissection  Stroke History   10/2020 - L pontine stroke, not on aspirin PTA. Discharged on aspirin + Plavix x21 days followed by aspirin monotherapy.   11/12/2021 - L and R centrum semiovale infarcts. Chronic pontine infarcts (new from prior). Discharged on DAPT with Plavix monotherapy thereafter.  11/30/21 - Progression of R centrum semiovale infarct  8/24/2022 - Acute R hemipons, possible late subacute right centrum semiovale infarcts. Switched to Brilinta monotherapy  CTA head showed poor enhancement of the intracranial right vertebral artery. Moderate to severe focal stenosis of the left P2 branch   MRA showed:  HEAD MRA:   1.  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 There is partial filling of the right posterior inferior cerebellar artery, presumably via collaterals.  2.  Multifocal areas of narrowing are seen within the left posterior cerebral artery as seen on prior CTA  NECK MRA:  1.  There is a small amount of flow-related enhancement within the distal cervical portion right vertebral artery, similar to the CTA neck performed earlier same day and concerning for right vertebral artery dissection.  2.  No significant stenosis either cervical carotid system or within the left vertebral artery.  CT CAP showed   1.  Cholelithiasis and distended gallbladder. In the setting of right upper quadrant pain or elevated bilirubin, consider gallbladder ultrasound to exclude cholecystitis.  2.  Scattered tiny pulmonary nodules, not well assessed due to motion artifact. Consider follow-up 12 month chest CT per Fleischner study guidelines.  3.  Moderate prostatic  hypertrophy  TTE showed EF 65 to 70%, no wall motion abnormalities, moderate to severe concentric LVH, normal left atrium   LDL was 127  HgA1C was 8.6%     --secondary stroke prevention HTN, HLD, DM, ADITI   --outpatient NIVIA  --hypercoag workup pending   --continue brilinta 90 bid and ASA was added   --continue  lovastatin 40 mg daily and zetia was added   --outpatient genetic referral given family history of stroke in young, can be further discussed at outpatient stroke followup   --outpatient referral to vascular medicine for consideration of PCSK9 inhibitor and further evaluation of hyperlipidemia   --neurology follow up  in 4-6 weeks with any stroke ALEXIS ([phone])   --PM&R follow up in 8-10 weeks     #Unresponsive episode x2 on 2/15  #Concerns for seizures  In early morning 2/15,  patient was noted by nursing staff to be weaker and non-responsive. Patient was found by nursing sitting at edge of bed on his way to the bathroom. Nursing assisted patient using gait belt and walker to the bathroom when it was noticed that patient was having weakness with standing and mobility. Patient was placed in wheelchair where he slumped to his side and became unresponsive. Patient was lifted back into bed where Code Blue was initially called but then was downgraded to RRT. ICU provider team recommended stroke code be called. Neurology evaluated patient at bedside. Vital signs showed /105 when resting in bed. CT imaging showed no hemorrhage or LVO. Lab work-up unremarkable. Patient was deemed not requiring transfer to acute care. Later in the morning, nursing staff stated that patient had similar episode of being disoriented and not being able to transfer from chair to bed.  Rehab medical team arrived at bedside to evaluate.  While in chair, patient was not responsive to questions.  Blood pressure noticeably lower than usual, 113/71.  Presentation was likely related to either new stroke or orthostatic hypotension.   Nursing staff to assisted with lifting patient back into bed.  Once patient was in bed and sitting up, patient became more responsive to questions.  Presentation likely related to positional orthostatic hypotension while sitting up in chair but MRI brain was ordered to rule out acute stroke.  MRI results showed no evidence of new infarcts.  Neurology evaluated results and confirmed no new strokes and signed off.    - Discussed with nursing staff to encourage having patient stay in bed at this time to prevent further episodes of disorientation and confusion related to orthostatic hypotension  -Continue to assess cognition and vital signs   -Low threshold to transfer to acute care if patient continues to demonstrate altered mental status through orthostatic hypotension  -Given patient's intermittent episodes of unresponsiveness, there is concern for either patient having further strokes versus seizures.  Neurology was consulted for assessment for possible seizures    #Agitation  On early evening of 2/16, patient was restless, agitated, and combative with cares. Patient reportedly consistently trying to get out of bed. Patient trying to throw punches at staff per nursing. Patient reportedly unwilling to take oral medications. Given the acutely agitated presentation and to maintain the safety of staff on unit, Code Green was activated and provider team ordered a IM olanzapine 5mg x1 for use.   -Starting Seroquel 25 mg at bedtime for agitation on 2/16 - will monitor QTc (last on 2/15 was 474  -Discontinued trazodone on 2/16 given nightmares per wife  -Delirium precautions  -Continue 1:1 bedside attendant  -Low threshold to administer IM olanzapine for acute agitation  -Psychiatry consulted for delirium management in the setting of recurrent strokes     # HTN   # 20 year h/o orthostasis   Goal BP <130/80  Has had long standing issue with orthostatic hypotension even before diagnosis of HTN. Had a tilt table test through  [facility] that showed severe orthostatic hypotension. Was followed by cardiology and has a loop recorder in place   Per his SO didn't tolerate abdominal binder and compression socks in the past; was also on midodrine for a while.   -Currently on lisinopril 5mg bid and hydralazine prn   -Plan to slowly up titrate antihypertensive regimen as allowed by patients orthostatics eventual goal of 130 SBP outpatient     # DM  # Polyneuropathy ?  # Autonomic dysfunction   Goal A1c <7.0   Was not checking his BG at home but has a glucometer as his BG was well controlled   --continue PTA metformin 1000 mg BID   --increased PTA glipizide 10 mg BID AC   --BG check qid      # Bilateral hand muscle atrophy and paresthesia   Concerns for focal mononeuropathy vs cervical radiculopathy; doesn't seem to be explained by polyneuropathy only    -Neurology follow up as outpatient    -May need NCS/EMG for more evaluation      # HLD    2/11  Documented intolerance to atorvastatin, rosuvastatin, simvastatin   --continue lovastatin and zetia as above   --outpatient referral to vascular medicine for consideration of PCSK9 inhibitor and further evaluation of hyperlipidemia      # ADITI   # Insomnia   Was not using CPAP prior to admission  Diagnosed 2/2022 following witnessed apneic episodes and polysomnogram 12/2021. Patient reports stopping BIPAP therapy for his ADITI due to abdominal bloating. He reports a repeat polysomnogram after stopping BIPAP that showed minimal ADITI.   --sleep medicine referral as outpatient to re-establish care     -- Discontinue trazodone on 2/16  -- Start Seroquel 25 mg on 2/16 for insomnia and agitation    # Dehydration  Patient does not appear to be having good oral hydration.  -Given stable electrolytes, IVF bolus given on 2/16 for hydration     # Chronic low back pain  Secondary to prior injury years ago. Follows at [facility] in [place].     Adjustment to disability:  Clinical psychology to antonette and  treat if indicated  FEN: consistent CHO diet   Bowel: on prn bowel meds; continent   Bladder: continent; will check PVRs  DVT Prophylaxis: on antiplatelet   GI Prophylaxis: on famotidine due to h/o GERD; added TUMS prn  Code: DNR/DNI - confirmed upon admission   Disposition: home with SO  ELOS:  7-10 days (pending further evaluation regarding medical care)  Rehab prognosis:  fair   Follow up Appointments on Discharge: PCP in 1-2 weeks, cardiology and NIVIA, stroke neurology, vascular surgery, PM&R       Patient reviewed with attending physician, Dr. Cook, who agrees with the assessment and plan.    Tomas Grady, DO  N PM&R PGY-2  02/16/2025  Pager #: [phone]           I, Christie Cook, saw this patient with my resident Dr. Grady and agree with his findings and plan of care as documented in his note.     I personally reviewed the chart (vitals signs, medications, and labs).     I was present during the interview and exam, reviewed the plan as outlined and edited the above note.     This is a very unfortunate and complicated situation. His alertness varies significantly as well as his behaviors and cooperation which all seems multifactorial due to   --stroke and cognitive / linguistic deficits   --delirium   --dehydration due to lack of adequate intake   --orthostasis and drop in BP   --questionable seizure - but less likely as there is no clear post-ictal phase   --medication induced - slower recovery after meds like antipsychotic     His wife is stressed out and he is missing therapy minutes. Will continue medical management as above to potentially optimize his chance of participation and recovery.        Christie Cook MD  Physical Medicine & Rehabilitation

## 2025-02-16 NOTE — PLAN OF CARE
Goal Outcome Evaluation:           Overall Patient Progress: no changeOverall Patient Progress: no change     Pt alert and oriented with intermittent confusion at start of shift. Around 1900 pt became agitated and paranoid. Called significant other to help calm pt down and get him to take meds. Pt complained of heartburn, did not want PRN Tums. 1:1 attendant present. Bed in low position, bed alarm on.

## 2025-02-16 NOTE — PROGRESS NOTES
Writer paged around [phone] about patient being restless, agitated, and combative with cares. Patient reportedly consistently trying to get out of bed. Patient trying to throw punches at staff per nursing. Patient reportedly unwilling to take oral medications. Given the acutely agitated presentation and to maintain the safety of staff on unit, writer encouraged Code Green activation and ordered a IM olanzapine 5mg x1 for use. Writer will continue to assess.

## 2025-02-17 ENCOUNTER — APPOINTMENT (OUTPATIENT)
Dept: SPEECH THERAPY | Facility: CLINIC | Age: 60
DRG: 057 | End: 2025-02-17
Attending: PHYSICAL MEDICINE & REHABILITATION
Payer: COMMERCIAL

## 2025-02-17 ENCOUNTER — APPOINTMENT (OUTPATIENT)
Dept: OCCUPATIONAL THERAPY | Facility: CLINIC | Age: 60
DRG: 057 | End: 2025-02-17
Attending: PHYSICAL MEDICINE & REHABILITATION
Payer: COMMERCIAL

## 2025-02-17 ENCOUNTER — APPOINTMENT (OUTPATIENT)
Dept: PHYSICAL THERAPY | Facility: CLINIC | Age: 60
DRG: 057 | End: 2025-02-17
Attending: PHYSICAL MEDICINE & REHABILITATION
Payer: COMMERCIAL

## 2025-02-17 ENCOUNTER — PATIENT OUTREACH (OUTPATIENT)
Dept: CARE COORDINATION | Facility: CLINIC | Age: 60
End: 2025-02-17
Payer: COMMERCIAL

## 2025-02-17 LAB
ANION GAP SERPL CALCULATED.3IONS-SCNC: 13 MMOL/L (ref 7–15)
ATRIAL RATE - MUSE: 99 BPM
BUN SERPL-MCNC: 14.3 MG/DL (ref 8–23)
CALCIUM SERPL-MCNC: 9.2 MG/DL (ref 8.8–10.4)
CHLORIDE SERPL-SCNC: 106 MMOL/L (ref 98–107)
CREAT SERPL-MCNC: 0.98 MG/DL (ref 0.67–1.17)
DIASTOLIC BLOOD PRESSURE - MUSE: NORMAL MMHG
EGFRCR SERPLBLD CKD-EPI 2021: 88 ML/MIN/1.73M2
ERYTHROCYTE [DISTWIDTH] IN BLOOD BY AUTOMATED COUNT: 13.6 % (ref 10–15)
GLUCOSE BLDC GLUCOMTR-MCNC: 139 MG/DL (ref 70–99)
GLUCOSE BLDC GLUCOMTR-MCNC: 88 MG/DL (ref 70–99)
GLUCOSE SERPL-MCNC: 75 MG/DL (ref 70–99)
HCO3 SERPL-SCNC: 21 MMOL/L (ref 22–29)
HCT VFR BLD AUTO: 41.9 % (ref 40–53)
HGB BLD-MCNC: 14.4 G/DL (ref 13.3–17.7)
INTERPRETATION ECG - MUSE: NORMAL
MCH RBC QN AUTO: 29.4 PG (ref 26.5–33)
MCHC RBC AUTO-ENTMCNC: 34.4 G/DL (ref 31.5–36.5)
MCV RBC AUTO: 86 FL (ref 78–100)
P AXIS - MUSE: 70 DEGREES
PLATELET # BLD AUTO: 320 10E3/UL (ref 150–450)
POTASSIUM SERPL-SCNC: 3.7 MMOL/L (ref 3.4–5.3)
PR INTERVAL - MUSE: 188 MS
QRS DURATION - MUSE: 92 MS
QT - MUSE: 370 MS
QTC - MUSE: 474 MS
R AXIS - MUSE: 1 DEGREES
RBC # BLD AUTO: 4.9 10E6/UL (ref 4.4–5.9)
SODIUM SERPL-SCNC: 140 MMOL/L (ref 135–145)
SYSTOLIC BLOOD PRESSURE - MUSE: NORMAL MMHG
T AXIS - MUSE: 51 DEGREES
VENTRICULAR RATE- MUSE: 99 BPM
WBC # BLD AUTO: 7.4 10E3/UL (ref 4–11)

## 2025-02-17 PROCEDURE — 99223 1ST HOSP IP/OBS HIGH 75: CPT | Mod: GC | Performed by: STUDENT IN AN ORGANIZED HEALTH CARE EDUCATION/TRAINING PROGRAM

## 2025-02-17 PROCEDURE — 99233 SBSQ HOSP IP/OBS HIGH 50: CPT | Performed by: PHYSICAL MEDICINE & REHABILITATION

## 2025-02-17 PROCEDURE — 97130 THER IVNTJ EA ADDL 15 MIN: CPT | Mod: GN

## 2025-02-17 PROCEDURE — 250N000013 HC RX MED GY IP 250 OP 250 PS 637: Performed by: PHYSICAL MEDICINE & REHABILITATION

## 2025-02-17 PROCEDURE — 80048 BASIC METABOLIC PNL TOTAL CA: CPT | Performed by: PHYSICIAN ASSISTANT

## 2025-02-17 PROCEDURE — 250N000013 HC RX MED GY IP 250 OP 250 PS 637: Performed by: PHYSICIAN ASSISTANT

## 2025-02-17 PROCEDURE — 97166 OT EVAL MOD COMPLEX 45 MIN: CPT | Mod: GO

## 2025-02-17 PROCEDURE — 99222 1ST HOSP IP/OBS MODERATE 55: CPT | Performed by: STUDENT IN AN ORGANIZED HEALTH CARE EDUCATION/TRAINING PROGRAM

## 2025-02-17 PROCEDURE — 85027 COMPLETE CBC AUTOMATED: CPT | Performed by: PHYSICIAN ASSISTANT

## 2025-02-17 PROCEDURE — 128N000003 HC R&B REHAB

## 2025-02-17 PROCEDURE — 97530 THERAPEUTIC ACTIVITIES: CPT | Mod: GP

## 2025-02-17 PROCEDURE — 97129 THER IVNTJ 1ST 15 MIN: CPT | Mod: GN

## 2025-02-17 PROCEDURE — 250N000013 HC RX MED GY IP 250 OP 250 PS 637

## 2025-02-17 PROCEDURE — 36415 COLL VENOUS BLD VENIPUNCTURE: CPT | Performed by: PHYSICIAN ASSISTANT

## 2025-02-17 PROCEDURE — 97535 SELF CARE MNGMENT TRAINING: CPT | Mod: GO

## 2025-02-17 RX ORDER — OLANZAPINE 10 MG/2ML
5 INJECTION, POWDER, FOR SOLUTION INTRAMUSCULAR 2 TIMES DAILY PRN
Status: DISCONTINUED | OUTPATIENT
Start: 2025-02-17 | End: 2025-02-17

## 2025-02-17 RX ORDER — HALOPERIDOL 0.5 MG/1
0.5 TABLET ORAL 2 TIMES DAILY
Status: DISCONTINUED | OUTPATIENT
Start: 2025-02-17 | End: 2025-02-20

## 2025-02-17 RX ORDER — HYDRALAZINE HYDROCHLORIDE 10 MG/1
10 TABLET, FILM COATED ORAL EVERY 6 HOURS PRN
Status: DISCONTINUED | OUTPATIENT
Start: 2025-02-17 | End: 2025-03-06 | Stop reason: HOSPADM

## 2025-02-17 RX ORDER — HYDRALAZINE HYDROCHLORIDE 10 MG/1
20 TABLET, FILM COATED ORAL EVERY 6 HOURS PRN
Status: DISCONTINUED | OUTPATIENT
Start: 2025-02-17 | End: 2025-03-06 | Stop reason: HOSPADM

## 2025-02-17 RX ORDER — HALOPERIDOL 5 MG/ML
2 INJECTION INTRAMUSCULAR EVERY 6 HOURS PRN
Status: DISCONTINUED | OUTPATIENT
Start: 2025-02-17 | End: 2025-02-18

## 2025-02-17 RX ORDER — OLANZAPINE 5 MG/1
5 TABLET, ORALLY DISINTEGRATING ORAL 2 TIMES DAILY PRN
Status: DISCONTINUED | OUTPATIENT
Start: 2025-02-17 | End: 2025-02-17

## 2025-02-17 RX ORDER — OLANZAPINE 10 MG/2ML
5 INJECTION, POWDER, FOR SOLUTION INTRAMUSCULAR DAILY PRN
Status: DISCONTINUED | OUTPATIENT
Start: 2025-02-17 | End: 2025-02-17

## 2025-02-17 RX ORDER — RAMELTEON 8 MG/1
8 TABLET ORAL AT BEDTIME
Status: DISCONTINUED | OUTPATIENT
Start: 2025-02-17 | End: 2025-03-06 | Stop reason: HOSPADM

## 2025-02-17 RX ADMIN — TICAGRELOR 90 MG: 90 TABLET ORAL at 19:11

## 2025-02-17 RX ADMIN — HALOPERIDOL 0.5 MG: 0.5 TABLET ORAL at 19:10

## 2025-02-17 RX ADMIN — EZETIMIBE 10 MG: 10 TABLET ORAL at 19:11

## 2025-02-17 RX ADMIN — METFORMIN ER 500 MG 1000 MG: 500 TABLET ORAL at 19:11

## 2025-02-17 RX ADMIN — Medication 10 MG: at 17:13

## 2025-02-17 RX ADMIN — RAMELTEON 8 MG: 8 TABLET, FILM COATED ORAL at 19:10

## 2025-02-17 RX ADMIN — FAMOTIDINE 40 MG: 20 TABLET, FILM COATED ORAL at 19:10

## 2025-02-17 RX ADMIN — LISINOPRIL 5 MG: 5 TABLET ORAL at 19:10

## 2025-02-17 ASSESSMENT — ACTIVITIES OF DAILY LIVING (ADL)
ADLS_ACUITY_SCORE: 49
ADLS_ACUITY_SCORE: 52
ADLS_ACUITY_SCORE: 48
ADLS_ACUITY_SCORE: 49
ADLS_ACUITY_SCORE: 52
ADLS_ACUITY_SCORE: 49
BADLS,_PREVIOUS_FUNCTIONAL_LEVEL: USES DEVICE OR EQUIPMENT
ADLS_ACUITY_SCORE: 52
ADLS_ACUITY_SCORE: 49
ADLS_ACUITY_SCORE: 52
ADLS_ACUITY_SCORE: 48
PREVIOUS_RESPONSIBILITIES: MEAL PREP
ADLS_ACUITY_SCORE: 49
ADLS_ACUITY_SCORE: 52
IADLS,_PREVIOUS_FUNCTIONAL_LEVEL: PARTIAL ASSISTANCE
ADLS_ACUITY_SCORE: 52
ADLS_ACUITY_SCORE: 49
ADLS_ACUITY_SCORE: 48
ADLS_ACUITY_SCORE: 49

## 2025-02-17 NOTE — PROGRESS NOTES
Discharge Planner Post-Acute Rehab PT:     Discharge Plan: Home mod-I during day while spouse at work. HH PT    Precautions: *Orthostatic hypotension in combination and significant resting hypertension*  Has tried LE and abdominal compression in the past without benefit or tolerance  Do not worry about resting/supine BP hypertension unless diastolic >110    *Currently not safe to be OOB*  *Impulsive*    Current Status  Bed Mobility: Able to get to EOB unassisted, requires heavy assist to supine because pt will get syncopal sitting  Transfer: Mod-A to stand  Gait: Not safe due to syncope  Stairs: Not safe due to syncope  Balance: Able to sit unsupported. Unable to evaluate standing balance due to syncope    Outcome Measures: - not appropriate due to syncope    Assessment: BP appears mildly more stable sitting, however still unable to stand with immediate and un-resolving symptoms. At this time pt remains unsafe to work with as he is not appropriately following commands and is combative and hitting therapists.    Other Barriers to Discharge (DME, Family Training, etc):   W/c - anticipate needed for community  Stairs - 3 DANIEL  Orthostatic hypotension - currently unable to safely be OOB

## 2025-02-17 NOTE — PLAN OF CARE
Goal Outcome Evaluation:      Plan of Care Reviewed With: patient, spouse    Overall Patient Progress: no changeOverall Patient Progress: no change  Alert to self only, patient re-approach again after he visited with psyche staff.  Had a long conversation with wife and she reiterated that the underlying issue for patient  refusing the meds is lack of trust. Aggred to take his meds if his wife is present and involved with taking the meds. Allowed writer to scan him, Patients wife assisted with med administration, despite wife's involvement, patient spit out the meds. Therefore did not take any medications today. Ate breakfast and lunch assisted by his wife.will continue poc

## 2025-02-17 NOTE — PLAN OF CARE
Goal Outcome Evaluation:           Overall Patient Progress: no changeOverall Patient Progress: no change     Pt alert, disoriented to situation at start of shift. Denies chest pain and shortness of breath. 1:1 attendant at bedside. Pt calm and cooperative.     Around 1900, pt became increasingly agitated, S.O. on her way back to stay the night. Pt talked to her on the phone and calmed down. S.O. returned around 2000, able to keep pt calm. S.O. got pt to take evening meds except metformin and potassium chloride, too big for pt to swallow. S.O. at bedside. Call light within reach, bed alarm on.

## 2025-02-17 NOTE — PROGRESS NOTES
Discharge Planner Post-Acute Rehab OT:     Discharge Plan: Home mod-I during day while s/o at work. HCOT    Precautions: Orthostatic hypotension and significant resting hypertension, cognition - impulsive and agitated    Permissive resting/supine hypertension unless diastolic >110    Do not leave alone in seated position; must have legs elevated in recliner.    Current Status:  ADLs:  Mobility: CGA FWW stand pivot.  Grooming: SBA seated, cues.  Dressing: UB - SBA. LB - Min A FWW. Feet - NT.  Bathing: NT  Toileting: NT  IADLs: Baseline IND simple meal prep, light home mgmt, med mgmt. 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 Enjoys refurbishing antique toys.   Vision/Cognition: Baseline vision deficit d/t retinal disease. Dysarthric. Delirium - not oriented to place, reason, time; paranoid. Agitated and combative at times. Significant functional cognition deficits across attention, memory, reasoning, impulse control.    Assessment: Pt admitted to ARU s/p CVA; pertinent history of previous strokes and baseline orthostatic hypotension limiting extended activity. Limited evaluation completed. Pt currently far below limited baseline; requires increased assist across all transfers and ADLs, and limited ability to tolerate any activity in seated or standing d/t symptomatic orthostatic hypotension and syncopal episodes since arrival at ARU. Performance primarily limited by orthostatic hypotension significantly limiting meaningful activity and signficant functional cognition and orientation deficits with noted agitation. Pt main social support works out of home; goal to progress to Mod I daytime ADL, functional transfers, and meal retrieval in order to meet discharge environment needs.     Other Barriers to Discharge (DME, Family Training, etc):   Cognition.  Fall risk  - significant orthostatic.      DME: Shower chair, 4WW.

## 2025-02-17 NOTE — PLAN OF CARE
"Discharge Planner Post-Acute Rehab SLP:     Discharge Plan: home with significant other? Ongoing SLP    Precautions: 1:1 attendant, can be combative with cares.    Current Status:  Hearing: WFL  Vision: Retinal detachment, Readers  Communication: Mild dysarthria when pt alert, motor speech at baseline per wife's report. Hx expressive aphasia; WAB-R Bedside 02/16 moderate expressive/receptive deficits (suspect fatigue, medications impacting)  Cognition: At least moderate cognitive impairment suspected. Subjectively noting poor insight into deficits/safety/situation, poor working memory, alternating/sustained attention  Swallow: Regular solids/Thin liquids (0). ARU SLP to monitor for need for clinical swallow evaluation.    Assessment: 1:1 present. Pt participated in task, sorting cards into x2 categories by color. Pt able to sort cards into category with 50% accuracy IND, 100% max cues. Noting difficulties with sustained/alternating attention, initiation, significantly poor working memory, poor insight into situation/deficits. Few instances of agitation and refusal but easily redirectable and then engaged in task with max cues. required max cues to initiate through task and participate. Notable confusion and poor historian. Pt very perseverative with \"getting out of here\" but redirectable. Severely poor insight     Other Barriers to Discharge (Family Training, etc): level of assist/supervision with iADLs?    "

## 2025-02-17 NOTE — PROGRESS NOTES
02/17/25 0900   Appointment Info   Signing Clinician's Name / Credentials (OT) Jadon Rosales, OTR/L   Living Environment   People in Home spouse   Current Living Arrangements house   Home Accessibility stairs to enter home   Number of Stairs, Main Entrance 3   Stair Railings, Main Entrance railing on right side (ascending)   Transportation Anticipated family or friend will provide   Living Environment Comments Lives in [place], [place] with s/o Meryl. All needs met on main level with limited walking distance required. BR with deep tub with shower chair. Social support: Meryl works M-F out of home. Pt referred to adult children in state however indicated not appropriate for additional A.   Self-Care   Usual Activity Tolerance fair   Current Activity Tolerance poor   Regular Exercise No   Equipment Currently Used at Home cane, straight;walker, rolling;grab bar, tub/shower;shower chair   Fall history within last six months yes   Number of times patient has fallen within last six months 4   Activity/Exercise/Self-Care Comment Mod I ADLs baseline. Activity tolerance limited by orthostatic hypotension at baseline with increased balance deficits and falls in months preceding hospitalization.   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 Mod I basic meal prep, light home mgmt tasks, and med mgmt, and occassional lawncare utilizing riding machinery at baseline. A for heavier IADLs; stopped driving a few years ago d/t vision issues. Per chart, s/o endorses pt may have missed medication doses prior to hospitalization.   Post-Acute Assessment Only   Post-Acute Functional Assessment See below   Previous Level of Function/Home Environm   Bathing, Previous Functional Level uses device or equipment   Grooming, Previous Functional Level independent  "  Dressing, Previous Functional Level independent   Eating/Feeding, Previous Functional Level independent   Toileting, Previous Functional Level independent   BADLs, Previous Functional Level uses device or equipment   IADLs, Previous Functional Level partial assistance   Bed Mobility, Previous Functional Level independent   Transfers, Previous Functional Level uses device or equipment   Household Ambulation, Previous Functional Level uses device or equipment   Stairs, Previous Functional Level uses device or equipment   Community Ambulation, Previous Functional Level uses device or equipment  (limited baseline)   Functional Cognition, Previous Functional Level IND   Previous Level of Function Mod I BADL, A for IADL   General Information   Onset of Illness/Injury or Date of Surgery 02/11/25   Referring Physician Christie Escobar MD   Patient/Family Therapy Goal Statement (OT) \"go home\"   Additional Occupational Profile Info/Pertinent History of Current Problem Per EMR \"Chris Delcid is a 59 year old right hand dominant male with a past medical history of HTN, orthostatic hypotension, HLD, DM II and prior stroke who was admitted on 2/11 with worsening fatigue, weakness and syncope and was found to have multifocal acute subcortical infarcts .\"   Performance Patterns (Routines, Roles, Habits) right handed male, limited baseline with funcitonal decline in months precedding hospitalization, enjoys taking cre of pet cat Preeti and refurbishing antique toys   Existing Precautions/Restrictions fall  (orthostatic hypotension - symptomatic and syncopal episodes; resting hypertension)   Limitations/Impairments safety/cognitive   Left Upper Extremity (Weight-bearing Status) full weight-bearing (FWB)   Right Upper Extremity (Weight-bearing Status) full weight-bearing (FWB)   Left Lower Extremity (Weight-bearing Status) full weight-bearing (FWB)   Right Lower Extremity (Weight-bearing Status) full weight-bearing (FWB)   Heart " "Disease Risk Factors Medical history   General Observations and Info Per provider, permissive resting/supine BP hypertension unless diastolic >110   Cognitive Status Examination   Orientation Status person   Behavioral Issues combative/physical outbursts;inappropriate language   Affect/Mental Status (Cognitive) agitated;confused   Follows Commands follows one-step commands;50-74% accuracy;delayed response/completion;increased processing time needed;repetition of directions required;physical/tactile prompts required;verbal cues/prompting required   Safety Deficit ability to follow commands;at risk behavior observed;awareness of need for assistance;impulsivity;insight into deficits/self-awareness;judgment;problem-solving   Memory Deficit recall, biographical information;recall, recent events;episodic memory   Attention Deficit distractible in quiet environment;focused/sustained attention;restless when unoccupied;requires cues/redirection to task   Executive Function Deficit impulse control;information processing;problem-solving/reasoning   Cognitive Status Comments Dysarthric. Pt citing \"writing on floor\", \"people trying to convert\" him, and \"bandits\" referring to staff with masks.   Visual Perception   Impact of Vision Impairment on Function (Vision) Difficult to assess in context of cognition and orthostatic hypotension. Baseline issue with his vision to retinal disease but endorses no new changes in his vision. Required prompt to identify phone on tray table; unable to read schedule on whiteboard when asked by writer citing \"I can't see part of it\".   Sensory   Sensory Comments Per chart, endorses parasthesias in RUE.   Posture   Posture forward head position;protracted shoulders   Range of Motion Comprehensive   Comment, General Range of Motion Not formally screened d/t behavior/cognition; UB appears WFL in upper body dressing task   Strength Comprehensive (MMT)   Comment, General Manual Muscle Testing (MMT) " Assessment Not formally tested, appears WFL within upper body tasks in limited timeframe pt able to tolerate activity.   Coordination   Coordination Comments Not formally screened; pt able to manipulate tooth paste cap and oral cares mgmt without issues or dropping items.   Clinical Impression   Criteria for Skilled Therapeutic Interventions Met (OT) Yes, treatment indicated   OT Diagnosis Impaired ADL, IADL, funcitonal transfers   Influenced by the following impairments cognition, hemodynamic stability   OT Problem List-Impairments impacting ADL problems related to;activity tolerance impaired;balance;cognition;mobility;communication;strength   Assessment of Occupational Performance 5 or more Performance Deficits   Identified Performance Deficits bathing, dressing, toileting, g/h, functional transfers, simple meal prep, simple home mgmt   Planned Therapy Interventions (OT) ADL retraining;IADL retraining;cognition;home program guidelines;progressive activity/exercise;risk factor education;transfer training;other (see comments)  (caregiver education)   Clinical Decision Making Complexity (OT) detailed assessment/moderate complexity   Risk & Benefits of therapy have been explained evaluation/treatment results reviewed;care plan/treatment goals reviewed;risks/benefits reviewed;current/potential barriers reviewed;participants voiced agreement with care plan;participants included;patient   Clinical Impression Comments Pt admitted to ARU s/p CVA; pertinent history of previous strokes and baseline orthostatic hypotension limiting extended activity. Limited evaluation completed. Pt currently far below limited baseline; requires increased assist across all transfers and ADLs, and limited ability to tolerate any activity in seated or standing d/t symptomatic orthostatic hypotension and syncopal episodes since arrival at ARU. Performance primarily limited by orthostatic hypotension significantly limiting meaningful activity and  "signficant functional cognition and orientation deficits with noted agitation. Pt main social support works out of home; goal to progress to Mod I daytime ADL, functional transfers, and meal retrieval in order to meet discharge environment needs.   OT Total Evaluation Time   OT Eval, Moderate Complexity Minutes (88189) 10   OT Goals   Therapy Frequency (OT) 6 times/week   OT Predicted Duration/Target Date for Goal Attainment 03/03/25   OT Goals Hygiene/Grooming;Upper Body Dressing;Upper Body Bathing;Lower Body Dressing;Lower Body Bathing;Toilet Transfer/Toileting;Cognition;OT Goal 1;Meal Preparation   OT: Hygiene/Grooming modified independent;within precautions   OT: Upper Body Dressing Modified independent;within precautions   OT: Lower Body Dressing Modified independent;within precautions   OT: Upper Body Bathing Modified independent;within precautions;using adaptive equipment   OT: Lower Body Bathing Modified independent;using adaptive equipment;with precautions   OT: Toilet Transfer/Toileting Modified independent;toilet transfer;cleaning and garment management;using adaptive equipment;within precautions   OT: Meal Preparation Modified independent;with simple meal preparation;ambulatory level;within precautions   OT: Cognitive Patient/caregiver will verbalize understanding of cognitive assessment results/recommendations as needed for safe discharge planning   OT: Goal 1 Pt will perform bathing transfer simulating home environment SBA within precautions with graded assist utilizing DME.   Self-Care/Home Management   Self-Care/Home Mgmt/ADL, Compensatory, Meal Prep Minutes (80073) 60   Symptoms Noted During/After Treatment (Meal Preparation/Planning Training) fatigue;significant change in vital signs;behavioral stress signs   Treatment Detail/Skilled Intervention Abbreviated eval completed, tx initiated. S/O Meryl present for beginning of session; reported pt continues to be agitated and \"cranky\" this morning but " "wanting to get out of hospital bed. S/O endorsing pt's orthostatic hypotension managed much better at baseline and current inability to maintain hemodynamic stability to get out of bed is not baseline. Time spent establishing rapport with pt in context of delirium and agitation; multiple instances of pt attempting to peel tape from LUE PICC site, verbal cues and reapplication of tape by presenting pt tape and allowing him to place beneficial in session to divert behavior. S/O left session. Pt responsive to behavior mgmt techniques of slow approach, explanation of steps one at a time prior to initiation of touch or change of environment, use of pt's name, non-occlusion of staff faces as able, and simple orienting/personal narrative questions and conversation topics during tasks. Pt endorsing \"I want to go home today\" multiple times; unable to demonstrate comprehension of reasoning for being at rehab unit. With extra time for BP monitoring t/o, pt engaged in AM ADL routine within precautions with graded assist to promote functional cognition, behavior mgmt, and activity tolerance in context of volatile BP and hx of syncopal episodes. Pt with hypertensive BP t/o; however orthostatic drop with increased time in seated  EOB and 1 STS and 1 SPT in session. BP able to recover with time spent in recliner with BLE elevated by footrest (see vitals flowsheet); chair outfitted with seat alarm. Writer sat with pt and monitored BP response with OOB activity in specific BLE elevated positioning for additional time to ensure safety and appropriate BP response; BP recovered appropriately, see vitals. Educated pt and wrote on whiteboard for carryover across staff that pt ok to sit in recliner only if BLE elevated and pt in general semi-garcia positioning; educated 1:1 at doorway upon leaving room that pt unabel to maintain typical seated/chair position with BP volatilty. Left in care of 1:1, alarm on, call light in reach; all needs " "met.   OT Discharge Planning   OT Plan AM ADL routine with monitoring of BP, support orientation and fx cognition with environmental mgmt and visual aides, behavior mgmt strategies across caregivers   Total Session Time   Timed Code Treatment Minutes 60   Total Session Time (sum of timed and untimed services) 70   Post Acute Settings Only   What unit is patient on? Acute Rehab   OT - Acute Rehab Center Time   Individual Time (minutes) - OT 70   Group Time (minutes) - OT 0   Concurrent Time (minutes) - OT 0   Co-Treatment Time (minutes) - OT 0   ARC Total Session Time (minutes) - OT 70   ARC Daily Total Session Time   OT ARC Daily Total Session Time 70   ARC Daily Rehab Total Minutes 70   Oral Hygiene   Describe performance Close SBA EOB with set up, initial one step cues \"put toothpaste on brush\" and \"dip in water\" partially d/t unfamiliar set up of use of cup vs sink; then pt able to complete brushing w/o cues   Grooming (except oral cares)   Grooming Task Performed Washing face;Hair grooming   Grooming Comment Close SBA EOB with set up, prompting cue to begin and then pt completed.   Upper Body Dressing   Describe performance SBA don EOB   Clothing Utilized Shirt   Lower Body Dressing (Pants/Undergarments)   Describe performance Min A don shorts FWW, extra time, cuing   Chair/bed-to-chair Transfer: standard height (18 inches)   Describe performance CGA FWW EOB > recliner     "

## 2025-02-17 NOTE — PLAN OF CARE
Goal Outcome Evaluation:      Plan of Care Reviewed With: patient, parent    Overall Patient Progress: no changeOverall Patient Progress: no change    Found patient this morning sitting in the middle of his bed , spouse by his side and talking to him. Patient keep repeating that he wants to go out of here and go home, also attempt to strike at me, also c/o back pain. Writer offered him the tylenol and he declined, however patient spouse ask me to bring it and she will will give to him after I scan the medication. Patient's wife put the tylenol in patients mouth and patient reach in his mouth, take them out and throw it. So far did not take none of his meds. Will continue poc

## 2025-02-17 NOTE — CONSULTS
Initial Psychiatric Consult   Consult date: February 17, 2025         Reason for Consult, requesting source:    Reason for Consult: Delirium evaluation and treatment  Requesting source: Izzy Zavala    Labs and imaging reviewed.         HPI:   Chris Delcid is a 59 year old  with no past psychiatric history and medical history of HTN, orthostatic hypotension, HLD, T2DM, and prior stroke who waas admitted 2/11 with worsening fatigue, weakness, syncope and was found to have multifocal acute subcortical infarcts. Was admitted to in rehab 2/14/25.  Pt has been seen by neurology team this morning. Per their note, transient neurological episodes could be due to delirium, hypotension, vs elements of stroke recrudescence.     Psychiatry consulted to evaluate and assist with treatment of delirium.    Per nursing notes pt has been intermittently agitated and refusing medications. Has attempted to strike nursing staff on occasion. Trazodone was discontinued yesterday, seroquel 25 mg at bedtime started.     Today on exam, rosalba is calm and cooperative. He is joined at bedside by his wife Meryl, who also contributes to history. Per wife, paranoia and agitation are very new to patient. Explains that he was treated at OSH in [place] Tuesday-Thursday without behavioral concerns. He was started on trazodone there, denies any previous MH medication trials. Friday started having significant behavioral changes which raised concern for stroke, CT and MRI were done which were unchanged from prior. Early Saturday morning around 1 am, he experienced increased delirium symptoms and was treated with 10 mg IM Zyprexa. He subsequently slept most of Sunday.     Has been intermittently refusing medications and becoming aggressive with both wife and staff. He feels unsafe and that people are trying to hurt him. Has asked to call the police on multiple occasions as he was confused and concerned over his own safety or his wife's safety  when she is not present in room. Pt was started on seroquel last night, Meryl reports this helped with his sleep. Had some confusion over night when nurse came in to clean him but was redirectable. Today Pa perseverates on going home. He makes multiple suicidal statements throughout the interview. Says he would use a knife or jump out of the window. When team asks whether he wants to just be out of the hospital or if he wants to end his life, he answers both. No history of suicide attempts in past, no previous psychiatric hospitalizations. He is oriented to self, month and year but not oriented to situation or setting. His short term memory also impaired. Wife reports no previous concern for dementia. 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           Past Psychiatric History:   Past Diagnoses: none  Medication Trials: trazodone at OSH, stopped due to nightmares  Psychiatric Hospitalizations: none  Suicide Attempts: none  Self Injury: none  History of Trauma: not addressed today  Past Psychosis: N  Past Cristiane: N  Outpatient Programs: none per chart  Civil Commitment: none  ECT: none per chart        Substance Use and History:   Not addressed today. No history of substance use per chart.      Social History     Tobacco Use    Smoking status: Never     Passive exposure: Past    Smokeless tobacco: Current     Types: Snuff   Substance Use Topics    Alcohol use: No           Past Medical History:   PAST MEDICAL HISTORY:   Past Medical History:   Diagnosis Date    Chronic low back pain 5/29/2014    Overview:  Follow at [facility] in [place]. Follow at [facility] in [place].    Essential hypertension 6/7/2003    Hyperlipidemia 3/4/2011    Orthostatic hypotension 12/26/2014    Overview:  Secondary to diabetic autonomic dysfunction?  Started midodrine Dec, 2014 Secondary to diabetic autonomic dysfunction?  Started midodrine Dec, 2014    Statin intolerance 2/13/2020    Type 2 diabetes mellitus (H)  2014    Type 2 diabetes, uncontrolled, with autonomic neuropathy (HCC)       PAST SURGICAL HISTORY:   Past Surgical History:   Procedure Laterality Date    FRACTURE TX, WRIST RT/LT Left     Multiple surgeries, eventually needed fusion    HC SHOULDER ARTHROSCOPY, DX Right     INTRAVITREAL INJECTION OU (BOTH EYES) Bilateral     Multiple injections    RETINAL REATTACHMENT               Family History:   FAMILY HISTORY:   I have reviewed this patient's family history and updated it with pertinent information if needed.  Family History   Problem Relation Age of Onset    Diabetes Mother          age 49 of pneumonia    Diabetes Father     Heart Failure Father     Coronary Artery Disease Father     Myocardial Infarction Father         Stents    Hypertension Father     No Known Problems Sister     No Known Problems Brother     Esophageal Cancer Brother              Social History:     Relationships: , lives with wife Meryl in [place]  Children: adult son  Occupation/Finances: previously worked in [employer] repair           Physical ROS:   The 10 point Review of Systems is negative other than noted in the HPI or here.           Medications:     Current Facility-Administered Medications   Medication Dose Route Frequency Provider Last Rate Last Admin    aspirin (ASA) chewable tablet 81 mg  81 mg Oral Daily Nichelle Chavez PA   81 mg at 25 1029    atorvastatin (LIPITOR) tablet 10 mg  10 mg Oral Daily Nichelle Chavez PA   10 mg at 25 1028    ezetimibe (ZETIA) tablet 10 mg  10 mg Oral At Bedtime Nichelle Chavez PA   10 mg at 25 2106    famotidine (PEPCID) tablet 40 mg  40 mg Oral BID Nick Zavala MD   40 mg at 25 210    glipiZIDE (GLUCOTROL) half-tab 10 mg  10 mg Oral BID AC Nichelle Chavez PA   10 mg at 25 1633    iopamidol (ISOVUE-370) solution 500 mL  500 mL Intravenous Once Dakotah Huggins APRN CNP        lisinopril (ZESTRIL) tablet 5 mg  5 mg Oral BID  Nichelle Chavez PA   5 mg at 02/16/25 2104    metFORMIN (GLUCOPHAGE XR) 24 hr tablet 1,000 mg  1,000 mg Oral BID Nichelle Chavez PA   1,000 mg at 02/16/25 1028    pantoprazole (PROTONIX) EC tablet 40 mg  40 mg Oral QAM Christie Roldan MD        potassium chloride marek ER (KLOR-CON M20) CR tablet 20 mEq  20 mEq Oral BID Nichelle Chavez PA   20 mEq at 02/16/25 1028    QUEtiapine (SEROquel) half-tab 12.5 mg  12.5 mg Oral BID Nick Zavala MD        QUEtiapine (SEROquel) tablet 25 mg  25 mg Oral At Bedtime To, Tomas, DO   25 mg at 02/16/25 2106    sodium chloride 0.9 % bag for CT scan flush use  100 mL As instructed Once Dakotah Huggins APRN CNP        ticagrelor (BRILINTA) tablet 90 mg  90 mg Oral BID Nichelle Chavze PA   90 mg at 02/16/25 2104              Allergies:     Allergies   Allergen Reactions    Hydrochlorothiazide Other (See Comments)     Syncope per note 08/28/2012        Penicillins Hives and Angioedema    Cortisone Hives and Swelling    Aspirin Other (See Comments)     Ringing in ears    Atorvastatin Muscle Pain (Myalgia)    No Clinical Screening - See Comments      No latex allergy-dcm    Rosuvastatin Muscle Pain (Myalgia)    Simvastatin Other (See Comments)     Drowsy          Fludrocortisone Palpitations and Other (See Comments)     Hypertension, head pounding at night, tremor             Labs:     Recent Results (from the past 48 hours)   Glucose by meter    Collection Time: 02/15/25  5:19 PM   Result Value Ref Range    GLUCOSE BY METER POCT 132 (H) 70 - 99 mg/dL   Glucose by meter    Collection Time: 02/15/25  9:52 PM   Result Value Ref Range    GLUCOSE BY METER POCT 143 (H) 70 - 99 mg/dL   Glucose by meter    Collection Time: 02/16/25  8:24 AM   Result Value Ref Range    GLUCOSE BY METER POCT 86 70 - 99 mg/dL   Glucose by meter    Collection Time: 02/16/25 11:12 AM   Result Value Ref Range    GLUCOSE BY METER POCT 136 (H) 70 - 99 mg/dL   Basic metabolic panel    Collection  "Time: 02/17/25  5:34 AM   Result Value Ref Range    Sodium 140 135 - 145 mmol/L    Potassium 3.7 3.4 - 5.3 mmol/L    Chloride 106 98 - 107 mmol/L    Carbon Dioxide (CO2) 21 (L) 22 - 29 mmol/L    Anion Gap 13 7 - 15 mmol/L    Urea Nitrogen 14.3 8.0 - 23.0 mg/dL    Creatinine 0.98 0.67 - 1.17 mg/dL    GFR Estimate 88 >60 mL/min/1.73m2    Calcium 9.2 8.8 - 10.4 mg/dL    Glucose 75 70 - 99 mg/dL   CBC with platelets    Collection Time: 02/17/25  5:34 AM   Result Value Ref Range    WBC Count 7.4 4.0 - 11.0 10e3/uL    RBC Count 4.90 4.40 - 5.90 10e6/uL    Hemoglobin 14.4 13.3 - 17.7 g/dL    Hematocrit 41.9 40.0 - 53.0 %    MCV 86 78 - 100 fL    MCH 29.4 26.5 - 33.0 pg    MCHC 34.4 31.5 - 36.5 g/dL    RDW 13.6 10.0 - 15.0 %    Platelet Count 320 150 - 450 10e3/uL   Glucose by meter    Collection Time: 02/17/25  8:49 AM   Result Value Ref Range    GLUCOSE BY METER POCT 88 70 - 99 mg/dL          Physical and Psychiatric Examination:     BP (!) 160/85 (BP Location: Left arm)   Pulse 85   Temp 97.9  F (36.6  C) (Oral)   Resp 18   Ht 1.829 m (6')   Wt 105.6 kg (232 lb 12.9 oz)   SpO2 98%   BMI 31.57 kg/m    Weight is 232 lbs 12.89 oz  Body mass index is 31.57 kg/m .    Physical Exam:  I have reviewed the physical exam as documented by by the medical team and agree with findings and assessment and have no additional findings to add at this time.         MSE:   Appearance: awake, alert, appeared older than stated age, and casually dressed  Attitude:  cooperative and guarded at times  Eye Contact:  fair  Mood:   \"horse sh*t\"  Affect:  mood congruent  Speech:  dysarthria  Language: fluent in english  Psychomotor Behavior:  no evidence of tardive dyskinesia, dystonia, or tics  Muscle strength and tone: not formally assessed  Thought Process:  illogical and confused at times  Associations:   questionable  Thought Content:   endorses suicidal ideation, unclear intent with fluctuating mental status, denies AVH, endorses " paranoia  Insight:  limited  Judgement:  limited  Oriented to:   person, month, year. Not oriented to situation, building, city  Cognition: limited, failed 3 word recall test, unable to say days of the week backwards, unable to say months of year backward, able to say how many quarters are in $1.75.     EKG: Qtc 474   2/15/25         DSM-5 Diagnosis:     Delirium/encephalopathy  R/o vascular dementia           Assessment:   Chris Delcid is a 59 year old  with no past psychiatric history and medical history of HTN, orthostatic hypotension, HLD, T2DM, and prior stroke who was admitted 2/11 with worsening fatigue, weakness, syncope and was found to have multifocal acute subcortical infarcts. Was admitted to in rehab 2/14/25.  Pt has been seen by neurology team this morning. Per their note, transient neurological episodes could be due to delirium, hypotension, vs elements of stroke recrudescence.     Psychiatry consulted to evaluate and assist with treatment of delirium. On exam today Pa is cooperative but intermittently confused. He is oriented to self, year, and month but not oriented to situation, building, city. Short term memory impaired as he is unable to recall events from earlier today or over the last week. Additionally unable to do 3 word recall test or say days of the week backwards. He endorses paranoia, agitation, and suicidal ideation. He has no history of suicide attempts or suicidal behaviors. Current presentation consistent with delirium, at times hyperactive. Plan to discontinue seroquel due to chronic and ongoing orthostasis, start 0.5 mg haldol BID to target paranoia and delirium sx, start 8 mg ramelteon at bedtime to optimize sleep. Recommend 2mg Haldol IM as backup for agitation. If this dose is not effective, reasonable to try repeat dose or then increase to 5 mg. Haldol preferable over olanzapine or seroquel as it will have less risk for orthostatic hypotension.     Also need to consider  vascular dementia given possible new strokes. Would recommend OP neuropsychological testing.           Summary of Recommendations:     Safety: recommend verbal de escalation, re direction, haldol PRN available  Medications:  Plan to discontinue seroquel, start 0.5 mg haldol BID, and start 8 mg ramelteon at bedtime. Recommend 2mg Haldol IM as backup for agitation.    - Non-pharmacologic management of delirium, including lights on during day, off at night, frequent re-orientation, limit delirio-genic medications as much as possible including opioids, benzodiazepines, and anti-cholinergics, minimize use of restraints as much as possible, titrate pharmacologic PRN medications to avoid restraints and oversedation, early PT/OT/mobilization    Follow-Up: Psychiatry will continue to follow          Shanta Mcmahon MD MPH  PGY2 Psychiatry resident    Pt seen and staffed with attending Dr. Alford    I was present with the resident who participated in the service and in the documentation of the services provided. I have verified the history and personally performed the physical exam and medical decision making, as documented by the student/resident and edited by me     Je Alford MD    Department of Psychiatry  Please Vocera if questions    Total time spent in chart review, patient interview and coordination of care; 85 minutes - all time was spent on the date of the encounter that I saw patient

## 2025-02-17 NOTE — PROGRESS NOTES
02/17/25 0900   Appointment Info   Signing Clinician's Name / Credentials (OT) Jadon Rosales, OTR/L   Living Environment   People in Home spouse   Current Living Arrangements house   Home Accessibility stairs to enter home   Number of Stairs, Main Entrance 3   Stair Railings, Main Entrance railing on right side (ascending)   Transportation Anticipated family or friend will provide   Living Environment Comments Lives in [place], [place] with s/o Meryl. All needs met on main level with limited walking distance required. BR with deep tub with shower chair. Social support: Meryl works M-F out of home. Pt referred to adult children in state however indicated not appropriate for additional A.   Self-Care   Usual Activity Tolerance fair   Current Activity Tolerance poor   Regular Exercise No   Equipment Currently Used at Home cane, straight;walker, rolling;grab bar, tub/shower;shower chair   Fall history within last six months yes   Number of times patient has fallen within last six months 4   Activity/Exercise/Self-Care Comment Mod I ADLs baseline. Activity tolerance limited by orthostatic hypotension at baseline with increased balance deficits and falls in months preceding hospitalization.   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 Mod I basic meal prep, light home mgmt tasks, and med mgmt, and occassional lawncare utilizing riding machinery at baseline. A for heavier IADLs; stopped driving a few years ago d/t vision issues. Per chart, s/o endorses pt may have missed medication doses prior to hospitalization.   Post-Acute Assessment Only   Post-Acute Functional Assessment See below   Previous Level of Function/Home Environm   Bathing, Previous Functional Level uses device or equipment   Grooming, Previous Functional Level independent  "  Dressing, Previous Functional Level independent   Eating/Feeding, Previous Functional Level independent   Toileting, Previous Functional Level independent   BADLs, Previous Functional Level uses device or equipment   IADLs, Previous Functional Level partial assistance   Bed Mobility, Previous Functional Level independent   Transfers, Previous Functional Level uses device or equipment   Household Ambulation, Previous Functional Level uses device or equipment   Stairs, Previous Functional Level uses device or equipment   Community Ambulation, Previous Functional Level uses device or equipment  (limited baseline)   Functional Cognition, Previous Functional Level IND   Previous Level of Function Mod I BADL, A for IADL   Cognitive Status Examination   Orientation Status person   Behavioral Issues combative/physical outbursts;inappropriate language   Affect/Mental Status (Cognitive) agitated;confused   Follows Commands follows one-step commands;50-74% accuracy;delayed response/completion;increased processing time needed;repetition of directions required;physical/tactile prompts required;verbal cues/prompting required   Safety Deficit ability to follow commands;at risk behavior observed;awareness of need for assistance;impulsivity;insight into deficits/self-awareness;judgment;problem-solving   Memory Deficit recall, biographical information;recall, recent events;episodic memory   Attention Deficit distractible in quiet environment;focused/sustained attention;restless when unoccupied;requires cues/redirection to task   Executive Function Deficit impulse control;information processing;problem-solving/reasoning   Cognitive Status Comments Dysarthric. Pt citing \"writing on floor\", \"people trying to convert\" him, and \"bandits\" referring to staff with masks.   Visual Perception   Impact of Vision Impairment on Function (Vision) Difficult to assess in context of cognition and orthostatic hypotension. Baseline issue with his " "vision to retinal disease but endorses no new changes in his vision. Required prompt to identify phone on tray table; unable to read schedule on whiteboard when asked by writer citing \"I can't see part of it\".   Sensory   Sensory Comments Per chart, endorses parasthesias in RUE.   Posture   Posture forward head position;protracted shoulders   Range of Motion Comprehensive   Comment, General Range of Motion Not formally screened d/t behavior/cognition; UB appears WFL in upper body dressing task   Strength Comprehensive (MMT)   Comment, General Manual Muscle Testing (MMT) Assessment Not formally tested, appears WFL within upper body tasks in limited timeframe pt able to tolerate activity.   Coordination   Coordination Comments Not formally screened; pt able to manipulate tooth paste cap and oral cares mgmt without issues or dropping items.   Clinical Impression   Criteria for Skilled Therapeutic Interventions Met (OT) Yes, treatment indicated   OT Diagnosis Impaired ADL, IADL, funcitonal transfers   Influenced by the following impairments cognition, hemodynamic stability   OT Problem List-Impairments impacting ADL problems related to;activity tolerance impaired;balance;cognition;mobility;communication;strength   Assessment of Occupational Performance 5 or more Performance Deficits   Identified Performance Deficits bathing, dressing, toileting, g/h, functional transfers, simple meal prep, simple home mgmt   Planned Therapy Interventions (OT) ADL retraining;IADL retraining;cognition;home program guidelines;progressive activity/exercise;risk factor education;transfer training;other (see comments)  (caregiver education)   Clinical Decision Making Complexity (OT) detailed assessment/moderate complexity   Risk & Benefits of therapy have been explained evaluation/treatment results reviewed;care plan/treatment goals reviewed;risks/benefits reviewed;current/potential barriers reviewed;participants voiced agreement with care " plan;participants included;patient   Clinical Impression Comments Pt admitted to ARU s/p CVA; pertinent history of previous strokes and baseline orthostatic hypotension limiting extended activity. Limited evaluation completed. Pt currently far below limited baseline; requires increased assist across all transfers and ADLs, and limited ability to tolerate any activity in seated or standing d/t symptomatic orthostatic hypotension and syncopal episodes since arrival at ARU. Performance primarily limited by orthostatic hypotension significantly limiting meaningful activity and signficant functional cognition and orientation deficits with noted agitation. Pt main social support works out of home; goal to progress to Mod I daytime ADL, functional transfers, and meal retrieval in order to meet discharge environment needs.   OT Total Evaluation Time   OT Eval, Moderate Complexity Minutes (17555) 10   OT Goals   Therapy Frequency (OT) 6 times/week   OT Predicted Duration/Target Date for Goal Attainment 03/03/25   OT Goals Hygiene/Grooming;Upper Body Dressing;Upper Body Bathing;Lower Body Dressing;Lower Body Bathing;Toilet Transfer/Toileting;Cognition;OT Goal 1;Meal Preparation   OT: Hygiene/Grooming modified independent;within precautions   OT: Upper Body Dressing Modified independent;within precautions   OT: Lower Body Dressing Modified independent;within precautions   OT: Upper Body Bathing Modified independent;within precautions;using adaptive equipment   OT: Lower Body Bathing Modified independent;using adaptive equipment;with precautions   OT: Toilet Transfer/Toileting Modified independent;toilet transfer;cleaning and garment management;using adaptive equipment;within precautions   OT: Meal Preparation Modified independent;with simple meal preparation;ambulatory level;within precautions   OT: Cognitive Patient/caregiver will verbalize understanding of cognitive assessment results/recommendations as needed for safe  "discharge planning   OT: Goal 1 Pt will perform bathing transfer simulating home environment SBA within precautions with graded assist utilizing DME.   Self-Care/Home Management   Self-Care/Home Mgmt/ADL, Compensatory, Meal Prep Minutes (86413) 60   Symptoms Noted During/After Treatment (Meal Preparation/Planning Training) fatigue;significant change in vital signs;behavioral stress signs   Treatment Detail/Skilled Intervention Abbreviated eval completed, tx initiated. S/O Meryl present for beginning of session; reported pt continues to be agitated and \"cranky\" this morning but wanting to get out of hospital bed. S/O endorsing pt's orthostatic hypotension managed much better at baseline and current inability to maintain hemodynamic stability to get out of bed is not baseline. Time spent establishing rapport with pt in context of delirium and agitation; multiple instances of pt attempting to peel tape from [facility] PICC site, verbal cues and reapplication of tape by presenting pt tape and allowing him to place beneficial in session to divert behavior. S/O left session. Pt responsive to behavior mgmt techniques of slow approach, explanation of steps one at a time prior to initiation of touch or change of environment, use of pt's name, non-occlusion of staff faces as able, and simple orienting/personal narrative questions and conversation topics during tasks. Pt endorsing \"I want to go home today\" multiple times; unable to demonstrate comprehension of reasoning for being at rehab unit. With extra time for BP monitoring t/o, pt engaged in AM ADL routine within precautions with graded assist to promote functional cognition, behavior mgmt, and activity tolerance in context of volatile BP and hx of syncopal episodes. Pt with hypertensive BP t/o; however orthostatic drop with increased time in seated  EOB and 1 STS and 1 SPT in session. BP able to recover with time spent in recliner with BLE elevated by footrest (see vitals " "flowsheet); chair outfitted with seat alarm. Writer sat with pt and monitored BP response with OOB activity in specific BLE elevated positioning for additional time to ensure safety and appropriate BP response; BP recovered appropriately, see vitals. Educated pt and wrote on whiteboard for carryover across staff that pt ok to sit in recliner only if BLE elevated and pt in general semi-garcia positioning; educated 1:1 at doorway upon leaving room that pt unabel to maintain typical seated/chair position with BP volatilty. Left in care of 1:1, alarm on, call light in reach; all needs met.   OT Discharge Planning   OT Plan AM ADL routine with monitoring of BP, support orientation and fx cognition with environmental mgmt and visual aides, behavior mgmt strategies across caregivers   Total Session Time   Timed Code Treatment Minutes 60   Total Session Time (sum of timed and untimed services) 70   Post Acute Settings Only   What unit is patient on? Acute Rehab   OT - Acute Rehab Center Time   Individual Time (minutes) - OT 70   Group Time (minutes) - OT 0   Concurrent Time (minutes) - OT 0   Co-Treatment Time (minutes) - OT 0   ARC Total Session Time (minutes) - OT 70   ARC Daily Total Session Time   OT ARC Daily Total Session Time 70   ARC Daily Rehab Total Minutes 70   Oral Hygiene   Describe performance Close SBA EOB with set up, initial one step cues \"put toothpaste on brush\" and \"dip in water\" partially d/t unfamiliar set up of use of cup vs sink; then pt able to complete brushing w/o cues   Grooming (except oral cares)   Grooming Task Performed Washing face;Hair grooming   Grooming Comment Close SBA EOB with set up, prompting cue to begin and then pt completed.   Upper Body Dressing   Describe performance SBA don EOB   Clothing Utilized Shirt   Lower Body Dressing (Pants/Undergarments)   Describe performance Min A don shorts FWW, extra time, cuing   Chair/bed-to-chair Transfer: standard height (18 inches)   Describe " performance CGA FWW EOB > recliner

## 2025-02-17 NOTE — PROGRESS NOTES
"  [facility]   Acute Rehabilitation Unit  Daily progress note    INTERVAL HISTORY  Weekend notes reviewed.  Pa has been very agitated requiring a one-to-one sitter, use of intramuscular Zyprexa and initiation of Seroquel at nighttime.  He has tried to physically strike staff.  Stroke code was called on 2/15 with noted hypertension, and workup including CT head and MRI did not demonstrate any new or acute pathology.  Neurology stroke team signed off, and general neuro team reevaluated today.  It is possible episodes are multifactorial including delirium, hypotension, and stroke recrudescence however if episodes persist then an EEG could be pursued.  Today upon my visit, Pa continues to be paranoid and says he feels \"like shit\".  When asked him to specify what he means by this, he says he has pain everywhere.  He also reports that we have \"an ulterior agenda\" and is angry that there is a one-to-one sitter in his room.  I tried to discuss that this is for his safety and the goal is to work with him to ultimately try to get him home in a safe manner.  He says he wants to \"help himself and go home\", and again I reiterated that we would like him to be safe first and foremost.  He tells me that the \"next person comes near me is going to get one\" and makes a fist and punching motion.  Try to utilize the verbal de-escalation, but ultimately felt further engagement with the patient would be counterproductive.  The patient's wife was not present during my visit, however per discussion with team who have had discussions with her, this is an acute change over the past several days.  Psychiatry also consulted and recs appreciated.  Functionally, therapy is very limited due to agitation, and when he is agreeable to participate he is very limited by significant orthostasis which has caused syncopal episodes.  Therefore only very light and mostly bed exercises have been able to be performed " thus far.    Current functional status:  PT:  *Orthostatic hypotension in combination and significant resting hypertension*  Has tried LE and abdominal compression in the past without benefit or tolerance  Do not worry about resting/supine BP hypertension unless diastolic >110     *Currently not safe to be OOB*  *Impulsive*     Current Status  Bed Mobility: Able to get to EOB unassisted, requires heavy assist to supine because pt will get syncopal sitting  Transfer: Mod-A to stand  Gait: Not safe due to syncope  Stairs: Not safe due to syncope  Balance: Able to sit unsupported. Unable to evaluate standing balance due to syncope     Outcome Measures: - not appropriate due to syncope     Assessment: BP appears mildly more stable sitting, however still unable to stand with immediate and un-resolving symptoms. At this time pt remains unsafe to work with as he is not appropriately following commands and is combative and hitting therapists.    OT:  ADLs:  Mobility: CGA FWW stand pivot.  Grooming: SBA seated, cues.  Dressing: UB - SBA. LB - Min A FWW. Feet - NT.  Bathing: NT  Toileting: NT  IADLs: Baseline IND simple meal prep, light home mgmt, med mgmt. 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 Enjoys refurbishing antique toys.   Vision/Cognition: Baseline vision deficit d/t retinal disease. Dysarthric. Delirium - not oriented to place, reason, time; paranoid. Agitated and combative at times. Significant functional cognition deficits across attention, memory, reasoning, impulse control.     Assessment: Pt admitted to ARU s/p CVA; pertinent history of previous strokes and baseline orthostatic hypotension limiting extended activity. Limited evaluation completed. Pt currently far below limited baseline; requires increased assist across all transfers and ADLs, and limited ability to tolerate any activity in seated or standing d/t symptomatic orthostatic hypotension and syncopal episodes since arrival at ARU. Performance primarily limited  "by orthostatic hypotension significantly limiting meaningful activity and signficant functional cognition and orientation deficits with noted agitation. Pt main social support works out of home; goal to progress to Mod I daytime ADL, functional transfers, and meal retrieval in order to meet discharge environment needs.     SLP:  Hearing: WFL  Vision: Retinal detachment, Readers  Communication: Mild dysarthria when pt alert, motor speech at baseline per wife's report. Hx expressive aphasia; WAB-R Bedside 02/16 moderate expressive/receptive deficits (suspect fatigue, medications impacting)  Cognition: At least moderate cognitive impairment suspected. Subjectively noting poor insight into deficits/safety/situation, poor working memory, alternating/sustained attention  Swallow: Regular solids/Thin liquids (0). ARU SLP to monitor for need for clinical swallow evaluation.     Assessment: 1:1 present. Pt participated in task, sorting cards into x2 categories by color. Pt able to sort cards into category with 50% accuracy IND, 100% max cues. Noting difficulties with sustained/alternating attention, initiation, significantly poor working memory, poor insight into situation/deficits. Few instances of agitation and refusal but easily redirectable and then engaged in task with max cues. required max cues to initiate through task and participate. Notable confusion and poor historian. Pt very perseverative with \"getting out of here\" but redirectable. Severely poor insight     MEDICATIONS  Scheduled:  Current Facility-Administered Medications   Medication Dose Route Frequency Provider Last Rate Last Admin    aspirin (ASA) chewable tablet 81 mg  81 mg Oral Daily Nichelle Chavez PA   81 mg at 02/16/25 1029    atorvastatin (LIPITOR) tablet 10 mg  10 mg Oral Daily Nichelle Chavez PA   10 mg at 02/16/25 1028    ezetimibe (ZETIA) tablet 10 mg  10 mg Oral At Bedtime Nichelle Chavez PA   10 mg at 02/16/25 2106    famotidine " (PEPCID) tablet 40 mg  40 mg Oral BID Nick Zavala MD   40 mg at 02/16/25 2106    glipiZIDE (GLUCOTROL) half-tab 10 mg  10 mg Oral BID AC Nichelle Chavez PA   10 mg at 02/16/25 1633    haloperidol (HALDOL) tablet 0.5 mg  0.5 mg Oral BID Shanta Mcmahon MD        iopamidol (ISOVUE-370) solution 500 mL  500 mL Intravenous Once Dakotah Huggins APRN CNP        lisinopril (ZESTRIL) tablet 5 mg  5 mg Oral BID Nichelle Chavez PA   5 mg at 02/16/25 2104    metFORMIN (GLUCOPHAGE XR) 24 hr tablet 1,000 mg  1,000 mg Oral BID Nichelle Chavez PA   1,000 mg at 02/16/25 1028    pantoprazole (PROTONIX) EC tablet 40 mg  40 mg Oral QAM  Christie Cook MD        potassium chloride marek ER (KLOR-CON M20) CR tablet 20 mEq  20 mEq Oral BID Nichelle Chavez PA   20 mEq at 02/16/25 1028    ramelteon (ROZEREM) tablet 8 mg  8 mg Oral At Bedtime Shanta Mcmahon MD        sodium chloride 0.9 % bag for CT scan flush use  100 mL As instructed Once Dakotah Huggins APRN CNP        ticagrelor (BRILINTA) tablet 90 mg  90 mg Oral BID Nichelle hCavez PA   90 mg at 02/16/25 2104        PRN:    Current Facility-Administered Medications   Medication Dose Route Frequency Provider Last Rate Last Admin    acetaminophen (TYLENOL) tablet 650 mg  650 mg Oral Q4H PRN Nichelle Chavez PA        calcium carbonate (TUMS) chewable tablet 500-1,000 mg  500-1,000 mg Oral TID PRN Christie Cook MD   1,000 mg at 02/15/25 1546    carboxymethylcellulose PF (REFRESH PLUS) 0.5 % ophthalmic solution 1 drop  1 drop Both Eyes 4x Daily PRN Nick Zavala MD        glucose gel 15-30 g  15-30 g Oral Q15 Min PRN Nichelle Chavez PA        Or    dextrose 50 % injection 25-50 mL  25-50 mL Intravenous Q15 Min PRN Nichelle Chavez PA        Or    glucagon injection 1 mg  1 mg Subcutaneous Q15 Min PRN Nichelle Chavez PA        haloperidol lactate (HALDOL) injection 2 mg  2 mg Intravenous Q6H PRN Lisandro  Shanta Natarajan MD        hydrALAZINE (APRESOLINE) tablet 10 mg  10 mg Oral Q6H PRN Nick Zavala MD        Or    hydrALAZINE (APRESOLINE) tablet 20 mg  20 mg Oral Q6H PRN Nick Zavala MD        polyethylene glycol (MIRALAX) powder 17 g  17 g Oral Daily PRN Nichelle Chavez PA              PHYSICAL EXAM  Patient Vitals for the past 24 hrs:   BP Temp Temp src Pulse Resp SpO2   02/17/25 1548 (!) 166/105 97.4  F (36.3  C) Oral 90 18 98 %   02/17/25 1300 (!) 160/95 -- -- -- -- --   02/17/25 0824 (!) 160/85 97.9  F (36.6  C) Oral 85 18 98 %   02/17/25 0217 (!) 183/95 -- -- -- -- --   02/16/25 2104 (!) 188/92 -- -- -- -- --       GEN: NAD  HEENT: NC/AT  PULM: Non-labored breathing on room air  ABD: Non-distended  Neuro: Awake, irritable affect.  Oriented to month and year, but not date.  Escalating behaviors during visit so formal physical exam deferred.    LABS  CBC RESULTS:   Recent Labs   Lab Test 02/17/25  0534   WBC 7.4   RBC 4.90   HGB 14.4   HCT 41.9   MCV 86   MCH 29.4   MCHC 34.4   RDW 13.6          Last Comprehensive Metabolic Panel:  Sodium   Date Value Ref Range Status   02/17/2025 140 135 - 145 mmol/L Final   11/16/2020 139 133 - 144 mmol/L Final     Potassium   Date Value Ref Range Status   02/17/2025 3.7 3.4 - 5.3 mmol/L Final   11/16/2020 3.8 3.4 - 5.3 mmol/L Final     Potassium POCT   Date Value Ref Range Status   02/15/2025 3.6 3.4 - 5.3 mmol/L Final     Comment:     /RN Notified     Chloride   Date Value Ref Range Status   02/17/2025 106 98 - 107 mmol/L Final   11/16/2020 107 94 - 109 mmol/L Final     Carbon Dioxide   Date Value Ref Range Status   11/16/2020 27 20 - 32 mmol/L Final     Carbon Dioxide (CO2)   Date Value Ref Range Status   02/17/2025 21 (L) 22 - 29 mmol/L Final     Anion Gap   Date Value Ref Range Status   02/17/2025 13 7 - 15 mmol/L Final   11/16/2020 5 3 - 14 mmol/L Final     Glucose   Date Value Ref Range Status   02/17/2025 75 70 - 99 mg/dL Final    11/16/2020 168 (H) 70 - 99 mg/dL Final     GLUCOSE BY METER POCT   Date Value Ref Range Status   02/17/2025 88 70 - 99 mg/dL Final     Urea Nitrogen   Date Value Ref Range Status   02/17/2025 14.3 8.0 - 23.0 mg/dL Final   11/16/2020 24 7 - 30 mg/dL Final     Creatinine   Date Value Ref Range Status   02/17/2025 0.98 0.67 - 1.17 mg/dL Final   11/16/2020 0.91 0.66 - 1.25 mg/dL Final     GFR Estimate   Date Value Ref Range Status   02/17/2025 88 >60 mL/min/1.73m2 Final     Comment:     eGFR calculated using 2021 CKD-EPI equation.   11/16/2020 >90 >60 mL/min/[1.73_m2] Final     Comment:     Non  GFR Calc  Starting 12/18/2018, serum creatinine based estimated GFR (eGFR) will be   calculated using the Chronic Kidney Disease Epidemiology Collaboration   (CKD-EPI) equation.       Calcium   Date Value Ref Range Status   02/17/2025 9.2 8.8 - 10.4 mg/dL Final   11/16/2020 9.1 8.5 - 10.1 mg/dL Final       Recent Labs   Lab 02/17/25  0849 02/17/25  0534 02/16/25  1112 02/16/25  0824 02/15/25  2152 02/15/25  1719   GLC 88 75 136* 86 143* 132*       IMAGING  MRI Brain (2/15/25)  Impression:  1.  Expected evolution of small scattered late acute to subacute  infarcts including involvement of the left thalamus and bilateral  cerebral white matter. No new infarct.  2.  Severe chronic small vessel ischemic changes with multiple chronic  lacunar infarcts and chronic microhemorrhages, unchanged.  3.  No abnormal enhancement.    ASSESSMENT AND PLAN  Chris Delcid is a 59 year old right hand dominant male with a past medical history of HTN, orthostatic hypotension, HLD, DM II and prior stroke who was admitted on 2/11 with worsening fatigue, weakness and syncope and was found to have multifocal acute subcortical infarcts .  Admission to acute inpatient rehab 2/14/25.    Impairment group code: Stroke Ischemic 01.3 Bilateral Involvement; acute lacunar infarcts in the left thalamus, the posterior left centrum semiovale  ovale, the right occipital and right occipitotemporal white matter, and the right frontal white matter, suspected embolic etiology      PT, OT and SLP 60 minutes of each daily for 6 days per week for 7 days, in addition to rehab nursing and close management of physiatrist.     2. Impairment of ADL's:  OT to work on upper and lower body self care, dressing, toileting, bathing, energy conservation techniques with use of ADs as needed.  3. Impairment of mobility:   PT to work on gait exercises, strengthening, endurance buildup, transfers with use of walker as needed.  4. Impairment of cognition/language/swallow:   SLP for cognitive evaluation and treatment strategies for higher level cognitive deficits and memory impairment.  5. Rehab RN to administer medication, patient education on medication taking, VS monitoring, bowel regimen, glucose monitoring and management of orthostasis.      Medical Conditions  # Multiple acute lacunar infarcts in both cerebral hemispheres   # Right vertebral artery dissection  --secondary stroke prevention HTN, HLD, DM, ADITI   --outpatient NIVIA  --hypercoag workup pending   --continue brilinta 90 bid and ASA was added   --continue  lovastatin 40 mg daily and zetia was added   --outpatient genetic referral given family history of stroke in young, can be further discussed at outpatient stroke followup   --outpatient referral to vascular medicine for consideration of PCSK9 inhibitor and further evaluation of hyperlipidemia   --neurology follow up  in 4-6 weeks with any stroke ALEXIS ([phone])   --PM&R follow up in 8-10 weeks      #Unresponsive episode x2 on 2/15  #Concerns for seizures  In early morning 2/15,  patient was noted by nursing staff to be weaker and non-responsive. Patient was found by nursing sitting at edge of bed on his way to the bathroom. Nursing assisted patient using gait belt and walker to the bathroom when it was noticed that patient was having weakness with standing and  mobility. Patient was placed in wheelchair where he slumped to his side and became unresponsive. Patient was lifted back into bed where Code Blue was initially called but then was downgraded to RRT. ICU provider team recommended stroke code be called. Neurology evaluated patient at bedside. Vital signs showed /105 when resting in bed. CT imaging showed no hemorrhage or LVO. Lab work-up unremarkable. Patient was deemed not requiring transfer to acute care. Later in the morning, nursing staff stated that patient had similar episode of being disoriented and not being able to transfer from chair to bed.  Rehab medical team arrived at bedside to evaluate.  While in chair, patient was not responsive to questions.  Blood pressure noticeably lower than usual, 113/71.  Presentation was likely related to either new stroke or orthostatic hypotension.  Nursing staff to assisted with lifting patient back into bed.  Once patient was in bed and sitting up, patient became more responsive to questions.  Presentation likely related to positional orthostatic hypotension while sitting up in chair but MRI brain was ordered to rule out acute stroke.  MRI results showed no evidence of new infarcts.  Neurology evaluated results and confirmed no new strokes and signed off.    - Discussed with nursing staff to encourage having patient stay in bed at this time to prevent further episodes of disorientation and confusion related to orthostatic hypotension  -Continue to assess cognition and vital signs   -Low threshold to transfer to acute care if patient continues to demonstrate altered mental status through orthostatic hypotension  -Given patient's intermittent episodes of unresponsiveness, there is concern for either patient having further strokes versus seizures.  Neurology was consulted for assessment for possible seizures     #Agitation  #Delirium  On early evening of 2/16, patient was restless, agitated, and combative with cares.  Patient reportedly consistently trying to get out of bed. Patient trying to throw punches at staff per nursing. Patient reportedly unwilling to take oral medications. Given the acutely agitated presentation and to maintain the safety of staff on unit, Code Green was activated and provider team ordered a IM olanzapine 5mg x1 for use.   -Seroquel 25 mg at bedtime for agitation started on 2/16 - Discontinued per psychiatry recs  -Discontinued trazodone on 2/16 given nightmares per wife  -Delirium precautions  -Continue 1:1 bedside attendant  -Psychiatry consulted for delirium management in the setting of recurrent strokes - Recommended Haldol 0.5 mg BID and Ramelteon 8 mg at bedtime.  2 mg Haldol IM PRN for agitation, can repeat x1.  Haldol recommended at this time due to less risk of exacerbating orthostasis      # HTN   # 20 year h/o orthostasis   Has had long standing issue with orthostatic hypotension even before diagnosis of HTN. Had a tilt table test through [facility] that showed severe orthostatic hypotension. Was followed by cardiology and has a loop recorder in place   Per his SO didn't tolerate abdominal binder and compression socks in the past; was also on midodrine for a while.   -Currently on lisinopril 5mg bid and hydralazine prn   -Plan to slowly up titrate antihypertensive regimen as allowed by patients orthostatics eventual goal of 130 SBP outpatient      # DM  # Polyneuropathy ?  # Autonomic dysfunction   Goal A1c <7.0   Was not checking his BG at home but has a glucometer as his BG was well controlled   --continue PTA metformin 1000 mg BID   --increased PTA glipizide 10 mg BID AC   --BG check qid      # Bilateral hand muscle atrophy and paresthesia   Concerns for focal mononeuropathy vs cervical radiculopathy; doesn't seem to be explained by polyneuropathy only    -Neurology follow up as outpatient    -May need NCS/EMG for more evaluation      # HLD    2/11  Documented intolerance to  atorvastatin, rosuvastatin, simvastatin   --continue lovastatin and zetia as above   --outpatient referral to vascular medicine for consideration of PCSK9 inhibitor and further evaluation of hyperlipidemia      # ADITI   # Insomnia   Was not using CPAP prior to admission  Diagnosed 2/2022 following witnessed apneic episodes and polysomnogram 12/2021. Patient reports stopping BIPAP therapy for his ADITI due to abdominal bloating. He reports a repeat polysomnogram after stopping BIPAP that showed minimal ADITI.   --sleep medicine referral as outpatient to re-establish care     -- Discontinue trazodone on 2/16  -- Discontinued Seroquel as above     # Dehydration  Patient does not appear to be having good oral hydration.  -Given stable electrolytes, IVF bolus given on 2/16 for hydration     # Chronic low back pain  Secondary to prior injury years ago. Follows at [facility] in [place].     Adjustment to disability:  Clinical psychology to eval and treat if indicated  FEN: consistent CHO diet   Bowel: on prn bowel meds; continent   Bladder: continent.  One PVR obtained 60 ml.  Continue to monitor as able  DVT Prophylaxis: on antiplatelet   GI Prophylaxis: on famotidine due to h/o GERD; added TUMS prn  Code: DNR/DNI - confirmed upon admission   Disposition: home with SO  ELOS:  Pending clearing of delirium and ability to participate in therapies  Rehab prognosis:  fair   Follow up Appointments on Discharge: PCP in 1-2 weeks, cardiology and NIVIA, stroke neurology, vascular surgery, PM&R     Gary Zavala MD  Department of Rehabilitation Medicine    Time Spent on this Encounter   I spent a total of 50 minutes face-to-face or managing the care of Chris Delcid. Over 50% of my time on the unit was spent counseling the patient and coordinating care. See note for details.

## 2025-02-17 NOTE — CONSULTS
[facility]  Neurology Consultation    Patient Name:  Chris Delcid  MRN:  [id number]    :  1965  Date of Service:  2025  Primary care provider:  Roni - JUNIOR Romo Doyline      Neurology consultation service was asked to see Chris Delcid by Dr. Grady to evaluate concern for strokes given episode of unresponsiveness per consult placement notes    History of Present Illness:   Per initial HPI Chris Delcid is a 59 year old right hand dominant male with past medical history of HTN, orthostatic hypotension, HLD, DM II and prior strokes who presented on  with worsening fatigue, generalized weakness and syncope resulting in falls.   He had some functional decline since spring 2024, mainly due to orthostasis and frequent episodes of hypotension and syncope.     He presented to rehab after presenting with Mri showing multiple small lacunar strokes for further rehabilitation.     Neurology is consulted given spells of altered awareness.  I discussed these in detail with his wife who is an RN and at bedside.   Event in question similar to event that brought him in, very weak on right side, staring off.  No clear low BP with this event but had been present in event's past.    MRI/CTA were repeated and unrevealing.     He had been getting poor sleep recently but she notes a good night's sleep last night.  He is roughly at his baseline this AM per wife.      PMH  Past Medical History:   Diagnosis Date    Chronic low back pain 2014    Overview:  Follow at [facility] in [place]. Follow at [facility] in [place].    Essential hypertension 2003    Hyperlipidemia 3/4/2011    Orthostatic hypotension 2014    Overview:  Secondary to diabetic autonomic dysfunction?  Started midodrine Dec, 2014 Secondary to diabetic autonomic dysfunction?  Started midodrine Dec, 2014    Statin intolerance 2020    Type 2 diabetes mellitus (H)  12/26/2014    Type 2 diabetes, uncontrolled, with autonomic neuropathy (HCC)     Past Surgical History:   Procedure Laterality Date    FRACTURE TX, WRIST RT/LT Left 1997    Multiple surgeries, eventually needed fusion    HC SHOULDER ARTHROSCOPY, DX Right 1992    INTRAVITREAL INJECTION OU (BOTH EYES) Bilateral     Multiple injections    RETINAL REATTACHMENT         Medications   Medications Prior to Admission   Medication Sig Dispense Refill Last Dose/Taking    acetaminophen (TYLENOL) 325 MG tablet Take 2 tablets (650 mg) by mouth every 4 hours as needed for mild pain or other (and adjunct with moderate or severe pain or per patient request).       aspirin (ASA) 81 MG chewable tablet Take 1 tablet (81 mg) by mouth daily.       blood glucose monitoring (SOFTCLIX) lancets TEST BLOOD SUGAR ONCE DAILY       Blood Glucose Monitoring Suppl (ACCU-CHEK GUIDE) w/Device KIT TEST BLOOD SUGAR ONCE DAILY       BRILINTA 90 MG tablet Take 1 tablet (90 mg) by mouth 2 times daily. 180 tablet 0     ezetimibe (ZETIA) 10 MG tablet Take 1 tablet (10 mg) by mouth at bedtime.       famotidine (PEPCID) 40 MG tablet Take 1 tablet (40 mg) by mouth 2 times daily 180 tablet 3     glipiZIDE (GLUCOTROL) 10 MG tablet Take 1 tablet (10 mg) by mouth 2 times daily (before meals).       hydrALAZINE (APRESOLINE) 10 MG tablet Take 1-2 tablets (10-20 mg) by mouth every 6 hours as needed for high blood pressure (take 1 tab for SBP > 180 mmHg or 2 tabs for SBP > 220 mmHg).       hypromellose (ARTIFICIAL TEARS) 0.5 % SOLN ophthalmic solution Place 1 drop into both eyes 4 times daily as needed for dry eyes       SALINAOGEMINI BLOOD GLUCOSE TEST test strip Test 1 times per day.       lisinopril (ZESTRIL) 5 MG tablet Take 1 tablet (5 mg) by mouth 2 times daily. 180 tablet 3     lovastatin (MEVACOR) 40 MG tablet Take 1 tablet (40 mg) by mouth at bedtime 90 tablet 3     metFORMIN (GLUCOPHAGE XR) 500 MG 24 hr tablet Take 2 tablets (1,000 mg) by mouth 2 times daily. 360  tablet 1     ondansetron (ZOFRAN ODT) 4 MG ODT tab Take 1 tablet (4 mg) by mouth every 6 hours as needed.       polyethylene glycol (MIRALAX) 17 GM/Dose powder Take 17 g by mouth daily as needed for constipation.       traZODone (DESYREL) 50 MG tablet Take 0.5 tablets (25 mg) by mouth at bedtime.          Allergies  Allergies   Allergen Reactions    Hydrochlorothiazide Other (See Comments)     Syncope per note 08/28/2012        Penicillins Hives and Angioedema    Cortisone Hives and Swelling    Aspirin Other (See Comments)     Ringing in ears    Atorvastatin Muscle Pain (Myalgia)    No Clinical Screening - See Comments      No latex allergy-dcm    Rosuvastatin Muscle Pain (Myalgia)    Simvastatin Other (See Comments)     Drowsy          Fludrocortisone Palpitations and Other (See Comments)     Hypertension, head pounding at night, tremor              Physical Examination   Vitals: BP (!) 160/85 (BP Location: Left arm)   Pulse 85   Temp 97.9  F (36.6  C) (Oral)   Resp 18   Ht 1.829 m (6')   Wt 105.6 kg (232 lb 12.9 oz)   SpO2 98%   BMI 31.57 kg/m        Neuro:   General Appearance: A tad somonlent but pleasant    Mental Status: Alert and oriented to person, place, and time. Speech fluent and comprehension intact. Mild to moderate dysarthria.        Cranial Nerves:   II: Visual fields: normal  III: Pupils: 3 mm, equal, round, reactive to light   III,IV,VI: Extraocular Movements: intact   V: Facial sensation: intact to light touch  VII: Facial strength: intact without asymmetry      Motor Exam:   5/5 diffusely as best can tell     Sensory: intact to light touch    Coordination: no dysmetria noted    Reflexes: biceps, triceps, brachioradialis, patellar 1+ and symmetric.     Investigations   MR BRAIN W/O & W CONTRAST 2/15/2025 9:53 AM     Provided History: Acute lacunar infarcts in the left thalamus, the  posterior left centrum semiovale ovale, the right occipital and right  occipitotemporal white matter, and the  right frontal white matter,  recent stroke code last night     Comparison:  Brain MRI, MRA, MRV 2/11/2025     Technique: Sagittal T1-weighted, coronal T2-weighted, axial T2 FLAIR,  axial susceptibility weighted, phase contrast, and axial  diffusion-weighted with ADC map images of the brain were obtained  without intravenous contrast. T1 axial and coronal postcontrast images  were obtained.     Contrast: 10 cc gadobutrol     Findings: Unchanged foci of FLAIR hyperintense diffusion restriction  in the left lateral thalamus. Decreased conspicuity of punctate  diffusion restriction in the left posterior left centrum semiovale  (3/56), unchanged in the left temporal white matter (3/49), and  decreased conspicuity of  the diffusion restriction in the right  occipitotemporal white matter, right occipital lobe and right frontal  white matter with persistent T2 FLAIR hyperintensities of these  regions. No new diffusion restriction.     Similar extent of diffuse FLAIR hyperintensity of the white matter  consistent with chronic small vessel ischemic changes with multiple  chronic lacunar infarcts in the cuca, bilateral thalami and right  inferior basal ganglia. No significant change in parenchymal volume  loss. Unchanged scattered susceptibility foci consistent with chronic  microhemorrhages of bilateral cerebral hemispheres and in the lower  cuca. No intracranial mass lesion, mass effect, midline shift, or  abnormal fluid collection. The ventricles and sulci are normal for  age. Vascular patency better characterized on exam 2/11/2025.     No abnormal enhancement.     The orbits are unremarkable. Sinuses are clear.                                                                      Impression:  1.  Expected evolution of small scattered late acute to subacute  infarcts including involvement of the left thalamus and bilateral  cerebral white matter. No new infarct.  2.  Severe chronic small vessel ischemic changes with multiple  chronic  lacunar infarcts and chronic microhemorrhages, unchanged.  3.  No abnormal enhancement.     I have personally reviewed the examination and initial interpretation  and I agree with the findings.     NILO PRESLEY MD       Narrative & Impression   EXAM: CT HEAD W/O CONTRAST, CTA HEAD NECK W CONTRAST, CT HEAD PERFUSION W CONTRAST  LOCATION: [facility]  DATE: 2/15/2025     INDICATION: Code Stroke to evaluate for potential thrombolysis and thrombectomy. PLEASE READ IMMEDIATELY  COMPARISON: Head CT, CTA of the head and neck, MRI of the brain, and MRA of the brain dated 02/11/2025 and MRA of the neck dated 02/12/2025  TECHNIQUE: Head and neck CT angiogram with IV contrast. Noncontrast head CT followed by axial helical CT images of the head and neck vessels obtained during the arterial phase of intravenous contrast administration. Axial 2D reconstructed images and   multiplanar 3D MIP reconstructed images of the head and neck vessels were performed by the technologist. Additional CT cerebral perfusion was performed utilizing a second contrast bolus. Perfusion data were post processed with generation of standard   perfusion maps and estimation of ischemic/infarcted volumes utilizing standard threshold values. Dose reduction techniques were used. All stenosis measurements made according to NASCET criteria unless otherwise specified.  CONTRAST: 67 mLs Isovue 370 (accession [id number]), 50 mLs Isovue 370 (accession [id number])     FINDINGS:   NONCONTRAST HEAD CT:   INTRACRANIAL CONTENTS: No intracranial hemorrhage, extraaxial collection, or mass effect.  Small lacunar infarcts bilateral thalami and right basal ganglia. The lacunar infarct involving the superior left thalamus appeared acute on the prior MR.   Additional scattered small acute white matter infarcts in both cerebral hemispheres seen on the prior MRI are not readily apparent by CT. Small lacunar infarct  involving the cuca is better seen by MRI. Severe presumed chronic small vessel ischemic   changes. Mild to moderate generalized volume loss. No hydrocephalus. Mild to moderate intracranial atherosclerotic disease.     VISUALIZED ORBITS/SINUSES/MASTOIDS: No intraorbital abnormality. No paranasal sinus mucosal disease. No middle ear or mastoid effusion.     BONES/SOFT TISSUES: No acute abnormality.     HEAD CTA:  ANTERIOR CIRCULATION: Calcite atherosclerotic plaque involving the distal internal carotid arteries without hemodynamically significant stenosis. Mild to moderate multifocal stenoses involving the anterior and middle cerebral arteries particularly the   left middle cerebral artery is not significantly changed. No occlusion, aneurysm, or high flow vascular malformation. Fetal origin of the left posterior cerebral artery from the anterior circulation. Developmentally hypoplastic A1 segment left anterior   cerebral artery.     POSTERIOR CIRCULATION: Improved opacification of the distal V4 segment of the right vertebral artery. The proximal V4 segment is again unopacified. The right posterior inferior cerebellar artery is not clearly seen and may be occluded. The distal left   vertebral artery and basilar artery appear widely patent. Stable high-grade stenosis of the P2/P3 junction of the left posterior cerebral artery. Additional moderate focal stenosis of the mid P2 segment left posterior cerebral artery is not significantly   changed. Additional mild multifocal stenoses involving both posterior cerebral arteries are not significant changed. No aneurysm, or high flow vascular malformation.       DURAL VENOUS SINUSES: Not well evaluated on a technical basis.     NECK CTA:  Examination somewhat limited by body habitus and beam hardening artifact.     RIGHT CAROTID: Mild atherosclerosis at the bifurcation. No measurable stenosis or dissection.     LEFT CAROTID: Mild atherosclerosis at the bifurcation. No  measurable stenosis or dissection.     VERTEBRAL ARTERIES: There is again markedly decreased enhancement of the right vertebral artery with partial opacification of small segments of the V1 and V2 segments. Poor opacification of the V3 segment is more pronounced than on the prior study and   may at least in part be related to differences in timing of the contrast bolus. The left vertebral artery appears widely patent.      AORTIC ARCH: Classic aortic arch anatomy with no significant stenosis at the origin of the great vessels.     NONVASCULAR STRUCTURES: Unremarkable.     CT PERFUSION:  The examination is limited with poor arterial input function.     PERFUSION MAPS: Relatively symmetric elevated Tmax in both cerebral hemispheres is likely artifactual. No evidence of core infarct on cerebral blood flow maps.     RAPID ANALYSIS:  CBF<30%: 0  Tmax>6sec: 83  Mismatch volume: 83  Mismatch ratio: Infinite                                                                      IMPRESSION:   HEAD CT:  1.  No CT evidence for acute intracranial process.  2.  Scattered acute infarcts in both cerebral hemispheres seen on the MRI are not well seen by CT.  3.  Lacunar infarcts bilateral thalami and right basal ganglia and cuca.  4.  Brain atrophy and presumed chronic microvascular ischemic changes as above.     HEAD CTA:  1.  Improved opacification of the distal V4 segment of the right vertebral artery.  2.  Stable multifocal stenoses involving the anterior, middle, and posterior cerebral arteries.  3.  No large vessel anterior circulation occlusion.     NECK CTA:  1.  Persistent markedly decreased enhancement of the right vertebral artery with poor opacification of the V1 and V2 segments and poor opacification of the V3 segment which is more pronounced than on the prior study. Vertebral artery dissection is again   suspected.  2.  Widely patent left vertebral artery and bilateral carotid arteries.     CT PERFUSION:  Technically  limited examination with no evidence of core infarct.             Impression/Recommendations  Per initial HPI Chris Delcid is a 59 year old right hand dominant male with past medical history of HTN, orthostatic hypotension, HLD, DM II and prior strokes who presented on 2/11 with worsening fatigue, generalized weakness and syncope resulting in falls.   He had some functional decline since spring 2024, mainly due to orthostasis and frequent episodes of hypotension and syncope.     He presented to rehab after presenting with Mri showing multiple small lacunar strokes for further rehabilitation.    Neurology is consulted to further discuss transient neurological episodes.  It appears he has been having these in varied forms for some time.  Most recent most alarming episode very similar to episode that brought him in/where strokes were discovered per his wife.  Often these do seem 2/2 low BP but not always.  I think it will be difficult to know what is from delirium, hypotension, vs possibly elements of stroke recrudescence.   No clear semiology at this time to suggest seizures.   Discussed observation with family in light of revealing repeat MRI/CTA.  They are in agreement.  If episodes persist without good explanation next step would be routine EEG.    Hopefully with good sleep these will dissapate, but if they persist reach out and would obtain EEG and we can review this and re-assess Mr. Delcid    Thank you for involving Neurology in the care of Chris Delcid.  Please do not hesitate to call with questions/concerns (consult pager [phone]).      Abraham Lynne MD   of Neurology  [facility]    My total floor time today for this patient was at least 60 minutes, including extensive chart checking, time with patient, reviewing imaging, coordination of care.

## 2025-02-17 NOTE — PROGRESS NOTES
Clinic Care Coordination Contact  Care Coordination Transition Communication    Clinical Data: Patient was hospitalized at [facility] from 2/11/2025 to 2/14/2025 with diagnosis of Generalized weakness.     Assessment: Patient has transitioned to TCU/ARU for short term rehabilitation:    Facility Name: [facility]    Transition Communication:  Notified facility of Primary Care- Care Coordination support via Epic fax.    Plan: Care Coordinator will await notification from facility staff informing of patient's discharge plans/needs. Care Coordinator will review chart and outreach to facility staff every 4 weeks and as needed.     Ericka Hunter, RN Care Coordination   [facility]  Email: [email or web address]  Phone: [phone]

## 2025-02-17 NOTE — PLAN OF CARE
Individualized Overall Plan Of Care (IOPOC)      Rehab diagnosis/Impairment Group Code: Stroke ischemic 01.3 bilateral involvement; acute lacunar infarcts in the left thalamus, the posterior left centrum semiovale ovale, the right occipital and right occipitotemporal white matter, and the right frontal white matter, suspect embolic etiology  Stroke (h)     Expected functional outcome: CGA for ambulation, mobility, ADLs    Clinical Impression Comments: Very limited therapy due to agitation and orthostatic hypotension    Mobility: Pt presents post-stroke. Limited eval over multiple attempts due to multiple syncopal episodes throughout the day. Pt complicated history with hypertension and orthostatic hypotension greatly complicate presentation and mobility picture. At day of evaluation, pt is unsafe to leave his bed due to orthostatic hypotension that does not recover. Goals set for household mod-I gait, however pending medical picture may need to modify. At this time, cannot clinically accurate estimate and ELOS.    ADL: Pt admitted to ARU s/p CVA; pertinent history of previous strokes and baseline orthostatic hypotension limiting extended activity. Limited evaluation completed. Pt currently far below limited baseline; requires increased assist across all transfers and ADLs, and limited ability to tolerate any activity in seated or standing d/t symptomatic orthostatic hypotension and syncopal episodes since arrival at ARU. Performance primarily limited by orthostatic hypotension significantly limiting meaningful activity and signficant functional cognition and orientation deficits with noted agitation. Pt main social support works out of home; goal to progress to Mod I daytime ADL, functional transfers, and meal retrieval in order to meet discharge environment needs.    Communication/Cognition/Swallow: SLP: Mild dysarthria when pt alert, motor speech at baseline per wife's report. WAB-R Bedside administered, score of  55 consistent with moderate expressive and receptive aphasia; however, pt with poor sleep last night. Per wife's report, pt not sleeping well prior to CVA or in hospital. Pt also given medication last night related to combative behaviors and to promote sleep. Suspect language presenting lower than actual due to fatigue and medications. Standardized cognitive assessment not recommended at this time due to history of aphasia, tenuous status (per IDT, after SLP evaluation session pt with increased confusion, visual hallucinations). May consider utilizing informal cog assessment tasks. Skilled SLP services indicated to train pt and family in cognitive strategies to promote safety and independence.     Intensity of therapy:   PT 60 minutes, 6x/week, for 14 days  OT 60 minutes, 6 times/week, for 14 days  SLP 60 minutes, 6 times/week, for 14 days    Orthotics  None  Education stroke  Neuropsychology Testing: No  Other:  None      Medical Prognosis: Guarded      Physician summary statement: Pt delirious and agitated, leading to limited therapy participation.  When he is agreeable, he is limited by orthostatic hypotension and syncope.  Needs ongoing medical management of both.    Discharge destination: prior home  Discharge rehabilitation needs: home care, PT, OT, and SLP      Estimated length of stay: 14 days      Rehabilitation Physician Nick Zavala MD

## 2025-02-17 NOTE — PLAN OF CARE
Goal Outcome Evaluation:      Plan of Care Reviewed With: patient    Overall Patient Progress: no changeOverall Patient Progress: no change     Outcome Summary  Pt. received on bed, sleeping comfortably, with S.O at bedside. Slept most of the shift, no agitation episode this shift. 1:1 sitter maintained. Mix continent for bladder, incontinent care provided. Call light within reach. Will continue with current POC.

## 2025-02-18 ENCOUNTER — APPOINTMENT (OUTPATIENT)
Dept: OCCUPATIONAL THERAPY | Facility: CLINIC | Age: 60
DRG: 057 | End: 2025-02-18
Attending: PHYSICAL MEDICINE & REHABILITATION
Payer: COMMERCIAL

## 2025-02-18 ENCOUNTER — APPOINTMENT (OUTPATIENT)
Dept: SPEECH THERAPY | Facility: CLINIC | Age: 60
DRG: 057 | End: 2025-02-18
Attending: PHYSICAL MEDICINE & REHABILITATION
Payer: COMMERCIAL

## 2025-02-18 ENCOUNTER — APPOINTMENT (OUTPATIENT)
Dept: PHYSICAL THERAPY | Facility: CLINIC | Age: 60
DRG: 057 | End: 2025-02-18
Attending: PHYSICAL MEDICINE & REHABILITATION
Payer: COMMERCIAL

## 2025-02-18 LAB
ALBUMIN UR-MCNC: 100 MG/DL
APPEARANCE UR: CLEAR
BILIRUB UR QL STRIP: NEGATIVE
COLOR UR AUTO: YELLOW
GLUCOSE BLDC GLUCOMTR-MCNC: 112 MG/DL (ref 70–99)
GLUCOSE BLDC GLUCOMTR-MCNC: 123 MG/DL (ref 70–99)
GLUCOSE BLDC GLUCOMTR-MCNC: 147 MG/DL (ref 70–99)
GLUCOSE UR STRIP-MCNC: 30 MG/DL
HGB UR QL STRIP: NEGATIVE
HYALINE CASTS: 4 /LPF
KETONES UR STRIP-MCNC: NEGATIVE MG/DL
LEUKOCYTE ESTERASE UR QL STRIP: NEGATIVE
MUCOUS THREADS #/AREA URNS LPF: PRESENT /LPF
NITRATE UR QL: NEGATIVE
PH UR STRIP: 5.5 [PH] (ref 5–7)
RBC URINE: 1 /HPF
SP GR UR STRIP: 1.02 (ref 1–1.03)
SQUAMOUS EPITHELIAL: <1 /HPF
TRANSITIONAL EPI: <1 /HPF
UROBILINOGEN UR STRIP-MCNC: NORMAL MG/DL
WBC URINE: <1 /HPF

## 2025-02-18 PROCEDURE — 97129 THER IVNTJ 1ST 15 MIN: CPT | Mod: GN | Performed by: SPEECH-LANGUAGE PATHOLOGIST

## 2025-02-18 PROCEDURE — 97530 THERAPEUTIC ACTIVITIES: CPT | Mod: GP

## 2025-02-18 PROCEDURE — 250N000013 HC RX MED GY IP 250 OP 250 PS 637

## 2025-02-18 PROCEDURE — 97129 THER IVNTJ 1ST 15 MIN: CPT | Mod: GN

## 2025-02-18 PROCEDURE — 81001 URINALYSIS AUTO W/SCOPE: CPT | Performed by: PHYSICAL MEDICINE & REHABILITATION

## 2025-02-18 PROCEDURE — 250N000013 HC RX MED GY IP 250 OP 250 PS 637: Performed by: PHYSICAL MEDICINE & REHABILITATION

## 2025-02-18 PROCEDURE — 97535 SELF CARE MNGMENT TRAINING: CPT | Mod: GO

## 2025-02-18 PROCEDURE — 128N000003 HC R&B REHAB

## 2025-02-18 PROCEDURE — 99233 SBSQ HOSP IP/OBS HIGH 50: CPT | Performed by: PHYSICAL MEDICINE & REHABILITATION

## 2025-02-18 PROCEDURE — 97130 THER IVNTJ EA ADDL 15 MIN: CPT | Mod: GN | Performed by: SPEECH-LANGUAGE PATHOLOGIST

## 2025-02-18 PROCEDURE — 250N000013 HC RX MED GY IP 250 OP 250 PS 637: Performed by: PHYSICIAN ASSISTANT

## 2025-02-18 RX ORDER — LISINOPRIL 10 MG/1
10 TABLET ORAL EVERY EVENING
Status: DISCONTINUED | OUTPATIENT
Start: 2025-02-19 | End: 2025-03-06 | Stop reason: HOSPADM

## 2025-02-18 RX ORDER — HALOPERIDOL 5 MG/ML
2 INJECTION INTRAMUSCULAR EVERY 6 HOURS PRN
Status: DISCONTINUED | OUTPATIENT
Start: 2025-02-18 | End: 2025-02-20

## 2025-02-18 RX ADMIN — Medication 10 MG: at 08:10

## 2025-02-18 RX ADMIN — Medication 10 MG: at 18:01

## 2025-02-18 RX ADMIN — ATORVASTATIN CALCIUM 10 MG: 10 TABLET, FILM COATED ORAL at 08:11

## 2025-02-18 RX ADMIN — RAMELTEON 8 MG: 8 TABLET, FILM COATED ORAL at 18:01

## 2025-02-18 RX ADMIN — HALOPERIDOL 0.5 MG: 0.5 TABLET ORAL at 18:03

## 2025-02-18 RX ADMIN — ACETAMINOPHEN 650 MG: 325 TABLET, FILM COATED ORAL at 18:09

## 2025-02-18 RX ADMIN — ASPIRIN 81 MG CHEWABLE TABLET 81 MG: 81 TABLET CHEWABLE at 08:11

## 2025-02-18 RX ADMIN — POTASSIUM CHLORIDE 20 MEQ: 1500 TABLET, EXTENDED RELEASE ORAL at 08:11

## 2025-02-18 RX ADMIN — TICAGRELOR 90 MG: 90 TABLET ORAL at 18:02

## 2025-02-18 RX ADMIN — FAMOTIDINE 40 MG: 20 TABLET, FILM COATED ORAL at 18:02

## 2025-02-18 RX ADMIN — METFORMIN ER 500 MG 1000 MG: 500 TABLET ORAL at 08:11

## 2025-02-18 RX ADMIN — PANTOPRAZOLE SODIUM 40 MG: 40 TABLET, DELAYED RELEASE ORAL at 08:11

## 2025-02-18 RX ADMIN — TICAGRELOR 90 MG: 90 TABLET ORAL at 08:11

## 2025-02-18 RX ADMIN — EZETIMIBE 10 MG: 10 TABLET ORAL at 18:03

## 2025-02-18 RX ADMIN — LISINOPRIL 5 MG: 5 TABLET ORAL at 08:11

## 2025-02-18 RX ADMIN — LISINOPRIL 5 MG: 5 TABLET ORAL at 18:01

## 2025-02-18 RX ADMIN — POTASSIUM CHLORIDE 20 MEQ: 1500 TABLET, EXTENDED RELEASE ORAL at 18:04

## 2025-02-18 RX ADMIN — METFORMIN ER 500 MG 1000 MG: 500 TABLET ORAL at 18:04

## 2025-02-18 RX ADMIN — FAMOTIDINE 40 MG: 20 TABLET, FILM COATED ORAL at 08:11

## 2025-02-18 RX ADMIN — HALOPERIDOL 0.5 MG: 0.5 TABLET ORAL at 08:11

## 2025-02-18 ASSESSMENT — ACTIVITIES OF DAILY LIVING (ADL)
ADLS_ACUITY_SCORE: 50
ADLS_ACUITY_SCORE: 50
ADLS_ACUITY_SCORE: 56
ADLS_ACUITY_SCORE: 56
ADLS_ACUITY_SCORE: 62
ADLS_ACUITY_SCORE: 50
ADLS_ACUITY_SCORE: 56
ADLS_ACUITY_SCORE: 50
ADLS_ACUITY_SCORE: 56
ADLS_ACUITY_SCORE: 59
ADLS_ACUITY_SCORE: 56
ADLS_ACUITY_SCORE: 56
ADLS_ACUITY_SCORE: 50
ADLS_ACUITY_SCORE: 56
ADLS_ACUITY_SCORE: 59
ADLS_ACUITY_SCORE: 52
ADLS_ACUITY_SCORE: 56
ADLS_ACUITY_SCORE: 56
ADLS_ACUITY_SCORE: 62
ADLS_ACUITY_SCORE: 50

## 2025-02-18 NOTE — PROGRESS NOTES
ARU Social Work Progress Notes     Team rounds today; discharge needs pending, experiencing delirium.     JAX Molina  [facility], Acute Inpatient Rehab Unit   [facility] [place] [address]  Phone: [phone]  Fax: [phone]

## 2025-02-18 NOTE — PLAN OF CARE
Goal Outcome Evaluation:      Plan of Care Reviewed With: spouse    Overall Patient Progress: no changeOverall Patient Progress: no change    Patient slept most of the night, S.O. at bedside, awaken in between toilet needs, up to bedside commode for elimination, no BM, voiding well. Patient remained calm throughout the night, no agitation noted.   Per S.O early morning pill Protonix can be given together with all AM medications.  Patient is high risk of fall d/t Orthostatic hypotension and Syncope episode, pt can also be combative and impulsive with poor safety awareness, need some cuing and directions as well when transferring to commode.   Delirium precautions observed, S.O. at bedside, sitter stayed outside the room.  No significant changed overnight  Plan of care ongoing.

## 2025-02-18 NOTE — PROGRESS NOTES
Discharge Planner Post-Acute Rehab OT:     Discharge Plan: Home mod-I during day while s/o at work. HCOT    Precautions: Orthostatic hypotension and significant resting hypertension, cognition - impulsive and agitated    Permissive resting/supine hypertension unless diastolic >110    Do not leave alone in seated position; must have legs elevated in recliner.    Current Status:  ADLs:  Mobility: CGA FWW stand pivot. Limited tolerance.   Grooming: SBA seated, cues.  Dressing: UB - SBA. LB - SBA FWW. Feet - NT.  Bathing: Not safe d/t BP - recommend spongebath.  Toileting: Bedpan per BP. Do not recommend use of commode at this time d/t syncopal episodes.   IADLs: Baseline IND simple meal prep, light home mgmt, med mgmt. 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 Enjoys refurbishing antique toys.   Vision/Cognition: Baseline vision deficit d/t retinal disease. Dysarthric. Delirium - not oriented to place, reason, time; paranoid. Agitated and combative at times. Significant functional cognition deficits across attention, memory, reasoning, impulse control.    Assessment: Pt seen for interdisciplinary rounds, see POC note for details.     Other Barriers to Discharge (DME, Family Training, etc):   Cognition.  Fall risk  - significant orthostatic.      DME: Shower chair, 4WW.

## 2025-02-18 NOTE — PLAN OF CARE
Goal Outcome Evaluation:      Plan of Care Reviewed With: patient, spouse    Overall Patient Progress: no changeOverall Patient Progress: no change      VS: BP (!) 158/78   Pulse 78   Temp 98.2  F (36.8  C) (Oral)   Resp 20   Ht 1.829 m (6')   Wt 105.6 kg (232 lb 12.9 oz)   SpO2 99%   BMI 31.57 kg/m       O2: SpO2 > 99 and stable on RA. Denies chest pain and SOB.    Output: Voids spontaneously without difficulty to bathroom / using beside urinal / BSC.   Last BM: 2/14 denies abdominal discomfort. BS active / passing flatus.    Activity: Up with A1 walker and gait belt    Skin: WDL    Pain: Denied pain    CMS: Intact, Aox1 disoriented to time, place and situation. Denies numbness and tingling.   Dressing:    Diet: Regular diet. Denies nausea/vomiting.   BG checks before meals and at bedtime.    LDA: No PIV access.    Equipment: IV pole, personal belongings,    Plan: Safety precautions maintained / Continue with plan of care. Call light within reach, pt able to make needs known.    Additional Info: Patient continues on 1:1 during the shift.

## 2025-02-18 NOTE — PLAN OF CARE
Goal Outcome Evaluation:      Plan of Care Reviewed With: spouse    Overall Patient Progress: no changeOverall Patient Progress: no change     Acute Rehab Care Conference/Team Rounds      Type: Team Rounds    Present: Dr. Zavala, Nick Vega MD, Rena VILLAVICENCIO, Unique Joseph Neuropsychologist, Rajesh Chow, PT  PT, Connie Diop OT, Anand Cornejo SLP,  SLP, Luisana Whitt , Radha Grimaldo RD, Yenny Rapp Rehab Supervisor, Javon Sandoval RN, Bhavin CHIN Patient      Discharge Barriers/Treatment/Education    Rehab Diagnosis: Bilateral CVAs    Active Medical Co-morbidities/Prognosis: Delirium/agitation, vertebral artery dissection, HTN, orthostatic hypotension, DM, polyneuropathy, autonomic dysfunction, HLD, ADITI, insomnia, chronic low back pain    Safety: A1 with walker, alert to self, impulsive, can be combative at times, with trust issue ( paranoid ) wife at bedside,   High risk of Fall d/t Orthostatic hypotension and syncope episode,   Bed alarm ON  Pain: no reported pain overnight    Medications, Skin, Tubes/Lines: takes medication whole, RN to scan the medication and wife will hand it to patient ( need to name the medication and open the label in front of the patient )  Skin : no skin issue  Tubes/lines  - no lines/tubes    Swallowing/Nutrition: Regular solids/Thin liquids (0). ARU SLP to monitor for need for clinical swallow evaluation.     Bowel/Bladder: Continent of Bladder and Bowel, utilized bedside commode for elimination, wife at bedside to help, LBM 2/14/2025    Psychosocial:Pt lives in a house with his significant other.     ADLs/IADLs: Pt early in stay; significantly limited participation since admission due to delirium, agitation, and symptomatic orthostatic hypotension limiting any seated or OOB task. Observed SBA dressing tasks, g/h tasks, and FWW pivot and STS noted in lowerbody dressing when pt has been at best; no overt right-sided strength or coordination  deficits noted in observation. Performance primarily limited by severe hemodynamic instability with increased risk of falls, and functional cognition deficits in context of suspected delirium. Continue IP OT pending medical stability; OT POC to continue to promote functional cognition and orientation for delirium prevention/mgmt, monitor CV tolerance for functional transfers and tasks, and monitor caregiver education and DME needs prior to discharge.     Mobility: Pt has not been able to functionally participate in PT due to agitation and combativeness, or when he is not agitated, significant orthostasis has not allowed him to be OOB, even for static sitting in the chair. Mod-A to pivot, however immediate symptoms in standing may be leading to more assist. Continue IP PT as medically tolerated.    Cognition/Language: Mild dysarthria when pt alert, motor speech at baseline per wife's report. Hx expressive aphasia; WAB-R Bedside 02/16 moderate expressive/receptive deficits (suspect fatigue, medications impacting). At least moderate cognitive impairment suspected. Subjectively noting poor insight into deficits/safety/situation, poor reasoning, disoriented, poor working memory, alternating/sustained attention. Noting improvement in orientation with onset of large font visual aids. Participation limited at times by agitation and behavior. Will benefit from total IADL assist and ongoing SLP    Community Re-Entry: Home bound due to current mobility status/activity tolerance.    Transportation: At this time recommend stretcher due to orthostasis    Decision maker: significant other    Plan of Care and goals reviewed and updated.    Discharge Plan/Recommendations    Fall Precautions: continue    Patient/Family input to goals: Yes    Anticipated rehab needs following discharge:  PT, OT, SLP    Anticipated care giver support after discharge: Spouse    Estimated length of stay: 14 days    Overall plan for the patient: Patient  limited in therapies thus far due to agitation/delirium and orthostatic hypotension.  Needing ongoing medical management of both.      Utilization Review and Continued Stay Justification    Medical Necessity Criteria:    For any criteria that is not met, please document reason and plan for discharge, transfer, or modification of plan of care to address.    Requires intensive rehabilitation program to treat functional deficits?: Yes    Requires 3x per week or greater involvement of rehabilitation physician to oversee rehabilitation program?: Yes    Requires rehabilitation nursing interventions?: Yes    Patient is making functional progress?: Yes    There is a potential for additional functional progress? Yes    Patient is participating in therapy 3 hours per day a minimum of 5 days per week or 15 hours per week in 7 day period?: Medical exception week 1 with change in status, need for MRI, and limited participation with hallucinations and delirium days 2 and 3.  Improved participation day 4, anticipating this trajectory moving forward.      Has discharge needs that require coordinated discharge planning approach?:Yes      Barriers/Concerns related to meeting medical necessity criteria:  Agitation, orthostatic hypotension    Team Plan to Address Concern:  Ongoing medical management, psychiatry and cardiology consults      Final Physician Sign off    Statement of Approval: I have reviewed and agree with the recommendations and documentation in this team rounds note.       Patient Goals    Social Work Goals: Confirm discharge recommendations with therapy, coordinate safe discharge plan and remain available to support and assist as needed.     OT Predicted Duration/Target Date for Goal Attainment: 03/03/25  Therapy Frequency (OT): 6 times/week  OT: Hygiene/Grooming: modified independent, within precautions  OT: Upper Body Dressing: Modified independent, within precautions  OT: Lower Body Dressing: Modified independent,  within precautions  OT: Upper Body Bathing: Modified independent, within precautions, using adaptive equipment  OT: Lower Body Bathing: Modified independent, using adaptive equipment, with precautions        OT: Toilet Transfer/Toileting: Modified independent, toilet transfer, cleaning and garment management, using adaptive equipment, within precautions  OT: Meal Preparation: Modified independent, with simple meal preparation, ambulatory level, within precautions     OT: Cognitive: Patient/caregiver will verbalize understanding of cognitive assessment results/recommendations as needed for safe discharge planning     OT: Goal 1: Pt will perform bathing transfer simulating home environment SBA within precautions with graded assist utilizing DME.                         PT Predicted Duration/Target Date for Goal Attainment: 02/25/25  PT Frequency: 6x/week  PT: Bed Mobility: Independent  PT: Transfers: Modified independent, Sit to/from stand, Bed to/from chair  PT: Gait: Modified independent, 50 feet  PT: Stairs: Supervision/stand-by assist, 3 stairs, Rail on right        PT: Goal 1: Car transfer SBA for community entry  PT: Goal 2: Pt/family will recall 3 fall preventions for safe discharge home.     SLP Predicted Duration/Target Date for Goal Attainment: 02/27/25  Therapy Frequency (SLP Eval): 6 times/week  SLP: Improve language comprehension for interaction with caregivers/environment: minimal assist  SLP: Communicate basic wants and needs: verbally, minimal assist  SLP: Goal 1: Patient will maintain alertness, focus/attend to basic level task for 30 minutes with no cues from SLP to increase alertness.                                   Goal: Skin Integrity: Pt will remain free from skin breakdown while on ARU by frequently repositioning.                    Goal: Safety Management: Pt will remain free from falls while on ARU by calling appropriately and waiting for assistance.

## 2025-02-18 NOTE — PLAN OF CARE
"Discharge Planner Post-Acute Rehab SLP:     Discharge Plan: home with significant other? Ongoing SLP    Precautions: 1:1 attendant, can be combative with cares.    Current Status:  Hearing: WFL  Vision: Retinal detachment, Readers  Communication: Mild dysarthria when pt alert, motor speech at baseline per wife's report. Hx expressive aphasia; WAB-R Bedside 02/16 moderate expressive/receptive deficits (suspect fatigue, medications impacting)  Cognition: At least moderate cognitive impairment suspected. Subjectively noting poor insight into deficits/safety/situation, poor working memory, alternating/sustained attention  Swallow: Regular solids/Thin liquids (0). ARU SLP to monitor for need for clinical swallow evaluation.    Assessment: Wife present at start of SLP session, reported she updated date visual aide \"today\" marker and went over visual aides with pt. Pt's wife then left to go home for a few hours. Probed pt orientation initially without visual aides, pt oriented to year, month, day of week, and city (not oriented to date, place, situation). With visual aides, orientation increased to all concepts. Instructed pt in sustained auditory attention task, pt attended to task with no cues to increase alertness for 15 minutes.     Other Barriers to Discharge (Family Training, etc): level of assist/supervision with iADLs?    "

## 2025-02-18 NOTE — PLAN OF CARE
Goal Outcome Evaluation:      Plan of Care Reviewed With: patient, spouse    Overall Patient Progress: no change  Pt is alert to self with intermittent confusion. Wife present at bedside, pt was able to take meds his meds at 7 pm as per request by spouse to maintain pt's home medication routine. Pt / spouse refused potassium tablet says tastes nasty, writer tried apple sauce, pudding  and juice but they decline the offer. Continent of BM and bladder, LBM 2/14. On 1:1 sitter at bedside. Call light within reach and bed alarms on.

## 2025-02-18 NOTE — PROGRESS NOTES
Discharge Planner Post-Acute Rehab PT:     Discharge Plan: Home mod-I during day while spouse at work. HH PT    Precautions: Orthostatic hypotension in combination and significant resting hypertension  Has tried LE and abdominal compression in the past without benefit or tolerance  Do not worry about resting/supine BP hypertension unless diastolic >110    *Impulsive* *Delirium*    Current Status - limited by fluctuating orthostatic hypotension  Bed Mobility: IND  Transfer: SBA FWW  Gait: SBA up to 100' FWW  Stairs: Not safe due to syncope  Balance: Near baseline when not syncopal. Uses UE support for gait    Outcome Measures: - not appropriate due to command following    Assessment: Found in hallway with sitter this AM. Was able to walk 100' SBA FWW prior to limitations related to orthostatic hypotension. With stable BP, balance and RLE strength appears near/at baseline. In PM was agitated and confabulatory, unable to participate.     Other Barriers to Discharge (DME, Family Training, etc):   W/c - anticipate needed for community  Stairs - 3 DANIEL  Orthostatic hypotension

## 2025-02-18 NOTE — PROGRESS NOTES
[facility]   Acute Rehabilitation Unit  Daily progress note    INTERVAL HISTORY  Chris Delcid was seen in his room this morning during physician rounds with wife present, and also discussed in team rounds.  No acute events overnight, per nursing notes was more calm, and per discussion with therapies was able to participate today without any syncopal episodes.  At the time of my visit he is escalating behaviors, paranoia, accusing the rehab unit of stealing his property, and despite wife being here is angry at her as well and not reasonable to speak with.  He continually says he wants to go home, and I tried to reinforce that is our goal as well, however needs to be safe in doing so.  Pa denies any shortness of breath, dysuria, or increased urinary frequency, however wife reports that he was urinating every 20 minutes overnight which is abnormal for him.  Will obtain a UA with culture as part of delirium workup and increased urinary frequency.  I confirm with wife his longstanding history of hypertension and orthostasis.  He tends to run in the 150-170/ range of blood pressure.  She says he was previously on midodrine, however this was for previously low blood pressure prior to his strokes and not due to orthostasis.  Discussed that given his significantly elevated blood pressures with symptomatic orthostasis including syncope, will ask cardiology opinion regarding medication optimization.    Current functional status:    OT:  ADLs:  Mobility: CGA FWW stand pivot. Limited tolerance.   Grooming: SBA seated, cues.  Dressing: UB - SBA. LB - SBA FWW. Feet - NT.  Bathing: Not safe d/t BP - recommend spongebath.  Toileting: Bedpan per BP. Do not recommend use of commode at this time d/t syncopal episodes.   IADLs: Baseline IND simple meal prep, light home mgmt, med mgmt. 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 Enjoys refurbishing antique toys.   Vision/Cognition: Baseline vision  "deficit d/t retinal disease. Dysarthric. Delirium - not oriented to place, reason, time; paranoid. Agitated and combative at times. Significant functional cognition deficits across attention, memory, reasoning, impulse control.    SLP:  Hearing: WFL  Vision: Retinal detachment, Readers  Communication: Mild dysarthria when pt alert, motor speech at baseline per wife's report. Hx expressive aphasia; WAB-R Bedside 02/16 moderate expressive/receptive deficits (suspect fatigue, medications impacting)  Cognition: At least moderate cognitive impairment suspected. Subjectively noting poor insight into deficits/safety/situation, poor working memory, alternating/sustained attention  Swallow: Regular solids/Thin liquids (0). ARU SLP to monitor for need for clinical swallow evaluation.     Assessment: Wife present at start of SLP session, reported she updated date visual aide \"today\" marker and went over visual aides with pt. Pt's wife then left to go home for a few hours. Probed pt orientation initially without visual aides, pt oriented to year, month, day of week, and city (not oriented to date, place, situation). With visual aides, orientation increased to all concepts. Instructed pt in sustained auditory attention task, pt attended to task with no cues to increase alertness for 15 minutes.     MEDICATIONS  Scheduled:  Current Facility-Administered Medications   Medication Dose Route Frequency Provider Last Rate Last Admin    aspirin (ASA) chewable tablet 81 mg  81 mg Oral Daily Nichelle Chavez PA   81 mg at 02/18/25 0811    atorvastatin (LIPITOR) tablet 10 mg  10 mg Oral Daily Nichelle Chavez PA   10 mg at 02/18/25 0811    ezetimibe (ZETIA) tablet 10 mg  10 mg Oral At Bedtime Nichelle Chavez PA   10 mg at 02/17/25 1911    famotidine (PEPCID) tablet 40 mg  40 mg Oral BID Nick Zavala MD   40 mg at 02/18/25 0811    glipiZIDE (GLUCOTROL) half-tab 10 mg  10 mg Oral BID AC Nichelle Chavez PA   10 mg at " 02/18/25 0810    haloperidol (HALDOL) tablet 0.5 mg  0.5 mg Oral BID Shanta Mcmahon MD   0.5 mg at 02/18/25 0811    iopamidol (ISOVUE-370) solution 500 mL  500 mL Intravenous Once Dakotah Huggins APRN CNP        [START ON 2/19/2025] lisinopril (ZESTRIL) tablet 10 mg  10 mg Oral QPM Nick Zavala MD        lisinopril (ZESTRIL) tablet 5 mg  5 mg Oral BID Nick Zavala MD   5 mg at 02/18/25 0811    metFORMIN (GLUCOPHAGE XR) 24 hr tablet 1,000 mg  1,000 mg Oral BID Nichelle Chavez PA   1,000 mg at 02/18/25 0811    pantoprazole (PROTONIX) EC tablet 40 mg  40 mg Oral QAM AC Christie Cook MD   40 mg at 02/18/25 0811    potassium chloride marek ER (KLOR-CON M20) CR tablet 20 mEq  20 mEq Oral BID Nichelle Chavez PA   20 mEq at 02/18/25 0811    ramelteon (ROZEREM) tablet 8 mg  8 mg Oral At Bedtime Shanta Mcmahon MD   8 mg at 02/17/25 1910    sodium chloride 0.9 % bag for CT scan flush use  100 mL As instructed Once Dakotah Huggins APRN CNP        ticagrelor (BRILINTA) tablet 90 mg  90 mg Oral BID Nichelle Chavez PA   90 mg at 02/18/25 0811        PRN:    Current Facility-Administered Medications   Medication Dose Route Frequency Provider Last Rate Last Admin    acetaminophen (TYLENOL) tablet 650 mg  650 mg Oral Q4H PRN Nichelle Chavez PA        calcium carbonate (TUMS) chewable tablet 500-1,000 mg  500-1,000 mg Oral TID PRN Christie Cook MD   1,000 mg at 02/15/25 1546    carboxymethylcellulose PF (REFRESH PLUS) 0.5 % ophthalmic solution 1 drop  1 drop Both Eyes 4x Daily PRN Nick Zavala MD        glucose gel 15-30 g  15-30 g Oral Q15 Min PRN Nichelle Chavez PA        Or    dextrose 50 % injection 25-50 mL  25-50 mL Intravenous Q15 Min PRN Nichelle Chavez PA        Or    glucagon injection 1 mg  1 mg Subcutaneous Q15 Min PRN Nichelle Chavez PA        haloperidol lactate (HALDOL) injection 2 mg  2 mg Intramuscular Q6H PRN Lupe Chase PA-C         hydrALAZINE (APRESOLINE) tablet 10 mg  10 mg Oral Q6H PRN Nick Zavala MD        Or    hydrALAZINE (APRESOLINE) tablet 20 mg  20 mg Oral Q6H PRN Nick Zavala MD        polyethylene glycol (MIRALAX) powder 17 g  17 g Oral Daily PRN Nichelle Chavez, PA              PHYSICAL EXAM  Patient Vitals for the past 24 hrs:   BP Temp Temp src Pulse Resp SpO2   02/18/25 0717 (!) 155/94 -- -- 64 -- 99 %   02/17/25 1907 (!) 186/91 -- -- 86 -- 98 %   02/17/25 1548 (!) 166/105 97.4  F (36.3  C) Oral 90 18 98 %       GEN: NAD  HEENT: NC/AT  PULM: Non-labored breathing on room air  ABD: Non-distended  Neuro: Awake, agitated.    LABS  CBC RESULTS:   Recent Labs   Lab Test 02/17/25  0534   WBC 7.4   RBC 4.90   HGB 14.4   HCT 41.9   MCV 86   MCH 29.4   MCHC 34.4   RDW 13.6          Last Comprehensive Metabolic Panel:  Sodium   Date Value Ref Range Status   02/17/2025 140 135 - 145 mmol/L Final   11/16/2020 139 133 - 144 mmol/L Final     Potassium   Date Value Ref Range Status   02/17/2025 3.7 3.4 - 5.3 mmol/L Final   11/16/2020 3.8 3.4 - 5.3 mmol/L Final     Potassium POCT   Date Value Ref Range Status   02/15/2025 3.6 3.4 - 5.3 mmol/L Final     Comment:     /RN Notified     Chloride   Date Value Ref Range Status   02/17/2025 106 98 - 107 mmol/L Final   11/16/2020 107 94 - 109 mmol/L Final     Carbon Dioxide   Date Value Ref Range Status   11/16/2020 27 20 - 32 mmol/L Final     Carbon Dioxide (CO2)   Date Value Ref Range Status   02/17/2025 21 (L) 22 - 29 mmol/L Final     Anion Gap   Date Value Ref Range Status   02/17/2025 13 7 - 15 mmol/L Final   11/16/2020 5 3 - 14 mmol/L Final     Glucose   Date Value Ref Range Status   11/16/2020 168 (H) 70 - 99 mg/dL Final     GLUCOSE BY METER POCT   Date Value Ref Range Status   02/18/2025 147 (H) 70 - 99 mg/dL Final     Urea Nitrogen   Date Value Ref Range Status   02/17/2025 14.3 8.0 - 23.0 mg/dL Final   11/16/2020 24 7 - 30 mg/dL Final     Creatinine   Date  Value Ref Range Status   02/17/2025 0.98 0.67 - 1.17 mg/dL Final   11/16/2020 0.91 0.66 - 1.25 mg/dL Final     GFR Estimate   Date Value Ref Range Status   02/17/2025 88 >60 mL/min/1.73m2 Final     Comment:     eGFR calculated using 2021 CKD-EPI equation.   11/16/2020 >90 >60 mL/min/[1.73_m2] Final     Comment:     Non  GFR Calc  Starting 12/18/2018, serum creatinine based estimated GFR (eGFR) will be   calculated using the Chronic Kidney Disease Epidemiology Collaboration   (CKD-EPI) equation.       Calcium   Date Value Ref Range Status   02/17/2025 9.2 8.8 - 10.4 mg/dL Final   11/16/2020 9.1 8.5 - 10.1 mg/dL Final       Recent Labs   Lab 02/18/25  1120 02/18/25  0720 02/17/25  1703 02/17/25  0849 02/17/25  0534 02/16/25  1112   * 123* 139* 88 75 136*       IMAGING  MRI Brain (2/15/25)  Impression:  1.  Expected evolution of small scattered late acute to subacute  infarcts including involvement of the left thalamus and bilateral  cerebral white matter. No new infarct.  2.  Severe chronic small vessel ischemic changes with multiple chronic  lacunar infarcts and chronic microhemorrhages, unchanged.  3.  No abnormal enhancement.    ASSESSMENT AND PLAN  Perezmackenzie Delcid is a 59 year old right hand dominant male with a past medical history of HTN, orthostatic hypotension, HLD, DM II and prior stroke who was admitted on 2/11 with worsening fatigue, weakness and syncope and was found to have multifocal acute subcortical infarcts .  Admission to acute inpatient rehab 2/14/25.    Impairment group code: Stroke Ischemic 01.3 Bilateral Involvement; acute lacunar infarcts in the left thalamus, the posterior left centrum semiovale ovale, the right occipital and right occipitotemporal white matter, and the right frontal white matter, suspected embolic etiology      PT, OT and SLP 60 minutes of each daily for 6 days per week for 7 days, in addition to rehab nursing and close management of physiatrist.     2.  Impairment of ADL's:  OT to work on upper and lower body self care, dressing, toileting, bathing, energy conservation techniques with use of ADs as needed.  3. Impairment of mobility:   PT to work on gait exercises, strengthening, endurance buildup, transfers with use of walker as needed.  4. Impairment of cognition/language/swallow:   SLP for cognitive evaluation and treatment strategies for higher level cognitive deficits and memory impairment.  5. Rehab RN to administer medication, patient education on medication taking, VS monitoring, bowel regimen, glucose monitoring and management of orthostasis.      Medical Conditions  # Multiple acute lacunar infarcts in both cerebral hemispheres   # Right vertebral artery dissection  --secondary stroke prevention HTN, HLD, DM, ADITI   --outpatient NIVIA  --hypercoag workup pending (Protein C and S, lupus anticoagulant, cardiolipin Ab, Antithrombin III, and Beta 2 glycoprotein negative.  Factor II and V still pending)  --continue brilinta 90 bid and ASA was added   --continue  lovastatin 40 mg daily and zetia was added   --outpatient genetic referral given family history of stroke in young, can be further discussed at outpatient stroke followup   --outpatient referral to vascular medicine for consideration of PCSK9 inhibitor and further evaluation of hyperlipidemia   --neurology follow up  in 4-6 weeks with any stroke [facility] ([phone])   --PM&R follow up in 8-10 weeks      #Unresponsive episode x2 on 2/15  #Concerns for seizures  In early morning 2/15,  patient was noted by nursing staff to be weaker and non-responsive. Patient was found by nursing sitting at edge of bed on his way to the bathroom. Nursing assisted patient using gait belt and walker to the bathroom when it was noticed that patient was having weakness with standing and mobility. Patient was placed in wheelchair where he slumped to his side and became unresponsive. Patient was lifted back into bed where Code  Blue was initially called but then was downgraded to RRT. ICU provider team recommended stroke code be called. Neurology evaluated patient at bedside. Vital signs showed /105 when resting in bed. CT imaging showed no hemorrhage or LVO. Lab work-up unremarkable. Patient was deemed not requiring transfer to acute care. Later in the morning, nursing staff stated that patient had similar episode of being disoriented and not being able to transfer from chair to bed.  Rehab medical team arrived at bedside to evaluate.  While in chair, patient was not responsive to questions.  Blood pressure noticeably lower than usual, 113/71.  Presentation was likely related to either new stroke or orthostatic hypotension.  Nursing staff to assisted with lifting patient back into bed.  Once patient was in bed and sitting up, patient became more responsive to questions.  Presentation likely related to positional orthostatic hypotension while sitting up in chair but MRI brain was ordered to rule out acute stroke.  MRI results showed no evidence of new infarcts.  Neurology evaluated results and confirmed no new strokes and signed off.    - Discussed with nursing staff to encourage having patient stay in bed at this time to prevent further episodes of disorientation and confusion related to orthostatic hypotension  -Continue to assess cognition and vital signs   -Low threshold to transfer to acute care if patient continues to demonstrate altered mental status through orthostatic hypotension  -Given patient's intermittent episodes of unresponsiveness, there is concern for either patient having further strokes versus seizures.  Neurology was consulted for assessment for possible seizures     #Agitation  #Delirium  On early evening of 2/16, patient was restless, agitated, and combative with cares. Patient reportedly consistently trying to get out of bed. Patient trying to throw punches at staff per nursing. Patient reportedly unwilling to  take oral medications. Given the acutely agitated presentation and to maintain the safety of staff on unit, Code Green was activated and provider team ordered a IM olanzapine 5mg x1 for use.   -Seroquel 25 mg at bedtime for agitation started on 2/16 - Discontinued per psychiatry recs  -Discontinued trazodone on 2/16 given nightmares per wife  -Delirium precautions  -Continue 1:1 bedside attendant  -Psychiatry consulted for delirium management in the setting of recurrent strokes - Continue Haldol 0.5 mg BID and Ramelteon 8 mg at bedtime per psychiatry recs.  2 mg Haldol IM PRN for agitation, can repeat x1.  Haldol recommended at this time due to less risk of exacerbating orthostasis  -Check UA given ongoing delirium and increased urinary frequency per spouse      # HTN   # 20 year h/o orthostasis   Has had long standing issue with orthostatic hypotension even before diagnosis of HTN. Had a tilt table test through [facility] that showed severe orthostatic hypotension. Was followed by cardiology and has a loop recorder in place   Per his SO didn't tolerate abdominal binder and compression socks in the past; was also on midodrine for a while, but this was for overall hypotension and not specifically for orthostasis.  -Cardiology consulted for assistance with management of OH in the setting of overall elevated BPs.  Will change Lisinopril to 10 mg at bedtime, and continue with conservative measures as able including slow position changes, PO intake abdominal binder, compression.  Assistance appreciated.  -Given long history of OH, target BP goal may be higher than typical for a patient post-stroke         # DM  # Polyneuropathy ?  # Autonomic dysfunction   Goal A1c <7.0   Was not checking his BG at home but has a glucometer as his BG was well controlled   --continue PTA metformin 1000 mg BID   --increased PTA glipizide 10 mg BID AC   --BG check qid      # Bilateral hand muscle atrophy and paresthesia   Concerns for  focal mononeuropathy vs cervical radiculopathy; doesn't seem to be explained by polyneuropathy only    -Neurology follow up as outpatient    -May need NCS/EMG for more evaluation      # HLD    2/11  Documented intolerance to atorvastatin, rosuvastatin, simvastatin   --continue lovastatin and zetia as above   --outpatient referral to vascular medicine for consideration of PCSK9 inhibitor and further evaluation of hyperlipidemia      # ADITI   # Insomnia   Was not using CPAP prior to admission  Diagnosed 2/2022 following witnessed apneic episodes and polysomnogram 12/2021. Patient reports stopping BIPAP therapy for his ADITI due to abdominal bloating. He reports a repeat polysomnogram after stopping BIPAP that showed minimal ADITI.   --sleep medicine referral as outpatient to re-establish care     -- Discontinue trazodone on 2/16  -- Discontinued Seroquel as above     # Dehydration  Patient does not appear to be having good oral hydration.  -Given stable electrolytes, IVF bolus given on 2/16 for hydration     # Chronic low back pain  Secondary to prior injury years ago. Follows at [facility] in [place].     Adjustment to disability:  Clinical psychology to eval and treat if indicated  FEN: consistent CHO diet   Bowel: on prn bowel meds; continent   Bladder: continent.  One PVR obtained 60 ml.  Continue to monitor as able  DVT Prophylaxis: on antiplatelet   GI Prophylaxis: on famotidine due to h/o GERD; added TUMS prn  Code: DNR/DNI - confirmed upon admission   Disposition: home with SO  ELOS:  Pending clearing of delirium and ability to participate in therapies  Rehab prognosis:  fair   Follow up Appointments on Discharge: PCP in 1-2 weeks, cardiology and NIVIA, stroke neurology, vascular surgery, PM&R     Gary Zavala MD  Department of Rehabilitation Medicine    Time Spent on this Encounter   I spent a total of 50 minutes face-to-face or managing the care of Chris Delcid. Over 50% of my time on the unit  was spent counseling the patient and coordinating care. See note for details.

## 2025-02-19 ENCOUNTER — APPOINTMENT (OUTPATIENT)
Dept: PHYSICAL THERAPY | Facility: CLINIC | Age: 60
DRG: 057 | End: 2025-02-19
Attending: PHYSICAL MEDICINE & REHABILITATION
Payer: COMMERCIAL

## 2025-02-19 ENCOUNTER — APPOINTMENT (OUTPATIENT)
Dept: SPEECH THERAPY | Facility: CLINIC | Age: 60
DRG: 057 | End: 2025-02-19
Attending: PHYSICAL MEDICINE & REHABILITATION
Payer: COMMERCIAL

## 2025-02-19 ENCOUNTER — TELEPHONE (OUTPATIENT)
Dept: CONSULT | Facility: CLINIC | Age: 60
End: 2025-02-19

## 2025-02-19 LAB
GLUCOSE BLDC GLUCOMTR-MCNC: 156 MG/DL (ref 70–99)
GLUCOSE BLDC GLUCOMTR-MCNC: 77 MG/DL (ref 70–99)

## 2025-02-19 PROCEDURE — 250N000013 HC RX MED GY IP 250 OP 250 PS 637

## 2025-02-19 PROCEDURE — 128N000003 HC R&B REHAB

## 2025-02-19 PROCEDURE — 250N000013 HC RX MED GY IP 250 OP 250 PS 637: Performed by: PHYSICAL MEDICINE & REHABILITATION

## 2025-02-19 PROCEDURE — 92507 TX SP LANG VOICE COMM INDIV: CPT | Mod: GN | Performed by: SPEECH-LANGUAGE PATHOLOGIST

## 2025-02-19 PROCEDURE — 250N000013 HC RX MED GY IP 250 OP 250 PS 637: Performed by: PHYSICIAN ASSISTANT

## 2025-02-19 PROCEDURE — 99233 SBSQ HOSP IP/OBS HIGH 50: CPT | Mod: GC | Performed by: STUDENT IN AN ORGANIZED HEALTH CARE EDUCATION/TRAINING PROGRAM

## 2025-02-19 PROCEDURE — 97530 THERAPEUTIC ACTIVITIES: CPT | Mod: GP

## 2025-02-19 PROCEDURE — 99232 SBSQ HOSP IP/OBS MODERATE 35: CPT | Performed by: PHYSICAL MEDICINE & REHABILITATION

## 2025-02-19 RX ORDER — MAGNESIUM HYDROXIDE/ALUMINUM HYDROXICE/SIMETHICONE 120; 1200; 1200 MG/30ML; MG/30ML; MG/30ML
30 SUSPENSION ORAL EVERY 4 HOURS PRN
Status: DISCONTINUED | OUTPATIENT
Start: 2025-02-19 | End: 2025-03-06 | Stop reason: HOSPADM

## 2025-02-19 RX ORDER — NICOTINE 21 MG/24HR
1 PATCH, TRANSDERMAL 24 HOURS TRANSDERMAL DAILY
Status: DISCONTINUED | OUTPATIENT
Start: 2025-02-19 | End: 2025-02-20

## 2025-02-19 RX ORDER — ACETAMINOPHEN 325 MG/1
975 TABLET ORAL 3 TIMES DAILY
Status: DISCONTINUED | OUTPATIENT
Start: 2025-02-19 | End: 2025-03-06 | Stop reason: HOSPADM

## 2025-02-19 RX ADMIN — HALOPERIDOL 0.5 MG: 0.5 TABLET ORAL at 07:39

## 2025-02-19 RX ADMIN — RAMELTEON 8 MG: 8 TABLET, FILM COATED ORAL at 19:25

## 2025-02-19 RX ADMIN — Medication 10 MG: at 16:33

## 2025-02-19 RX ADMIN — TICAGRELOR 90 MG: 90 TABLET ORAL at 20:34

## 2025-02-19 RX ADMIN — FAMOTIDINE 40 MG: 20 TABLET, FILM COATED ORAL at 20:34

## 2025-02-19 RX ADMIN — Medication 10 MG: at 07:39

## 2025-02-19 RX ADMIN — EZETIMIBE 10 MG: 10 TABLET ORAL at 19:25

## 2025-02-19 RX ADMIN — TICAGRELOR 90 MG: 90 TABLET ORAL at 07:41

## 2025-02-19 RX ADMIN — ACETAMINOPHEN 975 MG: 325 TABLET, FILM COATED ORAL at 19:25

## 2025-02-19 RX ADMIN — POTASSIUM CHLORIDE 20 MEQ: 1500 TABLET, EXTENDED RELEASE ORAL at 07:40

## 2025-02-19 RX ADMIN — METFORMIN ER 500 MG 1000 MG: 500 TABLET ORAL at 07:39

## 2025-02-19 RX ADMIN — METFORMIN ER 500 MG 1000 MG: 500 TABLET ORAL at 20:34

## 2025-02-19 RX ADMIN — ATORVASTATIN CALCIUM 10 MG: 10 TABLET, FILM COATED ORAL at 07:40

## 2025-02-19 RX ADMIN — HALOPERIDOL 0.5 MG: 0.5 TABLET ORAL at 19:25

## 2025-02-19 RX ADMIN — CALCIUM CARBONATE (ANTACID) CHEW TAB 500 MG 1000 MG: 500 CHEW TAB at 04:54

## 2025-02-19 RX ADMIN — ASPIRIN 81 MG CHEWABLE TABLET 81 MG: 81 TABLET CHEWABLE at 07:41

## 2025-02-19 RX ADMIN — LISINOPRIL 10 MG: 10 TABLET ORAL at 20:34

## 2025-02-19 RX ADMIN — PANTOPRAZOLE SODIUM 40 MG: 40 TABLET, DELAYED RELEASE ORAL at 07:39

## 2025-02-19 RX ADMIN — POTASSIUM CHLORIDE 20 MEQ: 1500 TABLET, EXTENDED RELEASE ORAL at 20:34

## 2025-02-19 RX ADMIN — FAMOTIDINE 40 MG: 20 TABLET, FILM COATED ORAL at 07:40

## 2025-02-19 ASSESSMENT — ACTIVITIES OF DAILY LIVING (ADL)
ADLS_ACUITY_SCORE: 55
ADLS_ACUITY_SCORE: 58
ADLS_ACUITY_SCORE: 55
ADLS_ACUITY_SCORE: 58
ADLS_ACUITY_SCORE: 55
ADLS_ACUITY_SCORE: 50
ADLS_ACUITY_SCORE: 55
ADLS_ACUITY_SCORE: 58
ADLS_ACUITY_SCORE: 58
ADLS_ACUITY_SCORE: 55
ADLS_ACUITY_SCORE: 58
ADLS_ACUITY_SCORE: 50
ADLS_ACUITY_SCORE: 58
ADLS_ACUITY_SCORE: 55

## 2025-02-19 NOTE — CONSULTS
Psychiatry Consultation; Follow up              Reason for Consult, requesting source:    Reason for Consult: Delirium evaluation and treatment   Requesting source: Nick Zavala    Labs and imaging reviewed,     Total time spent in chart review, patient interview and coordination of care; 60 minutes - all time was spent on the date of the encounter that I saw patient                Interim history:    Pa seen in his room by team. Reports that mood and situation is still 'horse sh*t.' Says he is being held against his will and wants to return home. Endorses paranoia, says that many people are out to get him including his wife, doctors and other staff. Sleep has been interrupted at night. Today is oriented to self, not oriented to date, situation, or location. He is able to say the days of the week but cannot say them backwards. Short term memory continues to be poor as he cannot recall events from earlier in the day or over the last week.        Current Medications:     Current Facility-Administered Medications   Medication Dose Route Frequency Provider Last Rate Last Admin    aspirin (ASA) chewable tablet 81 mg  81 mg Oral Daily Nichelle Chavez PA   81 mg at 02/19/25 0741    atorvastatin (LIPITOR) tablet 10 mg  10 mg Oral Daily Nichelle Chavez PA   10 mg at 02/19/25 0740    ezetimibe (ZETIA) tablet 10 mg  10 mg Oral At Bedtime Nichelle Chavez PA   10 mg at 02/18/25 1803    famotidine (PEPCID) tablet 40 mg  40 mg Oral BID Nick Zavala MD   40 mg at 02/19/25 0740    glipiZIDE (GLUCOTROL) half-tab 10 mg  10 mg Oral BID AC Nichelle Chavez PA   10 mg at 02/19/25 0739    haloperidol (HALDOL) tablet 0.5 mg  0.5 mg Oral BID Shanta Mcmahon MD   0.5 mg at 02/19/25 0739    iopamidol (ISOVUE-370) solution 500 mL  500 mL Intravenous Once Dakotah Huggins APRN CNP        lisinopril (ZESTRIL) tablet 10 mg  10 mg Oral QPM Nick Zavala MD        metFORMIN (GLUCOPHAGE XR) 24 hr  tablet 1,000 mg  1,000 mg Oral BID Nichelle Chavez PA   1,000 mg at 02/19/25 0739    nicotine (NICODERM CQ) 21 MG/24HR 24 hr patch 1 patch  1 patch Transdermal Daily Nick Zavala MD        pantoprazole (PROTONIX) EC tablet 40 mg  40 mg Oral QAM AC Christie Cook MD   40 mg at 02/19/25 0739    potassium chloride marek ER (KLOR-CON M20) CR tablet 20 mEq  20 mEq Oral BID Nichelle Chavez PA   20 mEq at 02/19/25 0740    ramelteon (ROZEREM) tablet 8 mg  8 mg Oral At Bedtime Shanta Mcmahon MD   8 mg at 02/18/25 1801    sodium chloride 0.9 % bag for CT scan flush use  100 mL As instructed Once Dakotah Huggins APRN CNP        ticagrelor (BRILINTA) tablet 90 mg  90 mg Oral BID Nichelle Chavez PA   90 mg at 02/19/25 0741              MSE:   Appearance: awake, alert and casually dressed  Attitude:  cooperative, somewhat guarded  Eye Contact:  good  Mood:   'horse sh*t'  Affect:  intensity is heightened and guarded  Speech:  dysarthria  Psychomotor Behavior:  no evidence of tardive dyskinesia, dystonia, or tics  Muscle strength and tone: not formally assessed  Thought Process:  illogical  Associations:   questionable  Thought Content:  no evidence of suicidal ideation or homicidal ideation and denies AVH, endorses paranoia  Insight:  limited  Judgement:  limited  Oriented to:   self  Attention Span and Concentration: adequate for brief conversation   Recent and Remote Memory:  limited    Vital signs:  Temp: 98.2  F (36.8  C) Temp src: Oral BP: (!) 141/73 Pulse: 84   Resp: 20 SpO2: 95 % O2 Device: None (Room air)   Height: 182.9 cm (6') Weight: 105.6 kg (232 lb 12.9 oz)  Estimated body mass index is 31.57 kg/m  as calculated from the following:    Height as of this encounter: 1.829 m (6').    Weight as of this encounter: 105.6 kg (232 lb 12.9 oz).    EKG: Qtc 474 2/15         DSM-5 Diagnosis:     Delirium/encephalopathy  R/o vascular dementia           Assessment:   Chris Delcid is a 59 year  old  with no past psychiatric history and medical history of HTN, orthostatic hypotension, HLD, T2DM, and prior stroke who was admitted 2/11 with worsening fatigue, weakness, syncope and was found to have multifocal acute subcortical infarcts. Was admitted to in rehab 2/14/25.  Pt has been seen by neurology team this admission. Per their note, transient neurological episodes could be due to delirium, hypotension, vs elements of stroke recrudescence.      Psychiatry consulted to evaluate and assist with treatment of delirium. On exam today Pa is cooperative but intermittently confused. He is oriented to self,  not oriented to situation, building, city or date. Short term memory impaired as he is unable to recall events from earlier today or over the last week. Additionally unable to do 3 word recall test or say days of the week backwards. He endorses paranoia, stating others are out to get him. No evidence of suicidal or violent ideation today on interview. He has no history of suicide attempts or suicidal behaviors.     Pt has been more behaviorally appropriate since starting haldol, however paranoia is persisting. Current presentation potentially consistent with delirium, but in the absence of ongoing acute medical issues diagnosis become more unclear.  Need to consider vascular dementia given possible new strokes. Would recommend OP neuropsychological testing.  Plan to continue 0.5 mg haldol BID to target paranoia and delirium sx, continue 8 mg ramelteon at bedtime to optimize sleep. Recommend 2mg Haldol IM as backup for agitation. If this dose is not effective, reasonable to try repeat dose or then increase to 5 mg. Haldol preferable over olanzapine or seroquel as it will have less risk for orthostatic hypotension.              Summary of Recommendations:   Safety: recommend verbal de escalation, re direction, haldol PRN available    Medications:  continue 0.5 mg haldol BID, and start 8 mg ramelteon at  bedtime. Recommend 2mg Haldol IM as backup for agitation.     - Non-pharmacologic management of delirium, including lights on during day, off at night, frequent re-orientation, limit delirio-genic medications as much as possible including opioids, benzodiazepines, and anti-cholinergics, minimize use of restraints as much as possible, titrate pharmacologic PRN medications to avoid restraints and oversedation, early PT/OT/mobilization     Follow-Up: Psychiatry will continue to follow by chart review, please reach out if new questions arise    Shanta Mcmahon MD MPH  PGY2 Psychiatry resident  Pt staffed with attending Dr. Alford

## 2025-02-19 NOTE — TELEPHONE ENCOUNTER
LVM for patient to call back to schedule new patient Genetics appointment with Dr. Hu, with GC visit prior. When patient calls back, please assist in scheduling IN PERSON appointment.    Please advise patient to complete intake form via [employer],  as well as have outside records/previous genetic test reports sent prior to appointment date. Thank you.

## 2025-02-19 NOTE — PLAN OF CARE
FOCUS/GOAL  Medical management    ASSESSMENT, INTERVENTIONS AND CONTINUING PLAN FOR GOAL:  Pt is alert, oriented to self. No complaints of pain. Pt did not get oob. 1:1 for impulsivity and impaired safety awareness. Meryl, pt S.O also at bedside and attentive to patient. Mixed continence of bladder. Pt is very paranoid and guarded with staff. Believes staff members are out to get him. No aggression or attempting to hit anyone this shift. Pt having indigestion, received prn tums. S.O. wondering if prn tums can be changed to prn mylanta so it is easier for patient to take. Sticky note left for provider. Appeared to be sleeping on and off. POC ongoing.

## 2025-02-19 NOTE — PROGRESS NOTES
Discharge Planner Post-Acute Rehab PT:     Discharge Plan: Home mod-I during day while spouse at work. HH PT    Precautions: Orthostatic hypotension in combination and significant resting hypertension  Has tried LE and abdominal compression in the past without benefit or tolerance  Do not worry about resting/supine BP hypertension unless diastolic >110    *Impulsive* *Delirium*    Current Status - varies due to fluctuating orthostatic hypotension  Bed Mobility: IND  Transfer: SBA FWW  Gait: SBA up to 100' FWW  Stairs: SBA w/ R rail  Balance: Near baseline when not syncopal. Uses UE support for gait    Outcome Measures: - not appropriate due to command following    Assessment: BP more labile than yesterday, but was able to perform stairs and short gait. Increased agitation as session goes on because the more he does the more he believes he has proved he should be home.    Other Barriers to Discharge (DME, Family Training, etc):   W/c - anticipate needed for community  Orthostatic hypotension

## 2025-02-19 NOTE — PLAN OF CARE
"FOCUS/GOAL  Mobility, Cognition/Memory/Judgment/Problem solving, Safety management, Prevention of secondary complications, and Adjustment to lifestyle change    ASSESSMENT, INTERVENTIONS AND CONTINUING PLAN FOR GOAL:    Goal Outcome Evaluation:    Disoriented.  1:1 attendant  Needs in reach and safety measures in place.   Cont POC    Mobility: A1 FWW  Bowel/Bladder: mixed cont.  Skin: no new skin concerns  O2: RA  Diet: R/T/W  Psychosocial: anticipate needs, can be aggressive.  Pain: no s/sx of pain  Tubes/Lines/Drains: none  Misc: code green called during shift for increased agitation, verbal threats, and aggression. Pt responsive to verbal de-escalation, with calm and direct approach. Pt intermittently agitated throughout shift, able to redirect with verbal cues and distraction. Pt did make comment x1, \"I'm going to go kill myself\" and \"Im going to slit my throat.\" Spouse has also endorsed that pt has made comments like this before. S.O. requested RN locate and remove sharp scissors from room before she left. Provider and LSW notified. Cont 1:1                 "

## 2025-02-19 NOTE — PLAN OF CARE
Goal Outcome Evaluation:      Plan of Care Reviewed With: patient, spouse    Overall Patient Progress: no change  Pt is alert to self and family . C/o back pain prn tylenol given . Pt /spouse requested  bed time meds at dinner time to maintain home medication routine and pt was able to take his meds . Assist of x 1 walker and GB. Continent of BM and bladder. Urine sample was collected for UA , notified Dr on call about the results no new orders. Spouse at bedside and pt still has 1:1 .

## 2025-02-19 NOTE — PLAN OF CARE
"Goal Outcome Evaluation:       A&O x4 with intermittent confusion  1:1 attendant/nurse  Needs in reach and safety measures in place.   Cont POC     Mobility: Ax1 with walker, stable gait.  Bowel/Bladder: mixed cont. Pt seems to have low urine output, bladder scan was 0mL, will have next nurse bladder scan again  Skin: no new skin concerns, scattered bruising on chest/shoulder and left inner thigh due to fall with stroke  O2: RA  Diet: R/T/W  Psychosocial: anticipate needs, can be aggressive.  Pain: back pain managed with tylenol, heat packs, and repositioning  Tubes/Lines/Drains: none  Misc: Beginning of shift pt was agitated, but with redirection, music, television, and talking, he calmed down for about 3 hours, he was very pleasant. Pt started asking about calling his wife, which she told us not to allow him to do this evening because she needs a break. Pt was escalating saying \"I'm going to kill myself in my room tonight,\" \"I'm going to kick the shit out of one of them,\" (referring to staff). Pt also grabbed gait belt and held it up to his wrist and threatened to cut himself, gait belt confiscated and all possible harmful items were removed from the pt's room. Pt now on suicide precautions low risk. Eventually a code green was called around 6:15 PM for increased agitation, verbal threats, and aggression. Pt semi-responsive to verbal de-escalation, with calm and direct approach. Pt eventually agreed to take his psych and other meds, which calmed him down more, and got him to relax. Cont 1:1. Physician sticky note updated, encouraging them to review note from this evening and make any necessary changes needed for their treatment plan.     Pt also had high BP which charge confirmed is normal for him and can dip really low as well, scheduled BP meds were given. Otherwise VSS.      Pt finally fell asleep, will tell the next nurse to take a BG when he wakes up on his own, as to let him rest and be calm for a while. " Priority is keeping him calm and his last BG was really good.

## 2025-02-19 NOTE — PLAN OF CARE
"Discharge Planner Post-Acute Rehab SLP:     Discharge Plan: home with significant other? Ongoing SLP    Precautions: 1:1 attendant, can be combative with cares.    Current Status:  Hearing: WFL  Vision: Retinal detachment, Readers  Communication: Mild dysarthria when pt alert, motor speech at baseline per wife's report. Hx expressive aphasia; WAB-R Bedside 02/16 moderate expressive/receptive deficits (suspect fatigue, medications impacting)  Cognition: At least moderate cognitive impairment suspected. Subjectively noting poor insight into deficits/safety/situation, poor working memory, alternating/sustained attention  Swallow: Regular solids/Thin liquids (0). ARU SLP to monitor for need for clinical swallow evaluation.    Assessment: 1:1 attendant in room, pt sitting up in recliner when SLP arrived. Pt reported so \"pissed off\" when asked how he was feeling today. Pt generated multiple lengthy sentence/connected speech utterances with no word finding deficits or paraphasias; all productions on topic and appropriate for context. Pt maintained alertness throughout session.      Other Barriers to Discharge (Family Training, etc): level of assist/supervision with iADLs?    "

## 2025-02-19 NOTE — PROGRESS NOTES
[facility]   Acute Rehabilitation Unit  Daily progress note    INTERVAL HISTORY  Chris Delcid continues to be paranoid and agitated.  Code green needed to be called this morning, however was able to be de-escalated without the need for intramuscular Haldol.  During this episode he was quite angry towards his significant other as well which is understandably very distressing for her.  She tells me that the trigger for this episode was after going to the bathroom, he insisted he was able to get up and walk on his own but she tried to stop him to get assistance.  It seems oftentimes his triggers revolve around being able to get up and move to the bathroom.  Per PT, he was able to navigate stairs earlier today, and because of this felt he was okay to discharge home which was another trigger for his agitation.  Meryl tells me he has a long history of urinary frequency and urinary dribbling, however has never been formally diagnosed with any prostate issues.  She also thinks nicotine withdrawal may be playing a bigger role than anticipated and triggering his symptoms.  She also believes he may be in pain as he asked for Tylenol last night, however typically does not ask for any pain medications.  He previously followed with an outside pain clinic for chronic back injuries, however did not like being on opioids and discontinued it himself.  With PT, he seems to be close to his baseline function when able to participate from an agitation or blood pressure standpoint.    Current functional status:  PT:  Bed Mobility: IND  Transfer: SBA FWW  Gait: SBA up to 100' FWW  Stairs: SBA w/ R rail  Balance: Near baseline when not syncopal. Uses UE support for gait     Outcome Measures: - not appropriate due to command following     Assessment: BP more labile than yesterday, but was able to perform stairs and short gait. Increased agitation as session goes on because the more he does the more  "he believes he has proved he should be home.      SLP:  Hearing: WFL  Vision: Retinal detachment, Readers  Communication: Mild dysarthria when pt alert, motor speech at baseline per wife's report. Hx expressive aphasia; WAB-R Bedside 02/16 moderate expressive/receptive deficits (suspect fatigue, medications impacting)  Cognition: At least moderate cognitive impairment suspected. Subjectively noting poor insight into deficits/safety/situation, poor working memory, alternating/sustained attention  Swallow: Regular solids/Thin liquids (0). ARU SLP to monitor for need for clinical swallow evaluation.     Assessment: 1:1 attendant in room, pt sitting up in recliner when SLP arrived. Pt reported so \"pissed off\" when asked how he was feeling today. Pt generated multiple lengthy sentence/connected speech utterances with no word finding deficits or paraphasias; all productions on topic and appropriate for context. Pt maintained alertness throughout session.     MEDICATIONS  Scheduled:  Current Facility-Administered Medications   Medication Dose Route Frequency Provider Last Rate Last Admin    acetaminophen (TYLENOL) tablet 975 mg  975 mg Oral TID Nick Zavala MD        aspirin (ASA) chewable tablet 81 mg  81 mg Oral Daily Nichelle Chavez PA   81 mg at 02/19/25 0741    atorvastatin (LIPITOR) tablet 10 mg  10 mg Oral Daily Nichelle Chavez PA   10 mg at 02/19/25 0740    ezetimibe (ZETIA) tablet 10 mg  10 mg Oral At Bedtime Nichelle Chavez PA   10 mg at 02/18/25 1803    famotidine (PEPCID) tablet 40 mg  40 mg Oral BID Nick Zavala MD   40 mg at 02/19/25 0740    glipiZIDE (GLUCOTROL) half-tab 10 mg  10 mg Oral BID AC Nichelle Chavez PA   10 mg at 02/19/25 0739    haloperidol (HALDOL) tablet 0.5 mg  0.5 mg Oral BID Shanta Mcmahon MD   0.5 mg at 02/19/25 0739    iopamidol (ISOVUE-370) solution 500 mL  500 mL Intravenous Once Dakotah Huggins APRN CNP        lisinopril (ZESTRIL) tablet " 10 mg  10 mg Oral QPM Nick Zavala MD        metFORMIN (GLUCOPHAGE XR) 24 hr tablet 1,000 mg  1,000 mg Oral BID Nichelle Chavez PA   1,000 mg at 02/19/25 0739    nicotine (NICODERM CQ) 21 MG/24HR 24 hr patch 1 patch  1 patch Transdermal Daily Nick Zavala MD        pantoprazole (PROTONIX) EC tablet 40 mg  40 mg Oral QAM AC Christie Cook MD   40 mg at 02/19/25 0739    potassium chloride marek ER (KLOR-CON M20) CR tablet 20 mEq  20 mEq Oral BID Nichelle Chavez PA   20 mEq at 02/19/25 0740    ramelteon (ROZEREM) tablet 8 mg  8 mg Oral At Bedtime Shanta Mcmahon MD   8 mg at 02/18/25 1801    sodium chloride 0.9 % bag for CT scan flush use  100 mL As instructed Once Dakotah Huggins APRN CNP        ticagrelor (BRILINTA) tablet 90 mg  90 mg Oral BID Nichelle Chavez PA   90 mg at 02/19/25 0741        PRN:    Current Facility-Administered Medications   Medication Dose Route Frequency Provider Last Rate Last Admin    alum & mag hydroxide-simethicone (MAALOX) suspension 30 mL  30 mL Oral Q4H PRN Nick Zavala MD        calcium carbonate (TUMS) chewable tablet 500-1,000 mg  500-1,000 mg Oral TID PRN Christie Cook MD   1,000 mg at 02/19/25 0454    carboxymethylcellulose PF (REFRESH PLUS) 0.5 % ophthalmic solution 1 drop  1 drop Both Eyes 4x Daily PRN Nick Zavala MD        glucose gel 15-30 g  15-30 g Oral Q15 Min PRN Nichelle Chavez PA        Or    dextrose 50 % injection 25-50 mL  25-50 mL Intravenous Q15 Min PRN Nichelle Chavez PA        Or    glucagon injection 1 mg  1 mg Subcutaneous Q15 Min PRN Nichelle Chavez PA        haloperidol lactate (HALDOL) injection 2 mg  2 mg Intramuscular Q6H PRN Lupe Chase PA-C        hydrALAZINE (APRESOLINE) tablet 10 mg  10 mg Oral Q6H PRN Nick Zavala MD        Or    hydrALAZINE (APRESOLINE) tablet 20 mg  20 mg Oral Q6H PRN Nick Zavala MD        polyethylene glycol (MIRALAX) powder 17 g  17  g Oral Daily PRN Nichelle Chavez PA              PHYSICAL EXAM  Patient Vitals for the past 24 hrs:   BP Temp Temp src Pulse Resp SpO2   02/19/25 1231 (!) 141/85 -- -- 95 18 99 %   02/19/25 0900 -- -- -- -- -- 95 %   02/18/25 1759 (!) 141/73 -- -- 84 -- 100 %   02/18/25 1500 (!) 158/78 98.2  F (36.8  C) Oral 78 20 99 %       GEN: NAD  HEENT: NC/AT  PULM: Non-labored breathing on room air  ABD: Non-distended  Neuro: Awake, agitated.    LABS  CBC RESULTS:   Recent Labs   Lab Test 02/17/25  0534   WBC 7.4   RBC 4.90   HGB 14.4   HCT 41.9   MCV 86   MCH 29.4   MCHC 34.4   RDW 13.6              Last Comprehensive Metabolic Panel:  Sodium   Date Value Ref Range Status   02/17/2025 140 135 - 145 mmol/L Final   11/16/2020 139 133 - 144 mmol/L Final     Potassium   Date Value Ref Range Status   02/17/2025 3.7 3.4 - 5.3 mmol/L Final   11/16/2020 3.8 3.4 - 5.3 mmol/L Final     Potassium POCT   Date Value Ref Range Status   02/15/2025 3.6 3.4 - 5.3 mmol/L Final     Comment:     /RN Notified     Chloride   Date Value Ref Range Status   02/17/2025 106 98 - 107 mmol/L Final   11/16/2020 107 94 - 109 mmol/L Final     Carbon Dioxide   Date Value Ref Range Status   11/16/2020 27 20 - 32 mmol/L Final     Carbon Dioxide (CO2)   Date Value Ref Range Status   02/17/2025 21 (L) 22 - 29 mmol/L Final     Anion Gap   Date Value Ref Range Status   02/17/2025 13 7 - 15 mmol/L Final   11/16/2020 5 3 - 14 mmol/L Final     Glucose   Date Value Ref Range Status   11/16/2020 168 (H) 70 - 99 mg/dL Final     GLUCOSE BY METER POCT   Date Value Ref Range Status   02/19/2025 156 (H) 70 - 99 mg/dL Final     Urea Nitrogen   Date Value Ref Range Status   02/17/2025 14.3 8.0 - 23.0 mg/dL Final   11/16/2020 24 7 - 30 mg/dL Final     Creatinine   Date Value Ref Range Status   02/17/2025 0.98 0.67 - 1.17 mg/dL Final   11/16/2020 0.91 0.66 - 1.25 mg/dL Final     GFR Estimate   Date Value Ref Range Status   02/17/2025 88 >60 mL/min/1.73m2 Final      Comment:     eGFR calculated using 2021 CKD-EPI equation.   11/16/2020 >90 >60 mL/min/[1.73_m2] Final     Comment:     Non  GFR Calc  Starting 12/18/2018, serum creatinine based estimated GFR (eGFR) will be   calculated using the Chronic Kidney Disease Epidemiology Collaboration   (CKD-EPI) equation.       Calcium   Date Value Ref Range Status   02/17/2025 9.2 8.8 - 10.4 mg/dL Final   11/16/2020 9.1 8.5 - 10.1 mg/dL Final       Recent Labs   Lab 02/19/25  0737 02/18/25  1728 02/18/25  1120 02/18/25  0720 02/17/25  1703 02/17/25  0849   * 112* 147* 123* 139* 88       Color Urine (no units)   Date Value   02/18/2025 Yellow   09/04/2015 Yellow     Appearance Urine (no units)   Date Value   02/18/2025 Clear   09/04/2015 Slightly Cloudy     Glucose Urine (mg/dL)   Date Value   02/18/2025 30 (A)   09/04/2015 30 (A)     Bilirubin Urine (no units)   Date Value   02/18/2025 Negative   09/04/2015 Negative     Ketones Urine (mg/dL)   Date Value   02/18/2025 Negative   09/04/2015 Negative     Specific Gravity Urine (no units)   Date Value   02/18/2025 1.025   09/04/2015 1.014     pH Urine   Date Value   02/18/2025 5.5   09/04/2015 6.0 pH     Protein Albumin Urine (mg/dL)   Date Value   02/18/2025 100 (A)   09/04/2015 30 (A)     Urobilinogen Urine (E.U./dL)   Date Value   12/20/2023 1.0     Nitrite Urine (no units)   Date Value   02/18/2025 Negative   09/04/2015 Negative     Leukocyte Esterase Urine (no units)   Date Value   02/18/2025 Negative   09/04/2015 Negative         IMAGING  MRI Brain (2/15/25)  Impression:  1.  Expected evolution of small scattered late acute to subacute  infarcts including involvement of the left thalamus and bilateral  cerebral white matter. No new infarct.  2.  Severe chronic small vessel ischemic changes with multiple chronic  lacunar infarcts and chronic microhemorrhages, unchanged.  3.  No abnormal enhancement.    ASSESSMENT AND PLAN  Perez B Bhavin is a 59 year old right  hand dominant male with a past medical history of HTN, orthostatic hypotension, HLD, DM II and prior stroke who was admitted on 2/11 with worsening fatigue, weakness and syncope and was found to have multifocal acute subcortical infarcts .  Admission to acute inpatient rehab 2/14/25.    Impairment group code: Stroke Ischemic 01.3 Bilateral Involvement; acute lacunar infarcts in the left thalamus, the posterior left centrum semiovale ovale, the right occipital and right occipitotemporal white matter, and the right frontal white matter, suspected embolic etiology      PT, OT and SLP 60 minutes of each daily for 6 days per week for 7 days, in addition to rehab nursing and close management of physiatrist.     2. Impairment of ADL's:  OT to work on upper and lower body self care, dressing, toileting, bathing, energy conservation techniques with use of ADs as needed.  3. Impairment of mobility:   PT to work on gait exercises, strengthening, endurance buildup, transfers with use of walker as needed.  4. Impairment of cognition/language/swallow:   SLP for cognitive evaluation and treatment strategies for higher level cognitive deficits and memory impairment.  5. Rehab RN to administer medication, patient education on medication taking, VS monitoring, bowel regimen, glucose monitoring and management of orthostasis.      Medical Conditions  # Multiple acute lacunar infarcts in both cerebral hemispheres   # Right vertebral artery dissection  --secondary stroke prevention HTN, HLD, DM, ADITI   --outpatient NIVIA  --hypercoag workup pending (Protein C and S, lupus anticoagulant, cardiolipin Ab, Antithrombin III, and Beta 2 glycoprotein negative.  Factor II and V still pending)  --continue brilinta 90 bid and ASA was added   --continue  lovastatin 40 mg daily and zetia was added   --outpatient genetic referral given family history of stroke in young, can be further discussed at outpatient stroke followup   --outpatient referral to  vascular medicine for consideration of PCSK9 inhibitor and further evaluation of hyperlipidemia   --neurology follow up  in 4-6 weeks with any stroke ALEXIS ([phone])   --PM&R follow up in 8-10 weeks      #Unresponsive episode x2 on 2/15  #Concerns for seizures  In early morning 2/15,  patient was noted by nursing staff to be weaker and non-responsive. Patient was found by nursing sitting at edge of bed on his way to the bathroom. Nursing assisted patient using gait belt and walker to the bathroom when it was noticed that patient was having weakness with standing and mobility. Patient was placed in wheelchair where he slumped to his side and became unresponsive. Patient was lifted back into bed where Code Blue was initially called but then was downgraded to RRT. ICU provider team recommended stroke code be called. Neurology evaluated patient at bedside. Vital signs showed /105 when resting in bed. CT imaging showed no hemorrhage or LVO. Lab work-up unremarkable. Patient was deemed not requiring transfer to acute care. Later in the morning, nursing staff stated that patient had similar episode of being disoriented and not being able to transfer from chair to bed.  Rehab medical team arrived at bedside to evaluate.  While in chair, patient was not responsive to questions.  Blood pressure noticeably lower than usual, 113/71.  Presentation was likely related to either new stroke or orthostatic hypotension.  Nursing staff to assisted with lifting patient back into bed.  Once patient was in bed and sitting up, patient became more responsive to questions.  Presentation likely related to positional orthostatic hypotension while sitting up in chair but MRI brain was ordered to rule out acute stroke.  MRI results showed no evidence of new infarcts.  Neurology evaluated results and confirmed no new strokes and signed off.    -Continue to assess cognition and vital signs   -Low threshold to transfer to acute care if  patient continues to demonstrate altered mental status through orthostatic hypotension  -Given patient's intermittent episodes of unresponsiveness, there is concern for either patient having further strokes versus seizures.  Neurology was consulted for assessment for possible seizures     #Agitation  #Delirium  On early evening of 2/16, patient was restless, agitated, and combative with cares. Patient reportedly consistently trying to get out of bed. Patient trying to throw punches at staff per nursing. Patient reportedly unwilling to take oral medications. Given the acutely agitated presentation and to maintain the safety of staff on unit, Code Green was activated and provider team ordered a IM olanzapine 5mg x1 for use.   -Seroquel 25 mg at bedtime for agitation started on 2/16 - Discontinued per psychiatry recs  -Discontinued trazodone on 2/16 given nightmares per wife  -Delirium precautions  -Continue 1:1 bedside attendant  -Psychiatry consulted for delirium management in the setting of recurrent strokes - Follow up 2/19 - Continue Haldol 0.5 mg BID and Ramelteon 8 mg at bedtime per psychiatry recs.  2 mg Haldol IM PRN for agitation, can repeat x1.  Haldol recommended at this time due to less risk of exacerbating orthostasis  -UA negative       # HTN   # 20 year h/o orthostasis   Has had long standing issue with orthostatic hypotension even before diagnosis of HTN. Had a tilt table test through [facility] that showed severe orthostatic hypotension. Was followed by cardiology and has a loop recorder in place   Per his SO didn't tolerate abdominal binder and compression socks in the past; was also on midodrine for a while, but this was for overall hypotension and not specifically for orthostasis.  -Cardiology consulted for assistance with management of OH in the setting of overall elevated BPs.  Recommended changing Lisinopril to 10 mg at bedtime, and continue with conservative measures as able including slow  position changes, PO intake abdominal binder, compression.  Assistance appreciated.  -Given long history of OH, target BP goal may be higher than typical for a patient post-stroke         # DM  # Polyneuropathy ?  # Autonomic dysfunction   Goal A1c <7.0   Was not checking his BG at home but has a glucometer as his BG was well controlled   --continue PTA metformin 1000 mg BID   --increased PTA glipizide 10 mg BID AC   --BG check qid      # Bilateral hand muscle atrophy and paresthesia   Concerns for focal mononeuropathy vs cervical radiculopathy; doesn't seem to be explained by polyneuropathy only    -Neurology follow up as outpatient    -May need NCS/EMG for more evaluation      # HLD    2/11  Documented intolerance to atorvastatin, rosuvastatin, simvastatin   --continue lovastatin and zetia as above   --outpatient referral to vascular medicine for consideration of PCSK9 inhibitor and further evaluation of hyperlipidemia      # ADITI   # Insomnia   Was not using CPAP prior to admission  Diagnosed 2/2022 following witnessed apneic episodes and polysomnogram 12/2021. Patient reports stopping BIPAP therapy for his ADITI due to abdominal bloating. He reports a repeat polysomnogram after stopping BIPAP that showed minimal ADITI.   --sleep medicine referral as outpatient to re-establish care     -- Discontinue trazodone on 2/16  -- Discontinued Seroquel as above     # Dehydration  Patient does not appear to be having good oral hydration.  -Given stable electrolytes, IVF bolus given on 2/16 for hydration     # Chronic low back pain  Secondary to prior injury years ago. Follows at [facility] in [place].     Adjustment to disability:  Clinical psychology to eval and treat if indicated  FEN: consistent CHO diet   Bowel: on prn bowel meds; continent   Bladder: continent.  One PVR obtained 60 ml.  Unable to obtain further values due to agitation  DVT Prophylaxis: on antiplatelet   GI Prophylaxis: on famotidine due to  h/o GERD; added TUMS and Mylanta prn  Code: DNR/DNI - confirmed upon admission   Disposition: home with SO  ELOS:  Pending clearing of delirium, ability to participate in therapies, and ability of significant other to provide supervision.   Rehab prognosis:  fair   Follow up Appointments on Discharge: PCP in 1-2 weeks, cardiology and NIVIA, stroke neurology, vascular surgery, PM&R     Gary Zavala MD  Department of Rehabilitation Medicine    Time Spent on this Encounter   I spent a total of 35 minutes face-to-face or managing the care of Chris Delcid. Over 50% of my time on the unit was spent counseling the patient and coordinating care. See note for details.

## 2025-02-19 NOTE — PROGRESS NOTES
"                                                           ARU Social Work Progress Notes     Sw received  a message from nursing; that patient has expressed thoughts of myself; stating  \"I'm going to go kill myself\" and \"Im going to slit my throat\" after she got called to patient's room for agitation, verbal threats and aggression. Provider was already notified and a Psychiatry was already placed for patient; orders for suicide precaution/ prevention were also placed to help remove options from room that could be used. Patient has a 1:1 attendant in room as well.     Luisana Whitt [facility], Acute Inpatient Rehab Unit   [address] [place] [address], [address] [place] [address]  Phone: [phone]  Fax: [phone]    "

## 2025-02-20 VITALS
TEMPERATURE: 98.3 F | HEIGHT: 72 IN | WEIGHT: 232.81 LBS | RESPIRATION RATE: 18 BRPM | HEART RATE: 90 BPM | DIASTOLIC BLOOD PRESSURE: 80 MMHG | OXYGEN SATURATION: 98 % | SYSTOLIC BLOOD PRESSURE: 136 MMHG | BODY MASS INDEX: 31.53 KG/M2

## 2025-02-20 LAB
ANION GAP SERPL CALCULATED.3IONS-SCNC: 13 MMOL/L (ref 7–15)
ATRIAL RATE - MUSE: 91 BPM
BUN SERPL-MCNC: 20 MG/DL (ref 8–23)
CALCIUM SERPL-MCNC: 9.4 MG/DL (ref 8.8–10.4)
CHLORIDE SERPL-SCNC: 108 MMOL/L (ref 98–107)
CREAT SERPL-MCNC: 0.97 MG/DL (ref 0.67–1.17)
DIASTOLIC BLOOD PRESSURE - MUSE: NORMAL MMHG
EGFRCR SERPLBLD CKD-EPI 2021: 89 ML/MIN/1.73M2
GLUCOSE BLDC GLUCOMTR-MCNC: 136 MG/DL (ref 70–99)
GLUCOSE BLDC GLUCOMTR-MCNC: 164 MG/DL (ref 70–99)
GLUCOSE BLDC GLUCOMTR-MCNC: 62 MG/DL (ref 70–99)
GLUCOSE BLDC GLUCOMTR-MCNC: 64 MG/DL (ref 70–99)
GLUCOSE BLDC GLUCOMTR-MCNC: 66 MG/DL (ref 70–99)
GLUCOSE BLDC GLUCOMTR-MCNC: 67 MG/DL (ref 70–99)
GLUCOSE BLDC GLUCOMTR-MCNC: 69 MG/DL (ref 70–99)
GLUCOSE BLDC GLUCOMTR-MCNC: 91 MG/DL (ref 70–99)
GLUCOSE BLDC GLUCOMTR-MCNC: 95 MG/DL (ref 70–99)
GLUCOSE SERPL-MCNC: 65 MG/DL (ref 70–99)
HCO3 SERPL-SCNC: 21 MMOL/L (ref 22–29)
HOLD SPECIMEN: NORMAL
INTERPRETATION ECG - MUSE: NORMAL
P AXIS - MUSE: 32 DEGREES
POTASSIUM SERPL-SCNC: 4 MMOL/L (ref 3.4–5.3)
PR INTERVAL - MUSE: 226 MS
QRS DURATION - MUSE: 78 MS
QT - MUSE: 382 MS
QTC - MUSE: 469 MS
R AXIS - MUSE: 1 DEGREES
SODIUM SERPL-SCNC: 142 MMOL/L (ref 135–145)
SYSTOLIC BLOOD PRESSURE - MUSE: NORMAL MMHG
T AXIS - MUSE: 33 DEGREES
VENTRICULAR RATE- MUSE: 91 BPM

## 2025-02-20 PROCEDURE — 99233 SBSQ HOSP IP/OBS HIGH 50: CPT | Performed by: STUDENT IN AN ORGANIZED HEALTH CARE EDUCATION/TRAINING PROGRAM

## 2025-02-20 PROCEDURE — 36415 COLL VENOUS BLD VENIPUNCTURE: CPT | Performed by: PHYSICIAN ASSISTANT

## 2025-02-20 PROCEDURE — 80048 BASIC METABOLIC PNL TOTAL CA: CPT | Performed by: PHYSICIAN ASSISTANT

## 2025-02-20 PROCEDURE — 250N000013 HC RX MED GY IP 250 OP 250 PS 637: Performed by: PHYSICIAN ASSISTANT

## 2025-02-20 PROCEDURE — 128N000003 HC R&B REHAB

## 2025-02-20 PROCEDURE — 93005 ELECTROCARDIOGRAM TRACING: CPT

## 2025-02-20 PROCEDURE — 250N000011 HC RX IP 250 OP 636: Performed by: PHYSICIAN ASSISTANT

## 2025-02-20 PROCEDURE — 250N000013 HC RX MED GY IP 250 OP 250 PS 637: Performed by: PHYSICAL MEDICINE & REHABILITATION

## 2025-02-20 PROCEDURE — 250N000013 HC RX MED GY IP 250 OP 250 PS 637

## 2025-02-20 PROCEDURE — 99232 SBSQ HOSP IP/OBS MODERATE 35: CPT | Performed by: PHYSICAL MEDICINE & REHABILITATION

## 2025-02-20 PROCEDURE — 250N000013 HC RX MED GY IP 250 OP 250 PS 637: Performed by: STUDENT IN AN ORGANIZED HEALTH CARE EDUCATION/TRAINING PROGRAM

## 2025-02-20 RX ORDER — HALOPERIDOL 0.5 MG/1
1 TABLET ORAL 2 TIMES DAILY
Status: DISCONTINUED | OUTPATIENT
Start: 2025-02-20 | End: 2025-03-03

## 2025-02-20 RX ORDER — HALOPERIDOL 5 MG/ML
5 INJECTION INTRAMUSCULAR EVERY 6 HOURS PRN
Status: DISCONTINUED | OUTPATIENT
Start: 2025-02-20 | End: 2025-03-03

## 2025-02-20 RX ADMIN — RAMELTEON 8 MG: 8 TABLET, FILM COATED ORAL at 21:49

## 2025-02-20 RX ADMIN — HALOPERIDOL 1 MG: 0.5 TABLET ORAL at 21:51

## 2025-02-20 RX ADMIN — FAMOTIDINE 40 MG: 20 TABLET, FILM COATED ORAL at 07:34

## 2025-02-20 RX ADMIN — METFORMIN ER 500 MG 1000 MG: 500 TABLET ORAL at 07:35

## 2025-02-20 RX ADMIN — ACETAMINOPHEN 975 MG: 325 TABLET, FILM COATED ORAL at 07:35

## 2025-02-20 RX ADMIN — Medication 10 MG: at 07:35

## 2025-02-20 RX ADMIN — POTASSIUM CHLORIDE 20 MEQ: 1500 TABLET, EXTENDED RELEASE ORAL at 07:34

## 2025-02-20 RX ADMIN — DEXTROSE 15 G: 15 GEL ORAL at 14:24

## 2025-02-20 RX ADMIN — LISINOPRIL 10 MG: 10 TABLET ORAL at 21:49

## 2025-02-20 RX ADMIN — TICAGRELOR 90 MG: 90 TABLET ORAL at 21:51

## 2025-02-20 RX ADMIN — POTASSIUM CHLORIDE 20 MEQ: 1500 TABLET, EXTENDED RELEASE ORAL at 21:51

## 2025-02-20 RX ADMIN — ACETAMINOPHEN 975 MG: 325 TABLET, FILM COATED ORAL at 13:40

## 2025-02-20 RX ADMIN — ATORVASTATIN CALCIUM 10 MG: 10 TABLET, FILM COATED ORAL at 07:34

## 2025-02-20 RX ADMIN — METFORMIN ER 500 MG 1000 MG: 500 TABLET ORAL at 21:49

## 2025-02-20 RX ADMIN — PANTOPRAZOLE SODIUM 40 MG: 40 TABLET, DELAYED RELEASE ORAL at 07:36

## 2025-02-20 RX ADMIN — ASPIRIN 81 MG CHEWABLE TABLET 81 MG: 81 TABLET CHEWABLE at 07:34

## 2025-02-20 RX ADMIN — HALOPERIDOL 0.5 MG: 0.5 TABLET ORAL at 07:36

## 2025-02-20 RX ADMIN — FAMOTIDINE 40 MG: 20 TABLET, FILM COATED ORAL at 21:51

## 2025-02-20 RX ADMIN — NICOTINE 1 PATCH: 21 PATCH, EXTENDED RELEASE TRANSDERMAL at 07:36

## 2025-02-20 RX ADMIN — ACETAMINOPHEN 975 MG: 325 TABLET, FILM COATED ORAL at 21:49

## 2025-02-20 RX ADMIN — EZETIMIBE 10 MG: 10 TABLET ORAL at 21:51

## 2025-02-20 RX ADMIN — TICAGRELOR 90 MG: 90 TABLET ORAL at 07:38

## 2025-02-20 RX ADMIN — HALOPERIDOL LACTATE 2 MG: 5 INJECTION, SOLUTION INTRAMUSCULAR at 09:42

## 2025-02-20 ASSESSMENT — ACTIVITIES OF DAILY LIVING (ADL)
ADLS_ACUITY_SCORE: 58
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
## 2025-02-20 NOTE — PROGRESS NOTES
Discharge Planner Post-Acute Rehab PT:     Discharge Plan: TBD pending behavior    Precautions: Orthostatic hypotension in combination and significant resting hypertension  Has tried LE and abdominal compression in the past without benefit or tolerance  Do not worry about resting/supine BP hypertension unless diastolic >110    On 1:1 due to behaviors, not functional mobility    Current Status  Bed Mobility: IND  Transfer: Mod-I FWW  Gait: Mikel FWW  Stairs: Mod-I with rail  Balance: Appreciably at baseline. Uses walker with gait    Outcome Measures: - not appropriate due to command following    Assessment: Pt not formally seen today due to ongoing agitation and aggressive behaviors, at this time will hold therapy until able to participate in skilled services given duration and consistency of these behaviors.    Based past performance/assessment and functional performance observed multiple times ambulating throughout the unit, his orthostatic hypotension appears to have improved, and pt has returned to his functional baseline with minimal appreciable acute stroke deficit. Discharge no longer pending IP PT input as his functional goals are met.    Other Barriers to Discharge (DME, Family Training, etc):   Behavior/safety with spouse at home

## 2025-02-20 NOTE — PROGRESS NOTES
[facility]   Acute Rehabilitation Unit  Daily progress note    INTERVAL HISTORY  Chris Delcid continues to be agitated, and on suicide precautions due to threats he made yesterday.  This morning patient was trying to cut his wrist with a plastic knife which required a code green, and restraints by [facility] team to remove the harmful items.  Intramuscular Haldol needed to be administered.  He also removed his nicotine patch and placed it in his mouth, but was able to be retrieved fully intact.  Reached out to psychiatry team and adjustments made to medications.  Will also reach out to neurology team for reassessment of other etiologies which could possibly explain these behaviors.  No specific underlying etiology for this delirium has been found at this time.  Functionally, he is likely near his baseline status from a blood pressure and mobility standpoint however cognitive change is the biggest concern and he has expressed violent outburst towards his spouse, and this would need to be resolved before she is able to safely take him home.    Current functional status:  PT:  Bed Mobility: IND  Transfer: SBA FWW  Gait: SBA up to 100' FWW  Stairs: SBA w/ R rail  Balance: Near baseline when not syncopal. Uses UE support for gait     Outcome Measures: - not appropriate due to command following     Assessment: BP more labile than yesterday, but was able to perform stairs and short gait. Increased agitation as session goes on because the more he does the more he believes he has proved he should be home.      SLP:  Hearing: WFL  Vision: Retinal detachment, Readers  Communication: Mild dysarthria when pt alert, motor speech at baseline per wife's report. Hx expressive aphasia; WAB-R Bedside 02/16 moderate expressive/receptive deficits (suspect fatigue, medications impacting)  Cognition: At least moderate cognitive impairment suspected. Subjectively noting poor insight into  "deficits/safety/situation, poor working memory, alternating/sustained attention  Swallow: Regular solids/Thin liquids (0). ARU SLP to monitor for need for clinical swallow evaluation.     Assessment: 1:1 attendant in room, pt sitting up in recliner when SLP arrived. Pt reported so \"pissed off\" when asked how he was feeling today. Pt generated multiple lengthy sentence/connected speech utterances with no word finding deficits or paraphasias; all productions on topic and appropriate for context. Pt maintained alertness throughout session.     MEDICATIONS  Scheduled:  Current Facility-Administered Medications   Medication Dose Route Frequency Provider Last Rate Last Admin    acetaminophen (TYLENOL) tablet 975 mg  975 mg Oral TID Nick Zavala MD   975 mg at 02/20/25 1340    aspirin (ASA) chewable tablet 81 mg  81 mg Oral Daily Nichelle Chavez PA   81 mg at 02/20/25 0734    atorvastatin (LIPITOR) tablet 10 mg  10 mg Oral Daily Nichelle Chavez PA   10 mg at 02/20/25 0734    ezetimibe (ZETIA) tablet 10 mg  10 mg Oral At Bedtime Nichelle Chavez PA   10 mg at 02/19/25 1925    famotidine (PEPCID) tablet 40 mg  40 mg Oral BID Nick Zavala MD   40 mg at 02/20/25 0734    [Held by provider] glipiZIDE (GLUCOTROL) half-tab 10 mg  10 mg Oral BID AC Nichelle Chavez PA   10 mg at 02/20/25 0735    haloperidol (HALDOL) tablet 1 mg  1 mg Oral BID Je Alford MD        iopamidol (ISOVUE-370) solution 500 mL  500 mL Intravenous Once Dakotah Huggins APRN CNP        lisinopril (ZESTRIL) tablet 10 mg  10 mg Oral QPM Nick Zavala MD   10 mg at 02/19/25 2034    metFORMIN (GLUCOPHAGE XR) 24 hr tablet 1,000 mg  1,000 mg Oral BID Nichelle Chavez PA   1,000 mg at 02/20/25 0735    nicotine (NICODERM CQ) 21 MG/24HR 24 hr patch 1 patch  1 patch Transdermal Daily Nick Zavala MD   1 patch at 02/20/25 0736    pantoprazole (PROTONIX) EC tablet 40 mg  40 mg Oral [facility] Christie Cook MD   40 " mg at 02/20/25 0736    potassium chloride marek ER (KLOR-CON M20) CR tablet 20 mEq  20 mEq Oral BID Nichelle Chavez PA   20 mEq at 02/20/25 0734    ramelteon (ROZEREM) tablet 8 mg  8 mg Oral At Bedtime Shanta Mcmahon MD   8 mg at 02/19/25 1925    sodium chloride 0.9 % bag for CT scan flush use  100 mL As instructed Once Dakotah Huggins APRN CNP        ticagrelor (BRILINTA) tablet 90 mg  90 mg Oral BID Nichelle Chavez PA   90 mg at 02/20/25 0738        PRN:    Current Facility-Administered Medications   Medication Dose Route Frequency Provider Last Rate Last Admin    alum & mag hydroxide-simethicone (MAALOX) suspension 30 mL  30 mL Oral Q4H PRN Nick Zavala MD        calcium carbonate (TUMS) chewable tablet 500-1,000 mg  500-1,000 mg Oral TID PRN Christie Cook MD   1,000 mg at 02/19/25 0454    carboxymethylcellulose PF (REFRESH PLUS) 0.5 % ophthalmic solution 1 drop  1 drop Both Eyes 4x Daily PRN Nick Zavala MD        glucose gel 15-30 g  15-30 g Oral Q15 Min PRN Nichelle Chavez PA   15 g at 02/20/25 1424    Or    dextrose 50 % injection 25-50 mL  25-50 mL Intravenous Q15 Min PRN Nichelle Chavez PA        Or    glucagon injection 1 mg  1 mg Subcutaneous Q15 Min PRN Nichelle Chavez PA        haloperidol lactate (HALDOL) injection 5 mg  5 mg Intramuscular Q6H PRN Je Alford MD        hydrALAZINE (APRESOLINE) tablet 10 mg  10 mg Oral Q6H PRN Nick Zavala MD        Or    hydrALAZINE (APRESOLINE) tablet 20 mg  20 mg Oral Q6H PRN Nick Zavala MD        polyethylene glycol (MIRALAX) powder 17 g  17 g Oral Daily PRN Nichelle Chavez PA              PHYSICAL EXAM  Patient Vitals for the past 24 hrs:   BP Temp Temp src Pulse Resp SpO2   02/20/25 1415 (!) 140/83 -- -- 90 -- --   02/20/25 1045 (!) 172/105 -- -- -- -- --   02/20/25 0844 (!) 173/104 -- -- 63 18 98 %   02/20/25 0735 (!) 177/103 -- -- -- -- --   02/20/25 0728 (!) 189/106 -- -- 88 18 --    02/20/25 0531 (!) 182/98 98.3  F (36.8  C) Oral 86 18 99 %   02/19/25 2021 (!) 192/82 -- -- 91 18 98 %       GEN: NAD, sitting in gaviria  HEENT: NC/AT  PULM: Non-labored breathing on room air  ABD: Non-distended  Neuro: Awake, irritable    LABS  CBC RESULTS:   Recent Labs   Lab Test 02/17/25  0534   WBC 7.4   RBC 4.90   HGB 14.4   HCT 41.9   MCV 86   MCH 29.4   MCHC 34.4   RDW 13.6            Last Comprehensive Metabolic Panel:  Sodium   Date Value Ref Range Status   02/20/2025 142 135 - 145 mmol/L Final   11/16/2020 139 133 - 144 mmol/L Final     Potassium   Date Value Ref Range Status   02/20/2025 4.0 3.4 - 5.3 mmol/L Final   11/16/2020 3.8 3.4 - 5.3 mmol/L Final     Potassium POCT   Date Value Ref Range Status   02/15/2025 3.6 3.4 - 5.3 mmol/L Final     Comment:     DR/RN Notified     Chloride   Date Value Ref Range Status   02/20/2025 108 (H) 98 - 107 mmol/L Final   11/16/2020 107 94 - 109 mmol/L Final     Carbon Dioxide   Date Value Ref Range Status   11/16/2020 27 20 - 32 mmol/L Final     Carbon Dioxide (CO2)   Date Value Ref Range Status   02/20/2025 21 (L) 22 - 29 mmol/L Final     Anion Gap   Date Value Ref Range Status   02/20/2025 13 7 - 15 mmol/L Final   11/16/2020 5 3 - 14 mmol/L Final     Glucose   Date Value Ref Range Status   11/16/2020 168 (H) 70 - 99 mg/dL Final     GLUCOSE BY METER POCT   Date Value Ref Range Status   02/20/2025 95 70 - 99 mg/dL Final     Urea Nitrogen   Date Value Ref Range Status   02/20/2025 20.0 8.0 - 23.0 mg/dL Final   11/16/2020 24 7 - 30 mg/dL Final     Creatinine   Date Value Ref Range Status   02/20/2025 0.97 0.67 - 1.17 mg/dL Final   11/16/2020 0.91 0.66 - 1.25 mg/dL Final     GFR Estimate   Date Value Ref Range Status   02/20/2025 89 >60 mL/min/1.73m2 Final     Comment:     eGFR calculated using 2021 CKD-EPI equation.   11/16/2020 >90 >60 mL/min/[1.73_m2] Final     Comment:     Non  GFR Calc  Starting 12/18/2018, serum creatinine based estimated  GFR (eGFR) will be   calculated using the Chronic Kidney Disease Epidemiology Collaboration   (CKD-EPI) equation.       Calcium   Date Value Ref Range Status   02/20/2025 9.4 8.8 - 10.4 mg/dL Final   11/16/2020 9.1 8.5 - 10.1 mg/dL Final       Recent Labs   Lab 02/20/25  1448 02/20/25  1418 02/20/25  1401 02/20/25  1334 02/20/25  0850 02/20/25  0637   GLC 95 64* 67* 62* 136* 91       Color Urine (no units)   Date Value   02/18/2025 Yellow   09/04/2015 Yellow     Appearance Urine (no units)   Date Value   02/18/2025 Clear   09/04/2015 Slightly Cloudy     Glucose Urine (mg/dL)   Date Value   02/18/2025 30 (A)   09/04/2015 30 (A)     Bilirubin Urine (no units)   Date Value   02/18/2025 Negative   09/04/2015 Negative     Ketones Urine (mg/dL)   Date Value   02/18/2025 Negative   09/04/2015 Negative     Specific Gravity Urine (no units)   Date Value   02/18/2025 1.025   09/04/2015 1.014     pH Urine   Date Value   02/18/2025 5.5   09/04/2015 6.0 pH     Protein Albumin Urine (mg/dL)   Date Value   02/18/2025 100 (A)   09/04/2015 30 (A)     Urobilinogen Urine (E.U./dL)   Date Value   12/20/2023 1.0     Nitrite Urine (no units)   Date Value   02/18/2025 Negative   09/04/2015 Negative     Leukocyte Esterase Urine (no units)   Date Value   02/18/2025 Negative   09/04/2015 Negative         IMAGING  MRI Brain (2/15/25)  Impression:  1.  Expected evolution of small scattered late acute to subacute  infarcts including involvement of the left thalamus and bilateral  cerebral white matter. No new infarct.  2.  Severe chronic small vessel ischemic changes with multiple chronic  lacunar infarcts and chronic microhemorrhages, unchanged.  3.  No abnormal enhancement.    ASSESSMENT AND PLAN  Perez GIUSEPPE Delcid is a 59 year old right hand dominant male with a past medical history of HTN, orthostatic hypotension, HLD, DM II and prior stroke who was admitted on 2/11 with worsening fatigue, weakness and syncope and was found to have multifocal  acute subcortical infarcts .  Admission to acute inpatient rehab 2/14/25.    Impairment group code: Stroke Ischemic 01.3 Bilateral Involvement; acute lacunar infarcts in the left thalamus, the posterior left centrum semiovale ovale, the right occipital and right occipitotemporal white matter, and the right frontal white matter, suspected embolic etiology      PT, OT and SLP 60 minutes of each daily for 6 days per week for 7 days, in addition to rehab nursing and close management of physiatrist.     2. Impairment of ADL's:  OT to work on upper and lower body self care, dressing, toileting, bathing, energy conservation techniques with use of ADs as needed.  3. Impairment of mobility:   PT to work on gait exercises, strengthening, endurance buildup, transfers with use of walker as needed.  4. Impairment of cognition/language/swallow:   SLP for cognitive evaluation and treatment strategies for higher level cognitive deficits and memory impairment.  5. Rehab RN to administer medication, patient education on medication taking, VS monitoring, bowel regimen, glucose monitoring and management of orthostasis.      Medical Conditions  # Multiple acute lacunar infarcts in both cerebral hemispheres   # Right vertebral artery dissection  --secondary stroke prevention HTN, HLD, DM, ADITI   --outpatient NIVIA  --hypercoag workup pending (Protein C and S, lupus anticoagulant, cardiolipin Ab, Antithrombin III, and Beta 2 glycoprotein negative.  Factor II and V still pending)  --continue brilinta 90 bid and ASA was added   --continue  lovastatin 40 mg daily and zetia was added   --outpatient genetic referral given family history of stroke in young, can be further discussed at outpatient stroke followup   --outpatient referral to vascular medicine for consideration of PCSK9 inhibitor and further evaluation of hyperlipidemia   --neurology follow up  in 4-6 weeks with any stroke [facility] ([phone])   --PM&R follow up in 8-10 weeks       #Unresponsive episode x2 on 2/15  #Concerns for seizures  In early morning 2/15,  patient was noted by nursing staff to be weaker and non-responsive. Patient was found by nursing sitting at edge of bed on his way to the bathroom. Nursing assisted patient using gait belt and walker to the bathroom when it was noticed that patient was having weakness with standing and mobility. Patient was placed in wheelchair where he slumped to his side and became unresponsive. Patient was lifted back into bed where Code Blue was initially called but then was downgraded to RRT. ICU provider team recommended stroke code be called. Neurology evaluated patient at bedside. Vital signs showed /105 when resting in bed. CT imaging showed no hemorrhage or LVO. Lab work-up unremarkable. Patient was deemed not requiring transfer to acute care. Later in the morning, nursing staff stated that patient had similar episode of being disoriented and not being able to transfer from chair to bed.  Rehab medical team arrived at bedside to evaluate.  While in chair, patient was not responsive to questions.  Blood pressure noticeably lower than usual, 113/71.  Presentation was likely related to either new stroke or orthostatic hypotension.  Nursing staff to assisted with lifting patient back into bed.  Once patient was in bed and sitting up, patient became more responsive to questions.  Presentation likely related to positional orthostatic hypotension while sitting up in chair but MRI brain was ordered to rule out acute stroke.  MRI results showed no evidence of new infarcts.  Neurology evaluated results and confirmed no new strokes and signed off.    -Continue to assess cognition and vital signs   -Low threshold to transfer to acute care if patient continues to demonstrate altered mental status through orthostatic hypotension  -Given patient's intermittent episodes of unresponsiveness, there is concern for either patient having further strokes  versus seizures.  Neurology was consulted for assessment for possible seizures     #Agitation  #Delirium  #Suicidal ideations  On early evening of 2/16, patient was restless, agitated, and combative with cares. Patient reportedly consistently trying to get out of bed. Patient trying to throw punches at staff per nursing. Patient reportedly unwilling to take oral medications. Given the acutely agitated presentation and to maintain the safety of staff on unit, Code Green was activated and provider team ordered a IM olanzapine 5mg x1 for use.   -Seroquel 25 mg at bedtime for agitation started on 2/16 - Discontinued per psychiatry recs  -Discontinued trazodone on 2/16 given nightmares per wife  -Delirium precautions  -Continue 1:1 bedside attendant  -Psychiatry consulted for delirium management in the setting of recurrent strokes - Follow up 2/20 - Needed code green called 2x yesterday, and once this morning.  He is on suicide precautions due to statements made yesterday, and today tried to cut himself with a plastic knife today.  IM Haldol was administered.  [facility] team was present and removed potentially harmful objects.  Ongoing psych assistance appreciated, Haldol increased to 1 mg BID   Continue Haldol 0.5 mg BID, and 5 mg IM PRN for agitation.  Haldol recommended at this time due to less risk of exacerbating orthostasis.   -Continue Rozerem 8 mg at bedtime  -Re-consult neurology for evaluation of underlying etiology of agitation/delirium  -UA negative       # HTN   # 20 year h/o orthostasis   Has had long standing issue with orthostatic hypotension even before diagnosis of HTN. Had a tilt table test through [facility] that showed severe orthostatic hypotension. Was followed by cardiology and has a loop recorder in place   Per his SO didn't tolerate abdominal binder and compression socks in the past; was also on midodrine for a while, but this was for overall hypotension and not specifically for  orthostasis.  -Cardiology consulted for assistance with management of OH in the setting of overall elevated BPs.  Recommended changing Lisinopril to 10 mg at bedtime, and continue with conservative measures as able including slow position changes, PO intake abdominal binder, compression.  Assistance appreciated.  -Given long history of OH, target BP goal may be higher than typical for a patient post-stroke         # DM  # Polyneuropathy ?  # Autonomic dysfunction   Goal A1c <7.0   Was not checking his BG at home but has a glucometer as his BG was well controlled   --continue PTA metformin 1000 mg BID   --Hold Glipizide due to hypoglycemia  --BG check qid      # Bilateral hand muscle atrophy and paresthesia   Concerns for focal mononeuropathy vs cervical radiculopathy; doesn't seem to be explained by polyneuropathy only    -Neurology follow up as outpatient    -May need NCS/EMG for more evaluation      # HLD    2/11  Documented intolerance to atorvastatin, rosuvastatin, simvastatin   --continue lovastatin and zetia as above   --outpatient referral to vascular medicine for consideration of PCSK9 inhibitor and further evaluation of hyperlipidemia      # ADITI   # Insomnia   Was not using CPAP prior to admission  Diagnosed 2/2022 following witnessed apneic episodes and polysomnogram 12/2021. Patient reports stopping BIPAP therapy for his ADITI due to abdominal bloating. He reports a repeat polysomnogram after stopping BIPAP that showed minimal ADITI.   --sleep medicine referral as outpatient to re-establish care     -- Discontinue trazodone on 2/16  -- Discontinued Seroquel as above     # Dehydration  Patient does not appear to be having good oral hydration.  -Given stable electrolytes, IVF bolus given on 2/16 for hydration     # Chronic low back pain  Secondary to prior injury years ago. Follows at [facility] in [place].  -Scheduled Tylenol 975 mg TID    #Tobacco use  -Per significant other, has significant  tobacco use history and is concerned he is having cravings or withdrawal.  Nicotine patch was ordered to help if any cravings present, however he removed and tried to eat it so will discontinue     Adjustment to disability:  Clinical psychology to eval and treat if indicated  FEN: consistent CHO diet   Bowel: on prn bowel meds; continent   Bladder: continent.  One PVR obtained 60 ml.  Unable to obtain further values due to agitation  DVT Prophylaxis: on antiplatelet   GI Prophylaxis: on famotidine due to h/o GERD; added TUMS and Mylanta prn  Code: DNR/DNI - confirmed upon admission   Disposition: home with SO  ELOS:  Pending clearing of delirium/improvement in agitation   Rehab prognosis:  fair   Follow up Appointments on Discharge: PCP in 1-2 weeks, cardiology and NIVIA, stroke neurology, vascular surgery, PM&R     Gary Zavala MD  Department of Rehabilitation Medicine    Time Spent on this Encounter   I spent a total of 35 minutes face-to-face or managing the care of Chris Delcid. Over 50% of my time on the unit was spent coordinating care including discussion with staff and consulting services. See note for details.

## 2025-02-20 NOTE — PLAN OF CARE
Goal Outcome Evaluation:       Pt is alert and oriented x 4, with intermittent confusion, pt slept well the entire shift, got up this morning to use the restroom, voided well, and many times this shift, pt's vitals were normal, but his BP sitting down was a bit high, pt's blood sugar was low as 69 and 66, but after giving him some apple and orange juice, his BG came up to 91. Pt has some mixed continent for urine this shift. Pt was calm, cooperative this shift, but was trying to make advances at me by trying to touch me inappropriately, I have always tried to stop the pt each time he tries to touch me inappropriately. Pt denies SOB, chest pain, N/V, pt has some numbness ands tingling at baseline reported the pt. No acute situation noted this shift. Continue pt's plan of care.

## 2025-02-20 NOTE — PLAN OF CARE
Goal Outcome Evaluation: Not Progressing  See prior notes about agitation and attempt to cut arm with plastic knife this am and need for code green and IM haldol.  Soon after IM haldol was given, patient remained agitated for a time. The sitter notified writer that he took off his nicotine patch and put it in his mouth.  He took it out and it appears intact.  Continues on 1:1 sitter.  Left IM site from haldol has been oozing a moderate amount of blood since it was given.  Hesitant to place a dressing on the site due to patient possibly trying to put it in his mouth.  Applied pressure to arm and it eventually stopped. Dr. Wolff and psychology team notified of his agitation, code green and nicotine patch today. Haldol dose increased for both the scheduled dose and PRN. Neurology consulted, EKG done. EEG called and will do the procedure tomorrow morning.  BP elevated this am however not high enough for PRN hydralazine. Attempted to check again this afternoon however he declined to get into bed with feet elevated. Took BP on edge of bed, see VS flowsheet.  His wife called and was updated. She states she will come to see him if it is helpful to him but worries that his agitation will escalate when she leaves. For now she is not planning to visit today unless we think it is helpful. She emailed pictures to be hung in his room to help with reorientation,etc.   Patient refused to eat breakfast and lunch until later this afternoon. At 1334 BG in 60's. Treated multiple times with juice and sugar, then ensure drink with more sugar, then juice and finally glucose gel. Patients last BG 95. He also ate about 3/4 of grilled cheese sandwich. Glipizide was stopped today after he had his morning dose.

## 2025-02-20 NOTE — PLAN OF CARE
Writer was notified that patient was currently attempting to cut left wrist with plastic knife and charge RN called a Code Green. Patient sitting in his wheelchair in the hallway. Writer approached patient and attempted to remove plastic knife from patient's hand, but he would not allow it. Patient cutting at left wrist and did break the skin. Writer was able to initiate conversation with patient and he would stop this motion. At this point, the Code Green team arrived and RAULITO Esteves spoke to patient. The [facility] team arrived and attempted to remove plastic knife from patient's hand. Patient stated that he would not allow them to take it, and it was decided that the [facility] team would have to utilize hands on restraint in order to remove the device from patient's hand for his own safety. [facility] team continued to provide hands on restraint until charge RN to could administer PRN Haldol per MD orders. During this time, [facility] team assisted ARU team with removing any other items from patient's room that could be utilized to harm himself. Patient agitated during this time, but did calm down before removal of hands on technique. No injury to patient or staff during this time. Patient's belongings currently in writer's office. Other items placed in patient's closet in room. Patient remains on 1:1 supervision and is currently on suicide precautions. PM&R did place a Psych consult for patient to be seen today. Bedside RN then notified writer that patient removed Nicotine patch and placed in his mouth. Bedside RN was able to retrieve Nicotine patch with patch intact.     Cielo Zayas RN, BSN, CRRN  ARC Patient Care Supervisor  Acute Rehabilitation Unit  PH: [phone]  Pager: [phone]

## 2025-02-20 NOTE — CARE PLAN
02/19/25 1830   Significant Event   Significant Event Aggressive/violent behavior   Aggressive/Violent Post Event Doc   When was the event?  02/19/25   Where was the event?  Acute rehab    What was event? code green was called because pt was yelling/swearing at the  and staff was unable to calm him down   Precipitating events, if known? pt was talking about calling his wife and he started getting angry and frustrated because he was told he couldn't speak with her unless she called, because she said she needed to rest for the evening (she asked us to let her rest tonight, she didn't want him to be able to contact her)   De-escalating Interventions? Pt was getting upset with me, so I had a new sitter come, so there could be a new face. Psych associates, psych RN's and security came with code green, all of which used different strategies of therapeutic communication and talk to calm the pt. He agreed after persistent reassurance to take his meds, including psych meds, and then he calmed down      [facility] DIVISION OF KIDNEY DISEASES AND HYPERTENSION -- HEMODIALYSIS NOTE  --------------------------------------------------------------------------------  Chief Complaint: ESRD/Ongoing hemodialysis requirement    24 hour events/subjective: S/P Angiogram,     PAST HISTORY  --------------------------------------------------------------------------------  No significant changes to PMH, PSH, FHx, SHx, unless otherwise noted    ALLERGIES & MEDICATIONS  --------------------------------------------------------------------------------  Allergies    Plavix (Hives)  Toprol-XL (Rash)    Intolerances : None,     Standing Inpatient Medications  atorvastatin 80 milliGRAM(s) Oral at bedtime  chlorhexidine 4% Liquid 1 Application(s) Topical <User Schedule>  clopidogrel Tablet 75 milliGRAM(s) Oral daily  DULoxetine 60 milliGRAM(s) Oral daily  epoetin juan-epbx (RETACRIT) Injectable 45208 Unit(s) IV Push <User Schedule>  gabapentin 100 milliGRAM(s) Oral two times a day  hydrALAZINE 50 milliGRAM(s) Oral two times a day  isosorbide   mononitrate ER Tablet (IMDUR) 30 milliGRAM(s) Oral daily  Nephro-pito 1 Tablet(s) Oral daily  NIFEdipine XL 90 milliGRAM(s) Oral daily  NIFEdipine XL 60 milliGRAM(s) Oral <User Schedule>  pantoprazole    Tablet 40 milliGRAM(s) Oral every 12 hours  torsemide 20 milliGRAM(s) Oral <User Schedule>    PRN Inpatient Medications  acetaminophen   Tablet .. 650 milliGRAM(s) Oral every 6 hours PRN    REVIEW OF SYSTEMS  --------------------------------------------------------------------------------  Gen: No weight changes, fatigue, fevers/chills, weakness  Skin: No rashes  Head/Eyes/Ears/Mouth: No headache; Normal hearing; Normal vision w/o blurriness; No sinus pain/discomfort, sore throat  Respiratory: No dyspnea, cough, wheezing, hemoptysis  CV: No chest pain, PND, orthopnea  GI: No abdominal pain, diarrhea, constipation, nausea, vomiting, melena, hematochezia  : No increased frequency, dysuria, hematuria, nocturia  MSK: No joint pain/swelling; no back pain; no edema  Neuro: No dizziness/lightheadedness, weakness, seizures, numbness, tingling  Heme: No easy bruising or bleeding  Endo: No heat/cold intolerance  Psych: No significant nervousness, anxiety, stress, depression    All other systems were reviewed and are negative, except as noted.    VITALS/PHYSICAL EXAM  --------------------------------------------------------------------------------  T(C): 36.8 (03-17-21 @ 07:17), Max: 36.9 (03-16-21 @ 22:10)  HR: 64 (03-17-21 @ 10:30) (63 - 79)  BP: 161/74 (03-17-21 @ 10:30) (142/53 - 161/74)  RR: 18 (03-17-21 @ 10:30) (16 - 18)  SpO2: 100% (03-17-21 @ 10:30) (95% - 100%)  Height (cm): 165.1 (03-17-21 @ 07:17)  Weight (kg): 70.3 (03-17-21 @ 07:17)  BMI (kg/m2): 25.8 (03-17-21 @ 07:17)  BSA (m2): 1.77 (03-17-21 @ 07:17)    Physical Exam:  	Gen: NAD, well-appearing  	HEENT: PERRL, supple neck, clear oropharynx  	Pulm: CTA B/L  	CV: RRR, S1S2; no rub  	Back: No spinal or CVA tenderness; no sacral edema  	Abd: +BS, soft, nontender/nondistended  	: No suprapubic tenderness  	UE: Warm, FROM, no clubbing, intact strength; no edema; no asterixis  	LE: Warm, FROM, no clubbing, intact strength; no edema  	Neuro: No focal deficits, intact gait  	Psych: Normal affect and mood  	Skin: Warm, without rashes  	Vascular access:    LABS/STUDIES  --------------------------------------------------------------------------------              10.0   6.06  >-----------<  159      [03-16-21 @ 07:00]              33.1     142  |  99  |  67.0  ----------------------------<  98      [03-16-21 @ 07:00]  3.9   |  25.0  |  5.19        Ca     9.1     [03-16-21 @ 07:00]    Iron 53, TIBC 266, %sat 20      [08-19-20 @ 06:32]  Ferritin 184      [08-19-20 @ 06:32]  PTH -- (Ca 9.6)      [08-19-20 @ 16:08]   91  Vitamin D (25OH) 26.4      [08-19-20 @ 16:08]  HbA1c 4.9      [08-09-18 @ 05:54]  TSH 2.16      [09-15-20 @ 02:01]

## 2025-02-20 NOTE — PROGRESS NOTES
Discharge Planner Post-Acute Rehab OT:     Discharge Plan: TBD. Goal at admission home mod-I during day while s/o at work. HCOT    Precautions: Orthostatic hypotension and significant resting hypertension, cognition - impulsive and agitated    Permissive resting/supine hypertension unless diastolic >110    Do not leave alone in seated position; must have legs elevated in recliner.    Current Status:  ADLs:  Mobility: Supervision/1:1 FWW for behavior only; functionally Mod I from physical and simple functional cognition perspective.   Grooming: Supervision  Dressing: Supervision FWW.  Bathing: Not safe d/t BP and behavior at initial admission. Clinical judgement supervision transfer and tasks in familiar environment d/t pt's observed ability in similar transfers and tasks.  Toileting: Recommend supervision FWW <> toilet d/t hx of syncopal episodes.   IADLs: Baseline IND simple meal prep, light home mgmt, med mgmt. 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 Enjoys refurbishing antique toys.   Vision/Cognition: Baseline vision deficit d/t retinal disease. Dysarthric. Delirium - not oriented to place, reason, time; paranoid. Agitated and combative at times. Significant functional cognition deficits across attention, memory, reasoning, impulse control.    Assessment: Time missed d/t behavior and agitation; holding therapy until behavior appropriate for skilled therapy participation. In observation across multiple days, no apparent stroke-related deficits impairing R-side function or balance. Functional limitations are orthostatic hypotension and delirium/agitation/cognition exacerbated by unfamiliar setting, routine, and interactions.    Other Barriers to Discharge (DME, Family Training, etc):   Cognition.  Fall risk  - significant orthostatic.      DME: Shower chair, 4WW.

## 2025-02-20 NOTE — PLAN OF CARE
Goal Outcome Evaluation:       Patient Acquired from [id number]    Patient is A&O. Patient gets out of bed frequently; alternating between recliner and bed. Pt mentions how he is waiting for a phone call from his wife; he occasionally picks up his phone to see if he's received a notification (assumably from Meryl) but has not tried to call her. Patient has been calm throughout this time frame and is able to make his needs known.

## 2025-02-20 NOTE — PLAN OF CARE
Goal Outcome Evaluation:    Agitated and trying to to cut his  R wrist with a plastic knife. Three red lines noted on his R wrist, not actively bleeding. Code green activated but refused to hand over the knife to the code team. Patient was physically restrained and at that time IM haldol 0.4 mg was administered to the R deltoid to prevent patient from further harm. Nursing and code staff in his room attending to patient.

## 2025-02-21 ENCOUNTER — ANCILLARY PROCEDURE (OUTPATIENT)
Dept: NEUROLOGY | Facility: CLINIC | Age: 60
End: 2025-02-21
Attending: PHYSICIAN ASSISTANT
Payer: COMMERCIAL

## 2025-02-21 LAB
AMMONIA PLAS-SCNC: 21 UMOL/L (ref 16–60)
GLUCOSE BLDC GLUCOMTR-MCNC: 189 MG/DL (ref 70–99)
T4 FREE SERPL-MCNC: 1.4 NG/DL (ref 0.9–1.7)
TSH SERPL DL<=0.005 MIU/L-ACNC: 7.21 UIU/ML (ref 0.3–4.2)
VIT B12 SERPL-MCNC: 471 PG/ML (ref 232–1245)

## 2025-02-21 PROCEDURE — 84439 ASSAY OF FREE THYROXINE: CPT | Performed by: PHYSICIAN ASSISTANT

## 2025-02-21 PROCEDURE — 250N000013 HC RX MED GY IP 250 OP 250 PS 637: Performed by: PHYSICIAN ASSISTANT

## 2025-02-21 PROCEDURE — 82140 ASSAY OF AMMONIA: CPT | Performed by: PHYSICIAN ASSISTANT

## 2025-02-21 PROCEDURE — 82607 VITAMIN B-12: CPT | Performed by: PHYSICIAN ASSISTANT

## 2025-02-21 PROCEDURE — 95819 EEG AWAKE AND ASLEEP: CPT

## 2025-02-21 PROCEDURE — 250N000013 HC RX MED GY IP 250 OP 250 PS 637: Performed by: PHYSICAL MEDICINE & REHABILITATION

## 2025-02-21 PROCEDURE — 128N000003 HC R&B REHAB

## 2025-02-21 PROCEDURE — 250N000013 HC RX MED GY IP 250 OP 250 PS 637

## 2025-02-21 PROCEDURE — 84443 ASSAY THYROID STIM HORMONE: CPT | Performed by: PHYSICIAN ASSISTANT

## 2025-02-21 PROCEDURE — 95819 EEG AWAKE AND ASLEEP: CPT | Mod: 26 | Performed by: INTERNAL MEDICINE

## 2025-02-21 PROCEDURE — 36415 COLL VENOUS BLD VENIPUNCTURE: CPT | Performed by: PHYSICIAN ASSISTANT

## 2025-02-21 PROCEDURE — 99231 SBSQ HOSP IP/OBS SF/LOW 25: CPT | Performed by: PHYSICIAN ASSISTANT

## 2025-02-21 PROCEDURE — 250N000013 HC RX MED GY IP 250 OP 250 PS 637: Performed by: STUDENT IN AN ORGANIZED HEALTH CARE EDUCATION/TRAINING PROGRAM

## 2025-02-21 PROCEDURE — 99233 SBSQ HOSP IP/OBS HIGH 50: CPT | Performed by: PSYCHIATRY & NEUROLOGY

## 2025-02-21 RX ORDER — CALCIUM CARBONATE 500 MG/1
500 TABLET, CHEWABLE ORAL 3 TIMES DAILY PRN
Status: DISCONTINUED | OUTPATIENT
Start: 2025-02-21 | End: 2025-03-06 | Stop reason: HOSPADM

## 2025-02-21 RX ADMIN — TICAGRELOR 90 MG: 90 TABLET ORAL at 07:47

## 2025-02-21 RX ADMIN — ACETAMINOPHEN 975 MG: 325 TABLET, FILM COATED ORAL at 07:46

## 2025-02-21 RX ADMIN — LISINOPRIL 10 MG: 10 TABLET ORAL at 20:39

## 2025-02-21 RX ADMIN — EZETIMIBE 10 MG: 10 TABLET ORAL at 20:39

## 2025-02-21 RX ADMIN — TICAGRELOR 90 MG: 90 TABLET ORAL at 20:39

## 2025-02-21 RX ADMIN — PANTOPRAZOLE SODIUM 40 MG: 40 TABLET, DELAYED RELEASE ORAL at 07:46

## 2025-02-21 RX ADMIN — ASPIRIN 81 MG CHEWABLE TABLET 81 MG: 81 TABLET CHEWABLE at 07:46

## 2025-02-21 RX ADMIN — HALOPERIDOL 1 MG: 0.5 TABLET ORAL at 07:46

## 2025-02-21 RX ADMIN — HALOPERIDOL 1 MG: 0.5 TABLET ORAL at 20:39

## 2025-02-21 RX ADMIN — POTASSIUM CHLORIDE 20 MEQ: 1500 TABLET, EXTENDED RELEASE ORAL at 20:39

## 2025-02-21 RX ADMIN — FAMOTIDINE 40 MG: 20 TABLET, FILM COATED ORAL at 07:47

## 2025-02-21 RX ADMIN — POTASSIUM CHLORIDE 20 MEQ: 1500 TABLET, EXTENDED RELEASE ORAL at 07:46

## 2025-02-21 RX ADMIN — ACETAMINOPHEN 975 MG: 325 TABLET, FILM COATED ORAL at 20:39

## 2025-02-21 RX ADMIN — METFORMIN ER 500 MG 1000 MG: 500 TABLET ORAL at 20:39

## 2025-02-21 RX ADMIN — RAMELTEON 8 MG: 8 TABLET, FILM COATED ORAL at 20:39

## 2025-02-21 RX ADMIN — METFORMIN ER 500 MG 1000 MG: 500 TABLET ORAL at 07:47

## 2025-02-21 RX ADMIN — ATORVASTATIN CALCIUM 10 MG: 10 TABLET, FILM COATED ORAL at 07:47

## 2025-02-21 RX ADMIN — FAMOTIDINE 40 MG: 20 TABLET, FILM COATED ORAL at 20:39

## 2025-02-21 ASSESSMENT — ACTIVITIES OF DAILY LIVING (ADL)
ADLS_ACUITY_SCORE: 50
ADLS_ACUITY_SCORE: 55
ADLS_ACUITY_SCORE: 58
ADLS_ACUITY_SCORE: 55
ADLS_ACUITY_SCORE: 55
ADLS_ACUITY_SCORE: 50
ADLS_ACUITY_SCORE: 55
ADLS_ACUITY_SCORE: 50
ADLS_ACUITY_SCORE: 50
ADLS_ACUITY_SCORE: 55
ADLS_ACUITY_SCORE: 50
ADLS_ACUITY_SCORE: 58
ADLS_ACUITY_SCORE: 50
ADLS_ACUITY_SCORE: 50
ADLS_ACUITY_SCORE: 58

## 2025-02-21 NOTE — PROGRESS NOTES
"CLINICAL NUTRITION SERVICES - ASSESSMENT NOTE    RECOMMENDATIONS FOR MDs/PROVIDERS TO ORDER:  Appreciate encouragement surrounding PO intake    Malnutrition Status:    Moderate malnutrition in the context of acute illness or injury    Registered Dietitian Interventions:  - Ordered 'Not Appropriate for Room Service' - pt will receive standard tray  - Added 'Safe Tray - No Utensils' as pt on suicide precautions. Will send as many finger foods as possible.  - Ensure Enlive TID with meals    Future/Additional Recommendations:  Monitor PO intake, supplement use, labs, and weight trends     REASON FOR ASSESSMENT  LOS    SUBJECTIVE INFORMATION  Assessed patient in room.    NUTRITION HISTORY  Pt reports good appetite PTA. He typically eats 3 meals/day at home.    CURRENT NUTRITION ORDERS  Diet: Regular    CURRENT INTAKE/TOLERANCE  0-100% per flowsheets. Per [employer], pt ordered/received 3-day average of 1951 kcals and 98 g protein. Pt currently has decreased appetite. He had Ensure Enlive yesterday and liked it. Pt had most of breakfast tacos for breakfast this morning.     LABS  Nutrition-relevant labs:   Hemoglobin A1C: 8.6 (2/11)  Glucose POCT:  over 24 hours    MEDICATIONS  Nutrition-relevant medications:   Pepcid  Glipizide - on hold  Metformin  Protonix  Potassium chloride    ANTHROPOMETRICS  Height: 182.9 cm (6' 0\")  Most Recent Weight: 105.6 kg (232 lb 12.9 oz)  IBW: 80.9 kg  % IBW: 131%  BMI (kg/m ): Obesity Class I BMI 30-34.9  Weight History:   Wt Readings from Last 5 Encounters:   02/14/25 105.6 kg (232 lb 12.9 oz)   02/11/25 112.8 kg (248 lb 10.9 oz)   09/17/24 121.1 kg (267 lb)   02/21/24 122.2 kg (269 lb 6.4 oz)   02/02/24 122 kg (269 lb)   No recent weights in Care Everywhere  6.4% wt loss in 3 days, 13% wt loss in 5 months    Dosing Weight: 87 kg, based on adjusted wt    ASSESSED NUTRITION NEEDS  Estimated Energy Needs: 6238-4160 kcals/day (25 - 30 kcals/kg)  Justification: Maintenance  Estimated " Protein Needs: 104-130 grams protein/day (1.2 - 1.5 grams of pro/kg)  Justification: Increased needs  Estimated Fluid Needs: 1 mL/kcal  Justification: Maintenance    SYSTEM FINDINGS    Skin/wounds: no pressure injuries or skin concerns noted    GI symptoms: LBM 2/20 per I/Os    MALNUTRITION  % Intake: < 75% for > 7 days (moderate)  % Weight Loss: > 10% in 6 months (severe)   Subcutaneous Fat Loss: None observed - visual only as pt not appropriate for full NFPE  Muscle Loss: None observed - visual only as pt not appropriate for full NFPE  Fluid Accumulation/Edema: None noted  Malnutrition Diagnosis: Moderate malnutrition in the context of acute illness or injury  Malnutrition Present on Admission: Yes    NUTRITION DIAGNOSIS  Inadequate oral intake related to decreased appetite as evidenced by pt report and documented intakes.    INTERVENTIONS  Collaboration by nutrition professional with other providers  Medical food supplement therapy  Nutrition education - discussed importance of adequate intakes. Discussed rationale for Ensure with meals.    Goals  Patient to consume % of nutritionally adequate meal trays TID, or the equivalent with supplements/snacks.     Monitoring/Evaluation  Progress toward goals will be monitored and evaluated per policy.     Radha Grimaldo RD, LD  Available via phone and Vocera  Phone: [phone]  Vocera: 5R Acute Rehab Clinical Dietitian  Weekend/Holiday Vocera: Weekend Holiday Clinical Dietitian [Multi Site Groups]

## 2025-02-21 NOTE — PLAN OF CARE
FOCUS/GOAL  Mobility, Cognition/Memory/Judgment/Problem solving, Psychosocial needs, Safety management, Prevention of secondary complications, and Adjustment to lifestyle change    ASSESSMENT, INTERVENTIONS AND CONTINUING PLAN FOR GOAL:    Goal Outcome Evaluation:      Plan of Care Reviewed With: patient    Overall Patient Progress: no changeOverall Patient Progress: no change     Disoriented.  1:1 attendant  Needs in reach and safety measures in place.   Cont POC     Mobility: A1 FWW  Bowel/Bladder: mixed cont. of both  Skin: no new skin concerns  O2: RA  Diet: R/T/W  Psychosocial: anticipate needs, can be aggressive/combative, although pt calm and redirectable this shift. Called and updated sig other. Sig other says she wants to come vist, but concerned it will be triggering for pt. Explained that pt has been calm and cooperative today. She is planning on coming to visit this afternoon/meagan  Pain: no s/sx of pain  Tubes/Lines/Drains: none

## 2025-02-21 NOTE — PLAN OF CARE
Goal Outcome Evaluation:      Plan of Care Reviewed With: patient    Overall Patient Progress: no changeOverall Patient Progress: no change     Pt is alert and oriented x 4, can be agitated, and impulsive some times. Pt is on a 1 to 1, continent of bowel and bladder, last BM was on the 2/20/25, pt is assist of 1 with walker, BG checks, regular diet thin, meds whole, denies chest pain, SOB, N/V, numbness and tingling at baseline reported the pt. No acute situation noted this shift. Continue pt's monitoring.

## 2025-02-21 NOTE — PROGRESS NOTES
[facility]   Acute Rehabilitation Unit  Daily progress note    INTERVAL HISTORY  Chris Delcid was seen at bedside this morning.  No acute events reported overnight.  He reports to be feeling ok today.  He states he did not sleep very well but doing better this morning.  He notes ongoing, stable chronic low back pain.  He also notes some indigestion but indicates he is getting antacids which are helpful.  He denies dizziness or lightheadedness, chest pain, shortness of breath, abdominal pain, nausea.      With therapies, participation has been limited by agitation/aggression.  However, PT observed multiple times ambulating throughout the unit, his orthostatic hypotension appears to have improved, and pt has returned to his functional baseline with minimal appreciable acute stroke deficit. Discharge no longer pending IP PT input as his functional goals are met.     MEDICATIONS  Current Facility-Administered Medications   Medication Dose Route Frequency Provider Last Rate Last Admin    acetaminophen (TYLENOL) tablet 975 mg  975 mg Oral TID Nick Zavala MD   975 mg at 02/21/25 0746    aspirin (ASA) chewable tablet 81 mg  81 mg Oral Daily Nichelle Chavez PA   81 mg at 02/21/25 0746    atorvastatin (LIPITOR) tablet 10 mg  10 mg Oral Daily Nichelle Chavez PA   10 mg at 02/21/25 0747    ezetimibe (ZETIA) tablet 10 mg  10 mg Oral At Bedtime Nichelle Chavez PA   10 mg at 02/20/25 2151    famotidine (PEPCID) tablet 40 mg  40 mg Oral BID Nick Zavala MD   40 mg at 02/21/25 0747    [Held by provider] glipiZIDE (GLUCOTROL) half-tab 10 mg  10 mg Oral BID AC Nichelle Chavez PA   10 mg at 02/20/25 0735    haloperidol (HALDOL) tablet 1 mg  1 mg Oral BID Je Alford MD   1 mg at 02/21/25 0746    iopamidol (ISOVUE-370) solution 500 mL  500 mL Intravenous Once Dakotah Huggins APRN CNP        lisinopril (ZESTRIL) tablet 10 mg  10 mg Oral QPM Nick Zavala  MD Gary   10 mg at 02/20/25 2149    metFORMIN (GLUCOPHAGE XR) 24 hr tablet 1,000 mg  1,000 mg Oral BID Nichelle Chavez PA   1,000 mg at 02/21/25 0747    pantoprazole (PROTONIX) EC tablet 40 mg  40 mg Oral QAM AC Christie Cook MD   40 mg at 02/21/25 0746    potassium chloride marek ER (KLOR-CON M20) CR tablet 20 mEq  20 mEq Oral BID Nichelle Chavez PA   20 mEq at 02/21/25 0746    ramelteon (ROZEREM) tablet 8 mg  8 mg Oral At Bedtime Shanta Mcmahon MD   8 mg at 02/20/25 2149    sodium chloride 0.9 % bag for CT scan flush use  100 mL As instructed Once Dakotah Huggins APRN CNP        ticagrelor (BRILINTA) tablet 90 mg  90 mg Oral BID Nichelle Chavez PA   90 mg at 02/21/25 0747          Current Facility-Administered Medications   Medication Dose Route Frequency Provider Last Rate Last Admin    alum & mag hydroxide-simethicone (MAALOX) suspension 30 mL  30 mL Oral Q4H PRN Nick Zavala MD        calcium carbonate (TUMS) chewable tablet 500 mg  500 mg Oral TID PRN Lupe Chase PA-C        carboxymethylcellulose PF (REFRESH PLUS) 0.5 % ophthalmic solution 1 drop  1 drop Both Eyes 4x Daily PRN Nick Zavala MD        glucose gel 15-30 g  15-30 g Oral Q15 Min PRN Nichelle Chavez PA   15 g at 02/20/25 1424    Or    dextrose 50 % injection 25-50 mL  25-50 mL Intravenous Q15 Min PRN Nichelle Chavez PA        Or    glucagon injection 1 mg  1 mg Subcutaneous Q15 Min PRN Nichelle Chavez PA        haloperidol lactate (HALDOL) injection 5 mg  5 mg Intramuscular Q6H PRN Je Alford MD        hydrALAZINE (APRESOLINE) tablet 10 mg  10 mg Oral Q6H PRN Nick Zavala MD        Or    hydrALAZINE (APRESOLINE) tablet 20 mg  20 mg Oral Q6H PRN Nick Zavala MD        polyethylene glycol (MIRALAX) powder 17 g  17 g Oral Daily PRN Nichelle Chavez, PA            PHYSICAL EXAM  BP (!) 165/95 (BP Location: Left arm)   Pulse 79   Temp 98.3  F (36.8  C) (Oral)    Resp 18   Ht 1.829 m (6')   Wt 105.6 kg (232 lb 12.9 oz)   SpO2 98%   BMI 31.57 kg/m    Gen: NAD, lying in bed  HEENT: NC/AT  Cardio: RRR, no murmurs  Pulm: non-labored on room air, lungs CTA bilaterally  Abd: soft, non-tender, protuberant, bowel sounds present  Ext: no appreciable edema in BLE, multiple superficial abrasions on left wrist  Neuro/MSK: awake, alert, moving all extremities actively in bed    LABS  CBC RESULTS:   Recent Labs   Lab Test 02/17/25  0534 02/15/25  0246 02/15/25  0245 02/14/25  0536   WBC 7.4  --  8.9 6.8   RBC 4.90  --  5.16 5.03   HGB 14.4 15.4 15.1 14.9   HCT 41.9  --  43.3 42.6   MCV 86  --  84 85   MCH 29.4  --  29.3 29.6   MCHC 34.4  --  34.9 35.0   RDW 13.6  --  13.2 13.2     --  370 322       Last Basic Metabolic Panel:  Recent Labs   Lab Test 02/20/25  1700 02/20/25  1448 02/20/25  1418 02/20/25  0546 02/20/25  0541 02/17/25  0849 02/17/25  0534 02/15/25  0829 02/15/25  0556   NA  --   --   --   --  142  --  140  --  139   POTASSIUM  --   --   --   --  4.0  --  3.7  --  3.4   CHLORIDE  --   --   --   --  108*  --  106  --  102   CO2  --   --   --   --  21*  --  21*  --  21*   ANIONGAP  --   --   --   --  13  --  13  --  16*   * 95 64*   < > 65*   < > 75   < > 173*   BUN  --   --   --   --  20.0  --  14.3  --  16.9   CR  --   --   --   --  0.97  --  0.98  --  0.98   GFRESTIMATED  --   --   --   --  89  --  88  --  88   JAMIE  --   --   --   --  9.4  --  9.2  --  9.3    < > = values in this interval not displayed.       Rehabilitation -  Admission to acute inpatient rehab 2/14/25.    Impairment group code: Stroke Ischemic 01.3 Bilateral Involvement; acute lacunar infarcts in the left thalamus, the posterior left centrum semiovale ovale, the right occipital and right occipitotemporal white matter, and the right frontal white matter, suspected embolic etiology      PT, OT and SLP 60 minutes of each daily for 6 days per week for 7 days, in addition to rehab nursing and  "close management of physiatrist.     2. Impairment of ADL's:  OT to work on upper and lower body self care, dressing, toileting, bathing, energy conservation techniques with use of ADs as needed.  3. Impairment of mobility:   PT to work on gait exercises, strengthening, endurance buildup, transfers with use of walker as needed.  4. Impairment of cognition/language/swallow:   SLP for cognitive evaluation and treatment strategies for higher level cognitive deficits and memory impairment.  5. Rehab RN to administer medication, patient education on medication taking, VS monitoring, bowel regimen, glucose monitoring and management of orthostasis.     Continue comprehensive acute inpatient rehabilitation program with multidisciplinary approach including therapies, rehab nursing, and physiatry following. See interval history for updates.      ASSESSMENT AND PLAN  Chris Delcid is a 59 year old right hand dominant male with a past medical history of multiple prior strokes (left pontine; L and R centrum semiovale; R hemipons), HTN, orthostatic hypotension, HLD, DM II c/b gastroparesis, ADITI, chronic low back pain, and GERD who was admitted on 2/11/25 with worsening fatigue, weakness, recurrent falls, and syncope and was found to have multifocal acute/subacute subcortical infarcts with hospital course complicated by additional imaging finding of right vertebral artery occlusion/dissection, hypertensive urgency, hyperglycemia, and elevated troponin.  He was admitted to ARU on 2/14/25 for multidisciplinary rehabilitation and ongoing medical management.     Medical Conditions    Multiple acute lacunar infarcts in both cerebral hemispheres   Right vertebral artery dissection  Hx multiple prior strokes (10/2020 L pontine; 11/2021 L and R centrum semiovale infarcts; 8/2022 acute R hemipons, possible late subacute right centrum semiovale)  PTA on Brilinta monotherapy (since 8/2022 stroke).  Per neurology, patient \"has had recurrent " "primarily subcortical infarcts and progression of microvascular disease over the past five years. This has occurred in the setting of uncontrolled vascular risk factors (HTN, HLD, DM, ADITI), however, will pursue further evaluation for atypical stroke etiologies given recurrent nature and family history.\"  - Hypercoag workup: Protein C and S, lupus anticoagulant, cardiolipin Ab, Antithrombin III, and Beta 2 glycoprotein negative.  Factor II and V now resulted and negative for mutation  - Continue PTA brilinta 90 bid and ASA 81 mg daily, which was added this admission.  DAPT duration TBD  - Continue lovastatin 40 mg daily and zetia (added this admission for HLD)  - Goal BP <130/80, management as below  - Goal A1c <7, management as below   - Continue PT/OT  - Outpatient NIVIA  - Outpatient genetic referral given family history of stroke in young, can be further discussed at outpatient stroke followup   - Follow up with vascular medicine for consideration of PCSK9 inhibitor and further evaluation of hyperlipidemia   - Follow up neurology in 4-6 weeks with any stroke ALEXIS ([phone])   - Follow up with PM&R in 8-10 weeks      Agitation, paranoia  Delirium  Suicidal ideations  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 Patient reportedly consistently trying to get out of bed. Patient trying to throw punches at staff per nursing. Patient reportedly unwilling to take oral medications. Given the acutely agitated presentation and to maintain the safety of staff on unit, Code Green was activated and provider team ordered a IM olanzapine 5mg x1 for use.  1:1 bedside attendant added for safety.  Seroquel was added at bedtime (stopped trazodone).  Psychiatry consulted on 2/17 and changed seroquel to scheduled and PRN Haldol.  Zephyr most likely delirium though also with concerns for underlying vascular dementia.  UA negative on 2/18.  2 recurrent code " 21 episodes on 2/19 due to paranoia/agitation and physical aggression.  Psychiatry reassessed and no changes to plan at that time.  However, on 2/21 had episode of self-injurious behavior (cutting wrist with plastic knife) which required physical restraints and use of PRN IM Haldol.  Scheduled Haldol doses were increased and psychiatry recommended EEG.  Neurology re-consulted as well to consider any further work-up for etiology.  - Appreciate ongoing psychiatry assistance  - Delirium precautions  - Continue 1:1 bedside attendant  - Continue Haldol 1 mg BID (increased on 2/2) with 5 mg IM PRN for agitation.  Haldol recommended at this time due to less risk of exacerbating orthostasis.   - Appreciate neurology repeat consult for evaluation of underlying etiology of agitation/delirium.  Recommended ammonia, TSH, B12, delirium precautions, ongoing psych support, no further work-up at this time from their perspective.  Appreciate assistance.  - Pharmacy reviewed medications, none currently felt to be contributing to increased risk of delirium  - Outpatient neuropsych testing recommended by psychiatry    Unresponsive episode x2 on 2/15  In early morning 2/15,  patient was noted by nursing staff to be weaker and non-responsive. Patient was found by nursing sitting at edge of bed on his way to the bathroom. Nursing assisted patient using gait belt and walker to the bathroom when it was noticed that patient was having weakness with standing and mobility. Patient was placed in wheelchair where he slumped to his side and became unresponsive. Patient was lifted back into bed where Code Blue was initially called but then was downgraded to RRT. ICU provider team recommended stroke code be called. Neurology evaluated patient at bedside. Vital signs showed /105 when resting in bed. CT imaging showed no hemorrhage or LVO. Lab work-up unremarkable. Patient was deemed not requiring transfer to acute care. Later in the morning,  nursing staff stated that patient had similar episode of being disoriented and not being able to transfer from chair to bed.  Rehab medical team arrived at bedside to evaluate.  While in chair, patient was not responsive to questions.  Blood pressure noticeably lower than usual, 113/71.  Presentation was likely related to either new stroke or orthostatic hypotension.  Nursing staff to assisted with lifting patient back into bed.  Once patient was in bed and sitting up, patient became more responsive to questions.  Presentation likely related to positional orthostatic hypotension while sitting up in chair but MRI brain was ordered to rule out acute stroke.  MRI results showed no evidence of new infarcts.  Neurology evaluated results and confirmed no new strokes and signed off.  Felt possibly delirium vs hypotension vs stroke recrudescence.   - Continue to assess cognition and vital signs   - Low threshold to transfer to acute care if patient continues to demonstrate altered mental status through orthostatic hypotension  - Per neurology, consider EEG if persistent/recurrent episodes.  Obtaining EEG per psychiatry as below     HTN   20-year hx orthostasis   Has had long standing issue with orthostatic hypotension even before diagnosis of HTN. Had a tilt table test through [facility] that showed severe orthostatic hypotension. Was followed by cardiology and has a loop recorder in place   Per his SO didn't tolerate abdominal binder and compression socks in the past; was also on midodrine for a while, but this was for overall hypotension and not specifically for orthostasis.  Cardiology consulted at ARU for assistance with management of OH in the setting of overall elevated BPs.  Recommended changing Lisinopril to 10 mg at bedtime, and continue with conservative measures as able.  Assistance appreciated.  - Continue lisinopril 10 mg at HS  - Continue slow position changes, PO intake abdominal binder, compression  -  Continue hydralazine 10-20 mg q6h PRN for SBP >200  - Given long history of OH, target BP goal may be higher than typical for a patient post-stroke     HLD    2/11  Documented intolerance to atorvastatin, rosuvastatin, simvastatin   - Continue lovastatin and zetia as above   - Outpatient referral to vascular medicine for consideration of PCSK9 inhibitor and further evaluation of hyperlipidemia      Type II diabetes mellitus  Polyneuropathy ?  Autonomic dysfunction   PTA on metformin 1000 mg BID and glipizide 10 mg BID. Was not checking his BG at home as his BG was well controlled but has a glucometer.  A1c 8.6% on 2/11/25.  Hyperglycemia during inpatient stay but has been lower at ARU with decreased intake in setting of altered mental status as above.  - Goal A1c <7.0   - Monitor BG TID AC + HS.  BG overall remains well-controlled off glipizide.  Continue to monitor, especially if intake increases.  - Holding glipizide starting 2/20 due to hypoglycemia in setting of impaired intake  - Continue PTA metformin 1000 mg BID   - Hypoglycemia protocol     Bilateral hand muscle atrophy and paresthesia   Concerns for focal mononeuropathy vs cervical radiculopathy; doesn't seem to be explained by polyneuropathy only    - Neurology follow up as outpatient    - May need NCS/EMG for more evaluation      ADITI   Insomnia   Was not using CPAP prior to admission.  Diagnosed 2/2022 following witnessed apneic episodes and polysomnogram 12/2021. Patient reports stopping BIPAP therapy for his ADITI due to abdominal bloating. He reports a repeat polysomnogram after stopping BIPAP that showed minimal ADITI.   - Trazodone and seroquel discontinued as above  - Continue ramelteon 8 mg at HS (added 2/17)  - Sleep hygiene  - Follow up with sleep medicine as outpatient    Moderate malnutrition in the context of acute illness or injury  Poor oral intake  At ARU admission, felt that patient did not appear to be having good oral hydration.   Stable electrolytes, did receive IVF bolus on 2/16 for hydration.  With increased paranoia and agitation since ARU admission (as above), PO intake has been limited.  - RD consulted, appreciate assistance  - Carb restriction removed from diet order on 2/20 to allow greater selections, will monitor BG  - Supplements per RD  - RD also sending more finger foods and pre-packaged foods for safety and to encourage more intake given paranoia and self-injurious behaviors     Chronic low back pain  Secondary to prior injury years ago. Follows at [facility] in [place].  - Scheduled Tylenol 975 mg TID     Tobacco use  Per significant other, has significant tobacco use history and is concerned he is having cravings or withdrawal.  Nicotine patch was ordered to help if any cravings present, however he removed and tried to eat it on 2/20 so discontinued  - Consider PRN nicotine replacement such as gum or lozenge if appropriate     Adjustment to disability:  Clinical psychology to eval and treat if indicated  FEN: regular diet  Bowel: on prn bowel meds; continent   Bladder: continent.  One PVR obtained 60 ml.  Unable to obtain further values due to agitation  DVT Prophylaxis: mechanical  GI Prophylaxis: on famotidine due to h/o GERD; added TUMS and Mylanta prn  Code: DNR/DNI - confirmed upon admission   Disposition: home with SO  ELOS:  Pending clearing of delirium/improvement in agitation  Follow up Appointments on Discharge: PCP in 1-2 weeks, cardiology and NIVIA, stroke neurology, vascular surgery, PM&R, sleep medicine       35 minutes spent on the date of service doing chart review, history and exam, documentation, and further activities as noted above.       Patient was discussed with Dr. Gary Zavala, PM&R Staff Physician    Lupe Chase PA-C  Physical Medicine & Rehabilitation

## 2025-02-21 NOTE — PROGRESS NOTES
ARU Social Work Progress Notes     Team huddle today; discharge needs pending         JAX Molina  [facility], Acute Inpatient Rehab Unit   [facility] [address], [address] [place] [address]  Phone: [phone]  Fax: [phone]

## 2025-02-21 NOTE — PROGRESS NOTES
[facility]  Neurology Consultation - Progress Note    Patient Name:  Chris Delcid  Date of Service:  February 21, 2025    Subjective:    Asked to see after neruology saw earlier this week now for delirium.  Was reportedly trying to harm self by cutting wrists and has been adamant to leave. Today he tells me he feels well but wants to leave.  Knows he is on rehab for strokes.   Psychiatry is evaluating.      Objective:    Vitals: BP (!) 165/95 (BP Location: Left arm)   Pulse 79   Temp 98.3  F (36.8  C) (Oral)   Resp 18   Ht 1.829 m (6')   Wt 105.6 kg (232 lb 12.9 oz)   SpO2 98%   BMI 31.57 kg/m    General: Lying in bed, NAD  Head: Atraumatic, normocephalic   Cardiac: no lower extremity edema  Psych: Calm  Neurologic:  Mental Status: Fully alert, attentive and oriented. Speech fluent but dysarthric.  Able to tell me quarters in 1.75, do 3 step commands.  Naming objects.  Able to tell me month and in hosptal as well as situation.   Cranial Nerves: EOM intact, without nystagmus. Facial movements symmetric at rest and with activation.   Motor: No abnormal movements. 5/5 in b/l UE and LLE.  Does apear to have mild hallux extension and hip flexion weakness on R.  Foot drop noted in past.    Sensory: Intact to light touch x 4 extremities , negative romberg  Reflexes: 2+ and symmetric except R patella brisker than L  Station/Gait:Steady gait with walker.      Pertinent Investigations:    I have personally reviewed most recent and pertinent labs, tests, and radiological images.     R BRAIN W/O & W CONTRAST 2/15/2025 9:53 AM     Provided History: Acute lacunar infarcts in the left thalamus, the  posterior left centrum semiovale ovale, the right occipital and right  occipitotemporal white matter, and the right frontal white matter,  recent stroke code last night     Comparison:  Brain MRI, MRA, MRV 2/11/2025     Technique: Sagittal T1-weighted, coronal T2-weighted, axial T2  FLAIR,  axial susceptibility weighted, phase contrast, and axial  diffusion-weighted with ADC map images of the brain were obtained  without intravenous contrast. T1 axial and coronal postcontrast images  were obtained.     Contrast: 10 cc gadobutrol     Findings: Unchanged foci of FLAIR hyperintense diffusion restriction  in the left lateral thalamus. Decreased conspicuity of punctate  diffusion restriction in the left posterior left centrum semiovale  (3/56), unchanged in the left temporal white matter (3/49), and  decreased conspicuity of  the diffusion restriction in the right  occipitotemporal white matter, right occipital lobe and right frontal  white matter with persistent T2 FLAIR hyperintensities of these  regions. No new diffusion restriction.     Similar extent of diffuse FLAIR hyperintensity of the white matter  consistent with chronic small vessel ischemic changes with multiple  chronic lacunar infarcts in the cuca, bilateral thalami and right  inferior basal ganglia. No significant change in parenchymal volume  loss. Unchanged scattered susceptibility foci consistent with chronic  microhemorrhages of bilateral cerebral hemispheres and in the lower  cuca. No intracranial mass lesion, mass effect, midline shift, or  abnormal fluid collection. The ventricles and sulci are normal for  age. Vascular patency better characterized on exam 2/11/2025.     No abnormal enhancement.     The orbits are unremarkable. Sinuses are clear.                                                                      Impression:  1.  Expected evolution of small scattered late acute to subacute  infarcts including involvement of the left thalamus and bilateral  cerebral white matter. No new infarct.  2.  Severe chronic small vessel ischemic changes with multiple chronic  lacunar infarcts and chronic microhemorrhages, unchanged.  3.  No abnormal enhancement.     I have personally reviewed the examination and initial  interpretation  and I agree with the findings.     NILO PRESLEY MD         Narrative & Impression   EXAM: CT HEAD W/O CONTRAST, CTA HEAD NECK W CONTRAST, CT HEAD PERFUSION W CONTRAST  LOCATION: [facility]  DATE: 2/15/2025     INDICATION: Code Stroke to evaluate for potential thrombolysis and thrombectomy. PLEASE READ IMMEDIATELY  COMPARISON: Head CT, CTA of the head and neck, MRI of the brain, and MRA of the brain dated 02/11/2025 and MRA of the neck dated 02/12/2025  TECHNIQUE: Head and neck CT angiogram with IV contrast. Noncontrast head CT followed by axial helical CT images of the head and neck vessels obtained during the arterial phase of intravenous contrast administration. Axial 2D reconstructed images and   multiplanar 3D MIP reconstructed images of the head and neck vessels were performed by the technologist. Additional CT cerebral perfusion was performed utilizing a second contrast bolus. Perfusion data were post processed with generation of standard   perfusion maps and estimation of ischemic/infarcted volumes utilizing standard threshold values. Dose reduction techniques were used. All stenosis measurements made according to NASCET criteria unless otherwise specified.  CONTRAST: 67 mLs Isovue 370 (accession [id number]), 50 mLs Isovue 370 (accession [id number])     FINDINGS:   NONCONTRAST HEAD CT:   INTRACRANIAL CONTENTS: No intracranial hemorrhage, extraaxial collection, or mass effect.  Small lacunar infarcts bilateral thalami and right basal ganglia. The lacunar infarct involving the superior left thalamus appeared acute on the prior MR.   Additional scattered small acute white matter infarcts in both cerebral hemispheres seen on the prior MRI are not readily apparent by CT. Small lacunar infarct involving the cuca is better seen by MRI. Severe presumed chronic small vessel ischemic   changes. Mild to moderate generalized volume loss. No hydrocephalus.  Mild to moderate intracranial atherosclerotic disease.     VISUALIZED ORBITS/SINUSES/MASTOIDS: No intraorbital abnormality. No paranasal sinus mucosal disease. No middle ear or mastoid effusion.     BONES/SOFT TISSUES: No acute abnormality.     HEAD CTA:  ANTERIOR CIRCULATION: Calcite atherosclerotic plaque involving the distal internal carotid arteries without hemodynamically significant stenosis. Mild to moderate multifocal stenoses involving the anterior and middle cerebral arteries particularly the   left middle cerebral artery is not significantly changed. No occlusion, aneurysm, or high flow vascular malformation. Fetal origin of the left posterior cerebral artery from the anterior circulation. Developmentally hypoplastic A1 segment left anterior   cerebral artery.     POSTERIOR CIRCULATION: Improved opacification of the distal V4 segment of the right vertebral artery. The proximal V4 segment is again unopacified. The right posterior inferior cerebellar artery is not clearly seen and may be occluded. The distal left   vertebral artery and basilar artery appear widely patent. Stable high-grade stenosis of the P2/P3 junction of the left posterior cerebral artery. Additional moderate focal stenosis of the mid P2 segment left posterior cerebral artery is not significantly   changed. Additional mild multifocal stenoses involving both posterior cerebral arteries are not significant changed. No aneurysm, or high flow vascular malformation.       DURAL VENOUS SINUSES: Not well evaluated on a technical basis.     NECK CTA:  Examination somewhat limited by body habitus and beam hardening artifact.     RIGHT CAROTID: Mild atherosclerosis at the bifurcation. No measurable stenosis or dissection.     LEFT CAROTID: Mild atherosclerosis at the bifurcation. No measurable stenosis or dissection.     VERTEBRAL ARTERIES: There is again markedly decreased enhancement of the right vertebral artery with partial opacification of  small segments of the V1 and V2 segments. Poor opacification of the V3 segment is more pronounced than on the prior study and   may at least in part be related to differences in timing of the contrast bolus. The left vertebral artery appears widely patent.      AORTIC ARCH: Classic aortic arch anatomy with no significant stenosis at the origin of the great vessels.     NONVASCULAR STRUCTURES: Unremarkable.     CT PERFUSION:  The examination is limited with poor arterial input function.     PERFUSION MAPS: Relatively symmetric elevated Tmax in both cerebral hemispheres is likely artifactual. No evidence of core infarct on cerebral blood flow maps.     RAPID ANALYSIS:  CBF<30%: 0  Tmax>6sec: 83  Mismatch volume: 83  Mismatch ratio: Infinite                                                                      IMPRESSION:   HEAD CT:  1.  No CT evidence for acute intracranial process.  2.  Scattered acute infarcts in both cerebral hemispheres seen on the MRI are not well seen by CT.  3.  Lacunar infarcts bilateral thalami and right basal ganglia and cuca.  4.  Brain atrophy and presumed chronic microvascular ischemic changes as above.     HEAD CTA:  1.  Improved opacification of the distal V4 segment of the right vertebral artery.  2.  Stable multifocal stenoses involving the anterior, middle, and posterior cerebral arteries.  3.  No large vessel anterior circulation occlusion.     NECK CTA:  1.  Persistent markedly decreased enhancement of the right vertebral artery with poor opacification of the V1 and V2 segments and poor opacification of the V3 segment which is more pronounced than on the prior study. Vertebral artery dissection is again   suspected.  2.  Widely patent left vertebral artery and bilateral carotid arteries.     CT PERFUSION:  Technically limited examination with no evidence of core infarct.           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 Delcid is a 59 year  old who neuro is consulted for delirium.  He is calm and appropriate for me with good bedside mental status testing.  Suspect given burden of small vessel disease and strokes that he may have a component of vascular cogntiive impairment.   His exam is stable from prior with mild R leg weakenss but this has been noted before.  He has 2 MRI's in past 10 days and don't think needs repeat imaging or EEG given his exam currently.  Low suspicion for seizures.  Can consider EEG if events recurring but low suspicion his suicidal tendencies are ictal.  Would check some labs but otherwise no further work up from neurologic standpoint.      Recommendations:   - ammonia, TSH, vitamin b12  - Will sign off  - agree with psych consult  - delirium precaution order set    Thank you for involving Neurology in the care of Chris Delcid.    Alli Edge, DO      Medical Decision Making       54 MINUTES SPENT BY ME on the date of service doing chart review, history, exam, documentation & further activities per the note.    Reviewing MRI's and last few neuro notes as well as PM&R

## 2025-02-21 NOTE — PHARMACY-CONSULT NOTE
Delirium Consult    Medications evaluated as requested per Delirium Consult.  No medication recommendations at this time.  Pharmacist will continue to follow as new medications are ordered.    Stuart Olson, JuancarlosD, BCPS

## 2025-02-21 NOTE — PLAN OF CARE
Goal Outcome Evaluation:    [id number]      Plan of Care Reviewed With: patient    Overall Patient Progress: no changeOverall Patient Progress: no change    FOCUS/GOAL     Bowel management, Bladder management, Medication management, Wound care management, Medical management, Mobility, Skin integrity, and Safety management     ASSESSMENT, INTERVENTIONS AND CONTINUING PLAN FOR GOAL:     Pt is 1:1 A&OX2 disoriented to time & situation, agitated at time, & cooperative with care. Impulsive & bedside attendant in pt's room. Denied CP, SOB, & n/v. VSS & on RA. Pt transfers A of 1 with walker & GB. Pt was continent for both B&B this shift. Takes meds whole with thin liquid. Refused to take bedtime meds & took it after multiple prompt. Skin WNL. Pt ate dinner with good appetite & no other acute concern. Able to make needs known & call light within reach. Continue with plan of care.    Patient's most recent vital signs are:     Vital signs:  BP: 136/80  Temp: 98.3  HR: 90  RR: 18  SpO2: 98 %     Patient does not have new respiratory symptoms.  Patient does not have new sore throat.  Patient does not have a fever greater than 99.5.

## 2025-02-22 LAB
GLUCOSE BLDC GLUCOMTR-MCNC: 131 MG/DL (ref 70–99)
GLUCOSE BLDC GLUCOMTR-MCNC: 150 MG/DL (ref 70–99)
GLUCOSE BLDC GLUCOMTR-MCNC: 168 MG/DL (ref 70–99)

## 2025-02-22 PROCEDURE — 99231 SBSQ HOSP IP/OBS SF/LOW 25: CPT | Mod: GC | Performed by: STUDENT IN AN ORGANIZED HEALTH CARE EDUCATION/TRAINING PROGRAM

## 2025-02-22 PROCEDURE — 250N000013 HC RX MED GY IP 250 OP 250 PS 637: Performed by: PHYSICAL MEDICINE & REHABILITATION

## 2025-02-22 PROCEDURE — 250N000013 HC RX MED GY IP 250 OP 250 PS 637: Performed by: STUDENT IN AN ORGANIZED HEALTH CARE EDUCATION/TRAINING PROGRAM

## 2025-02-22 PROCEDURE — 128N000003 HC R&B REHAB

## 2025-02-22 PROCEDURE — 250N000013 HC RX MED GY IP 250 OP 250 PS 637

## 2025-02-22 PROCEDURE — 250N000013 HC RX MED GY IP 250 OP 250 PS 637: Performed by: PHYSICIAN ASSISTANT

## 2025-02-22 RX ADMIN — ACETAMINOPHEN 975 MG: 325 TABLET, FILM COATED ORAL at 19:57

## 2025-02-22 RX ADMIN — HALOPERIDOL 1 MG: 0.5 TABLET ORAL at 19:57

## 2025-02-22 RX ADMIN — ASPIRIN 81 MG CHEWABLE TABLET 81 MG: 81 TABLET CHEWABLE at 08:55

## 2025-02-22 RX ADMIN — LISINOPRIL 10 MG: 10 TABLET ORAL at 19:57

## 2025-02-22 RX ADMIN — ACETAMINOPHEN 650 MG: 325 TABLET, FILM COATED ORAL at 13:40

## 2025-02-22 RX ADMIN — TICAGRELOR 90 MG: 90 TABLET ORAL at 19:57

## 2025-02-22 RX ADMIN — HALOPERIDOL 1 MG: 0.5 TABLET ORAL at 08:54

## 2025-02-22 RX ADMIN — EZETIMIBE 10 MG: 10 TABLET ORAL at 19:57

## 2025-02-22 RX ADMIN — RAMELTEON 8 MG: 8 TABLET, FILM COATED ORAL at 19:57

## 2025-02-22 ASSESSMENT — ACTIVITIES OF DAILY LIVING (ADL)
ADLS_ACUITY_SCORE: 62
ADLS_ACUITY_SCORE: 55
ADLS_ACUITY_SCORE: 63
ADLS_ACUITY_SCORE: 57
ADLS_ACUITY_SCORE: 62
ADLS_ACUITY_SCORE: 50
ADLS_ACUITY_SCORE: 63
ADLS_ACUITY_SCORE: 55
ADLS_ACUITY_SCORE: 63
ADLS_ACUITY_SCORE: 62
ADLS_ACUITY_SCORE: 55
ADLS_ACUITY_SCORE: 62
ADLS_ACUITY_SCORE: 58
ADLS_ACUITY_SCORE: 58
ADLS_ACUITY_SCORE: 50
ADLS_ACUITY_SCORE: 63
ADLS_ACUITY_SCORE: 62
ADLS_ACUITY_SCORE: 63
ADLS_ACUITY_SCORE: 55
ADLS_ACUITY_SCORE: 62
ADLS_ACUITY_SCORE: 58
ADLS_ACUITY_SCORE: 62
ADLS_ACUITY_SCORE: 50

## 2025-02-22 NOTE — PROGRESS NOTES
[facility], [place]   Physical Medicine and Rehabilitation Weekend Note           Assessment and Plan of Care:   Chris Delcid is a 59 year old right hand dominant male with a past medical history of multiple prior strokes (left pontine; L and R centrum semiovale; R hemipons), HTN, orthostatic hypotension, HLD, DM II c/b gastroparesis, ADITI, chronic low back pain, and GERD who was admitted on 2/11/25 with worsening fatigue, weakness, recurrent falls, and syncope and was found to have multifocal acute/subacute subcortical infarcts with hospital course complicated by additional imaging finding of right vertebral artery occlusion/dissection, hypertensive urgency, hyperglycemia, and elevated troponin.  He was admitted to ARU on 2/14/25 for multidisciplinary rehabilitation and ongoing medical management.     Admission to acute inpatient rehab 2/14/25.    Impairment group code: Stroke Ischemic 01.3 Bilateral Involvement; acute lacunar infarcts in the left thalamus, the posterior left centrum semiovale ovale, the right occipital and right occipitotemporal white matter, and the right frontal white matter, suspected embolic etiology     He is currently medically stable with current sustaining cares meeting basic needs. Please refer to last note from primary team for a more complete problem list and plan of care.  -- Acute issues overnight. Noted to he disoriented and noted to push staff away overnight.   --Functionally, therapies have been held in the last 24 hours due to agitation and aggressive behaviors  --- PT 2/20/2025  Remain independent for bed mobility with moderate independent for front wheel walker.  At the time not formally seen due to ongoing agitation and aggressive behaviors had plan to hold.  --Vitals stable.   --No lab today.  --Continue ongoing medical management.  --Continue therapies and plan of care.             Interval history:   RN note reviewed: Was alert and disoriented  to time and place.  He required to be conversed with the following concerns of him pushing staff and hitting staff stomach likely.  He was offered to be reposition intact in bed did not want that.  Did eventually sleep the rest of the night from 2 AM, incontinence x 2 changes overnight.    Patient is noted to be more somnolent this morning but does cooperate and is calmer.  Still has one-to-one in the room during this visit.  Answering some questions appropriately but not all.  Does endorse some back pain.    Denies fever, chills, CP, SOB, N/V, abdominal pain, new pain or weakness/numbness/tingling. No new issues with bowel or bladder. Functionally, continues to wear diaper.            Physical Exam:     Vitals:    02/21/25 1530 02/21/25 1730 02/21/25 2039 02/22/25 0758   BP: (!) 157/95  (!) 159/87 129/63   BP Location: Left arm  Left arm Left arm   Patient Position: Sitting  Sitting    Cuff Size: Adult Regular  Adult Regular    Pulse: 82   98   Resp: 18   20   Temp:  97.8  F (36.6  C)  97.7  F (36.5  C)   TempSrc:  Oral  Oral   SpO2: 98%   97%   Weight:       Height:         Gen:  seated up in chair, calm and cooperative, some somnolence but able to follow simple commands, initially shifting in chair back-and-forth.    Heart: RRR  Lungs: clear breath sounds b/l  Abd: soft and non-tender  Ext: wwp, no edema in BLE, no tenderness in calves  MSK/neuro: alert and oriented. speech fluent.  Moving moves BUE and left lower extremity > right lower extremity volitionally.   Skin superficial abrasions on left wrist not bleeding starting to heal       Data:   CBC:   Recent Labs   Lab Test 02/17/25  0534   WBC 7.4   RBC 4.90   HGB 14.4   MCV 86   MCH 29.4   MCHC 34.4   RDW 13.6        BMP:   Recent Labs   Lab Test 02/22/25  0749 02/20/25  0546 02/20/25  0541   NA  --   --  142   POTASSIUM  --   --  4.0   CHLORIDE  --   --  108*   CO2  --   --  21*   BUN  --   --  20.0   CR  --   --  0.97   *   < > 65*    < > =  values in this interval not displayed.     Other labs:     Scheduled meds  Current Facility-Administered Medications   Medication Dose Route Frequency Provider Last Rate Last Admin    acetaminophen (TYLENOL) tablet 975 mg  975 mg Oral TID Nick Zavala MD   975 mg at 02/22/25 0854    aspirin (ASA) chewable tablet 81 mg  81 mg Oral Daily Nichelle Chavez PA   81 mg at 02/22/25 0855    atorvastatin (LIPITOR) tablet 10 mg  10 mg Oral Daily Nichelle Chavez PA   10 mg at 02/22/25 0854    ezetimibe (ZETIA) tablet 10 mg  10 mg Oral At Bedtime Nichelle Chavez PA   10 mg at 02/21/25 2039    famotidine (PEPCID) tablet 40 mg  40 mg Oral BID Nick Zavala MD   40 mg at 02/22/25 0854    [Held by provider] glipiZIDE (GLUCOTROL) half-tab 10 mg  10 mg Oral BID  Nichelle Chavez PA   10 mg at 02/20/25 0735    haloperidol (HALDOL) tablet 1 mg  1 mg Oral BID Je Alford MD   1 mg at 02/22/25 0854    iopamidol (ISOVUE-370) solution 500 mL  500 mL Intravenous Once Dakotah Huggins APRN CNP        lisinopril (ZESTRIL) tablet 10 mg  10 mg Oral QPM Nick Zavala MD   10 mg at 02/21/25 2039    metFORMIN (GLUCOPHAGE XR) 24 hr tablet 1,000 mg  1,000 mg Oral BID Nichelle Chavez PA   1,000 mg at 02/22/25 0854    pantoprazole (PROTONIX) EC tablet 40 mg  40 mg Oral QAM  Christie Cook MD   40 mg at 02/22/25 0854    potassium chloride marek ER (KLOR-CON M20) CR tablet 20 mEq  20 mEq Oral BID Nichelle Chavez PA   20 mEq at 02/22/25 0854    ramelteon (ROZEREM) tablet 8 mg  8 mg Oral At Bedtime Shanta Mcmahon MD   8 mg at 02/21/25 2039    sodium chloride 0.9 % bag for CT scan flush use  100 mL As instructed Once Dakotah Huggins APRN CNP        ticagrelor (BRILINTA) tablet 90 mg  90 mg Oral BID Nichelle Chavez PA   90 mg at 02/22/25 0855       PRN meds:  Current Facility-Administered Medications   Medication Dose Route Frequency Provider Last Rate Last Admin    alum & mag  hydroxide-simethicone (MAALOX) suspension 30 mL  30 mL Oral Q4H PRN Nick Zavala MD        calcium carbonate (TUMS) chewable tablet 500 mg  500 mg Oral TID PRN Lupe Chase PA-C        carboxymethylcellulose PF (REFRESH PLUS) 0.5 % ophthalmic solution 1 drop  1 drop Both Eyes 4x Daily PRN Nick Zavala MD        glucose gel 15-30 g  15-30 g Oral Q15 Min PRN Nichelle Chavez PA   15 g at 02/20/25 1424    Or    dextrose 50 % injection 25-50 mL  25-50 mL Intravenous Q15 Min PRN Nichelle Chavez PA        Or    glucagon injection 1 mg  1 mg Subcutaneous Q15 Min PRN Nichelle Chavez PA        haloperidol lactate (HALDOL) injection 5 mg  5 mg Intramuscular Q6H PRN Je Alford MD        hydrALAZINE (APRESOLINE) tablet 10 mg  10 mg Oral Q6H PRN Nick Zavala MD        Or    hydrALAZINE (APRESOLINE) tablet 20 mg  20 mg Oral Q6H PRN Nick Zavala MD        polyethylene glycol (MIRALAX) powder 17 g  17 g Oral Daily PRN Nichelle Chavez PA         TSH   Date Value Ref Range Status   02/21/2025 7.21 (H) 0.30 - 4.20 uIU/mL Final     Free T4   Date Value Ref Range Status   02/21/2025 1.40 0.90 - 1.70 ng/dL Final       Patient was staffed with Dr. Varsha Love who agrees with the assessment and plan.    Cande Zelaya MD  Resident Physician PGY-4  Physical Medicine and Rehabilitation

## 2025-02-22 NOTE — PLAN OF CARE
Goal Outcome Evaluation:      Plan of Care Reviewed With: patient    Overall Patient Progress: no changeOverall Patient Progress: no change            FOCUS/GOAL  Mobility, Cognition/Memory/Judgment/Problem solving, Psychosocial needs, Safety management, Prevention of secondary complications, and Adjustment to lifestyle change     ASSESSMENT, INTERVENTIONS AND CONTINUING PLAN FOR GOAL:      Poor insight  1:1 attendant  Needs in reach and safety measures in place.   Cont POC     Mobility: A1 FWW  Bowel/Bladder: mixed cont. of both, LBM 21st  Skin: no new skin concerns, R thigh bruise  O2: RA  Diet: R/T/W  Psychosocial: anticipate needs, can be aggressive/combative, although pt calm and redirectable this shift.   Pt has orthostatic episode this morning while sitting EOB. Brought pt to supine position, VSS. Pt was able to tolerate sitting up in chair shortly after.  Pt spit out majority of meds this morning.  Pain: no s/sx of pain, reports some back pain. Took 650mg/975mg of Tylenol as pt was unable to swallow full dose. Increased coughing and nose running with intake of thin liquids noted at breakfast and lunch. Pt very lethargic this afternoon, requiring transfer assist from [place]. Notified providers and suggests thickened liquids until reassessment tomorrow and reevaluation of medications. Sig other at bedside.  Tubes/Lines/Drains: none

## 2025-02-22 NOTE — PLAN OF CARE
Goal Outcome Evaluation:      Plan of Care Reviewed With: patient    Overall Patient Progress: no change    Pt. AO. On 1:1 sitter. Pt. calm and cooperative. Not restless/agitated. Takes medication whole w/ thin liquids.  Continent w/ bladder. LBM: 2/21. Suicide Precaution maintained. Bed alarm on for safety. Call light w/in reach. Continue POC.     Patient's most recent vital signs are:     Vital signs:  BP: 159/87  Temp: 97.8  HR: 82  RR: 18  SpO2: 98 %     Patient does not have new respiratory symptoms.  Patient does not have new sore throat.  Patient does not have a fever greater than 99.5.

## 2025-02-22 NOTE — PLAN OF CARE
Goal Outcome Evaluation:      Plan of Care Reviewed With: patient    Overall Patient Progress: no changeOverall Patient Progress: no change    Patient is alert, disoriented to time and place. Received still awake, sitting on the eob. Sitter at bedside. Conversed with patient who was calm, encouraged sleep and for tv to be turned off which pt declined At 0150, Sitter called for staff assist. Per sitter, pt was ambulating in the room and was very unsteady and sitter offered assistance but pt attempted to push staff away causing pt to hit staff's stomach lightly. Staff verbalized that she is alright and that she is okay to stay as a sitter still with the patient. This writer conversed with the patient who thought the sitter was a man, introduced the sitter and assured that she is a lady. Reoriented to the environment and time, offered to be repositioned and tucked in bed. Pt appears calm and was cooperative. Did not want the light turned off. Seemed very sleepy and tired. Offered toileting but declined at this time. During roundings at 0230, pt had fallen asleep per sitter. Slept til the morning. Incontinence change 2x overnight. No behavior noted. Not in distress. Continue to monitor.

## 2025-02-22 NOTE — PLAN OF CARE
Goal Outcome Evaluation:      Plan of Care Reviewed With: patient    Overall Patient Progress: improvingOverall Patient Progress: improving    VS: BP (!) 157/95 (BP Location: Left arm, Patient Position: Sitting, Cuff Size: Adult Regular)   Pulse 82   Temp 97.8  F (36.6  C) (Oral)   Resp 18   Ht 1.829 m (6')   Wt 105.6 kg (232 lb 12.9 oz)   SpO2 98%   BMI 31.57 kg/m       O2: SpO2 98% on RA. Denies chest pain and SOB.    Output: Cont x 2    Last BM: 2/21 hard    Activity: Up with A x 1 with walker    Skin: WDL    Pain: Denies pain    CMS: Intact, Alert and oriented with some confusion. Denies numbness and tingling.   Dressing: None    Diet: Regular diet, thin liquids takes pills whole. Denies nausea/vomiting.   BG checks before meals and at bedtime. BG check at bedtime. No insulin    LDA: None    Equipment: None    Plan: Precautions maintained. Continue with plan of care. Call light within reach, pt able to make needs known.   Bed alarm on   Additional Info: Remains on 1:1 for safety and suicidal ideation r/t trying to cut his arm with a plastic knife  Patient requested for Miralax r/t hard stool. Report was passed on to incoming RN to give together with HS medication.

## 2025-02-23 ENCOUNTER — APPOINTMENT (OUTPATIENT)
Dept: OCCUPATIONAL THERAPY | Facility: CLINIC | Age: 60
DRG: 057 | End: 2025-02-23
Attending: PHYSICAL MEDICINE & REHABILITATION
Payer: COMMERCIAL

## 2025-02-23 ENCOUNTER — APPOINTMENT (OUTPATIENT)
Dept: SPEECH THERAPY | Facility: CLINIC | Age: 60
DRG: 057 | End: 2025-02-23
Attending: PHYSICAL MEDICINE & REHABILITATION
Payer: COMMERCIAL

## 2025-02-23 LAB
GLUCOSE BLDC GLUCOMTR-MCNC: 148 MG/DL (ref 70–99)
GLUCOSE BLDC GLUCOMTR-MCNC: 169 MG/DL (ref 70–99)

## 2025-02-23 PROCEDURE — 250N000013 HC RX MED GY IP 250 OP 250 PS 637: Performed by: PHYSICAL MEDICINE & REHABILITATION

## 2025-02-23 PROCEDURE — 92610 EVALUATE SWALLOWING FUNCTION: CPT | Mod: GN

## 2025-02-23 PROCEDURE — 250N000013 HC RX MED GY IP 250 OP 250 PS 637: Performed by: PHYSICIAN ASSISTANT

## 2025-02-23 PROCEDURE — 250N000013 HC RX MED GY IP 250 OP 250 PS 637: Performed by: STUDENT IN AN ORGANIZED HEALTH CARE EDUCATION/TRAINING PROGRAM

## 2025-02-23 PROCEDURE — 250N000013 HC RX MED GY IP 250 OP 250 PS 637

## 2025-02-23 PROCEDURE — 128N000003 HC R&B REHAB

## 2025-02-23 PROCEDURE — 97535 SELF CARE MNGMENT TRAINING: CPT | Mod: GO

## 2025-02-23 RX ADMIN — PANTOPRAZOLE SODIUM 40 MG: 40 TABLET, DELAYED RELEASE ORAL at 08:38

## 2025-02-23 RX ADMIN — FAMOTIDINE 40 MG: 20 TABLET, FILM COATED ORAL at 21:20

## 2025-02-23 RX ADMIN — HALOPERIDOL 1 MG: 0.5 TABLET ORAL at 08:38

## 2025-02-23 RX ADMIN — TICAGRELOR 90 MG: 90 TABLET ORAL at 21:20

## 2025-02-23 RX ADMIN — METFORMIN ER 500 MG 1000 MG: 500 TABLET ORAL at 21:20

## 2025-02-23 RX ADMIN — LISINOPRIL 10 MG: 10 TABLET ORAL at 21:20

## 2025-02-23 RX ADMIN — POTASSIUM CHLORIDE 20 MEQ: 1500 TABLET, EXTENDED RELEASE ORAL at 21:20

## 2025-02-23 RX ADMIN — RAMELTEON 8 MG: 8 TABLET, FILM COATED ORAL at 21:21

## 2025-02-23 RX ADMIN — ACETAMINOPHEN 975 MG: 325 TABLET, FILM COATED ORAL at 08:38

## 2025-02-23 RX ADMIN — METFORMIN ER 500 MG 1000 MG: 500 TABLET ORAL at 08:38

## 2025-02-23 RX ADMIN — TICAGRELOR 90 MG: 90 TABLET ORAL at 08:38

## 2025-02-23 RX ADMIN — FAMOTIDINE 40 MG: 20 TABLET, FILM COATED ORAL at 08:39

## 2025-02-23 RX ADMIN — ACETAMINOPHEN 975 MG: 325 TABLET, FILM COATED ORAL at 21:21

## 2025-02-23 RX ADMIN — POTASSIUM CHLORIDE 20 MEQ: 1500 TABLET, EXTENDED RELEASE ORAL at 08:36

## 2025-02-23 RX ADMIN — HALOPERIDOL 1 MG: 0.5 TABLET ORAL at 21:20

## 2025-02-23 RX ADMIN — ASPIRIN 81 MG CHEWABLE TABLET 81 MG: 81 TABLET CHEWABLE at 08:39

## 2025-02-23 RX ADMIN — ATORVASTATIN CALCIUM 10 MG: 10 TABLET, FILM COATED ORAL at 08:38

## 2025-02-23 RX ADMIN — ACETAMINOPHEN 975 MG: 325 TABLET, FILM COATED ORAL at 13:31

## 2025-02-23 RX ADMIN — EZETIMIBE 10 MG: 10 TABLET ORAL at 21:20

## 2025-02-23 ASSESSMENT — ACTIVITIES OF DAILY LIVING (ADL)
ADLS_ACUITY_SCORE: 66
ADLS_ACUITY_SCORE: 66
ADLS_ACUITY_SCORE: 62
ADLS_ACUITY_SCORE: 66
ADLS_ACUITY_SCORE: 66
ADLS_ACUITY_SCORE: 62
ADLS_ACUITY_SCORE: 66
ADLS_ACUITY_SCORE: 62
ADLS_ACUITY_SCORE: 66
ADLS_ACUITY_SCORE: 62
ADLS_ACUITY_SCORE: 66
ADLS_ACUITY_SCORE: 62
ADLS_ACUITY_SCORE: 62
ADLS_ACUITY_SCORE: 66
ADLS_ACUITY_SCORE: 62

## 2025-02-23 NOTE — PROGRESS NOTES
Patient calm and cooperative this morning. Transferred with A2, SPT from bed to chair. Patient agreeable to take meds this shift. Writer showed patient all unopened meds, showed each med package, with what med is and what it is for and dispensed in cup at patients side table. An hour after morning meds, pt became heavy assist with transfers/bed mobility, lethargic, incont urinating on floor. Patient having some dizziness then resolved by the afternoon. By the time therapy came pt was min assist with transfers and awake. Wife at bedside at this time and curious if AM haldol can be decreased to reduce lethargy. Sticky note left for providers.    Wife requesting update on EEG. Provider updated and spoke with wife and will pass on to primary team.

## 2025-02-23 NOTE — PROGRESS NOTES
"   02/23/25 1051   Appointment Info   Signing Clinician's Name / Credentials (SLP) Vini Sanchez MS CCC SLP   General Information   Onset of Illness/Injury or Date of Surgery 02/11/25   Referring Physician Christie Cook MD   Pertinent History of Current Problem Per H&P: Patient is \"59 year old right hand dominant male with past medical history of HTN, orthostatic hypotension, HLD, DM II and prior strokes who presented on 2/11 with worsening fatigue, generalized weakness and syncope resulting in falls.   He had some functional decline since spring 2024, mainly due to orthostasis and frequent episodes of hypotension and syncope. They basically had to stop all of his BP meds.  Then few weeks before admission he was just excessively fatigue and was dragging his RLE. These changes were subtle and gradual. The night before admission he had some sudden changes with clear worsening weakness in RLE and another syncopal episode.      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 MRI shows  multiple small foci of FLAIR hyperintense diffusion restriction compatible with acute lacunar infarcts in the left thalamus, the posterior left centrum semiovale ovale, the right occipital and right occipitotemporal white matter, and the right frontal white matter. Scattered distribution involving multiple vascular territories suggested an embolic etiology. MRA was done to characterize for R vert pathology dissection versus athero changes. Other work-up as listed in A/P section.\"   General Observations Patient currently being seen by SLP for cognitive linguistic related goals. 2/22 nursing reporting s/sx of aspiration with recommendation made by medical team to change diet to mildly thick liquids until further assessment made. Diet currently continues to be regular textures and thin liquids. Previously seen by speech therapy during acute care hospitalization for " dysphagia evaluation and cleared for regular/thin.  Given the questions noted by nursing, decision made to complete swallow evaluation this date.   Type of Evaluation   Type of Evaluation Swallow Evaluation   Oral Motor   Oral Musculature generally intact  (Reduced movement and strength noted during oral Mech exam but functionally appears appropriate with better movement during actual eating.)   Structural Abnormalities none present   Mucosal Quality adequate   Dentition (Oral Motor)   Dentition (Oral Motor) significant number of missing teeth   Cough/Swallow/Gag Reflex (Oral Motor)   Soft Palate/Velum (Oral Motor) unable/difficult to assess   Volitional Throat Clear/Cough (Oral Motor) WNL   Volitional Swallow (Oral Motor) WNL   Vocal Quality/Secretion Management (Oral Motor)   Vocal Quality (Oral Motor) WFL   Secretion Management (Oral Motor) WNL   General Swallowing Observations   Past History of Dysphagia Prior dysphagia evaluations completed.  Patient does have history of prior CVAs.   Current Diet/Method of Nutritional Intake (General Swallowing Observations, NIS) thin liquids (level 0);regular diet   Swallowing Evaluation Clinical swallow evaluation   Clinical Swallow Evaluation   Feeding Assistance set up only required   Clinical Swallow Evaluation Textures Trialed thin liquids;solid foods   Clinical Swallow Eval: Thin Liquid Texture Trial   Mode of Presentation, Thin Liquids cup   Volume of Liquid or Food Presented Single or double swallows   Oral Phase of Swallow WFL   Pharyngeal Phase of Swallow throat clearing   Diagnostic Statement Noted throat clear x 1 but otherwise tolerated without any overt signs and symptoms of aspiration or changes in vocal quality.   Clinical Swallow Evaluation: Solid Food Texture Trial   Mode of Presentation spoon;self-fed   Volume Presented Normal sized bites   Oral Phase WFL   Pharyngeal Phase intact   Diagnostic Statement Slow but functional mastication.  Patient overall  going at an appropriate rate and no difficulties noted.   Esophageal Phase of Swallow   Patient reports or presents with symptoms of esophageal dysphagia No   Swallowing Recommendations   Diet Consistency Recommendations thin liquids (level 0);regular diet   Supervision Level for Intake distant supervision needed  (Encouragement to eat/drink given deficits in cognition.  Patient currently has one-to-one supervision due to safety concerns unrelated to swallowing.)   Monitoring/Assistance Required (Eating/Swallowing) monitor for cough or change in vocal quality with intake;stop eating activities when fatigue is present   Recommended Feeding/Eating Techniques (Swallow Eval) set-up and prepare tray   Medication Administration Recommendations, Swallowing (SLP) Per patient preference   Clinical Impression   Criteria for Skilled Therapeutic Interventions Met (SLP Eval) No problems identified which require skilled intervention   SLP Diagnosis Swallowing function appears to be at baseline level of function.   Problem List (SLP) Cognitive impairments could potentially impact swallowing safety   Activity Limitations Related to Problem List (SLP) Increase assistance/supervision with meals   Risks & Benefits of therapy have been explained evaluation/treatment results reviewed;participants voiced agreement with care plan;participants included;patient;care plan/treatment goals reviewed   Clinical Impression Comments Swallowing evaluation completed in setting of reports of difficulties by nursing on 2/22.  Patient currently on regular texture diet and thin liquids.  Patient seems to be tolerating current diet without significant difficulties.  Did have throat clear x 1.  Also noting slow but functional oral phase with meals.  At this time recommend continue with regular texture diet and thin liquids.  Will continue to work with patient from a cognitive linguistic standpoint but do not anticipate any further dysphagia interventions.   Will continue to monitor and address as needed.   SLP Discharge Planning   SLP Plan Cognitive evaluation pending level of alertness and engagement.  Recent history of severe agitation.  Engage in easy level reasoning/problem solving.  Goals for auditory comprehension and verbal expression though likely significantly impacted by cognition/level of engagement.   SLP - Acute Rehab Center Time   Individual Time (minutes) - SLP 30   Group Time (minutes) - SLP 0   Concurrent Time (minutes) - SLP 0   Co-Treatment Time (minutes) - SLP 0   ARC Total Session Time (minutes) - SLP 30   ARC Daily Total Session Time   SLP ARC Daily Total Session Time 30   ARC Daily Rehab Total Minutes 30   Comprehension   Comprehension Comment SLP: Consistently answering conversational questions appropriately   Expression   Expression Comment No word finding difficulties evident in casual conversation but not engaged in any structured tasks.   Social Interaction   Social Interaction Comment Patient answering conversational questions appropriately but not asking any reciprocal type questions.  Patient calm and cooperative throughout full session.

## 2025-02-23 NOTE — PLAN OF CARE
Discharge Planner Post-Acute Rehab SLP:     Discharge Plan: home with significant other? Ongoing SLP    Precautions: 1:1 attendant, can be combative with cares.    Current Status:  Hearing: WFL  Vision: Retinal detachment, Readers  Communication: Mild dysarthria when pt alert, motor speech at baseline per wife's report. Hx expressive aphasia; WAB-R Bedside 02/16 moderate expressive/receptive deficits (suspect fatigue, medications impacting)  Cognition: At least moderate cognitive impairment suspected. Subjectively noting poor insight into deficits/safety/situation, poor working memory, alternating/sustained attention  Swallow: Regular solids/Thin liquids (0).     Assessment: Swallowing evaluation completed in setting of reports of difficulties by nursing on 2/22. Patient currently on regular texture diet and thin liquids. Patient seems to be tolerating current diet without significant difficulties. Did have throat clear x 1. Also noting slow but functional oral phase with meals. At this time recommend continue with regular texture diet and thin liquids. Will continue to work with patient from a cognitive linguistic standpoint but do not anticipate any further dysphagia interventions. Will continue to monitor and address as needed.      Other Barriers to Discharge (Family Training, etc): level of assist/supervision with iADLs?

## 2025-02-23 NOTE — PLAN OF CARE
Goal Outcome Evaluation:      Plan of Care Reviewed With: patient    Overall Patient Progress: no change         Pt.alert oriented x 1-2, no aggressive behavior this shift. Continent of bladder this shift, uses bathroom toilet. Regular, mildly thick with fair appetite. 1:1 bedside sitter maintained. Refused scheduled 9PM medication. Safety measures in place, call light within reach. Will continue with current POC.

## 2025-02-23 NOTE — PROGRESS NOTES
Discharge Planner Post-Acute Rehab OT:     Discharge Plan: TBD. Goal at admission home mod-I during day while s/o at work. HCOT    Precautions: Orthostatic hypotension and significant resting hypertension, cognition - impulsive and agitated    Permissive resting/supine hypertension unless diastolic >110    Do not leave alone in seated position; must have legs elevated in recliner.    Current Status:  ADLs:  Mobility: Supervision/1:1 FWW for behavior only; functionally Mod I from physical and simple functional cognition perspective.   Grooming: Supervision  Dressing: Supervision FWW.  Bathing: Not safe d/t BP and behavior at initial admission. Clinical judgement supervision transfer and tasks in familiar environment d/t pt's observed ability in similar transfers and tasks.  Toileting: Recommend supervision FWW <> toilet d/t hx of syncopal episodes.   IADLs: Baseline IND simple meal prep, light home mgmt, med mgmt. 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 Enjoys refurbishing antique toys.   Vision/Cognition: Baseline vision deficit d/t retinal disease. Dysarthric. Delirium - not oriented to place, reason, time; paranoid. Agitated and combative at times. Significant functional cognition deficits across attention, memory, reasoning, impulse control.    Assessment: significant improvement with participation. S/o present which was beneficial. Supine to sit eob, min a. Monitored orthostatics. Overall cga with all adls, ub/lb dressing h/g tasks transfers. No behaviors during session.     Other Barriers to Discharge (DME, Family Training, etc):   Cognition.  Fall risk  - significant orthostatic.      DME: Shower chair, 4WW.

## 2025-02-23 NOTE — PLAN OF CARE
Goal Outcome Evaluation:      Plan of Care Reviewed With: patient    Overall Patient Progress: no change    Pt alert and oriented x1 w/ confusion. Calm and cooperative w/ cares. Not combative w/ staff. Pt appears sleeping well from 11pm to 3am. Pt incontinent w/ bladder, checked and changed pt x3 this shift. Pt can get up in the bed immediately when soiled/ when he wants to be changed. Pt does not use the call light or ask for help in changing or in going to the toilet. Needs anticipated. Initially used walker and GB for transferring but d/t pt. weakness, sera steady was utilized instead. Pt complained of dizziness during transfer, reports feeling better after positioning back to bed slowly. Call light w/in reach. Continue POC.         Patient's most recent vital signs are:     Vital signs:  BP: 161/85  Temp: 98  HR: 84  RR: 16  SpO2: 95 %     Patient does not have new respiratory symptoms.  Patient does not have new sore throat.  Patient does not have a fever greater than 99.5.

## 2025-02-24 ENCOUNTER — APPOINTMENT (OUTPATIENT)
Dept: OCCUPATIONAL THERAPY | Facility: CLINIC | Age: 60
DRG: 057 | End: 2025-02-24
Attending: PHYSICAL MEDICINE & REHABILITATION
Payer: COMMERCIAL

## 2025-02-24 ENCOUNTER — APPOINTMENT (OUTPATIENT)
Dept: SPEECH THERAPY | Facility: CLINIC | Age: 60
DRG: 057 | End: 2025-02-24
Attending: PHYSICAL MEDICINE & REHABILITATION
Payer: COMMERCIAL

## 2025-02-24 LAB
ANION GAP SERPL CALCULATED.3IONS-SCNC: 14 MMOL/L (ref 7–15)
BUN SERPL-MCNC: 21.5 MG/DL (ref 8–23)
CALCIUM SERPL-MCNC: 9.4 MG/DL (ref 8.8–10.4)
CHLORIDE SERPL-SCNC: 104 MMOL/L (ref 98–107)
CREAT SERPL-MCNC: 1.16 MG/DL (ref 0.67–1.17)
EGFRCR SERPLBLD CKD-EPI 2021: 72 ML/MIN/1.73M2
ERYTHROCYTE [DISTWIDTH] IN BLOOD BY AUTOMATED COUNT: 13.6 % (ref 10–15)
GLUCOSE BLDC GLUCOMTR-MCNC: 189 MG/DL (ref 70–99)
GLUCOSE SERPL-MCNC: 226 MG/DL (ref 70–99)
HCO3 SERPL-SCNC: 20 MMOL/L (ref 22–29)
HCT VFR BLD AUTO: 43.4 % (ref 40–53)
HGB BLD-MCNC: 14.9 G/DL (ref 13.3–17.7)
MCH RBC QN AUTO: 29.3 PG (ref 26.5–33)
MCHC RBC AUTO-ENTMCNC: 34.3 G/DL (ref 31.5–36.5)
MCV RBC AUTO: 85 FL (ref 78–100)
PLATELET # BLD AUTO: 398 10E3/UL (ref 150–450)
POTASSIUM SERPL-SCNC: 4.2 MMOL/L (ref 3.4–5.3)
RBC # BLD AUTO: 5.08 10E6/UL (ref 4.4–5.9)
SODIUM SERPL-SCNC: 138 MMOL/L (ref 135–145)
WBC # BLD AUTO: 6.6 10E3/UL (ref 4–11)

## 2025-02-24 PROCEDURE — 250N000013 HC RX MED GY IP 250 OP 250 PS 637: Performed by: PHYSICIAN ASSISTANT

## 2025-02-24 PROCEDURE — 36415 COLL VENOUS BLD VENIPUNCTURE: CPT | Performed by: PHYSICIAN ASSISTANT

## 2025-02-24 PROCEDURE — 85018 HEMOGLOBIN: CPT | Performed by: PHYSICIAN ASSISTANT

## 2025-02-24 PROCEDURE — 250N000013 HC RX MED GY IP 250 OP 250 PS 637: Performed by: STUDENT IN AN ORGANIZED HEALTH CARE EDUCATION/TRAINING PROGRAM

## 2025-02-24 PROCEDURE — 99232 SBSQ HOSP IP/OBS MODERATE 35: CPT | Performed by: PHYSICAL MEDICINE & REHABILITATION

## 2025-02-24 PROCEDURE — 80048 BASIC METABOLIC PNL TOTAL CA: CPT | Performed by: PHYSICIAN ASSISTANT

## 2025-02-24 PROCEDURE — 250N000013 HC RX MED GY IP 250 OP 250 PS 637: Performed by: PHYSICAL MEDICINE & REHABILITATION

## 2025-02-24 PROCEDURE — 97129 THER IVNTJ 1ST 15 MIN: CPT | Mod: GN

## 2025-02-24 PROCEDURE — 97535 SELF CARE MNGMENT TRAINING: CPT | Mod: GO

## 2025-02-24 PROCEDURE — 250N000013 HC RX MED GY IP 250 OP 250 PS 637

## 2025-02-24 PROCEDURE — 99233 SBSQ HOSP IP/OBS HIGH 50: CPT | Mod: GC | Performed by: STUDENT IN AN ORGANIZED HEALTH CARE EDUCATION/TRAINING PROGRAM

## 2025-02-24 PROCEDURE — 82310 ASSAY OF CALCIUM: CPT | Performed by: PHYSICIAN ASSISTANT

## 2025-02-24 PROCEDURE — 128N000003 HC R&B REHAB

## 2025-02-24 RX ADMIN — POTASSIUM CHLORIDE 20 MEQ: 1500 TABLET, EXTENDED RELEASE ORAL at 20:59

## 2025-02-24 RX ADMIN — TICAGRELOR 90 MG: 90 TABLET ORAL at 20:59

## 2025-02-24 RX ADMIN — RAMELTEON 8 MG: 8 TABLET, FILM COATED ORAL at 19:27

## 2025-02-24 RX ADMIN — HALOPERIDOL 1 MG: 0.5 TABLET ORAL at 09:18

## 2025-02-24 RX ADMIN — HALOPERIDOL 1 MG: 0.5 TABLET ORAL at 19:27

## 2025-02-24 RX ADMIN — ACETAMINOPHEN 975 MG: 325 TABLET, FILM COATED ORAL at 20:59

## 2025-02-24 RX ADMIN — LISINOPRIL 10 MG: 10 TABLET ORAL at 20:59

## 2025-02-24 RX ADMIN — FAMOTIDINE 40 MG: 20 TABLET, FILM COATED ORAL at 20:59

## 2025-02-24 RX ADMIN — ACETAMINOPHEN 975 MG: 325 TABLET, FILM COATED ORAL at 13:18

## 2025-02-24 RX ADMIN — METFORMIN ER 500 MG 1000 MG: 500 TABLET ORAL at 20:59

## 2025-02-24 ASSESSMENT — ACTIVITIES OF DAILY LIVING (ADL)
ADLS_ACUITY_SCORE: 51
ADLS_ACUITY_SCORE: 51
ADLS_ACUITY_SCORE: 67
ADLS_ACUITY_SCORE: 66
ADLS_ACUITY_SCORE: 66
ADLS_ACUITY_SCORE: 51
ADLS_ACUITY_SCORE: 66
ADLS_ACUITY_SCORE: 66
ADLS_ACUITY_SCORE: 71
ADLS_ACUITY_SCORE: 56
ADLS_ACUITY_SCORE: 51
ADLS_ACUITY_SCORE: 51
ADLS_ACUITY_SCORE: 66
ADLS_ACUITY_SCORE: 66
ADLS_ACUITY_SCORE: 67
ADLS_ACUITY_SCORE: 66
ADLS_ACUITY_SCORE: 51
ADLS_ACUITY_SCORE: 51
ADLS_ACUITY_SCORE: 66
ADLS_ACUITY_SCORE: 51
ADLS_ACUITY_SCORE: 51
ADLS_ACUITY_SCORE: 71
ADLS_ACUITY_SCORE: 51

## 2025-02-24 NOTE — CONSULTS
Psychiatry Consultation; Follow up   2/24/25           Reason for Consult, requesting source:    Reason for Consult: Delirium evaluation and treatment, self injurious behavior  Requesting source: Nick Zavala    Labs and imaging reviewed.             Interim history:    Today on interview Pa is calm and cooperative. Reports he slept okay overnight and feels rested today. Has been accepting medications, says his wife has encouraged him to take meds during their phone calls. He is unsure when she will come to visit next. Is able to say the year and month, thought today was Friday. Is not oriented to location. Denies any thoughts of suicide today. Does not answer when asked if anyone is out to get him. Tells the team about his pet maincoon cat at home.        Current Medications:     Current Facility-Administered Medications   Medication Dose Route Frequency Provider Last Rate Last Admin    acetaminophen (TYLENOL) tablet 975 mg  975 mg Oral TID Nick Zavala MD   975 mg at 02/23/25 2121    aspirin (ASA) chewable tablet 81 mg  81 mg Oral Daily Nichelle Chavez PA   81 mg at 02/23/25 0839    atorvastatin (LIPITOR) tablet 10 mg  10 mg Oral Daily Nichelle Chavez PA   10 mg at 02/23/25 0838    ezetimibe (ZETIA) tablet 10 mg  10 mg Oral At Bedtime Nichelle Chavez PA   10 mg at 02/23/25 2120    famotidine (PEPCID) tablet 40 mg  40 mg Oral BID Nick Zavala MD   40 mg at 02/23/25 2120    [Held by provider] glipiZIDE (GLUCOTROL) half-tab 10 mg  10 mg Oral BID AC Nichelle Chavez PA   10 mg at 02/20/25 0735    haloperidol (HALDOL) tablet 1 mg  1 mg Oral BID Je Alford MD   1 mg at 02/24/25 0918    iopamidol (ISOVUE-370) solution 500 mL  500 mL Intravenous Once Dakotah Huggins APRN CNP        lisinopril (ZESTRIL) tablet 10 mg  10 mg Oral QPM Nick Zavala MD   10 mg at 02/23/25 2120    metFORMIN (GLUCOPHAGE XR) 24 hr tablet 1,000 mg  1,000 mg Oral BID Nichelle Chavez, PA    "1,000 mg at 02/23/25 2120    pantoprazole (PROTONIX) EC tablet 40 mg  40 mg Oral QAM AC Christie Cook MD   40 mg at 02/23/25 0838    potassium chloride marek ER (KLOR-CON M20) CR tablet 20 mEq  20 mEq Oral BID Nichelle Chavez PA   20 mEq at 02/23/25 2120    ramelteon (ROZEREM) tablet 8 mg  8 mg Oral At Bedtime Shanta Mcmahon MD   8 mg at 02/23/25 2121    sodium chloride 0.9 % bag for CT scan flush use  100 mL As instructed Once Dakotah Huggins APRN CNP        ticagrelor (BRILINTA) tablet 90 mg  90 mg Oral BID Nichelle Chavez PA   90 mg at 02/23/25 2120              MSE:   Appearance: awake, alert, dressed casually   Attitude:  cooperative  Eye Contact:  fair  Mood:  \"ok\"  Affect:   stable, mood congruent  Speech:  dysarthria  Psychomotor Behavior:  no evidence of tardive dyskinesia, dystonia, or tics  Muscle strength and tone: not assessed  Thought Process:  illogical at times regarding treatment and disposition  Associations:   questionable  Thought Content:   denies suicidal ideation , decreased paranoia today, no delusional statements elicited on interview, of note is able to talk about wife and their conversation without expressing paranoia  Insight:  limited  Judgement:  limited  Oriented to:   self, year, month  Attention Span and Concentration:  intact to conversation  Recent and Remote Memory:  fair    Vital signs:  Temp: 98.3  F (36.8  C) Temp src: Oral BP: (!) 175/104 Pulse: 83   Resp: 16 SpO2: 99 % O2 Device: None (Room air)   Height: 182.9 cm (6') Weight: 105.6 kg (232 lb 12.9 oz)  Estimated body mass index is 31.57 kg/m  as calculated from the following:    Height as of this encounter: 1.829 m (6').    Weight as of this encounter: 105.6 kg (232 lb 12.9 oz).           DSM-5 Diagnosis:   Delirium/encephalopathy  R/o vascular dementia           Assessment:   Pa Delcid is a 59 year old man with no past psychiatric history and medical history of HTN, orthostatic hypotension, HLD, T2DM, " and prior stroke who was admitted 2/11 with worsening fatigue, weakness, syncope and was found to have multifocal acute subcortical infarcts. Was admitted to in rehab 2/14/25. Pt has been seen by neurology team this admission. Per their note, transient neurological episodes could be due to delirium, hypotension, vs elements of stroke recrudescence.      EEG done Friday showed moderate diffuse slowing, likely consistent with delirium. Likely component of vascular cognitive impairment contributing to presentation as well. On interview today Pa appears more alert and paranoia is lessened. He is calm and cooperative throughout interview. Per chart continues to have some intermittent agitation but overall seems to be more behaviorally appropriate, compliant with medications and treatment plan. Plan to continue current Haldol 1 mg BID dosing. If daytime sedation persists, would be reasonable to decrease morning dose to 0.5 mg. Once closer to discharge, will need a plan for tapering off.  Tentative plan to continue current dosing once discharged for one week, followed by 0.5 mg BID for one week, then plan to stop haldol.          Summary of Recommendations:   Legal: Does not have capacity to leave AMA  Safety: 1:1. Recommend verbal de-escalation, redirection, haldol 5mg PRN available  Medications: Continue scheduled Haldol 1 mg BID. If daytime sedation persists, would be reasonable to decrease morning dose to 0.5 mg.  Tentative plan to continue current dose for 1 week following discharge, then decrease to 0.5 mg BID for one week, then stop.  Labs/orders: reviewed  Follow-Up: Psychiatry will continue to follow    Shanta Mcmahon MD MPH  PGY2 Psychiatry resident   Pt staffed with attending Dr. Alford

## 2025-02-24 NOTE — PLAN OF CARE
7pm-11pm RN:    Goal Outcome Evaluation:      Plan of Care Reviewed With: patient    Overall Patient Progress: no changeOverall Patient Progress: no change    Orientation: intermittent confusion, oriented to self and year. Pt was impulsive attempting to get out of bed couple times per sitter.  VS: BP elevated at HS, scheduled lisinopril given, recheck /92 at 2309  Pain: c/o lower back pain, scheduled tylenol given  Ambulation/ Transfers: Ax1 with walker or Ax2 Desirae stedy per report, pt didn't get out of bed   Bowel: continent, LBM 2/21 per report  Bladder: mixed continent, sitter assisted holding urinal while pt sitting at edge of bed  Diet/ Liquids: regular/ thin, took pills well with water  Tubes/ Lines/ Drains: none  Skin: bruise on left thigh    Continue to have 1:1 sitter at bedside due to confusion and high risk of fall.

## 2025-02-24 NOTE — PROGRESS NOTES
"  [facility]   Acute Rehabilitation Unit  Daily progress note    INTERVAL HISTORY  Weekend notes reviewed.  Overall has been more calm over the past couple of days, however he also seems to be more tired and required increased physical assistance for transfers over the weekend per notes.  Significant other was inquiring if Haldol dose could be decreased.  This morning Pa is not aggressive upon my visit, however continues to tell me he is \"pissed off\".  He denies any pain, lightheadedness, or dizziness, however did acknowledge feeling more tired.    Current functional status:  OT:  ADLs:  Mobility: Supervision/1:1 FWW for behavior only; functionally Mod I from physical and simple functional cognition perspective. Instances of increased A needed when orthostatic.  Grooming: Supervision  Dressing: Supervision FWW.  Bathing: Not safe d/t BP and behavior at initial admission. Clinical judgement supervision transfer and tasks in familiar environment d/t pt's observed ability in similar transfers and tasks.  Toileting: Recommend supervision FWW <> toilet d/t hx of syncopal episodes.   IADLs: Baseline IND simple meal prep, light home mgmt, med mgmt. 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 Enjoys refurbishing antique toys.   Vision/Cognition: Baseline vision deficit d/t retinal disease. Dysarthric. Delirium - fluctuating orientation. Agitated and combative at times. Significant functional cognition deficits across attention, memory, reasoning, impulse control.     Assessment: Pt calm and appropriate in session; not oriented to time of day, affect improved with attention directed to home photos posted on wall; multiple times in session requesting whether s/o visiting today. Seated in recliner, initial BP low for pt's baseline upon approach and pt endorsing dizziness; BP improved to pt's baseline range following limited FWW activity. Recommendation provided to NA to assist pt in light activity in " room (e.g. FWW STS or short distance room ambulation) vs long bouts of sitting to promote functional BP over course of day.    SLP:  Hearing: WFL  Vision: Retinal detachment, Readers  Communication: Mild dysarthria when pt alert, motor speech at baseline per wife's report. Hx expressive aphasia; WAB-R Bedside 02/16 moderate expressive/receptive deficits (suspect fatigue, medications impacting)  Cognition: At least moderate cognitive impairment suspected. Subjectively noting poor insight into deficits/safety/situation, poor working memory, alternating/sustained attention  Swallow: Regular solids/Thin liquids (0).      Assessment: Patient engaged in picture problem-solving cards. At times needing verbal cues to continue with the task but able to consistently identify what the problem is and able to generate at least 1 solution to the problem. Level of engagement was variable but patient calm and cooperative throughout the full session. When a little more fatigued, did benefit from some casual conversation to reengage.        PM session cancelled as patient refusing offer of therapy tasks and wanting to speak with his wife before doing any additional tasks.     MEDICATIONS  Current Facility-Administered Medications   Medication Dose Route Frequency Provider Last Rate Last Admin    acetaminophen (TYLENOL) tablet 975 mg  975 mg Oral TID Nick Zavala MD   975 mg at 02/24/25 1318    aspirin (ASA) chewable tablet 81 mg  81 mg Oral Daily Nichelle Chavez PA   81 mg at 02/23/25 0839    atorvastatin (LIPITOR) tablet 10 mg  10 mg Oral Daily Nichelle Chavez PA   10 mg at 02/23/25 0838    ezetimibe (ZETIA) tablet 10 mg  10 mg Oral At Bedtime Nichelle Chavez PA   10 mg at 02/23/25 2120    famotidine (PEPCID) tablet 40 mg  40 mg Oral BID Nick Zavala MD   40 mg at 02/23/25 2120    [Held by provider] glipiZIDE (GLUCOTROL) half-tab 10 mg  10 mg Oral BID AC Nichelle Chavez PA   10 mg at 02/20/25 0781     haloperidol (HALDOL) tablet 1 mg  1 mg Oral BID Je Alford MD   1 mg at 02/24/25 0918    iopamidol (ISOVUE-370) solution 500 mL  500 mL Intravenous Once Dakotah Huggins APRN CNP        lisinopril (ZESTRIL) tablet 10 mg  10 mg Oral QPM Nick Zavala MD   10 mg at 02/23/25 2120    metFORMIN (GLUCOPHAGE XR) 24 hr tablet 1,000 mg  1,000 mg Oral BID Nichelle Chavez PA   1,000 mg at 02/23/25 2120    pantoprazole (PROTONIX) EC tablet 40 mg  40 mg Oral QAM AC Christie Cook MD   40 mg at 02/23/25 0838    potassium chloride marek ER (KLOR-CON M20) CR tablet 20 mEq  20 mEq Oral BID Nichelle Chavez PA   20 mEq at 02/23/25 2120    ramelteon (ROZEREM) tablet 8 mg  8 mg Oral At Bedtime Shanta Mcmahon MD   8 mg at 02/23/25 2121    sodium chloride 0.9 % bag for CT scan flush use  100 mL As instructed Once Dakotah Huggins APRN CNP        ticagrelor (BRILINTA) tablet 90 mg  90 mg Oral BID Nichelle Chavez PA   90 mg at 02/23/25 2120          Current Facility-Administered Medications   Medication Dose Route Frequency Provider Last Rate Last Admin    alum & mag hydroxide-simethicone (MAALOX) suspension 30 mL  30 mL Oral Q4H PRN Nick Zavala MD        calcium carbonate (TUMS) chewable tablet 500 mg  500 mg Oral TID PRN Lupe Chase PA-C        carboxymethylcellulose PF (REFRESH PLUS) 0.5 % ophthalmic solution 1 drop  1 drop Both Eyes 4x Daily PRN Nick Zavala MD        glucose gel 15-30 g  15-30 g Oral Q15 Min PRN Nichlele Chavez PA   15 g at 02/20/25 1424    Or    dextrose 50 % injection 25-50 mL  25-50 mL Intravenous Q15 Min PRN Nichelle Chavez PA        Or    glucagon injection 1 mg  1 mg Subcutaneous Q15 Min PRN Nichelle Chavez PA        haloperidol lactate (HALDOL) injection 5 mg  5 mg Intramuscular Q6H PRN Je Alford MD        hydrALAZINE (APRESOLINE) tablet 10 mg  10 mg Oral Q6H PRN Nick Zavala MD        Or    hydrALAZINE (APRESOLINE)  tablet 20 mg  20 mg Oral Q6H PRN Nick Zavala MD        polyethylene glycol (MIRALAX) powder 17 g  17 g Oral Daily PRN Nichelle Chavez, PA            PHYSICAL EXAM  BP (!) 187/103 (BP Location: Right arm, Patient Position: Sitting)   Pulse 83   Temp 98.3  F (36.8  C) (Oral)   Resp 16   Ht 1.829 m (6')   Wt 105.6 kg (232 lb 12.9 oz)   SpO2 99%   BMI 31.57 kg/m    Gen: NAD, sitting up in chair  HEENT: NC/AT  Pulm: non-labored on room air  Abd: Non-distended  Ext: no appreciable edema in BLE  Neuro/MSK: awake, alert    LABS  CBC RESULTS:   Recent Labs   Lab Test 02/24/25  0557 02/17/25  0534 02/15/25  0246 02/15/25  0245   WBC 6.6 7.4  --  8.9   RBC 5.08 4.90  --  5.16   HGB 14.9 14.4 15.4 15.1   HCT 43.4 41.9  --  43.3   MCV 85 86  --  84   MCH 29.3 29.4  --  29.3   MCHC 34.3 34.4  --  34.9   RDW 13.6 13.6  --  13.2    320  --  370       Last Basic Metabolic Panel:  Recent Labs   Lab Test 02/24/25  0712 02/24/25  0557 02/23/25  1659 02/20/25  0546 02/20/25  0541 02/17/25  0849 02/17/25  0534   NA  --  138  --   --  142  --  140   POTASSIUM  --  4.2  --   --  4.0  --  3.7   CHLORIDE  --  104  --   --  108*  --  106   CO2  --  20*  --   --  21*  --  21*   ANIONGAP  --  14  --   --  13  --  13   * 226* 148*   < > 65*   < > 75   BUN  --  21.5  --   --  20.0  --  14.3   CR  --  1.16  --   --  0.97  --  0.98   GFRESTIMATED  --  72  --   --  89  --  88   JAMIE  --  9.4  --   --  9.4  --  9.2    < > = values in this interval not displayed.       Rehabilitation -  Admission to acute inpatient rehab 2/14/25.    Impairment group code: Stroke Ischemic 01.3 Bilateral Involvement; acute lacunar infarcts in the left thalamus, the posterior left centrum semiovale ovale, the right occipital and right occipitotemporal white matter, and the right frontal white matter, suspected embolic etiology      PT, OT and SLP 60 minutes of each daily for 6 days per week for 7 days, in addition to rehab nursing and  "close management of physiatrist.     2. Impairment of ADL's:  OT to work on upper and lower body self care, dressing, toileting, bathing, energy conservation techniques with use of ADs as needed.  3. Impairment of mobility:   PT to work on gait exercises, strengthening, endurance buildup, transfers with use of walker as needed.  4. Impairment of cognition/language/swallow:   SLP for cognitive evaluation and treatment strategies for higher level cognitive deficits and memory impairment.  5. Rehab RN to administer medication, patient education on medication taking, VS monitoring, bowel regimen, glucose monitoring and management of orthostasis.     Continue comprehensive acute inpatient rehabilitation program with multidisciplinary approach including therapies, rehab nursing, and physiatry following. See interval history for updates.      ASSESSMENT AND PLAN  Chris Delcid is a 59 year old right hand dominant male with a past medical history of multiple prior strokes (left pontine; L and R centrum semiovale; R hemipons), HTN, orthostatic hypotension, HLD, DM II c/b gastroparesis, ADITI, chronic low back pain, and GERD who was admitted on 2/11/25 with worsening fatigue, weakness, recurrent falls, and syncope and was found to have multifocal acute/subacute subcortical infarcts with hospital course complicated by additional imaging finding of right vertebral artery occlusion/dissection, hypertensive urgency, hyperglycemia, and elevated troponin.  He was admitted to ARU on 2/14/25 for multidisciplinary rehabilitation and ongoing medical management.     Medical Conditions    Multiple acute lacunar infarcts in both cerebral hemispheres   Right vertebral artery dissection  Hx multiple prior strokes (10/2020 L pontine; 11/2021 L and R centrum semiovale infarcts; 8/2022 acute R hemipons, possible late subacute right centrum semiovale)  PTA on Brilinta monotherapy (since 8/2022 stroke).  Per neurology, patient \"has had recurrent " "primarily subcortical infarcts and progression of microvascular disease over the past five years. This has occurred in the setting of uncontrolled vascular risk factors (HTN, HLD, DM, ADITI), however, will pursue further evaluation for atypical stroke etiologies given recurrent nature and family history.\"  - Hypercoag workup: Protein C and S, lupus anticoagulant, cardiolipin Ab, Antithrombin III, Beta 2 glycoprotein, factor II, and factor V all negative.   - Continue PTA brilinta 90 bid and ASA 81 mg daily, which was added this admission.  DAPT duration TBD  - Continue lovastatin 40 mg daily and zetia (added this admission for HLD)  - Goal BP <130/80, management as below  - Goal A1c <7, management as below   - Continue PT/OT  - Outpatient [facility]  - Outpatient genetic referral given family history of stroke in young, can be further discussed at outpatient stroke followup   - Follow up with vascular medicine for consideration of PCSK9 inhibitor and further evaluation of hyperlipidemia   - Follow up neurology in 4-6 weeks with any stroke ALEXIS ([phone])   - Follow up with PM&R in 8-10 weeks      Agitation, paranoia  Delirium  Suicidal ideations  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 Patient reportedly consistently trying to get out of bed. Patient trying to throw punches at staff per nursing. Patient reportedly unwilling to take oral medications. Given the acutely agitated presentation and to maintain the safety of staff on unit, Code Green was activated and provider team ordered a IM olanzapine 5mg x1 for use.  1:1 bedside attendant added for safety.  Seroquel was added at bedtime (stopped trazodone).  Psychiatry consulted on 2/17 and changed seroquel to scheduled and PRN Haldol.  Lilly most likely delirium though also with concerns for underlying vascular dementia.  UA negative on 2/18.  2 recurrent code 21 episodes on 2/19 due to " paranoia/agitation and physical aggression.  Psychiatry reassessed and no changes to plan at that time.  However, on 2/21 had episode of self-injurious behavior (cutting wrist with plastic knife) which required physical restraints and use of PRN IM Haldol.  Scheduled Haldol doses were increased and psychiatry recommended EEG.  Neurology re-consulted as well to consider any further work-up for etiology.  - Appreciate ongoing psychiatry assistance  - Delirium precautions  - Continue 1:1 bedside attendant  - Continue Haldol 1 mg BID (increased on 2/2) with 5 mg IM PRN for agitation.  Haldol recommended at this time due to less risk of exacerbating orthostasis.  Discussed possibly decreasing morning Haldol dose with psychiatry due to increased fatigue.  Advised dose could be decreased if daytime sedation has been consistent, but if it has only been a 1-2 day occurrence then would keep as is for at least a couple more days.    - Appreciate neurology repeat consult for evaluation of underlying etiology of agitation/delirium. B12 and ammonia WNL.  TSH was elevated, but free T4 was normal. Continue delirium precautions and ongoing psych support, no further work-up at this time from their perspective.  Appreciate assistance.  - Pharmacy reviewed medications, none currently felt to be contributing to increased risk of delirium  - Outpatient neuropsych testing recommended by psychiatry    Unresponsive episode x2 on 2/15  In early morning 2/15,  patient was noted by nursing staff to be weaker and non-responsive. Patient was found by nursing sitting at edge of bed on his way to the bathroom. Nursing assisted patient using gait belt and walker to the bathroom when it was noticed that patient was having weakness with standing and mobility. Patient was placed in wheelchair where he slumped to his side and became unresponsive. Patient was lifted back into bed where Code Blue was initially called but then was downgraded to RRT. ICU  provider team recommended stroke code be called. Neurology evaluated patient at bedside. Vital signs showed /105 when resting in bed. CT imaging showed no hemorrhage or LVO. Lab work-up unremarkable. Patient was deemed not requiring transfer to acute care. Later in the morning, nursing staff stated that patient had similar episode of being disoriented and not being able to transfer from chair to bed.  Rehab medical team arrived at bedside to evaluate.  While in chair, patient was not responsive to questions.  Blood pressure noticeably lower than usual, 113/71.  Presentation was likely related to either new stroke or orthostatic hypotension.  Nursing staff to assisted with lifting patient back into bed.  Once patient was in bed and sitting up, patient became more responsive to questions.  Presentation likely related to positional orthostatic hypotension while sitting up in chair but MRI brain was ordered to rule out acute stroke.  MRI results showed no evidence of new infarcts.  Neurology evaluated results and confirmed no new strokes and signed off.  Felt possibly delirium vs hypotension vs stroke recrudescence.   - Continue to assess cognition and vital signs   - Low threshold to transfer to acute care if patient continues to demonstrate altered mental status through orthostatic hypotension  - Per neurology, consider EEG if persistent/recurrent episodes.  Obtained EEG per psychiatry, report pending     HTN   20-year hx orthostasis   Has had long standing issue with orthostatic hypotension even before diagnosis of HTN. Had a tilt table test through [facility] that showed severe orthostatic hypotension. Was followed by cardiology and has a loop recorder in place   Per his SO didn't tolerate abdominal binder and compression socks in the past; was also on midodrine for a while, but this was for overall hypotension and not specifically for orthostasis.  Cardiology consulted at ARU for assistance with management  of OH in the setting of overall elevated BPs.  Recommended changing Lisinopril to 10 mg at bedtime, and continue with conservative measures as able.  Assistance appreciated.  - Continue lisinopril 10 mg at HS  - Continue slow position changes, PO intake abdominal binder, compression  - Continue hydralazine 10-20 mg q6h PRN for SBP >200  - Given long history of OH, target BP goal may be higher than typical for a patient post-stroke     HLD    2/11  Documented intolerance to atorvastatin, rosuvastatin, simvastatin   - Continue lovastatin and zetia as above   - Outpatient referral to vascular medicine for consideration of PCSK9 inhibitor and further evaluation of hyperlipidemia      Type II diabetes mellitus  Polyneuropathy ?  Autonomic dysfunction   PTA on metformin 1000 mg BID and glipizide 10 mg BID. Was not checking his BG at home as his BG was well controlled but has a glucometer.  A1c 8.6% on 2/11/25.  Hyperglycemia during inpatient stay but has been lower at ARU with decreased intake in setting of altered mental status as above.  - Goal A1c <7.0   - Monitor BG TID AC + HS.  BG overall remains well-controlled off glipizide.  Continue to monitor, especially if intake increases.  - Holding glipizide starting 2/20 due to hypoglycemia in setting of impaired intake  - Continue PTA metformin 1000 mg BID   - Hypoglycemia protocol     Bilateral hand muscle atrophy and paresthesia   Concerns for focal mononeuropathy vs cervical radiculopathy; doesn't seem to be explained by polyneuropathy only    - Neurology follow up as outpatient    - May need NCS/EMG for more evaluation      ADITI   Insomnia   Was not using CPAP prior to admission.  Diagnosed 2/2022 following witnessed apneic episodes and polysomnogram 12/2021. Patient reports stopping BIPAP therapy for his ADITI due to abdominal bloating. He reports a repeat polysomnogram after stopping BIPAP that showed minimal ADITI.   - Trazodone and seroquel discontinued as  above  - Continue ramelteon 8 mg at HS (added 2/17)  - Sleep hygiene  - Follow up with sleep medicine as outpatient    Moderate malnutrition in the context of acute illness or injury  Poor oral intake  At ARU admission, felt that patient did not appear to be having good oral hydration.  Stable electrolytes, did receive IVF bolus on 2/16 for hydration.  With increased paranoia and agitation since ARU admission (as above), PO intake has been limited.  - RD consulted, appreciate assistance  - Carb restriction removed from diet order on 2/20 to allow greater selections, will monitor BG  - Supplements per RD  - RD also sending more finger foods and pre-packaged foods for safety and to encourage more intake given paranoia and self-injurious behaviors     Chronic low back pain  Secondary to prior injury years ago. Follows at [facility] in [place].  - Scheduled Tylenol 975 mg TID     Tobacco use  Per significant other, has significant tobacco use history and is concerned he is having cravings or withdrawal.  Nicotine patch was ordered to help if any cravings present, however he removed and tried to eat it on 2/20 so discontinued  - Consider PRN nicotine replacement such as gum or lozenge if appropriate     Adjustment to disability:  Clinical psychology to eval and treat if indicated  FEN: regular diet  Bowel: on prn bowel meds; continent   Bladder: continent.  One PVR obtained 60 ml.  Unable to obtain further values due to agitation  DVT Prophylaxis: mechanical  GI Prophylaxis: on famotidine due to h/o GERD; added TUMS and Mylanta prn  Code: DNR/DNI - confirmed upon admission   Disposition: home with SO  ELOS:  Pending clearing of delirium/improvement in agitation  Follow up Appointments on Discharge: PCP in 1-2 weeks, cardiology and NIVIA, stroke neurology, vascular surgery, PM&R, sleep medicine       35 minutes spent on the date of service doing chart review, history and exam, documentation, and further activities  as noted above.     Gary Zavala MD  Department of Rehabilitation Medicine

## 2025-02-24 NOTE — PROGRESS NOTES
Discharge Planner Post-Acute Rehab OT:     Discharge Plan: TBD     Precautions: Orthostatic hypotension and significant resting hypertension, cognition - impulsive and agitated    Permissive resting/supine hypertension unless diastolic >110. Pt will be orthostatic at ranges considered WNL.    Do not leave alone in seated position; must have legs elevated in recliner.    Current Status:  ADLs:  Mobility: Supervision/1:1 FWW for behavior only; functionally Mod I from physical and simple functional cognition perspective. Instances of increased A needed when orthostatic.  Grooming: Supervision  Dressing: Supervision FWW.  Bathing: Not safe d/t BP and behavior at initial admission. Clinical judgement supervision transfer and tasks in familiar environment d/t pt's observed ability in similar transfers and tasks.  Toileting: Recommend supervision FWW <> toilet d/t hx of syncopal episodes.   IADLs: Baseline IND simple meal prep, light home mgmt, med mgmt. 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 Enjoys refurbishing antique toys.   Vision/Cognition: Baseline vision deficit d/t retinal disease. Dysarthric. Delirium - fluctuating orientation. Agitated and combative at times. Significant functional cognition deficits across attention, memory, reasoning, impulse control.    Assessment: Pt calm and appropriate in session; not oriented to time of day, affect improved with attention directed to home photos posted on wall; multiple times in session requesting whether s/o visiting today. Seated in recliner, initial BP low for pt's baseline upon approach and pt endorsing dizziness; BP improved to pt's baseline range following limited FWW activity. Recommendation provided to NA to assist pt in light activity in room (e.g. FWW STS or short distance room ambulation) vs long bouts of sitting to promote functional BP over course of day.    Other Barriers to Discharge (DME, Family Training, etc):   Cognition.  Fall risk  - significant orthostatic.       DME: Shower chair, 4WW.

## 2025-02-24 NOTE — PLAN OF CARE
Goal Outcome Evaluation:      Plan of Care Reviewed With: patient, spouse    Overall Patient Progress: no changeOverall Patient Progress: no change  Pt is still confused, he has bed side attendant for safety. He does not initiate any thing, staff has to anticipate needs. We need to continue the bed side attendant due to safety concerns at this time.

## 2025-02-24 NOTE — PLAN OF CARE
Goal Outcome Evaluation:      Plan of Care Reviewed With: patient    Overall Patient Progress: no change Overall Patient Progress: no change     Patient is A&O to person and place. Patient denied chest pain/SOB and dizziness/nausea. Patient c/o back pain; scheduled Tylenol given but patient spat out shortly after administration. Patient refused all meds except for Haldol. Patient has mixed continence; LBM 2/22. Patient frequently seeks out Meryl, his significant other. 1:1 at bedside for safety and impulsiveness.     Pt's significant other arrived to unit at around 3:30pm, pt was very frustrated with significant other because he wants to go home with her. Pt's significant other explained how he is unable to go home with her and that he needs to get better and comply with nursing. Pt was unable to be reasoned with and pt's significant other left the unit and advised staff to go into his room immediately as he was showing signs of aggression towards the sitter. Charge nurse and bedside nurse went to pt's room to calm him down, pt threatened to hit sitter. Sitter was advised by charge nurse to let the pt walk out into the hallway. Charge nurse tried to talk to pt and pt threatened to hit charge as well. Pt then went down on his knees in frustration and refused to get up. Writer asked pt if he wanted to be pushed around the unit in his wheelchair and he agreed; pt got up on his own and sat in his wheelchair.     Activity: Assist x1 w/ walker + GB  Diet: Regular/thin/whole. Safety tray, no utensils  Skin: Scattered bruising   Goals for next shift: Promote rest

## 2025-02-24 NOTE — PLAN OF CARE
Goal Outcome Evaluation:      Plan of Care Reviewed With: patient    Overall Patient Progress: no changeOverall Patient Progress: no change    Patient is calm and cooperative this shift. No impulsive or aggressive behavior this shift. Bedside attendant present and has been assisting the patient in using the urinal when the pt sits up in bed. Does not call for needs. Slept mostly tonight. Redirectable. A1 walker but encouraged to use the urinal while sitting on the side of the bed for safety. Up on his recliner early this morning. No signs of pain. Not in distress. Continue poc

## 2025-02-24 NOTE — PLAN OF CARE
Discharge Planner Post-Acute Rehab SLP:     Discharge Plan: home with significant other? Ongoing SLP    Precautions: 1:1 attendant, can be combative with cares.    Current Status:  Hearing: WFL  Vision: Retinal detachment, Readers  Communication: Mild dysarthria when pt alert, motor speech at baseline per wife's report. Hx expressive aphasia; WAB-R Bedside 02/16 moderate expressive/receptive deficits (suspect fatigue, medications impacting)  Cognition: At least moderate cognitive impairment suspected. Subjectively noting poor insight into deficits/safety/situation, poor working memory, alternating/sustained attention  Swallow: Regular solids/Thin liquids (0).     Assessment: Patient engaged in picture problem-solving cards. At times needing verbal cues to continue with the task but able to consistently identify what the problem is and able to generate at least 1 solution to the problem. Level of engagement was variable but patient calm and cooperative throughout the full session. When a little more fatigued, did benefit from some casual conversation to reengage.       PM session cancelled as patient refusing offer of therapy tasks and wanting to speak with his wife before doing any additional tasks.     Other Barriers to Discharge (Family Training, etc): level of assist/supervision with iADLs?

## 2025-02-25 ENCOUNTER — APPOINTMENT (OUTPATIENT)
Dept: OCCUPATIONAL THERAPY | Facility: CLINIC | Age: 60
DRG: 057 | End: 2025-02-25
Attending: PHYSICAL MEDICINE & REHABILITATION
Payer: COMMERCIAL

## 2025-02-25 ENCOUNTER — APPOINTMENT (OUTPATIENT)
Dept: SPEECH THERAPY | Facility: CLINIC | Age: 60
DRG: 057 | End: 2025-02-25
Attending: PHYSICAL MEDICINE & REHABILITATION
Payer: COMMERCIAL

## 2025-02-25 LAB
GLUCOSE BLDC GLUCOMTR-MCNC: 176 MG/DL (ref 70–99)
GLUCOSE BLDC GLUCOMTR-MCNC: 186 MG/DL (ref 70–99)

## 2025-02-25 PROCEDURE — 250N000013 HC RX MED GY IP 250 OP 250 PS 637

## 2025-02-25 PROCEDURE — 99232 SBSQ HOSP IP/OBS MODERATE 35: CPT | Performed by: PHYSICAL MEDICINE & REHABILITATION

## 2025-02-25 PROCEDURE — 250N000013 HC RX MED GY IP 250 OP 250 PS 637: Performed by: PHYSICAL MEDICINE & REHABILITATION

## 2025-02-25 PROCEDURE — 128N000003 HC R&B REHAB

## 2025-02-25 PROCEDURE — 250N000013 HC RX MED GY IP 250 OP 250 PS 637: Performed by: STUDENT IN AN ORGANIZED HEALTH CARE EDUCATION/TRAINING PROGRAM

## 2025-02-25 PROCEDURE — 97130 THER IVNTJ EA ADDL 15 MIN: CPT | Mod: GN

## 2025-02-25 PROCEDURE — 250N000013 HC RX MED GY IP 250 OP 250 PS 637: Performed by: PHYSICIAN ASSISTANT

## 2025-02-25 PROCEDURE — 97129 THER IVNTJ 1ST 15 MIN: CPT | Mod: GN

## 2025-02-25 PROCEDURE — 97535 SELF CARE MNGMENT TRAINING: CPT | Mod: GO

## 2025-02-25 RX ORDER — POTASSIUM CHLORIDE 1500 MG/1
20 TABLET, EXTENDED RELEASE ORAL 2 TIMES DAILY WITH MEALS
Status: DISCONTINUED | OUTPATIENT
Start: 2025-02-25 | End: 2025-02-26

## 2025-02-25 RX ORDER — METHOCARBAMOL 500 MG/1
500 TABLET, FILM COATED ORAL 4 TIMES DAILY PRN
Status: DISCONTINUED | OUTPATIENT
Start: 2025-02-25 | End: 2025-03-06 | Stop reason: HOSPADM

## 2025-02-25 RX ORDER — EZETIMIBE 10 MG/1
10 TABLET ORAL
Status: DISCONTINUED | OUTPATIENT
Start: 2025-02-25 | End: 2025-03-06 | Stop reason: HOSPADM

## 2025-02-25 RX ORDER — METFORMIN HYDROCHLORIDE 500 MG/1
1000 TABLET, EXTENDED RELEASE ORAL 2 TIMES DAILY WITH MEALS
Status: DISCONTINUED | OUTPATIENT
Start: 2025-02-25 | End: 2025-02-26

## 2025-02-25 RX ORDER — FAMOTIDINE 20 MG/1
40 TABLET, FILM COATED ORAL 2 TIMES DAILY WITH MEALS
Status: DISCONTINUED | OUTPATIENT
Start: 2025-02-25 | End: 2025-03-06 | Stop reason: HOSPADM

## 2025-02-25 RX ORDER — BISACODYL 10 MG
10 SUPPOSITORY, RECTAL RECTAL DAILY PRN
Status: DISCONTINUED | OUTPATIENT
Start: 2025-02-25 | End: 2025-03-06 | Stop reason: HOSPADM

## 2025-02-25 RX ADMIN — HALOPERIDOL 1 MG: 0.5 TABLET ORAL at 20:13

## 2025-02-25 RX ADMIN — FAMOTIDINE 40 MG: 20 TABLET, FILM COATED ORAL at 17:23

## 2025-02-25 RX ADMIN — ACETAMINOPHEN 975 MG: 325 TABLET, FILM COATED ORAL at 03:34

## 2025-02-25 RX ADMIN — POLYETHYLENE GLYCOL 3350 17 G: 17 POWDER, FOR SOLUTION ORAL at 08:21

## 2025-02-25 RX ADMIN — TICAGRELOR 90 MG: 90 TABLET ORAL at 17:23

## 2025-02-25 RX ADMIN — POTASSIUM CHLORIDE 20 MEQ: 1500 TABLET, EXTENDED RELEASE ORAL at 17:22

## 2025-02-25 RX ADMIN — EZETIMIBE 10 MG: 10 TABLET ORAL at 17:23

## 2025-02-25 RX ADMIN — METFORMIN ER 500 MG 1000 MG: 500 TABLET ORAL at 17:22

## 2025-02-25 RX ADMIN — RAMELTEON 8 MG: 8 TABLET, FILM COATED ORAL at 20:13

## 2025-02-25 RX ADMIN — ATORVASTATIN CALCIUM 10 MG: 10 TABLET, FILM COATED ORAL at 08:20

## 2025-02-25 RX ADMIN — HALOPERIDOL 1 MG: 0.5 TABLET ORAL at 08:20

## 2025-02-25 RX ADMIN — LISINOPRIL 10 MG: 10 TABLET ORAL at 21:59

## 2025-02-25 RX ADMIN — PANTOPRAZOLE SODIUM 40 MG: 40 TABLET, DELAYED RELEASE ORAL at 08:20

## 2025-02-25 RX ADMIN — ACETAMINOPHEN 975 MG: 325 TABLET, FILM COATED ORAL at 20:13

## 2025-02-25 RX ADMIN — POTASSIUM CHLORIDE 20 MEQ: 1500 TABLET, EXTENDED RELEASE ORAL at 08:20

## 2025-02-25 RX ADMIN — TICAGRELOR 90 MG: 90 TABLET ORAL at 08:20

## 2025-02-25 RX ADMIN — METFORMIN ER 500 MG 1000 MG: 500 TABLET ORAL at 08:20

## 2025-02-25 RX ADMIN — FAMOTIDINE 40 MG: 20 TABLET, FILM COATED ORAL at 08:20

## 2025-02-25 RX ADMIN — ASPIRIN 81 MG CHEWABLE TABLET 81 MG: 81 TABLET CHEWABLE at 08:20

## 2025-02-25 ASSESSMENT — ACTIVITIES OF DAILY LIVING (ADL)
ADLS_ACUITY_SCORE: 50
ADLS_ACUITY_SCORE: 56
ADLS_ACUITY_SCORE: 57
ADLS_ACUITY_SCORE: 57
ADLS_ACUITY_SCORE: 50
ADLS_ACUITY_SCORE: 57
ADLS_ACUITY_SCORE: 57
ADLS_ACUITY_SCORE: 56
ADLS_ACUITY_SCORE: 57
ADLS_ACUITY_SCORE: 57
ADLS_ACUITY_SCORE: 63
ADLS_ACUITY_SCORE: 50
ADLS_ACUITY_SCORE: 50
ADLS_ACUITY_SCORE: 57
ADLS_ACUITY_SCORE: 50
ADLS_ACUITY_SCORE: 57
ADLS_ACUITY_SCORE: 50
ADLS_ACUITY_SCORE: 57
ADLS_ACUITY_SCORE: 57
ADLS_ACUITY_SCORE: 56
ADLS_ACUITY_SCORE: 50

## 2025-02-25 NOTE — PROGRESS NOTES
Discharge Planner Post-Acute Rehab PT:     Discharge Plan: TBD - No PT related barriers to discharge    Precautions: Orthostatic hypotension in combination and significant resting hypertension  Has tried LE and abdominal compression in the past without benefit or tolerance  Do not worry about resting/supine BP hypertension unless diastolic >110    On 1:1 due to behaviors, not functional mobility    Current Status: Has fluctuated based on BP/meds. Not mod-I on unit due to behavior  Bed Mobility: IND  Transfer: Mod-I FWW  Gait: Mikel FWW short distances. Requires SBA longer distances due to BP  Stairs: Mod-I with rail  Balance: Appreciably at baseline. Uses walker with gait    Outcome Measures: - not appropriate due to command following    Assessment: Attempted to pick back up on PT caseload given prolonged admission. Remained agitated and not appropriate for skilled PT services. Agitation only increases with encouragement. Anticipate if caught at the right time, pt would participate in mobility, however mobility goals for home have been met.    Other Barriers to Discharge (DME, Family Training, etc):   Behavior/safety with spouse at home

## 2025-02-25 NOTE — PROGRESS NOTES
ARU Social Work Progress Notes     Team rounds today;discharge needs pending.    JAX Molina  [facility], Acute Inpatient Rehab Unit   [facility][note 3][address], [address] [place] [address]  Phone: [phone]  Fax: [phone]

## 2025-02-25 NOTE — PLAN OF CARE
Goal Outcome Evaluation:      Plan of Care Reviewed With: patient    Overall Patient Progress: no changeOverall Patient Progress: no change     Orientation: alert to self. Patient confused. Restless at bedtime. Patient compliant after scheduled haldol medication given  Patient not on respiratory distress  Pain: restless at hs. Scheduled tylenol given  Woke up at 4am complaining of severe low back pain; rated 10/10. Scheduled tylenol given earlier  Diet:regular diet; safe tray   Bladder: mixed continence of bladder. Up to toilet or uses urinal  Bowel: mixed continence of BM 2/21/25 will pass it on to day for prn stool softener ; requested miralax this morning  Ambulation/Transfer : A1 fWW  Tubes/Lines/Drains: none  Skin:  Safety:fall precaution. Patient on 1:1 for safety  Others:  As per Charge RN when charge RN giving his medication,  patient hiding his pills on his left hand and pretending to drink his pills with empty medcup on his other hand  Patient slept and woke up at 4am due to complaints of low back pain; patient up in recliner.

## 2025-02-25 NOTE — PROGRESS NOTES
Acute Rehab Care Conference/Team Rounds      Type: Team Rounds    Present: Dr. Zavala, Nick Vega MD, Rena VILLAVICENCIO, Unique Joseph Neuropsychologist, Rajesh Chow PT, Connie Diop OT, Anand Cornejo SLP,  SLP, Luisana Whitt , Radha Grimaldo RD, Yenny Rapp Rehab Supervisor, Kelly SU RN, Bhavin CHIN Patient.      Discharge Barriers/Treatment/Education    Rehab Diagnosis: Bilateral CVAs     Active Medical Co-morbidities/Prognosis: Delirium/agitation, vertebral artery dissection, HTN, orthostatic hypotension, DM, polyneuropathy, autonomic dysfunction, HLD, ADITI, insomnia, chronic low back pain    Safety: fall; orthostatic hypotension; delirium prevention    Pain: complained of low back pain    Medications, Skin, Tubes/Lines: takes medications whole. Scabbed wound on left wrist     Swallowing/Nutrition: Regular solids/Thin liquids (0). All goals met during acute hospitalization. Per chart review, nursing noted increased coughing at bedside and MD recommended modification to thickened liquids pending SLP re assessment. Weekend SLP completed repeat clinical bedside assessment 2/24 and indicated no overt dysphagia concerns. Per review of notes, suspect all difficulties related to behavior/agitation vs any oropharyngeal dysfunction.     Bowel/Bladder:mixed continence of bladder and bowel. LBM 2/21/25 per report    Psychosocial:Pt lives in a house with his significant other.        ADLs/IADLs: Pt grossly supervision FWW-based ADL with prompts for initiation of self-cares and requiring supervision d/t behavior mgmt and cognition impacted by delirium, and fluctuating orthostatic hypotension with impaired ability to self-assess and conservatively manage without cues for positional changes and breaks. Pt demonstrates ability to perform all functional transfers and household distance mobility FWW-based at baseline and ADLs without physical A. No focal stroke-related deficits observed  in sessions; performance appears more consistent with dementia-like factors. Pt very agitated in unfamiliar environment, demonstrates behaviors towards s/o related to desire to discharge home. Pt has met functional goals feasible in ARU environment due to environmental and routine differences from home environment exacerbating agitation; would require observation in familiar home environment to determine feasibility of reduced supervision. Pt will require initial supervision at home with HCOT to maximize cognition, fall safety, and behaviors vs memory care unit.       Mobility: Pt is grossly mod-I with household distances with FWW and stairs. There have been some mobility fluctuations due to intermittent orthostasis or med related changes. Due to pt's behavior and cognition, he has been unable to participate in skilled PT while on the unit. Pt's primarily barriers to home are related to cognition and aggressive behaviors. At this time, will be unable to determine pt's long-term functional status until he returns to familiar environment. Recommend initial supervision at discharge with  PT follow-up.      Cognition/Language: Mild dysarthria when pt alert, motor speech at baseline per wife's report. Hx expressive aphasia; WAB-R Bedside 02/16 moderate expressive/receptive deficits (suspect fatigue, medications impacting)  Subjectively, noting at least moderate cognitive impairments re: poor insight into deficits/safety/situation, poor working memory, alternating/sustained attention, poor reasoning, disoriented. Plan to complete formal cognitive assessment pending participation and agitation. Agitation ongoing barrier to participation while in unfamiliar environment. Noting improvement with orientation with onset of visual aids. Recommend total IADL assist and ongoing SLP via HH vs LTC.     Community Re-Entry: Home bound due to activity tolerance and cognition    Transportation: Family to provide, do not anticipate  transfer as a barrier    Decision maker: significant other    Plan of Care and goals reviewed and updated.    Discharge Plan/Recommendations    Fall Precautions: continue    Patient/Family input to goals: Yes    Anticipated rehab needs following discharge: HH PT, OT, SLP    Anticipated care giver support after discharge: Spouse    Estimated length of stay: Pending clearing of delirium    Overall plan for the patient: Patient still with ongoing episodes of delirium and agitation, with some improvement and no code Green notifications in several days.  Discharge home will depend on spouse's ability to bring him home including feeling safe/comfortable given his agitation and functional status.  She also is not able to take significant time off from work and is not eligible for FMLA, and may need to explore alternative support options or long-term care.      Utilization Review and Continued Stay Justification    Medical Necessity Criteria:    For any criteria that is not met, please document reason and plan for discharge, transfer, or modification of plan of care to address.    Requires intensive rehabilitation program to treat functional deficits?: Yes    Requires 3x per week or greater involvement of rehabilitation physician to oversee rehabilitation program?: Yes    Requires rehabilitation nursing interventions?: Yes    Patient is making functional progress?: Yes    There is a potential for additional functional progress? Yes    Patient is participating in therapy 3 hours per day a minimum of 5 days per week or 15 hours per week in 7 day period?: Medical exception week 2 due to significant delirium and agitation, now improving enough to resume therapies and consider safe discharge to home vs LTC pending availability for 24-hr care.    Has discharge needs that require coordinated discharge planning approach?:Yes      Barriers/Concerns related to meeting medical necessity criteria:  Agitation, delirium    Team Plan to  Address Concern:  Ongoing medical and psychiatry management      Final Physician Sign off    Statement of Approval: I have reviewed and agree with the recommendations and documentation in this team rounds note.       Patient Goals    Social Work Goals: Confirm discharge recommendations with therapy, coordinate safe discharge plan and remain available to support and assist as needed.     OT Predicted Duration/Target Date for Goal Attainment: 03/03/25  Therapy Frequency (OT): 6 times/week  OT: Hygiene/Grooming: modified independent, within precautions  OT: Upper Body Dressing: Modified independent, within precautions  OT: Lower Body Dressing: Modified independent, within precautions  OT: Upper Body Bathing: Modified independent, within precautions, using adaptive equipment  OT: Lower Body Bathing: Modified independent, using adaptive equipment, with precautions        OT: Toilet Transfer/Toileting: Modified independent, toilet transfer, cleaning and garment management, using adaptive equipment, within precautions  OT: Meal Preparation: Modified independent, with simple meal preparation, ambulatory level, within precautions     OT: Cognitive: Patient/caregiver will verbalize understanding of cognitive assessment results/recommendations as needed for safe discharge planning     OT: Goal 1: Pt will perform bathing transfer simulating home environment SBA within precautions with graded assist utilizing DME.       PT Predicted Duration/Target Date for Goal Attainment: 02/25/25  PT Frequency: 6x/week  PT: Bed Mobility: Completed  PT: Transfers: Completed  PT: Gait: Completed  PT: Stairs: Completed  PT: Goal 1: Car transfer SBA for community entry  PT: Goal 2: Pt/family will recall 3 fall preventions for safe discharge home.     SLP Predicted Duration/Target Date for Goal Attainment: 02/27/25  Therapy Frequency (SLP Eval): 6 times/week  SLP: Improve language comprehension for interaction with caregivers/environment: minimal  assist  SLP: Communicate basic wants and needs: verbally, minimal assist  SLP: Goal 1: Patient will maintain alertness, focus/attend to basic level task for 30 minutes with no cues from SLP to increase alertness.     RN goal 1. Skin Integrity: Pt will remain free from skin breakdown while on ARU by frequently repositioning.  RN goal 2. Safety Management: Pt will remain free from falls while on ARU by calling appropriately and waiting for assistance.

## 2025-02-25 NOTE — PLAN OF CARE
Discharge Planner Post-Acute Rehab SLP:     Discharge Plan: TBD. ongoing SLP    Precautions: 1:1 attendant, can be combative with cares.    Current Status:  Hearing: WFL  Vision: Retinal detachment, Readers  Communication: Mild dysarthria when pt alert, motor speech at baseline per wife's report. Hx expressive aphasia; WAB-R Bedside 02/16 moderate expressive/receptive deficits (suspect fatigue, medications impacting)  Cognition: severe cognitive impairments re: poor insight into deficits/safety/situation, poor working memory, alternating/sustained attention, executive function, very slow processing , poor initiation  Swallow: Regular solids,Thin liquids (0). Goals met during acute. SLP re assessed and deemed any dysfunction related to behavior. May informally monitor     Assessment: Pt agitated upon arrival, ongoing passive participation despite encouragement. Pt required mod cues to redirect to visuals depicting information to report temporal information. Noting severely impaired sustained attention to tasks and poor initiation with responses. Attempted basic level problem solving task given visual scene. pt unable to identify problems despite max cues. Suspecting impact of behavior to factor into degree of difficulty. Pt then engaged in convergent naming, completed with 90% accuracy IND.     SUMMARY OF TEST:    The CLQT assesses visual attention and perception, working memory and language output skills, as well as auditory memory and comprehension.  Non-linguistic tasks can help assess planning, and self-monitoring, visual discrimination and analysis, as well as creativity and mental flexibility.   Together, these subtests assess the cognitive domains of attention, memory, executive function, language, and visuospatial skills using a severity rating of either WNL (within normal limits), Mild, Moderate or Severe.        Cognitive Domain                   Severity Rating/Score  Attention                                    17 severe  Memory                                    86severe  Executive Functions   3severe  Language                                 20severe  Visuospatial Skills                    12severe  Composite Severity Rating        severe  Clock Drawing Severity Rating    severe    INTERPRETATION OF TEST RESULTS: Pt agreeable and completed CLQT as task.  Pt presenting with severe cognitive deficits c/b difficulties with attention, memory, executive functions, language, and visuospatial skills. Subjectively noting decreased processing speed, difficulties with working memory, and distractibility throughout assessment. Pt with x3 oriented this PM session and was able to locate orientation visuals with min cues.     TIME FOR INTERPRETATION AND PREPARATION OF REPORT: 5  TOTAL TIME: 45    Reference:  Dacia Day, Summer, CCC-SLP, (2001) [employer]        Other Barriers to Discharge (Family Training, etc): level of assist/supervision with iADLs?

## 2025-02-25 NOTE — PLAN OF CARE
"Goal Outcome Evaluation:      Plan of Care Reviewed With: patient    Overall Patient Progress: no changeOverall Patient Progress: no change    Orientation: intermittent confusion, oriented to person  VS: stable except BP elevated  Pain: low back pain, \"10/10\" and heartburn at one point but didn't take tylenol or Tums when attempted. Hot pack applied to low back.  Ambulation/ Transfers: Ax1 with walker, steady when ambulated to bathroom per sitter  Bowel: LBM 2/21, bowel sounds active, prn Miralex given without result, Prune juice offered.   Bladder: used toilet but pt voided on the floor missing toilet per sitter.  Diet/ Liquids: regular/thin, pt took all am pills and I watched pt swallowing. But later pt spat out tylenol and Tums when offered for pain.   Blood Sugars: BID check, 186 before lunch  Tubes/ Lines/ Drains: none  Skin: bruise on left thigh, scabs on bilateral shin.  Misc: had team rounds today, SO is present, advocating for patient  1:1 sitter at bedside for safety concern due to significant impaired cognition.             "

## 2025-02-25 NOTE — PROGRESS NOTES
Discharge Planner Post-Acute Rehab OT:     Discharge Plan: TBD     Precautions: Orthostatic hypotension and significant resting hypertension, cognition - impulsive and agitated    Permissive resting/supine hypertension unless diastolic >110. Pt will be orthostatic at ranges considered WNL.    Do not leave alone in seated position; must have legs elevated in recliner.    Current Status:  ADLs:  Mobility: Supervision/1:1 FWW for behavior only; functionally Mod I from physical and simple functional cognition perspective. Instances of increased A needed when orthostatic.  Grooming: Supervision  Dressing: Supervision FWW.  Bathing: Not safe d/t BP and behavior at initial admission. Clinical judgement supervision transfer and tasks in familiar environment d/t pt's observed ability in similar transfers and tasks.  Toileting: Recommend supervision FWW <> toilet d/t hx of syncopal episodes.   IADLs: Baseline IND simple meal prep, light home mgmt, med mgmt. 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 Enjoys refurbishing antique toys.   Vision/Cognition: Baseline vision deficit d/t retinal disease. Dysarthric. Delirium - fluctuating orientation. Agitated and combative at times. Significant functional cognition deficits across attention, memory, reasoning, impulse control. Level of prompting required for initiation or sequencing variable since agitation and appears to fluctuate with medications.    Assessment: Pt seen for interdisciplinary rounds, see POC note. Required one-step cuing to engage in simple sandwich making task; perseverating on receiving call from s/o but unable to demonstrate use of cell phone functionally.     Other Barriers to Discharge (DME, Family Training, etc):   Cognition.  Fall risk  - significant orthostatic.      DME: Shower chair, 4WW.

## 2025-02-26 ENCOUNTER — APPOINTMENT (OUTPATIENT)
Dept: PHYSICAL THERAPY | Facility: CLINIC | Age: 60
DRG: 057 | End: 2025-02-26
Attending: PHYSICAL MEDICINE & REHABILITATION
Payer: COMMERCIAL

## 2025-02-26 ENCOUNTER — APPOINTMENT (OUTPATIENT)
Dept: SPEECH THERAPY | Facility: CLINIC | Age: 60
DRG: 057 | End: 2025-02-26
Attending: PHYSICAL MEDICINE & REHABILITATION
Payer: COMMERCIAL

## 2025-02-26 LAB
GLUCOSE BLDC GLUCOMTR-MCNC: 128 MG/DL (ref 70–99)
GLUCOSE BLDC GLUCOMTR-MCNC: 146 MG/DL (ref 70–99)

## 2025-02-26 PROCEDURE — 99231 SBSQ HOSP IP/OBS SF/LOW 25: CPT | Performed by: PHYSICIAN ASSISTANT

## 2025-02-26 PROCEDURE — 250N000013 HC RX MED GY IP 250 OP 250 PS 637: Performed by: PHYSICAL MEDICINE & REHABILITATION

## 2025-02-26 PROCEDURE — 97129 THER IVNTJ 1ST 15 MIN: CPT | Mod: GN

## 2025-02-26 PROCEDURE — 99233 SBSQ HOSP IP/OBS HIGH 50: CPT | Mod: GC | Performed by: STUDENT IN AN ORGANIZED HEALTH CARE EDUCATION/TRAINING PROGRAM

## 2025-02-26 PROCEDURE — 250N000013 HC RX MED GY IP 250 OP 250 PS 637: Performed by: PHYSICIAN ASSISTANT

## 2025-02-26 PROCEDURE — 97530 THERAPEUTIC ACTIVITIES: CPT | Mod: GP

## 2025-02-26 PROCEDURE — 90846 FAMILY PSYTX W/O PT 50 MIN: CPT | Performed by: CLINICAL NEUROPSYCHOLOGIST

## 2025-02-26 PROCEDURE — 250N000013 HC RX MED GY IP 250 OP 250 PS 637: Performed by: STUDENT IN AN ORGANIZED HEALTH CARE EDUCATION/TRAINING PROGRAM

## 2025-02-26 PROCEDURE — 128N000003 HC R&B REHAB

## 2025-02-26 PROCEDURE — 97130 THER IVNTJ EA ADDL 15 MIN: CPT | Mod: GN

## 2025-02-26 PROCEDURE — 250N000013 HC RX MED GY IP 250 OP 250 PS 637

## 2025-02-26 RX ORDER — POTASSIUM CHLORIDE 1.5 G/1.58G
20 POWDER, FOR SOLUTION ORAL 2 TIMES DAILY WITH MEALS
Status: DISCONTINUED | OUTPATIENT
Start: 2025-02-26 | End: 2025-03-06 | Stop reason: HOSPADM

## 2025-02-26 RX ADMIN — METFORMIN HYDROCHLORIDE 1000 MG: 500 TABLET, FILM COATED ORAL at 17:54

## 2025-02-26 RX ADMIN — TICAGRELOR 90 MG: 90 TABLET ORAL at 17:55

## 2025-02-26 RX ADMIN — TICAGRELOR 90 MG: 90 TABLET ORAL at 09:04

## 2025-02-26 RX ADMIN — ATORVASTATIN CALCIUM 10 MG: 10 TABLET, FILM COATED ORAL at 09:04

## 2025-02-26 RX ADMIN — ACETAMINOPHEN 975 MG: 325 TABLET, FILM COATED ORAL at 20:18

## 2025-02-26 RX ADMIN — POTASSIUM CHLORIDE 20 MEQ: 1500 TABLET, EXTENDED RELEASE ORAL at 09:05

## 2025-02-26 RX ADMIN — HALOPERIDOL 1 MG: 0.5 TABLET ORAL at 09:04

## 2025-02-26 RX ADMIN — ASPIRIN 81 MG CHEWABLE TABLET 81 MG: 81 TABLET CHEWABLE at 09:05

## 2025-02-26 RX ADMIN — ACETAMINOPHEN 975 MG: 325 TABLET, FILM COATED ORAL at 09:04

## 2025-02-26 RX ADMIN — EZETIMIBE 10 MG: 10 TABLET ORAL at 17:54

## 2025-02-26 RX ADMIN — ACETAMINOPHEN 975 MG: 325 TABLET, FILM COATED ORAL at 13:29

## 2025-02-26 RX ADMIN — METFORMIN ER 500 MG 1000 MG: 500 TABLET ORAL at 09:04

## 2025-02-26 RX ADMIN — FAMOTIDINE 40 MG: 20 TABLET, FILM COATED ORAL at 17:53

## 2025-02-26 RX ADMIN — HALOPERIDOL 1 MG: 0.5 TABLET ORAL at 20:19

## 2025-02-26 RX ADMIN — RAMELTEON 8 MG: 8 TABLET, FILM COATED ORAL at 20:18

## 2025-02-26 RX ADMIN — PANTOPRAZOLE SODIUM 40 MG: 40 TABLET, DELAYED RELEASE ORAL at 09:05

## 2025-02-26 RX ADMIN — FAMOTIDINE 40 MG: 20 TABLET, FILM COATED ORAL at 09:05

## 2025-02-26 RX ADMIN — LISINOPRIL 10 MG: 10 TABLET ORAL at 20:19

## 2025-02-26 ASSESSMENT — ACTIVITIES OF DAILY LIVING (ADL)
ADLS_ACUITY_SCORE: 57
ADLS_ACUITY_SCORE: 54
ADLS_ACUITY_SCORE: 57
ADLS_ACUITY_SCORE: 57
ADLS_ACUITY_SCORE: 54
ADLS_ACUITY_SCORE: 57
ADLS_ACUITY_SCORE: 57
ADLS_ACUITY_SCORE: 54
ADLS_ACUITY_SCORE: 57
ADLS_ACUITY_SCORE: 54
ADLS_ACUITY_SCORE: 57
ADLS_ACUITY_SCORE: 54
ADLS_ACUITY_SCORE: 57

## 2025-02-26 NOTE — CARE PLAN
Sitting with pt from  3-7pm    Pt wife would like  sitter to sit out and will call if she needs anything or is leaving. Charge made aware of wife request. Will continue to check in with pt and wife until end of  monitor pt until end of shift. No further concern.

## 2025-02-26 NOTE — CONSULTS
Psychiatry Consultation; Follow up   2/26/25         Reason for Consult, requesting source:    Reason for Consult: Delirium evaluation and treatment, self injurious behavior  Requesting source: Nick Zavala    Labs and imaging reviewed.             Interim history:    Per nursing notes Pa has still had intermittent agitation and difficulty following directions at times. Has continued to be frustrated he is not going home with partner yet. PT came to see him but he was too agitated to participate. Today on interview Pa is calm and cooperative. Reports he did not sleep well overnight. He ate some breakfast this morning and has been having some stomach cramps. Denies diarrhea. Says that Meryl will be coming to visit around noon today. Has been up out of bed some, moving around is exhausting. Denies suicidal ideation, denies violent ideation. Denies AVH. Is oriented to self and city, but not to month, year or building. Does not think people are out to get him, other than staff forcing him to take medications.        Current Medications:     Current Facility-Administered Medications   Medication Dose Route Frequency Provider Last Rate Last Admin    acetaminophen (TYLENOL) tablet 975 mg  975 mg Oral TID Nick Zavala MD   975 mg at 02/26/25 0904    aspirin (ASA) chewable tablet 81 mg  81 mg Oral Daily Nichelle Chavez PA   81 mg at 02/26/25 0905    atorvastatin (LIPITOR) tablet 10 mg  10 mg Oral Daily Nichelle Chavez PA   10 mg at 02/26/25 0904    ezetimibe (ZETIA) tablet 10 mg  10 mg Oral Daily with supper Nick Zavala MD   10 mg at 02/25/25 1723    famotidine (PEPCID) tablet 40 mg  40 mg Oral BID w/meals Nick Zavala MD   40 mg at 02/26/25 0905    [Held by provider] glipiZIDE (GLUCOTROL) half-tab 10 mg  10 mg Oral BID AC Nichelle Chavez PA   10 mg at 02/20/25 0735    haloperidol (HALDOL) tablet 1 mg  1 mg Oral BID Je Alford MD   1 mg at 02/26/25 0904    iopamidol  "(ISOVUE-370) solution 500 mL  500 mL Intravenous Once Dakotah Huggins APRN CNP        lisinopril (ZESTRIL) tablet 10 mg  10 mg Oral QPM Nick Zavala MD   10 mg at 02/25/25 2159    metFORMIN (GLUCOPHAGE XR) 24 hr tablet 1,000 mg  1,000 mg Oral BID w/meals Nick Zavala MD   1,000 mg at 02/26/25 0904    pantoprazole (PROTONIX) EC tablet 40 mg  40 mg Oral QAM AC Christie Cook MD   40 mg at 02/26/25 0905    potassium chloride mraek ER (KLOR-CON M20) CR tablet 20 mEq  20 mEq Oral BID w/meals Nick Zavala MD   20 mEq at 02/26/25 0905    ramelteon (ROZEREM) tablet 8 mg  8 mg Oral At Bedtime Shanta Mcmahon MD   8 mg at 02/25/25 2013    sodium chloride 0.9 % bag for CT scan flush use  100 mL As instructed Once Dakotah Huggins APRN CNP        ticagrelor (BRILINTA) tablet 90 mg  90 mg Oral BID w/meals Nick Zavala MD   90 mg at 02/26/25 0904              MSE:   Appearance: awake, alert, dressed casually   Attitude:  cooperative  Eye Contact:  fair  Mood:  \"horsesh*t\"  Affect:   stable, mood congruent  Speech:  dysarthria  Psychomotor Behavior:  no evidence of tardive dyskinesia, dystonia, or tics  Muscle strength and tone: not assessed  Thought Process:  illogical at times regarding treatment and disposition  Associations:  no loose associations  Thought Content:   denies suicidal ideation , denies violent ideation, decreased paranoia today, no delusional statements elicited on interview, of note is able to talk about wife and their conversation without expressing paranoia  Insight:  limited  Judgement:  limited  Oriented to:   self and city, not oriented to month, year, building  Attention Span and Concentration:  intact to conversation  Recent and Remote Memory:  fair    Vital signs:  Temp: 97.7  F (36.5  C) Temp src: Oral BP: (!) 148/97 Pulse: 81   Resp: 16 SpO2: 99 % O2 Device: None (Room air)   Height: 182.9 cm (6') Weight: 105.6 kg (232 lb 12.9 oz)  Estimated body mass " index is 31.57 kg/m  as calculated from the following:    Height as of this encounter: 1.829 m (6').    Weight as of this encounter: 105.6 kg (232 lb 12.9 oz).           DSM-5 Diagnosis:   Delirium/encephalopathy  C/f vascular dementia           Assessment:   Pa Delcid is a 59 year old man with no past psychiatric history and medical history of HTN, orthostatic hypotension, HLD, T2DM, and prior stroke who was admitted 2/11 with worsening fatigue, weakness, syncope and was found to have multifocal acute subcortical infarcts. Was admitted to in rehab 2/14/25. Pt has been seen by neurology team this admission. Per their note, transient neurological episodes could be due to delirium, hypotension, vs elements of stroke recrudescence.     EEG done last Friday showed moderate diffuse slowing, likely consistent with delirium. Likely component of vascular cognitive impairment contributing to presentation as well. On interview today Pa appears more alert and paranoia is lessened. He is calm and cooperative throughout interview. Per chart continues to have some intermittent agitation but overall seems to be more behaviorally appropriate, compliant with medications and treatment plan. Plan to continue current Haldol 1 mg BID dosing. If daytime sedation persists, would be reasonable to decrease morning dose to 0.5 mg. Once closer to discharge, will need a plan for tapering off.  Tentative plan to continue current dosing once discharged for one week, followed by 0.5 mg BID for one week, then plan to stop haldol.          Summary of Recommendations:   Legal: Does not have capacity to leave AMA  Safety: 1:1. Recommend verbal de-escalation, redirection, haldol 5mg PRN available  Medications: Continue scheduled Haldol 1 mg BID. If daytime sedation persists, would be reasonable to decrease morning dose to 0.5 mg.  Tentative plan to continue current dose for 1 week following discharge, then decrease to 0.5 mg BID for one week,  then stop.  Labs/orders: reviewed  Follow-Up: Psychiatry will continue to follow    Shanta Mcmahon MD MPH  PGY2 Psychiatry resident   Pt staffed with attending Dr. Alford

## 2025-02-26 NOTE — PLAN OF CARE
Goal Outcome Evaluation:      Plan of Care Reviewed With: patient, spouse    Overall Patient Progress: no changeOverall Patient Progress: no change  Pt is still impusive, he tried one time self transfer, failed to follow nursing guidance while his spouse was with the . He got aggitated and got restless not waiting for assist. Once she was back to the room, he got calm and was able to follow verbal comands and guidance. He has been in bed and up in a chair few times. since then. At this time, he is sitting in a chair trying to check his messages but he was not able because he did not put the right code to open the phone. We will continue with current plan of care

## 2025-02-26 NOTE — PLAN OF CARE
Discharge Planner Post-Acute Rehab SLP:     Discharge Plan: TBD. ongoing SLP    Precautions: 1:1 attendant, can be combative with cares.    Current Status:  Hearing: WFL  Vision: Retinal detachment, Readers  Communication: Mild dysarthria when pt alert, motor speech at baseline per wife's report. Hx expressive aphasia; WAB-R Bedside 02/16 moderate expressive/receptive deficits (suspect fatigue, medications impacting)  Cognition: severe cognitive impairments re: poor insight into deficits/safety/situation, poor working memory, alternating/sustained attention, executive function, very slow processing , poor initiation  Swallow: Regular solids,Thin liquids (0). Goals met during acute. SLP re assessed and deemed any dysfunction related to behavior. May informally monitor     Assessment:  Pt participated in card sorting and matching tasks ranging from simple to moderately complex - he completed task with 100% accuracy and min cues to initiate a request when unable to make any more moves.     Other Barriers to Discharge (Family Training, etc): level of assist/supervision with iADLs?

## 2025-02-26 NOTE — PROGRESS NOTES
Discharge Planner Post-Acute Rehab PT:     Discharge Plan: TBD - No PT related barriers to discharge    Precautions: Orthostatic hypotension in combination and significant resting hypertension  Has tried LE and abdominal compression in the past without benefit or tolerance  Do not worry about resting/supine BP hypertension unless diastolic >110    On 1:1 due to behaviors, not functional mobility. Allow pt to mobilize without physical assist unless he appears unsafe    Current Status: Has fluctuated based on BP/meds. Not mod-I on unit due to behavior  Bed Mobility: IND  Transfer: Mod-I FWW  Gait: Mikel FWW short distances. Requires SBA longer distances due to BP  Stairs: Mod-I with rail  Balance: Appreciably at baseline. Uses walker with gait    Outcome Measures: - not appropriate due to command following    Assessment: Pt participated in short session with s/o present with goal of demonstrating safe household mobility. Pt performed stairs and ambulated a self-selected 125' with FWW unassisted. S/o asked about using his 4WW vs his FWW. Recommended continued FWW use at this time as he has demonstrated safe use of this and it works better in his smaller hospital room.    Other Barriers to Discharge (DME, Family Training, etc):   Behavior/safety with spouse at home

## 2025-02-26 NOTE — PROGRESS NOTES
[facility]   Acute Rehabilitation Unit  Daily progress note    INTERVAL HISTORY  Chris Delcid seen this morning at bedside.  No acute events reported overnight.  However, 1:1 sitter notes that patient has been quite drowsy this morning.  He was awake earlier this morning and had breakfast, then became more sleepy.  This increased drowsiness occurred even prior to taking meds.  In fact, it made it difficult to administer medications and nursing decided to crush to give for safety.  Patient reports that he is feeling tired despite thinking he slept well last night.  Sitter indicates that her report received from prior shift also indicated a good night of sleep.  Patient notes some ongoing back pain and heartburn, though overall stable.  He denies any dizziness or lightheadedness, shortness of breath, chest pain.    With SLP this morning, he participated in card sorting and matching tasks ranging from simple to moderately complex - he completed task with 100% accuracy and min cues to initiate a request when unable to make any more moves.      MEDICATIONS  Current Facility-Administered Medications   Medication Dose Route Frequency Provider Last Rate Last Admin    acetaminophen (TYLENOL) tablet 975 mg  975 mg Oral TID Nick Zavala MD   975 mg at 02/25/25 2013    aspirin (ASA) chewable tablet 81 mg  81 mg Oral Daily Nichelle Chavez PA   81 mg at 02/25/25 0820    atorvastatin (LIPITOR) tablet 10 mg  10 mg Oral Daily Nichelle Chavez PA   10 mg at 02/25/25 0820    ezetimibe (ZETIA) tablet 10 mg  10 mg Oral Daily with supper Nick Zavala MD   10 mg at 02/25/25 1723    famotidine (PEPCID) tablet 40 mg  40 mg Oral BID w/meals Nick Zavala MD   40 mg at 02/25/25 1723    [Held by provider] glipiZIDE (GLUCOTROL) half-tab 10 mg  10 mg Oral BID AC Nichelle Chavez PA   10 mg at 02/20/25 0735    haloperidol (HALDOL) tablet 1 mg  1 mg Oral BID Prashanth  Je HSU MD   1 mg at 02/25/25 2013    iopamidol (ISOVUE-370) solution 500 mL  500 mL Intravenous Once Dakotah Huggins APRN CNP        lisinopril (ZESTRIL) tablet 10 mg  10 mg Oral QPM Nick Zavala MD   10 mg at 02/25/25 2159    metFORMIN (GLUCOPHAGE XR) 24 hr tablet 1,000 mg  1,000 mg Oral BID w/meals Nick Zavala MD   1,000 mg at 02/25/25 1722    pantoprazole (PROTONIX) EC tablet 40 mg  40 mg Oral QAM AC Christie Cook MD   40 mg at 02/25/25 0820    potassium chloride marek ER (KLOR-CON M20) CR tablet 20 mEq  20 mEq Oral BID w/meals Nick Zavala MD   20 mEq at 02/25/25 1722    ramelteon (ROZEREM) tablet 8 mg  8 mg Oral At Bedtime Shanta Mcmahon MD   8 mg at 02/25/25 2013    sodium chloride 0.9 % bag for CT scan flush use  100 mL As instructed Once Dakotah Huggins APRN CNP        ticagrelor (BRILINTA) tablet 90 mg  90 mg Oral BID w/meals Nick Zavala MD   90 mg at 02/25/25 1723          Current Facility-Administered Medications   Medication Dose Route Frequency Provider Last Rate Last Admin    alum & mag hydroxide-simethicone (MAALOX) suspension 30 mL  30 mL Oral Q4H PRN Nick Zavala MD        bisacodyl (DULCOLAX) suppository 10 mg  10 mg Rectal Daily PRN Nick Zavala MD        calcium carbonate (TUMS) chewable tablet 500 mg  500 mg Oral TID PRN Lupe Chase PA-C        carboxymethylcellulose PF (REFRESH PLUS) 0.5 % ophthalmic solution 1 drop  1 drop Both Eyes 4x Daily PRN Nick Zavala MD        glucose gel 15-30 g  15-30 g Oral Q15 Min PRN Nichelle Chavez PA   15 g at 02/20/25 1424    Or    dextrose 50 % injection 25-50 mL  25-50 mL Intravenous Q15 Min PRN Nichelle Chavez PA        Or    glucagon injection 1 mg  1 mg Subcutaneous Q15 Min PRN Nichelle Chavez PA        haloperidol lactate (HALDOL) injection 5 mg  5 mg Intramuscular Q6H PRN Je Alford MD        hydrALAZINE (APRESOLINE) tablet 10 mg  10 mg Oral Q6H PRN  Nick Zavala MD        Or    hydrALAZINE (APRESOLINE) tablet 20 mg  20 mg Oral Q6H PRN Nick Zavala MD        methocarbamol (ROBAXIN) tablet 500 mg  500 mg Oral 4x Daily PRN Nick Zavala MD        polyethylene glycol (MIRALAX) Packet 17 g  17 g Oral Daily PRN Nichelle Chavez PA   17 g at 02/25/25 0821        PHYSICAL EXAM  BP (!) 148/97 (BP Location: Left arm)   Pulse 81   Temp 97.7  F (36.5  C) (Oral)   Resp 16   Ht 1.829 m (6')   Wt 105.6 kg (232 lb 12.9 oz)   SpO2 99%   BMI 31.57 kg/m    Gen: NAD,  HEENT: NC/AT  Pulm: non-labored on room air  Abd: Non-distended  Ext: no appreciable edema in BLE  Neuro/MSK: drowsy but responded to voice    LABS  CBC RESULTS:   Recent Labs   Lab Test 02/24/25  0557 02/17/25  0534 02/15/25  0246 02/15/25  0245   WBC 6.6 7.4  --  8.9   RBC 5.08 4.90  --  5.16   HGB 14.9 14.4 15.4 15.1   HCT 43.4 41.9  --  43.3   MCV 85 86  --  84   MCH 29.3 29.4  --  29.3   MCHC 34.3 34.4  --  34.9   RDW 13.6 13.6  --  13.2    320  --  370       Last Basic Metabolic Panel:  Recent Labs   Lab Test 02/25/25  1757 02/25/25  1202 02/24/25  0712 02/24/25  0557 02/20/25  0546 02/20/25  0541 02/17/25  0849 02/17/25  0534   NA  --   --   --  138  --  142  --  140   POTASSIUM  --   --   --  4.2  --  4.0  --  3.7   CHLORIDE  --   --   --  104  --  108*  --  106   CO2  --   --   --  20*  --  21*  --  21*   ANIONGAP  --   --   --  14  --  13  --  13   * 186* 189* 226*   < > 65*   < > 75   BUN  --   --   --  21.5  --  20.0  --  14.3   CR  --   --   --  1.16  --  0.97  --  0.98   GFRESTIMATED  --   --   --  72  --  89  --  88   JAMIE  --   --   --  9.4  --  9.4  --  9.2    < > = values in this interval not displayed.       Rehabilitation -  Admission to acute inpatient rehab 2/14/25.    Impairment group code: Stroke Ischemic 01.3 Bilateral Involvement; acute lacunar infarcts in the left thalamus, the posterior left centrum semiovale ovale, the right occipital and  right occipitotemporal white matter, and the right frontal white matter, suspected embolic etiology      PT, OT and SLP daily.  Decreasing daily minutes due to lack of meaningful therapy participation.  .     2. Impairment of ADL's:  OT to work on upper and lower body self care, dressing, toileting, bathing, energy conservation techniques with use of ADs as needed.  3. Impairment of mobility:   PT to work on gait exercises, strengthening, endurance buildup, transfers with use of walker as needed.  4. Impairment of cognition/language/swallow:   SLP for cognitive evaluation and treatment strategies for higher level cognitive deficits and memory impairment.  5. Rehab RN to administer medication, patient education on medication taking, VS monitoring, bowel regimen, glucose monitoring and management of orthostasis.     Continue comprehensive acute inpatient rehabilitation program with multidisciplinary approach including therapies, rehab nursing, and physiatry following. See interval history for updates.      ASSESSMENT AND PLAN  Chris Delcid is a 59 year old right hand dominant male with a past medical history of multiple prior strokes (left pontine; L and R centrum semiovale; R hemipons), HTN, orthostatic hypotension, HLD, DM II c/b gastroparesis, ADITI, chronic low back pain, and GERD who was admitted on 2/11/25 with worsening fatigue, weakness, recurrent falls, and syncope and was found to have multifocal acute/subacute subcortical infarcts with hospital course complicated by additional imaging finding of right vertebral artery occlusion/dissection, hypertensive urgency, hyperglycemia, and elevated troponin.  He was admitted to ARU on 2/14/25 for multidisciplinary rehabilitation and ongoing medical management.     Medical Conditions    Multiple acute lacunar infarcts in both cerebral hemispheres   Right vertebral artery dissection  Hx multiple prior strokes (10/2020 L pontine; 11/2021 L and R centrum semiovale  "infarcts; 8/2022 acute R hemipons, possible late subacute right centrum semiovale)  PTA on Brilinta monotherapy (since 8/2022 stroke).  Per neurology, patient \"has had recurrent primarily subcortical infarcts and progression of microvascular disease over the past five years. This has occurred in the setting of uncontrolled vascular risk factors (HTN, HLD, DM, ADITI), however, will pursue further evaluation for atypical stroke etiologies given recurrent nature and family history.\"  Hypercoag workup: Protein C and S, lupus anticoagulant, cardiolipin Ab, Antithrombin III, Beta 2 glycoprotein, factor II, and factor V all negative.   - Continue PTA brilinta 90 bid and ASA 81 mg daily, which was added this admission.  DAPT duration TBD  - Continue lovastatin 40 mg daily and zetia (added this admission for HLD)  - Goal BP <130/80, management as below  - Goal A1c <7, management as below   - Continue PT/OT  - Outpatient NIVIA  - Outpatient genetic referral given family history of stroke in young, can be further discussed at outpatient stroke followup   - Follow up with vascular medicine for consideration of PCSK9 inhibitor and further evaluation of hyperlipidemia   - Follow up neurology in 4-6 weeks with any stroke ALEXIS ([phone])   - Follow up with PM&R in 8-10 weeks      Agitation, paranoia  Delirium  Suicidal ideations  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 Patient reportedly consistently trying to get out of bed. Patient trying to throw punches at staff per nursing. Patient reportedly unwilling to take oral medications. Given the acutely agitated presentation and to maintain the safety of staff on unit, Code Green was activated and provider team ordered a IM olanzapine 5mg x1 for use.  1:1 bedside attendant added for safety.  Seroquel was added at bedtime (stopped trazodone).  Psychiatry consulted on 2/17 and changed seroquel to " scheduled and PRN Haldol.  [place] most likely delirium though also with concerns for underlying vascular dementia.  UA negative on 2/18.  2 recurrent code 21 episodes on 2/19 due to paranoia/agitation and physical aggression.  Psychiatry reassessed and no changes to plan at that time.  However, on 2/21 had episode of self-injurious behavior (cutting wrist with plastic knife) which required physical restraints and use of PRN IM Haldol.  Scheduled Haldol doses were increased and psychiatry recommended vEEG, which showed moderate diffuse slowing but no epileptiform discharges or seizures (able to obtain 22 min).  Neurology re-consulted as well to consider any further work-up for etiology.  Recommended some labs (B12 and ammonia WNL, TSH elevated but free T4 normal), delirium precautions, ongoing psychology, but no further work-up at this time from their perspective.  Pharmacy reviewed medications, none currently felt to be contributing to increased risk of delirium  2/26: Drowsy this morning but this occurred even prior to medications.  Patient/staff report good night of sleep.  Intermittent agitation but overall has been improved.  Still having some difficulty with taking medications, seems to be more behavioral.    - Appreciate ongoing psychiatry assistance  - Delirium precautions  - Continue 1:1 bedside attendant  - Continue Haldol 1 mg BID (increased on 2/2) with 5 mg IM PRN for agitation.  Haldol recommended at this time due to less risk of exacerbating orthostasis.  Discussed possibly decreasing morning Haldol dose with psychiatry due to increased fatigue.  Advised dose could be decreased if daytime sedation has been consistent, but if it has only been a 1-2 day occurrence then would keep as is for at least a couple more days.  Appreciate psychiatry follow up today, no changes to plan of care per their note.  - Outpatient neuropsych testing recommended by psychiatry    Unresponsive episode x2 on 2/15  In early  morning 2/15,  patient was noted by nursing staff to be weaker and non-responsive. Patient was found by nursing sitting at edge of bed on his way to the bathroom. Nursing assisted patient using gait belt and walker to the bathroom when it was noticed that patient was having weakness with standing and mobility. Patient was placed in wheelchair where he slumped to his side and became unresponsive. Patient was lifted back into bed where Code Blue was initially called but then was downgraded to RRT. ICU provider team recommended stroke code be called. Neurology evaluated patient at bedside. Vital signs showed /105 when resting in bed. CT imaging showed no hemorrhage or LVO. Lab work-up unremarkable. Patient was deemed not requiring transfer to acute care. Later in the morning, nursing staff stated that patient had similar episode of being disoriented and not being able to transfer from chair to bed.  Rehab medical team arrived at bedside to evaluate.  While in chair, patient was not responsive to questions.  Blood pressure noticeably lower than usual, 113/71.  Presentation was likely related to either new stroke or orthostatic hypotension.  Nursing staff to assisted with lifting patient back into bed.  Once patient was in bed and sitting up, patient became more responsive to questions.  Presentation likely related to positional orthostatic hypotension while sitting up in chair but MRI brain was ordered to rule out acute stroke.  MRI results showed no evidence of new infarcts.  Neurology evaluated results and confirmed no new strokes and signed off.  Felt possibly delirium vs hypotension vs stroke recrudescence.   - Continue to assess cognition and vital signs   - Low threshold to transfer to acute care if patient continues to demonstrate altered mental status through orthostatic hypotension  - Per neurology, consider EEG if persistent/recurrent episodes.  EEG completed on 2/21 per psychiatry, interpretation  "\"moderate diffuse slowing which is non-specific but suggestive of diffuse cerebral dysfunction. There were no epileptiform discharges or seizures\"      HTN   20-year hx orthostasis   Has had long standing issue with orthostatic hypotension even before diagnosis of HTN. Had a tilt table test through [facility] that showed severe orthostatic hypotension. Was followed by cardiology and has a loop recorder in place   Per his SO didn't tolerate abdominal binder and compression socks in the past; was also on midodrine for a while, but this was for overall hypotension and not specifically for orthostasis.  Cardiology consulted at ARU for assistance with management of OH in the setting of overall elevated BPs.  Recommended changing Lisinopril to 10 mg at bedtime, and continue with conservative measures as able.  Assistance appreciated.  - Continue lisinopril 10 mg at HS  - Continue slow position changes, PO intake abdominal binder, compression  - Continue hydralazine 10-20 mg q6h PRN for SBP >200  - Given long history of OH, target BP goal may be higher than typical for a patient post-stroke     HLD    2/11  Documented intolerance to atorvastatin, rosuvastatin, simvastatin   - Continue lovastatin and zetia as above   - Outpatient referral to vascular medicine for consideration of PCSK9 inhibitor and further evaluation of hyperlipidemia      Type II diabetes mellitus  Polyneuropathy ?  Autonomic dysfunction   PTA on metformin 1000 mg BID and glipizide 10 mg BID. Was not checking his BG at home as his BG was well controlled but has a glucometer.  A1c 8.6% on 2/11/25.  Hyperglycemia during inpatient stay but has been lower at ARU with decreased intake in setting of altered mental status as above.  - Goal A1c <7.0   - Monitor BG TID AC + HS.    - Holding glipizide starting 2/20 due to hypoglycemia in setting of impaired intake.  Glucose levels improved since discontinuation.   - Continue PTA metformin 1000 mg BID   - " Hypoglycemia protocol     Bilateral hand muscle atrophy and paresthesia   Concerns for focal mononeuropathy vs cervical radiculopathy; doesn't seem to be explained by polyneuropathy only    - Neurology follow up as outpatient    - May need NCS/EMG for more evaluation      ADITI   Insomnia   Was not using CPAP prior to admission.  Diagnosed 2/2022 following witnessed apneic episodes and polysomnogram 12/2021. Patient reports stopping BIPAP therapy for his ADITI due to abdominal bloating. He reports a repeat polysomnogram after stopping BIPAP that showed minimal ADITI.   - Trazodone and seroquel discontinued as above  - Continue ramelteon 8 mg at HS (added 2/17)  - Sleep hygiene  - Follow up with sleep medicine as outpatient    Moderate malnutrition in the context of acute illness or injury  Poor oral intake  At ARU admission, felt that patient did not appear to be having good oral hydration.  Stable electrolytes, did receive IVF bolus on 2/16 for hydration.  With increased paranoia and agitation since ARU admission (as above), PO intake has been limited.  - RD consulted, appreciate assistance  - Carb restriction removed from diet order on 2/20 to allow greater selections, will monitor BG  - Supplements per RD  - RD also sending more finger foods and pre-packaged foods for safety and to encourage more intake given paranoia and self-injurious behaviors     Chronic low back pain  Secondary to prior injury years ago. Follows at [facility] in [place].  - Scheduled Tylenol 975 mg TID  - Continue Robaxin 500 mg QID PRN (added 2/25)     Tobacco use  Per significant other, has significant tobacco use history and is concerned he is having cravings or withdrawal.  Nicotine patch was ordered to help if any cravings present, however he removed and tried to eat it on 2/20 so discontinued  - Consider PRN nicotine replacement such as gum or lozenge if appropriate     Adjustment to disability:  Clinical psychology to [place] and  treat for support/assistance for significant other  FEN: regular diet  Bowel: on prn bowel meds; continent.  Some recent constipation but did have BM on 2/25 with PRN Miralax (did not take suppository).  Continue to monitor.  Bladder: continent.  One PVR obtained 60 ml.  Unable to obtain further values due to agitation  DVT Prophylaxis: mechanical  GI Prophylaxis: on famotidine due to h/o GERD; added TUMS and Mylanta prn  Code: DNR/DNI - confirmed upon admission   Disposition: home with SO  ELOS:  Pending clearing of delirium/improvement in agitation with 24 hour supervision, vs long term care   Follow up Appointments on Discharge: PCP in 1-2 weeks, cardiology and NIVIA, stroke neurology, vascular surgery, PM&R, sleep medicine       25 minutes spent on the date of service doing chart review, history and exam, documentation, and further activities as noted above.     Plan of care was discussed with Dr. Gary Zavala, PM&R staff physician     Lupe Chase PA-C  Physical Medicine & Rehabilitation

## 2025-02-26 NOTE — PROGRESS NOTES
"                                                           ARU Social Work Progress Notes     Sw met with patient and spouse to discuss next steps and discharge planning. Discharge planning will depend on her willingness to take patient home and provide 24 hours supervision. She shared that she understand that; Pa would have to go home or skilled nursing facility and she would like to take him too or help in that process but she works and her income is the only income that provides for the family. She is not eligible for FMLA as well because they are \"legally marriage\" and cannot afford to pay privately for Personal Care Assistant (PCA).    Her biggest concern is taking Pa home in \"his state\". She feel more can be done.    Next Steps  Discuss with team regarding concerns and plan a care conference.       JAX Molina  [facility], Acute Inpatient Rehab Unit   [facility] [address] [place] [address]  Phone: [phone]  Fax: [phone]    "

## 2025-02-26 NOTE — PLAN OF CARE
Goal Outcome Evaluation:      Plan of Care Reviewed With: patient    Overall Patient Progress: no changeOverall Patient Progress: no change         BP (!) 148/97 (BP Location: Left arm)   Pulse 81   Temp 97.7  F (36.5  C) (Oral)   Resp 16   Ht 1.829 m (6')   Wt 105.6 kg (232 lb 12.9 oz)   SpO2 99%   BMI 31.57 kg/m      Orientation: intermittent confusion, oriented to self. Pt was impulsive attempting to get out of bed couple times per sitter and during rounding.  VS: BP slightly elevated this morning, scheduled medications given,   Pain: c/o lower back pain, scheduled tylenol given  Ambulation/ Transfers: Ax1 with walker or Ax2 Desirae barrientos per report  Bowel: continent, LBM 2/26 per report  Bladder: mixed continent, sitter assist with urinal  Diet/ Liquids: regular/ thin, took pills well with water  Tubes/ Lines/ Drains: none  Skin: bruise on left thigh.    Pt wife came to visit around 3 pm. When she left to go make a call, pt try to  the wheelchair per sitter report. Staff assist was called. Upon assessment pt was fixated on going to find his wife, after few minutes of verbal redirection pt agreed to wait for wife in room.

## 2025-02-26 NOTE — PLAN OF CARE
Goal Outcome Evaluation:      Plan of Care Reviewed With: patient    Overall Patient Progress: no changeOverall Patient Progress: no change    Patient alert to self; pleasantly confused this shift. Cooperative and compliant  with cares. Continues to be on 1:1 for safety.  Mixed continence with bladder. Patient with large BM early AM.   Patient sleeping most of the night

## 2025-02-27 ENCOUNTER — APPOINTMENT (OUTPATIENT)
Dept: SPEECH THERAPY | Facility: CLINIC | Age: 60
DRG: 057 | End: 2025-02-27
Attending: PHYSICAL MEDICINE & REHABILITATION
Payer: COMMERCIAL

## 2025-02-27 VITALS
SYSTOLIC BLOOD PRESSURE: 207 MMHG | HEIGHT: 72 IN | HEART RATE: 77 BPM | DIASTOLIC BLOOD PRESSURE: 107 MMHG | OXYGEN SATURATION: 94 % | BODY MASS INDEX: 31.53 KG/M2 | TEMPERATURE: 97.5 F | RESPIRATION RATE: 18 BRPM | WEIGHT: 232.81 LBS

## 2025-02-27 LAB
GLUCOSE BLDC GLUCOMTR-MCNC: 130 MG/DL (ref 70–99)
GLUCOSE BLDC GLUCOMTR-MCNC: 224 MG/DL (ref 70–99)

## 2025-02-27 PROCEDURE — 99231 SBSQ HOSP IP/OBS SF/LOW 25: CPT | Performed by: PHYSICIAN ASSISTANT

## 2025-02-27 PROCEDURE — 97129 THER IVNTJ 1ST 15 MIN: CPT | Mod: GN

## 2025-02-27 PROCEDURE — 97130 THER IVNTJ EA ADDL 15 MIN: CPT | Mod: GN

## 2025-02-27 PROCEDURE — 250N000013 HC RX MED GY IP 250 OP 250 PS 637: Performed by: STUDENT IN AN ORGANIZED HEALTH CARE EDUCATION/TRAINING PROGRAM

## 2025-02-27 PROCEDURE — 250N000013 HC RX MED GY IP 250 OP 250 PS 637: Performed by: PHYSICIAN ASSISTANT

## 2025-02-27 PROCEDURE — 128N000003 HC R&B REHAB

## 2025-02-27 PROCEDURE — 250N000013 HC RX MED GY IP 250 OP 250 PS 637: Performed by: PHYSICAL MEDICINE & REHABILITATION

## 2025-02-27 PROCEDURE — 250N000013 HC RX MED GY IP 250 OP 250 PS 637

## 2025-02-27 RX ADMIN — FAMOTIDINE 40 MG: 20 TABLET, FILM COATED ORAL at 17:05

## 2025-02-27 RX ADMIN — ACETAMINOPHEN 975 MG: 325 TABLET, FILM COATED ORAL at 14:21

## 2025-02-27 RX ADMIN — FAMOTIDINE 40 MG: 20 TABLET, FILM COATED ORAL at 10:16

## 2025-02-27 RX ADMIN — TICAGRELOR 90 MG: 90 TABLET ORAL at 10:14

## 2025-02-27 RX ADMIN — EZETIMIBE 10 MG: 10 TABLET ORAL at 17:06

## 2025-02-27 RX ADMIN — POTASSIUM CHLORIDE 20 MEQ: 1.5 POWDER, FOR SOLUTION ORAL at 17:05

## 2025-02-27 RX ADMIN — METFORMIN HYDROCHLORIDE 1000 MG: 500 TABLET, FILM COATED ORAL at 17:06

## 2025-02-27 RX ADMIN — ATORVASTATIN CALCIUM 10 MG: 10 TABLET, FILM COATED ORAL at 10:13

## 2025-02-27 RX ADMIN — POTASSIUM CHLORIDE 20 MEQ: 1.5 POWDER, FOR SOLUTION ORAL at 10:27

## 2025-02-27 RX ADMIN — TICAGRELOR 90 MG: 90 TABLET ORAL at 17:05

## 2025-02-27 RX ADMIN — RAMELTEON 8 MG: 8 TABLET, FILM COATED ORAL at 21:14

## 2025-02-27 RX ADMIN — METFORMIN HYDROCHLORIDE 1000 MG: 500 TABLET, FILM COATED ORAL at 10:21

## 2025-02-27 RX ADMIN — HALOPERIDOL 1 MG: 0.5 TABLET ORAL at 21:14

## 2025-02-27 RX ADMIN — ASPIRIN 81 MG CHEWABLE TABLET 81 MG: 81 TABLET CHEWABLE at 10:21

## 2025-02-27 RX ADMIN — LISINOPRIL 10 MG: 10 TABLET ORAL at 21:14

## 2025-02-27 RX ADMIN — ACETAMINOPHEN 975 MG: 325 TABLET, FILM COATED ORAL at 05:39

## 2025-02-27 RX ADMIN — PANTOPRAZOLE SODIUM 40 MG: 40 TABLET, DELAYED RELEASE ORAL at 10:11

## 2025-02-27 RX ADMIN — ACETAMINOPHEN 975 MG: 325 TABLET, FILM COATED ORAL at 21:14

## 2025-02-27 RX ADMIN — HALOPERIDOL 1 MG: 0.5 TABLET ORAL at 10:14

## 2025-02-27 ASSESSMENT — ACTIVITIES OF DAILY LIVING (ADL)
ADLS_ACUITY_SCORE: 54

## 2025-02-27 NOTE — CONSULTS
PSYCHOLOGY CONSULTATION - INITIAL NOTE  Start Time: 1430  Stop Time: 1500  Session Duration in Minutes:    REASON FOR CONSULTATION: Psychology consulted to talk with and provide interventions for Pa's partner, Meryl.  I did not meet with Pa, and rather the entire session was completed with Meryl.     BACKGROUND PER EMR: Chris Delcid is a 59 year old right hand dominant male with a past medical history of multiple prior strokes (left pontine; L and R centrum semiovale; R hemipons), HTN, orthostatic hypotension, HLD, DM II c/b gastroparesis, ADITI, chronic low back pain, and GERD who was admitted on 2/11/25 with worsening fatigue, weakness, recurrent falls, and syncope and was found to have multifocal acute/subacute subcortical infarcts with hospital course complicated by additional imaging finding of right vertebral artery occlusion/dissection, hypertensive urgency, hyperglycemia, and elevated troponin.  He was admitted to ARU on 2/14/25 for multidisciplinary rehabilitation and ongoing medical management.     SUBJECTIVE: I discussed many topics with Meryl over our short session. This included:   Discharge planning.  We discussed several parameters that form the foundation of any decision making including the fact that Pa will likely need at least indirect supervision upon discharge, and the logistics of Meryl's life necessitates that Pa should be at a level where he can be left alone for lengthy periods of time (Meryl works outside the home).   Meryl stated that the best course of action may be discharge to skilled facility from the ARU, and if he continues to improve then she can take him home.  We discussed the positive and negatives of going home straight from here, and she decided that discharge to another facility was the best/safest course of action.   We also discussed that Pa may improve when he gets home, however, how soon and how much are hard to prognosticate and thus use in decision  "making.   Pre-Morbid functioning:  I attempted to figure out what Pa was like prior to his most recent hospitalization.  Meryl mentioned that she did not notice any concerning behaviors or cognitive challenges prior to his admission. However, upon further questioning, it sounds as if she was concerned albeit she might not be aware of her level of concern.  For example, for work trips, she would always bring Pa so he wouldn't be left alone, and Meryl would wake up with Pa in the middle of the night to make sure he didn't fall on the way to the restroom.  She stated she was frequently concerned about him falling on the way to the restroom when she left him alone.    Medical interventions:  Meryl mentioned that she feels like the medical team is \"missing something\" that could account for the drastic change in Pa's presentation.  When asked what we could do to directly address that feeling, she wasn't sure but used the examples such as a full body MRI scan. I re-assured her that Pa has many eyes on him across multiple disciplines that would catch most reasonable explanations for the changes in his behavior.   Acute on chronic presentation:  As mentioned in medical notes throughout his chart, it's very likely that Pa may have been experiencing a vascular dementia prior to this event, and this stroke depleted enough of the cognitive and parenchyma reserve enough to be seeing these drastic changes.  We discussed how someone can be diagnosed with vascular dementia due to acute events (such as a stroke), as well as contributions from chronic syndromes (i.e..g., white matter changes due to cerebrovascular risk factors) - which are both likely happening in Pa's case. This also makes it difficult to prognosticate - particularly if there is still residual delirium.     OBJECTIVE: I met with Meryl, reviewed records, and consulted with rehab team.  I did not assess Pa directly.     ASSESSMENT: At this time, " "I suspect Pa's presentation is likely multifactorial and includes: a suspect acute delirium, chronic vascular changes and remote and recent history of strokes.  This combination makes discharge planning from an acute rehab setting quite difficult, made even more difficult by life circumstances    DIAGNOSIS:  Stroke    RECOMMENDATION/PLAN: we plan to have a care conference with the rehab team to make sure we are all on the same page regarding recommendations and answering Meryl's questions.      It might be helpful to address the emotion behind Meryl's questions.  For example, \"Meryl, I hear you asking these questions because your concerned and care for Pa.  While I understand that it may feel necessary to do additional medical tests, the data is telling me that additional testing is not necessary and this is the most likely diagnosis or this is most likely to what's going on.\"     Please feel free to call if urgent concerns arise prior to the next follow-up session.    Unique Tapia PsyD,   Clinical Neuropsychologist  "

## 2025-02-27 NOTE — PLAN OF CARE
"Goal Outcome Evaluation:    Patient alert but intermittently sleepy throughout the day. Barely answers questions except for yes and no, or \"Oh, I might as well\" during med administration. VSS, did not answer when asked if he has any pain earlier in the shift, but gave a 1/10 PS to his lower back when asked while he was having lunch. 1:1 sitter at bedside for safety. On regular texture, thin liquid diet. Took his meds whole with thin liquids at first, but pocketed some and spit them out. He did allow writer to administer them again, this time whole with apple sauce, which he swallowed. A1 with the walker, refused gait belt use per sitter report. Continent of bowel and bladder, able to use the bathroom. No BM reported this shift. Participated in therapies. Care plan in place.   "

## 2025-02-27 NOTE — PROGRESS NOTES
ARU Social Work Progress Notes       Team Care Conference with patient and spouse; goal is to discuss the next steps, including discharge planning, medical condition, treatment options, and care goals, as well as explore various community options.    Barriers to discharges   Intermittent agitation and difficulty following directions at times.   Biopsychosocial factor: absence of 24-hour supervision (caregiver)    Care Conference    March 4th; Tuesday at 1:30 am    JAX Molina  [facility], Acute Inpatient Rehab Unit   [address] [place] [address], [address] [place] [address]  Phone: [phone]  Fax: [phone]

## 2025-02-27 NOTE — PLAN OF CARE
Goal Outcome Evaluation:      Plan of Care Reviewed With: patient    Overall Patient Progress: no change    Patient is alert to self and , was calm and cooperative with cares. Patient able to remember writer's name when asked after introducing. Awake on initial rounding. Was repositioned and boosted up in bed. Follows simple commands. Slept mostly this shift and awake between cares. No agitation noted overnight.  Rest and sleep promoted. Sitter remained at bedside. Reported back pain, morning dose of Tylenol given early, intervention effective per patient. Fall precautions maintained, call light in reach, safety checks completed.    Continue with POC.

## 2025-02-27 NOTE — PLAN OF CARE
Goal Outcome Evaluation:      Plan of Care Reviewed With: patient, spouse    Overall Patient Progress: no change     Outcome: No acute changes this shift. Per wife, pt can swallow medications x1 at a time.    Shift: 1530 to 1930  Neuro: A&Ox2, disoriented to time; situation. Intermittent confusion. Endorses numbness in BLE. Sitter in place for paranoia, agitation, and safety.  Pain: Denies pain this shift.  Cardiac: Denies CP.  Respiratory: Denies SOB.  Diet/Appetite: Regular diet, takes medications x1 at a time with thin liquids. Show pt medication in package and open package in front of pt before giving pt the medication.  /GI: Continent of bowel, LBM today 2/26 per report. Mixed continence of bladder.  Activity: Ax1 walker.  LDAs: None.  Skin: Skin intact except for L wrist scab; and L thigh bruise.  Infection/Isolation: Standard precautions maintained.  Other: 1:1 at bedside; wife at bedside for part of shift.  Plan: Continue POC.

## 2025-02-27 NOTE — PLAN OF CARE
Discharge Planner Post-Acute Rehab SLP:     Discharge Plan: Home with 24/7 supervision vs LTC/memory care? ongoing SLP    Precautions: 1:1 attendant, can be combative with cares.    Current Status:  Hearing: WFL  Vision: Retinal detachment, Readers  Communication: Mild dysarthria when pt alert, motor speech at baseline per wife's report. Hx expressive aphasia; WAB-R Bedside 02/16 moderate expressive/receptive deficits (suspect fatigue, medications impacting)  Cognition: severe cognitive impairments re: poor insight into deficits/safety/situation, poor working memory, alternating/sustained attention, executive function, very slow processing , poor initiation  Swallow: Regular solids,Thin liquids (0). Goals met during acute. SLP re assessed and deemed any dysfunction related to behavior. May informally monitor     Assessment: Pt engaged in orientation task utilizing previously introduced visual aids. Pt oriented x4 with use of visual aids and mod-max cues. Pt engaged in slightly more complex card sorting task. Pt required max cues to correctly sort cards by color within a field of x6. Noting difficulties with initiation, attention, working memory, and slow processing throughout task. Pt able to recall location of visual aids depicting orientation information when followed up at the end of session. Pt oriented x4 with use of orientation aids and mod cues.     Other Barriers to Discharge (Family Training, etc): level of assist/supervision with iADLs?

## 2025-02-27 NOTE — PROGRESS NOTES
Plan of Care Reviewed With: Patient.    Overall Patient Progress: No change    Assumed care at 1930.    Neuro/Orientation: A&Ox2, disoriented to time & situation. Intermittent confusion. Garbled speech, able to be understood. Sitter in place for paranoia, agitation, and safety.  Respiratory: Stable on RA. Denies chest pain/SOB.  Pain: Denies.  Ambulation/Mobility: A1 walker.  Diet: Regular diet, thin liquids, takes pills whole one at a time. Show each medication in package to patient and open each medication in front of patient.  Bladder: Mixed continence.  Bowel: Continent of bowel.  LBM: 2/26 per report.  Skin: L wrist scab KURT. L thigh bruise.  Tubes/Lines/Drains: N/A.    Patient slept/rested majority of shift. Cooperative and compliant with cares. Continues to be 1:1 for safety. Continue current plan of care.

## 2025-02-27 NOTE — PROGRESS NOTES
CLINICAL NUTRITION SERVICES - REASSESSMENT NOTE     RECOMMENDATIONS FOR MDs/PROVIDERS TO ORDER:  Appreciate continued encouragement surrounding PO intake    Malnutrition Status:    Moderate malnutrition in the context of acute illness or injury    Registered Dietitian Interventions:  - Requested updated weight from bedside RN  - Ensure Enlive (strawberry) TID with meals  - Encouraged adequate intakes    Future/Additional Recommendations:  Monitor PO intake, supplement use, labs, and weight trends     SUBJECTIVE INFORMATION  Assessed patient in room.    CURRENT NUTRITION ORDERS  Diet: Regular  - Not appropriate for room service    Snacks/supplements: Ensure Enlive TID with meals    CURRENT INTAKE/TOLERANCE  - % per flowsheets over past week, most recent intakes 25-50%. Per [employer], pt receiving 3 meals/day from room service. Pt was sent 3-day average of 3493 kcals and 162 g protein (including Ensure Enlive TID with meals).  - Pt had lunch tray in room during RD visit. He had eaten 1 mini corn dog a 1 broccoli floret.     NEW FINDINGS  Pt reports decreased appetite. He does not like the Ensure Enlive. Offered different flavors and other snacks, then pt stated he wasn't drinking the Ensure because he could not open it himself. Encouraged him to ask 1:1 for assistance opening items when needed.     Weight:   Wt Readings from Last 10 Encounters:   02/14/25 105.6 kg (232 lb 12.9 oz)   02/11/25 112.8 kg (248 lb 10.9 oz)   09/17/24 121.1 kg (267 lb)   02/21/24 122.2 kg (269 lb 6.4 oz)   02/02/24 122 kg (269 lb)   01/29/24 121.1 kg (267 lb)   01/22/24 122.5 kg (270 lb)   12/27/23 122.5 kg (270 lb)   12/20/23 121.2 kg (267 lb 3.2 oz)   11/16/20 128.4 kg (283 lb)   No recent weights in Care Everywhere  6.4% wt loss in 3 days, 13% wt loss in 5 months    Skin/wounds: no pressure injuries noted    GI symptoms: LBM 2/26 x2 per I/Os    Nutrition-relevant labs:   Hemoglobin A1C: 8.6 (2/11)  Glucose POCT: 128-224 over 24  hours    Nutrition-relevant medications:   Pepcid  Metformin  Protonix  Potassium chloride    MALNUTRITION  % Intake: < 75% for > 7 days (moderate)  % Weight Loss: > 10% in 6 months (severe)   Subcutaneous Fat Loss: None observed  Muscle Loss: None observed  Fluid Accumulation/Edema: None noted  Malnutrition Diagnosis: Moderate malnutrition in the context of acute illness or injury  Malnutrition Present on Admission: Yes    EVALUATION OF THE PROGRESS TOWARD GOALS   Previous Goals  Patient to consume % of nutritionally adequate meal trays TID, or the equivalent with supplements/snacks.   Evaluation: Not progressing    Previous Nutrition Diagnosis  Inadequate oral intake related to decreased appetite as evidenced by pt report and documented intakes.   Evaluation: No change    NUTRITION DIAGNOSIS  Inadequate oral intake related to decreased appetite as evidenced by pt report and documented intakes.     INTERVENTIONS  See nutrition interventions above    Goals  Patient to consume % of nutritionally adequate meal trays TID, or the equivalent with supplements/snacks.     Monitoring/Evaluation      Progress toward goals will be monitored and evaluated per policy.     Radha Grimaldo RD, LD  Available via phone and Vocera  Phone: [phone]  Vocera: 5R Acute Rehab Clinical Dietitian  Weekend/Holiday Vocera: Weekend Holiday Clinical Dietitian [Multi Site Groups]

## 2025-02-27 NOTE — PROGRESS NOTES
Discharge Planner Post-Acute Rehab PT:     Discharge Plan: TBD - No PT related barriers to discharge    Precautions: Orthostatic hypotension in combination and significant resting hypertension  Has tried LE and abdominal compression in the past without benefit or tolerance  Do not worry about resting/supine BP hypertension unless diastolic >110    On 1:1 due to behaviors, not functional mobility. Allow pt to mobilize without physical assist unless he appears unsafe    Current Status: Has fluctuated based on BP/meds. Not mod-I on unit due to behavior  Bed Mobility: IND  Transfer: Mod-I FWW  Gait: Mikel FWW short distances. Requires SBA longer distances due to BP  Stairs: Mod-I with rail  Balance: Appreciably at baseline. Uses walker with gait    Outcome Measures: - not appropriate due to command following    Assessment: Remains agitated with therapy providers and not-participatory. Given long-standing trend of refusal/behavior and all functional goals met outside of car transfer which he has refused to perform, will drop frequency to 3x/week.    Other Barriers to Discharge (DME, Family Training, etc):   Requires initial supervision at home due to behavior/cognition

## 2025-02-27 NOTE — PROGRESS NOTES
[facility]   Acute Rehabilitation Unit  Daily progress note    INTERVAL HISTORY  Chris Delcid seen this morning at bedside.  No acute events reported overnight.  Patient reports that he feels the same as he has been.  He is quite sleepy again this morning, though he states that he thought he slept fine.  He has ongoing back pain, unchanged.  He notes some minimal shortness of breath with activity.  He denies nausea but notes some upset stomach with medications.  He denies other questions or concerns at this time.    With therapies, yesterday PT completed short session with significant other present, during which patient demonstrated safe household mobility including stairs and ambulated a self-selected 125' with FWW unassisted.  PT recommended to continue to use FWW rather than 4WW at this time as he has demonstrated safe use of this and it works better in his smaller hospital room.  From functional standpoint, he is ready for discharge but continues to require supervision at all times for safety due to cognition.  With SLP this morning, patient engaged in orientation task utilizing previously introduced visual aids. Pt oriented x4 with use of visual aids and mod-max cues. Pt engaged in slightly more complex card sorting task. Pt required max cues to correctly sort cards by color within a field of x6. Noting difficulties with initiation, attention, working memory, and slow processing throughout task. Pt able to recall location of visual aids depicting orientation information when followed up at the end of session. Pt oriented x4 with use of orientation aids and mod cues.     MEDICATIONS  Current Facility-Administered Medications   Medication Dose Route Frequency Provider Last Rate Last Admin    acetaminophen (TYLENOL) tablet 975 mg  975 mg Oral TID Nick Zavala MD   975 mg at 02/27/25 0539    aspirin (ASA) chewable tablet 81 mg  81 mg Oral Daily Nichelle Chavez,  PA   81 mg at 02/26/25 0905    atorvastatin (LIPITOR) tablet 10 mg  10 mg Oral Daily Nichelle Chavez PA   10 mg at 02/26/25 0904    ezetimibe (ZETIA) tablet 10 mg  10 mg Oral Daily with supper Nick Zavala MD   10 mg at 02/26/25 1754    famotidine (PEPCID) tablet 40 mg  40 mg Oral BID w/meals Nick Zavala MD   40 mg at 02/26/25 1753    [Held by provider] glipiZIDE (GLUCOTROL) half-tab 10 mg  10 mg Oral BID AC Nichelle Chavez PA   10 mg at 02/20/25 0735    haloperidol (HALDOL) tablet 1 mg  1 mg Oral BID Je Alford MD   1 mg at 02/26/25 2019    iopamidol (ISOVUE-370) solution 500 mL  500 mL Intravenous Once Dakotah Huggins APRN CNP        lisinopril (ZESTRIL) tablet 10 mg  10 mg Oral QPM Nick Zavala MD   10 mg at 02/26/25 2019    metFORMIN (GLUCOPHAGE) tablet 1,000 mg  1,000 mg Oral BID w/meals Lupe Chase PA-C   1,000 mg at 02/26/25 1754    pantoprazole (PROTONIX) EC tablet 40 mg  40 mg Oral QAM AC Christie Cook MD   40 mg at 02/26/25 0905    potassium chloride (KLOR-CON) Packet 20 mEq  20 mEq Oral BID w/meals Lupe Chase PA-C        ramelteon (ROZEREM) tablet 8 mg  8 mg Oral At Bedtime Shanta Mcmahon MD   8 mg at 02/26/25 2018    sodium chloride 0.9 % bag for CT scan flush use  100 mL As instructed Once Dakotah Huggins APRN CNP        ticagrelor (BRILINTA) tablet 90 mg  90 mg Oral BID w/meals Nick Zavala MD   90 mg at 02/26/25 1755          Current Facility-Administered Medications   Medication Dose Route Frequency Provider Last Rate Last Admin    alum & mag hydroxide-simethicone (MAALOX) suspension 30 mL  30 mL Oral Q4H PRN Nick Zavala MD        bisacodyl (DULCOLAX) suppository 10 mg  10 mg Rectal Daily PRN Nick Zavala MD        calcium carbonate (TUMS) chewable tablet 500 mg  500 mg Oral TID PRN Lupe Chase PA-C        carboxymethylcellulose PF (REFRESH PLUS) 0.5 % ophthalmic solution 1 drop  1 drop  Both Eyes 4x Daily PRN Nick Zavala MD        glucose gel 15-30 g  15-30 g Oral Q15 Min PRN Nichelle Chavez PA   15 g at 02/20/25 1424    Or    dextrose 50 % injection 25-50 mL  25-50 mL Intravenous Q15 Min PRN Nichelle Chavez PA        Or    glucagon injection 1 mg  1 mg Subcutaneous Q15 Min PRN Nichelle Chavez PA        haloperidol lactate (HALDOL) injection 5 mg  5 mg Intramuscular Q6H PRN Je Alford MD        hydrALAZINE (APRESOLINE) tablet 10 mg  10 mg Oral Q6H PRN Nick Zavala MD        Or    hydrALAZINE (APRESOLINE) tablet 20 mg  20 mg Oral Q6H PRN Nick Zavala MD        methocarbamol (ROBAXIN) tablet 500 mg  500 mg Oral 4x Daily PRN Nick Zavala MD        polyethylene glycol (MIRALAX) Packet 17 g  17 g Oral Daily PRN Nichelle Chavez PA   17 g at 02/25/25 0821        PHYSICAL EXAM  BP (!) 169/109 (BP Location: Left arm)   Pulse 95   Temp 98.5  F (36.9  C) (Oral)   Resp 16   Ht 1.829 m (6')   Wt 105.6 kg (232 lb 12.9 oz)   SpO2 98%   BMI 31.57 kg/m    Gen: NAD, lying in bed  HEENT: NC/AT  Pulm: non-labored on room air  Abd: Non-distended  Ext: no appreciable edema in BLE  Neuro/MSK: drowsy but responded to voice    LABS  CBC RESULTS:   Recent Labs   Lab Test 02/24/25  0557 02/17/25  0534 02/15/25  0246 02/15/25  0245   WBC 6.6 7.4  --  8.9   RBC 5.08 4.90  --  5.16   HGB 14.9 14.4 15.4 15.1   HCT 43.4 41.9  --  43.3   MCV 85 86  --  84   MCH 29.3 29.4  --  29.3   MCHC 34.3 34.4  --  34.9   RDW 13.6 13.6  --  13.2    320  --  370       Last Basic Metabolic Panel:  Recent Labs   Lab Test 02/27/25  0826 02/26/25  1238 02/26/25  0816 02/24/25  0712 02/24/25  0557 02/20/25  0546 02/20/25  0541 02/17/25  0849 02/17/25  0534   NA  --   --   --   --  138  --  142  --  140   POTASSIUM  --   --   --   --  4.2  --  4.0  --  3.7   CHLORIDE  --   --   --   --  104  --  108*  --  106   CO2  --   --   --   --  20*  --  21*  --  21*   ANIONGAP  --   --    --   --  14  --  13  --  13   * 128* 146*   < > 226*   < > 65*   < > 75   BUN  --   --   --   --  21.5  --  20.0  --  14.3   CR  --   --   --   --  1.16  --  0.97  --  0.98   GFRESTIMATED  --   --   --   --  72  --  89  --  88   JAMIE  --   --   --   --  9.4  --  9.4  --  9.2    < > = values in this interval not displayed.       Rehabilitation -  Admission to acute inpatient rehab 2/14/25.    Impairment group code: Stroke Ischemic 01.3 Bilateral Involvement; acute lacunar infarcts in the left thalamus, the posterior left centrum semiovale ovale, the right occipital and right occipitotemporal white matter, and the right frontal white matter, suspected embolic etiology      PT, OT and SLP daily.  Decreasing daily minutes due to lack of meaningful therapy participation.  .     2. Impairment of ADL's:  OT to work on upper and lower body self care, dressing, toileting, bathing, energy conservation techniques with use of ADs as needed.  3. Impairment of mobility:   PT to work on gait exercises, strengthening, endurance buildup, transfers with use of walker as needed.  4. Impairment of cognition/language/swallow:   SLP for cognitive evaluation and treatment strategies for higher level cognitive deficits and memory impairment.  5. Rehab RN to administer medication, patient education on medication taking, VS monitoring, bowel regimen, glucose monitoring and management of orthostasis.     Continue comprehensive acute inpatient rehabilitation program with multidisciplinary approach including therapies, rehab nursing, and physiatry following. See interval history for updates.      ASSESSMENT AND PLAN  Chris Delcid is a 59 year old right hand dominant male with a past medical history of multiple prior strokes (left pontine; L and R centrum semiovale; R hemipons), HTN, orthostatic hypotension, HLD, DM II c/b gastroparesis, ADITI, chronic low back pain, and GERD who was admitted on 2/11/25 with worsening fatigue, weakness,  "recurrent falls, and syncope and was found to have multifocal acute/subacute subcortical infarcts with hospital course complicated by additional imaging finding of right vertebral artery occlusion/dissection, hypertensive urgency, hyperglycemia, and elevated troponin.  He was admitted to ARU on 2/14/25 for multidisciplinary rehabilitation and ongoing medical management.     Medical Conditions    Multiple acute lacunar infarcts in both cerebral hemispheres   Right vertebral artery dissection  Hx multiple prior strokes (10/2020 L pontine; 11/2021 L and R centrum semiovale infarcts; 8/2022 acute R hemipons, possible late subacute right centrum semiovale)  PTA on Brilinta monotherapy (since 8/2022 stroke).  Per neurology, patient \"has had recurrent primarily subcortical infarcts and progression of microvascular disease over the past five years. This has occurred in the setting of uncontrolled vascular risk factors (HTN, HLD, DM, ADITI), however, will pursue further evaluation for atypical stroke etiologies given recurrent nature and family history.\"  Hypercoag workup: Protein C and S, lupus anticoagulant, cardiolipin Ab, Antithrombin III, Beta 2 glycoprotein, factor II, and factor V all negative.   - Continue PTA brilinta 90 bid and ASA 81 mg daily, which was added this admission.  DAPT duration TBD  - Continue lovastatin 40 mg daily and zetia (added this admission for HLD)  - Goal BP <130/80, management as below  - Goal A1c <7, management as below   - Continue PT/OT  - Outpatient NIVIA  - Outpatient genetic referral given family history of stroke in young, can be further discussed at outpatient stroke followup   - Follow up with vascular medicine for consideration of PCSK9 inhibitor and further evaluation of hyperlipidemia   - Follow up neurology in 4-6 weeks with any stroke ALEXIS ([phone])   - Follow up with PM&R in 8-10 weeks      Agitation, paranoia  Delirium  Suicidal ideations  Probable vascular cognitive " impairment  On early evening of 2/16, patient was restless, agitated, and combative with cares. Patient reportedly consistently trying to get out of bed. Patient trying to throw punches at staff per nursing. Patient reportedly unwilling to take oral medications. Given the acutely agitated presentation and to maintain the safety of staff on unit, Code Green was activated and provider team ordered a IM olanzapine 5mg x1 for use.  1:1 bedside attendant added for safety.  Seroquel was added at bedtime (stopped trazodone).  Psychiatry consulted on 2/17 and changed seroquel to scheduled and PRN Haldol.  [place] most likely delirium though also with concerns for underlying vascular dementia.  UA negative on 2/18.  2 recurrent code 21 episodes on 2/19 due to paranoia/agitation and physical aggression.  Psychiatry reassessed and no changes to plan at that time.  However, on 2/21 had episode of self-injurious behavior (cutting wrist with plastic knife) which required physical restraints and use of PRN IM Haldol.  Scheduled Haldol doses were increased and psychiatry recommended vEEG, which showed moderate diffuse slowing but no epileptiform discharges or seizures (able to obtain 22 min).  Neurology re-consulted as well to consider any further work-up for etiology.  Recommended some labs (B12 and ammonia WNL, TSH elevated but free T4 normal), delirium precautions, ongoing psychology, but no further work-up at this time from their perspective.  Pharmacy reviewed medications, none currently felt to be contributing to increased risk of delirium  2/27: still some intermittent paranoia, especially around medications, and agitation particularly related to wanting to go home.  However, has not exhibited recurrent physical aggression or self-injurious behaviors.  Significant other has expressed concerns about safely managing at home due to impaired cognition.  Suspect that current presentation is delirium (due to unfamiliar environment  and routine, as well as recent additional multifocal strokes) in setting of possible underlying vascular dementia, especially given significant other's reports of prior baseline (low cognitive demand, was providing significant supervision).  While IDT suspects patient may perform better in familiar home environment and routine, at this time would need to recommend initial 24/7 supervision.  Will plan for care conference to further discuss options with significant other (home with 24/7 vs LTC/memory care).  - Appreciate ongoing psychiatry assistance.  Appreciate psychiatry follow up on 2/26, no changes to plan of care per their note.  - Appreciate neuropsychology assistance.  See their note on 2/26 for additional details.  - Delirium precautions  - Continue 1:1 bedside attendant  - Continue Haldol 1 mg BID (increased on 2/2) with 5 mg IM PRN for agitation.  Haldol recommended at this time due to less risk of exacerbating orthostasis.  Discussed possibly decreasing morning Haldol dose with psychiatry due to increased fatigue.  Advised dose could be decreased if daytime sedation has been consistent, but if it has only been a 1-2 day occurrence then would keep as is for at least a couple more days.    - Outpatient neuropsych testing recommended by psychiatry    Unresponsive episode x2 on 2/15  In early morning 2/15,  patient was noted by nursing staff to be weaker and non-responsive. Patient was found by nursing sitting at edge of bed on his way to the bathroom. Nursing assisted patient using gait belt and walker to the bathroom when it was noticed that patient was having weakness with standing and mobility. Patient was placed in wheelchair where he slumped to his side and became unresponsive. Patient was lifted back into bed where Code Blue was initially called but then was downgraded to RRT. ICU provider team recommended stroke code be called. Neurology evaluated patient at bedside. Vital signs showed /105 when  "resting in bed. CT imaging showed no hemorrhage or LVO. Lab work-up unremarkable. Patient was deemed not requiring transfer to acute care. Later in the morning, nursing staff stated that patient had similar episode of being disoriented and not being able to transfer from chair to bed.  Rehab medical team arrived at bedside to evaluate.  While in chair, patient was not responsive to questions.  Blood pressure noticeably lower than usual, 113/71.  Presentation was likely related to either new stroke or orthostatic hypotension.  Nursing staff to assisted with lifting patient back into bed.  Once patient was in bed and sitting up, patient became more responsive to questions.  Presentation likely related to positional orthostatic hypotension while sitting up in chair but MRI brain was ordered to rule out acute stroke.  MRI results showed no evidence of new infarcts.  Neurology evaluated results and confirmed no new strokes and signed off.  Felt possibly delirium vs hypotension vs stroke recrudescence.   - Continue to assess cognition and vital signs   - Low threshold to transfer to acute care if patient continues to demonstrate altered mental status through orthostatic hypotension  - Per neurology, consider EEG if persistent/recurrent episodes.  EEG completed on 2/21 per psychiatry, interpretation \"moderate diffuse slowing which is non-specific but suggestive of diffuse cerebral dysfunction. There were no epileptiform discharges or seizures\"      HTN   20-year hx orthostasis   Has had long standing issue with orthostatic hypotension even before diagnosis of HTN. Had a tilt table test through [facility] that showed severe orthostatic hypotension. Was followed by cardiology and has a loop recorder in place   Per his SO didn't tolerate abdominal binder and compression socks in the past; was also on midodrine for a while, but this was for overall hypotension and not specifically for orthostasis.  Cardiology consulted at ARU " for assistance with management of OH in the setting of overall elevated BPs.  Recommended changing Lisinopril to 10 mg at bedtime, and continue with conservative measures as able.  Assistance appreciated.  - Continue lisinopril 10 mg at HS  - Continue slow position changes, PO intake abdominal binder, compression  - Continue hydralazine 10-20 mg q6h PRN for SBP >200  - Given long history of OH, target BP goal may be higher than typical for a patient post-stroke     HLD    2/11  Documented intolerance to atorvastatin, rosuvastatin, simvastatin   - Continue lovastatin and zetia as above   - Outpatient referral to vascular medicine for consideration of PCSK9 inhibitor and further evaluation of hyperlipidemia      Type II diabetes mellitus  Polyneuropathy ?  Autonomic dysfunction   PTA on metformin 1000 mg BID and glipizide 10 mg BID. Was not checking his BG at home as his BG was well controlled but has a glucometer.  A1c 8.6% on 2/11/25.  Hyperglycemia during inpatient stay but has been lower at ARU with decreased intake in setting of altered mental status as above.  - Goal A1c <7.0   - Monitor BG TID AC + HS.    - Holding glipizide starting 2/20 due to hypoglycemia in setting of impaired intake.  Glucose levels improved since discontinuation.   - Continue PTA metformin 1000 mg BID.  XR changed to IR formulation on 2/26 to allow option to crush medications when patient reluctant to swallow (has been observed to pocket or spit out, felt to be behavioral related to paranoia).  Monitor for any GI side effects.  - Hypoglycemia protocol     Bilateral hand muscle atrophy and paresthesia   Concerns for focal mononeuropathy vs cervical radiculopathy; doesn't seem to be explained by polyneuropathy only    - Neurology follow up as outpatient    - May need NCS/EMG for more evaluation      ADITI   Insomnia   Was not using CPAP prior to admission.  Diagnosed 2/2022 following witnessed apneic episodes and polysomnogram 12/2021.  Patient reports stopping BIPAP therapy for his ADITI due to abdominal bloating. He reports a repeat polysomnogram after stopping BIPAP that showed minimal ADITI.   - Trazodone and seroquel discontinued as above  - Continue ramelteon 8 mg at HS (added 2/17)  - Sleep hygiene  - Follow up with sleep medicine as outpatient    Moderate malnutrition in the context of acute illness or injury  Poor oral intake  At ARU admission, felt that patient did not appear to be having good oral hydration.  Stable electrolytes, did receive IVF bolus on 2/16 for hydration.  With increased paranoia and agitation since ARU admission (as above), PO intake has been limited.  - RD consulted, appreciate assistance  - Carb restriction removed from diet order on 2/20 to allow greater selections, will monitor BG  - Supplements per RD  - RD also sending more finger foods and pre-packaged foods for safety and to encourage more intake given paranoia and self-injurious behaviors     Chronic low back pain  Secondary to prior injury years ago. Follows at [facility] in [place].  - Scheduled Tylenol 975 mg TID  - Continue Robaxin 500 mg QID PRN (added 2/25)     Tobacco use  Per significant other, has significant tobacco use history and is concerned he is having cravings or withdrawal.  Nicotine patch was ordered to help if any cravings present, however he removed and tried to eat it on 2/20 so discontinued  - Consider PRN nicotine replacement such as gum or lozenge if appropriate     Adjustment to disability:  Clinical psychology to eval and treat for support/assistance for significant other  FEN: regular diet  Bowel: on prn bowel meds; continent.  Some recent constipation but did have BM on 2/25 with PRN Miralax (did not take suppository) and again 2/26.  Continue to monitor.  Bladder: continent.  One PVR obtained 60 ml.  Unable to obtain further values due to agitation  DVT Prophylaxis: mechanical  GI Prophylaxis: on famotidine due to h/o GERD;  added TUMS and Mylanta prn  Code: DNR/DNI - confirmed upon admission   Disposition: home with initial 24-hour supervision vs long-term care/memory care.  SW planning to schedule care conference to further discuss.  ELOS:  Pending dispo as above  Follow up Appointments on Discharge: PCP in 1-2 weeks, cardiology and NIVIA, stroke neurology, vascular surgery, PM&R, sleep medicine       25 minutes spent on the date of service doing chart review, history and exam, documentation, and further activities as noted above.     Plan of care was discussed with Dr. Gary Zavala, PM&R staff physician     Lupe Chase PA-C  Physical Medicine & Rehabilitation

## 2025-02-28 ENCOUNTER — APPOINTMENT (OUTPATIENT)
Dept: OCCUPATIONAL THERAPY | Facility: CLINIC | Age: 60
DRG: 057 | End: 2025-02-28
Attending: PHYSICAL MEDICINE & REHABILITATION
Payer: COMMERCIAL

## 2025-02-28 ENCOUNTER — APPOINTMENT (OUTPATIENT)
Dept: SPEECH THERAPY | Facility: CLINIC | Age: 60
DRG: 057 | End: 2025-02-28
Attending: PHYSICAL MEDICINE & REHABILITATION
Payer: COMMERCIAL

## 2025-02-28 LAB
GLUCOSE BLDC GLUCOMTR-MCNC: 147 MG/DL (ref 70–99)
GLUCOSE BLDC GLUCOMTR-MCNC: 157 MG/DL (ref 70–99)

## 2025-02-28 PROCEDURE — 97130 THER IVNTJ EA ADDL 15 MIN: CPT | Mod: GN

## 2025-02-28 PROCEDURE — 250N000013 HC RX MED GY IP 250 OP 250 PS 637

## 2025-02-28 PROCEDURE — 99231 SBSQ HOSP IP/OBS SF/LOW 25: CPT | Performed by: PHYSICAL MEDICINE & REHABILITATION

## 2025-02-28 PROCEDURE — 250N000013 HC RX MED GY IP 250 OP 250 PS 637: Performed by: PHYSICIAN ASSISTANT

## 2025-02-28 PROCEDURE — 128N000003 HC R&B REHAB

## 2025-02-28 PROCEDURE — 250N000013 HC RX MED GY IP 250 OP 250 PS 637: Performed by: PHYSICAL MEDICINE & REHABILITATION

## 2025-02-28 PROCEDURE — 250N000013 HC RX MED GY IP 250 OP 250 PS 637: Performed by: STUDENT IN AN ORGANIZED HEALTH CARE EDUCATION/TRAINING PROGRAM

## 2025-02-28 PROCEDURE — 97535 SELF CARE MNGMENT TRAINING: CPT | Mod: GO

## 2025-02-28 PROCEDURE — 97129 THER IVNTJ 1ST 15 MIN: CPT | Mod: GN

## 2025-02-28 RX ADMIN — LISINOPRIL 10 MG: 10 TABLET ORAL at 20:09

## 2025-02-28 RX ADMIN — ACETAMINOPHEN 975 MG: 325 TABLET, FILM COATED ORAL at 20:10

## 2025-02-28 RX ADMIN — METFORMIN HYDROCHLORIDE 1000 MG: 500 TABLET, FILM COATED ORAL at 17:07

## 2025-02-28 RX ADMIN — TICAGRELOR 90 MG: 90 TABLET ORAL at 17:07

## 2025-02-28 RX ADMIN — HALOPERIDOL 1 MG: 0.5 TABLET ORAL at 20:10

## 2025-02-28 RX ADMIN — FAMOTIDINE 40 MG: 20 TABLET, FILM COATED ORAL at 17:07

## 2025-02-28 RX ADMIN — RAMELTEON 8 MG: 8 TABLET, FILM COATED ORAL at 20:10

## 2025-02-28 RX ADMIN — EZETIMIBE 10 MG: 10 TABLET ORAL at 17:07

## 2025-02-28 ASSESSMENT — ACTIVITIES OF DAILY LIVING (ADL)
ADLS_ACUITY_SCORE: 54

## 2025-02-28 NOTE — PROGRESS NOTES
"                                                           ARU Social Work Progress Notes       Updates from insurance;\" They report they would like his next review date to be Wednesday 3/5 following his care conference. They report his \"probable\" last covered date will be Friday 3/7/25 as it does not appear he will need continued IPR\".  Cris called spouse but did not get a response back.     Next Steps  Call spouse on Monday.        JAX Molina  [facility], Acute Inpatient Rehab Unit   [address] [place] [address], [address] [place] [address]  Phone: [phone]  Fax: [phone]    "

## 2025-02-28 NOTE — PLAN OF CARE
Goal Outcome Evaluation:      Plan of Care Reviewed With: patient, spouse    Overall Patient Progress: no changeOverall Patient Progress: no change    VS: /91, did not qualify for prn Hydralazine   O2: 99% at RA. No sob   Output: See I and O flow sheet   Last BM: 2/26/25   Activity: Assist of 1 with transfer belt and walker   Skin: Some scratches on the left forearm and left leg, it is scabbing   Pain: Denies pain   CMS: Intact   Dressing: none   Diet: Regular/ Thin   LDA: None   Equipment: Transfer belt and walker, he also uses wheel chair when out of room   Plan: Continue with bed side attendant as pt does not initiate any thing for assistance with poor balance and poor decision making. He has impaired safety awareness   Additional Info: Pt responds better when approached calmly

## 2025-02-28 NOTE — PLAN OF CARE
Goal Outcome Evaluation:      Plan of Care Reviewed With: patient    Overall Patient Progress: no changeOverall Patient Progress: no change  [id number]   No S/S of pain noted. Did not answer when asked if he is in pain. Medication given crushed with apple sauce. /107 scheduled Lisinopril was given. Remained on 1:1 sitter for safety. No care concern this time. Patient is calm and sleeping. Staff will continue to monitor.

## 2025-02-28 NOTE — PLAN OF CARE
Goal Outcome Evaluation:      Plan of Care Reviewed With: patient    Overall Patient Progress: no changeOverall Patient Progress: no change    VS: BP (!) 151/82 (BP Location: Left arm)   Pulse 88   Temp 98.2  F (36.8  C) (Oral)   Resp 16   Ht 1.829 m (6')   Wt 109 kg (240 lb 4.8 oz)   SpO2 99%   BMI 32.59 kg/m       O2: SpO2 > 99 and stable on RA. Denies chest pain and SOB.    Output: Voids spontaneously without difficulty to bathroom / using beside urinal    Last BM: 2/26 denies abdominal discomfort. BS active / passing flatus.    Activity: Up with A1 walker and gait belt    Skin: WDL    Pain: Denied pain    CMS: Intact, Aox2 . Disoriented to situation and time. Denies numbness and tingling.   Dressing:    Diet: Regular diet. Denies nausea/vomiting.   BG checks before meals and at bedtime.    LDA: No PIV access.    Equipment: IV pole, personal belongings,    Plan: Safety precautions maintained / Continue with plan of care. Call light within reach, pt able to make needs known.    Additional Info: Patient refused all his meds during the shift after 3 attempts. PA updated about refusal of medication.  remains with patient during the shift.  Nursing will continue to monitor.

## 2025-02-28 NOTE — PROGRESS NOTES
"  [facility]   Acute Rehabilitation Unit  Daily progress note    INTERVAL HISTORY  Chris Delcid seen this morning at bedside.  No acute events overnight, was calm however spit out all of this morning's medications.  When asked why he did not take his medications he says he \"puke them up\".  I advised that his medications are important to prevent another stroke and for his diabetes, and if he is feeling nauseous we can treat that, however he then told me \"there's shit in there\".  He denies any chest pain or shortness of breath.  Continues to endorse back pain and I reiterated that one of his medications is Tylenol which is to help with his pain, and also as needed Robaxin is available.  Functionally, therapy has decreased time in frequency due to fluctuating mental status and frequent nonparticipation and behaviors.  Care conference planned with significant other next week.      MEDICATIONS  Current Facility-Administered Medications   Medication Dose Route Frequency Provider Last Rate Last Admin    acetaminophen (TYLENOL) tablet 975 mg  975 mg Oral TID Nick Zavala MD   975 mg at 02/27/25 2114    aspirin (ASA) chewable tablet 81 mg  81 mg Oral Daily Nichelle Chavez PA   81 mg at 02/27/25 1021    atorvastatin (LIPITOR) tablet 10 mg  10 mg Oral Daily Nichelle Chavez PA   10 mg at 02/27/25 1013    ezetimibe (ZETIA) tablet 10 mg  10 mg Oral Daily with supper Nick Zavala MD   10 mg at 02/27/25 1706    famotidine (PEPCID) tablet 40 mg  40 mg Oral BID w/meals Nick Zavala MD   40 mg at 02/27/25 1705    [Held by provider] glipiZIDE (GLUCOTROL) half-tab 10 mg  10 mg Oral BID AC Nichelle Chavez PA   10 mg at 02/20/25 0735    haloperidol (HALDOL) tablet 1 mg  1 mg Oral BID Je Alford MD   1 mg at 02/27/25 2114    iopamidol (ISOVUE-370) solution 500 mL  500 mL Intravenous Once Dakotah Huggins APRN CNP        lisinopril (ZESTRIL) tablet 10 mg  10 " mg Oral QPM Nick Zavala MD   10 mg at 02/27/25 2114    metFORMIN (GLUCOPHAGE) tablet 1,000 mg  1,000 mg Oral BID w/meals Lupe Chase PA-C   1,000 mg at 02/27/25 1706    pantoprazole (PROTONIX) EC tablet 40 mg  40 mg Oral QAM AC Christie Cook MD   40 mg at 02/27/25 1011    potassium chloride (KLOR-CON) Packet 20 mEq  20 mEq Oral BID w/meals Lupe Chase PA-C   20 mEq at 02/27/25 1705    ramelteon (ROZEREM) tablet 8 mg  8 mg Oral At Bedtime Shanta Mcmahon MD   8 mg at 02/27/25 2114    sodium chloride 0.9 % bag for CT scan flush use  100 mL As instructed Once Dakotah Huggins APRN CNP        ticagrelor (BRILINTA) tablet 90 mg  90 mg Oral BID w/meals Nick Zavala MD   90 mg at 02/27/25 1705          Current Facility-Administered Medications   Medication Dose Route Frequency Provider Last Rate Last Admin    alum & mag hydroxide-simethicone (MAALOX) suspension 30 mL  30 mL Oral Q4H PRN Nick Zavala MD        bisacodyl (DULCOLAX) suppository 10 mg  10 mg Rectal Daily PRN Nick Zavala MD        calcium carbonate (TUMS) chewable tablet 500 mg  500 mg Oral TID PRN Lupe Chase PA-C        carboxymethylcellulose PF (REFRESH PLUS) 0.5 % ophthalmic solution 1 drop  1 drop Both Eyes 4x Daily PRN Nick Zavala MD        glucose gel 15-30 g  15-30 g Oral Q15 Min PRN Nichelle Chavez PA   15 g at 02/20/25 1424    Or    dextrose 50 % injection 25-50 mL  25-50 mL Intravenous Q15 Min PRN Nichelle Chavez PA        Or    glucagon injection 1 mg  1 mg Subcutaneous Q15 Min PRN Nichelle Chavez PA        haloperidol lactate (HALDOL) injection 5 mg  5 mg Intramuscular Q6H PRN Je Alford MD        hydrALAZINE (APRESOLINE) tablet 10 mg  10 mg Oral Q6H PRN Nick Zavala MD        Or    hydrALAZINE (APRESOLINE) tablet 20 mg  20 mg Oral Q6H PRN Nick Zavala MD        methocarbamol (ROBAXIN) tablet 500 mg  500 mg Oral 4x Daily PRN  Nikc Zavala MD        polyethylene glycol (MIRALAX) Packet 17 g  17 g Oral Daily PRN Nichelle Chavez PA   17 g at 02/25/25 0821        PHYSICAL EXAM  BP (!) 151/82 (BP Location: Left arm)   Pulse 88   Temp 98.2  F (36.8  C) (Oral)   Resp 16   Ht 1.829 m (6')   Wt 109 kg (240 lb 4.8 oz)   SpO2 99%   BMI 32.59 kg/m    Gen: NAD, lying in bed  HEENT: NC/AT  Pulm: non-labored on room air  Abd: Non-distended  Ext: no appreciable edema in BLE  Neuro/MSK: Awake and alert, dysarthric    LABS  CBC RESULTS:   Recent Labs   Lab Test 02/24/25  0557 02/17/25  0534 02/15/25  0246 02/15/25  0245   WBC 6.6 7.4  --  8.9   RBC 5.08 4.90  --  5.16   HGB 14.9 14.4 15.4 15.1   HCT 43.4 41.9  --  43.3   MCV 85 86  --  84   MCH 29.3 29.4  --  29.3   MCHC 34.3 34.4  --  34.9   RDW 13.6 13.6  --  13.2    320  --  370       Last Basic Metabolic Panel:  Recent Labs   Lab Test 02/28/25  1259 02/28/25  0751 02/27/25  2103 02/24/25  0712 02/24/25  0557 02/20/25  0546 02/20/25  0541 02/17/25  0849 02/17/25  0534   NA  --   --   --   --  138  --  142  --  140   POTASSIUM  --   --   --   --  4.2  --  4.0  --  3.7   CHLORIDE  --   --   --   --  104  --  108*  --  106   CO2  --   --   --   --  20*  --  21*  --  21*   ANIONGAP  --   --   --   --  14  --  13  --  13   * 147* 130*   < > 226*   < > 65*   < > 75   BUN  --   --   --   --  21.5  --  20.0  --  14.3   CR  --   --   --   --  1.16  --  0.97  --  0.98   GFRESTIMATED  --   --   --   --  72  --  89  --  88   JAMIE  --   --   --   --  9.4  --  9.4  --  9.2    < > = values in this interval not displayed.       Rehabilitation -  Admission to acute inpatient rehab 2/14/25.    Impairment group code: Stroke Ischemic 01.3 Bilateral Involvement; acute lacunar infarcts in the left thalamus, the posterior left centrum semiovale ovale, the right occipital and right occipitotemporal white matter, and the right frontal white matter, suspected embolic etiology      PT, OT and SLP  daily.  Decreasing daily minutes due to lack of meaningful therapy participation.  2. Impairment of ADL's:  OT to work on upper and lower body self care, dressing, toileting, bathing, energy conservation techniques with use of ADs as needed.  3. Impairment of mobility:   PT to work on gait exercises, strengthening, endurance buildup, transfers with use of walker as needed.  4. Impairment of cognition/language/swallow:   SLP for cognitive evaluation and treatment strategies for higher level cognitive deficits and memory impairment.  5. Rehab RN to administer medication, patient education on medication taking, VS monitoring, bowel regimen, glucose monitoring and management of orthostasis.     Continue comprehensive acute inpatient rehabilitation program with multidisciplinary approach including therapies, rehab nursing, and physiatry following. See interval history for updates.      ASSESSMENT AND PLAN  Chris Delcid is a 59 year old right hand dominant male with a past medical history of multiple prior strokes (left pontine; L and R centrum semiovale; R hemipons), HTN, orthostatic hypotension, HLD, DM II c/b gastroparesis, ADITI, chronic low back pain, and GERD who was admitted on 2/11/25 with worsening fatigue, weakness, recurrent falls, and syncope and was found to have multifocal acute/subacute subcortical infarcts with hospital course complicated by additional imaging finding of right vertebral artery occlusion/dissection, hypertensive urgency, hyperglycemia, and elevated troponin.  He was admitted to ARU on 2/14/25 for multidisciplinary rehabilitation and ongoing medical management.     Medical Conditions    Multiple acute lacunar infarcts in both cerebral hemispheres   Right vertebral artery dissection  Hx multiple prior strokes (10/2020 L pontine; 11/2021 L and R centrum semiovale infarcts; 8/2022 acute R hemipons, possible late subacute right centrum semiovale)  PTA on Brilinta monotherapy (since 8/2022  "stroke).  Per neurology, patient \"has had recurrent primarily subcortical infarcts and progression of microvascular disease over the past five years. This has occurred in the setting of uncontrolled vascular risk factors (HTN, HLD, DM, ADITI), however, will pursue further evaluation for atypical stroke etiologies given recurrent nature and family history.\"  Hypercoag workup: Protein C and S, lupus anticoagulant, cardiolipin Ab, Antithrombin III, Beta 2 glycoprotein, factor II, and factor V all negative.   - Continue PTA brilinta 90 bid and ASA 81 mg daily, which was added this admission.  DAPT duration TBD  - Continue lovastatin 40 mg daily and zetia (added this admission for HLD)  - Goal BP <130/80, management as below  - Goal A1c <7, management as below   - Continue PT/OT  - Outpatient NIVIA  - Outpatient genetic referral given family history of stroke in young, can be further discussed at outpatient stroke followup   - Follow up with vascular medicine for consideration of PCSK9 inhibitor and further evaluation of hyperlipidemia   - Follow up neurology in 4-6 weeks with any stroke A[facility] ([phone])   - Follow up with PM&R in 8-10 weeks      Agitation, paranoia  Delirium  Suicidal ideations  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 Patient reportedly consistently trying to get out of bed. Patient trying to throw punches at staff per nursing. Patient reportedly unwilling to take oral medications. Given the acutely agitated presentation and to maintain the safety of staff on unit, Code Green was activated and provider team ordered a IM olanzapine 5mg x1 for use.  1:1 bedside attendant added for safety.  Seroquel was added at bedtime (stopped trazodone).  Psychiatry consulted on 2/17 and changed seroquel to scheduled and PRN Haldol.  [place] most likely delirium though also with concerns for underlying vascular dementia.  UA negative on 2/18. "  2 recurrent code 21 episodes on 2/19 due to paranoia/agitation and physical aggression.  Psychiatry reassessed and no changes to plan at that time.  However, on 2/21 had episode of self-injurious behavior (cutting wrist with plastic knife) which required physical restraints and use of PRN IM Haldol.  Scheduled Haldol doses were increased and psychiatry recommended vEEG, which showed moderate diffuse slowing but no epileptiform discharges or seizures (able to obtain 22 min).  Neurology re-consulted as well to consider any further work-up for etiology.  Recommended some labs (B12 and ammonia WNL, TSH elevated but free T4 normal), delirium precautions, ongoing psychology, but no further work-up at this time from their perspective.  Pharmacy reviewed medications, none currently felt to be contributing to increased risk of delirium  2/27: still some intermittent paranoia, especially around medications, and agitation particularly related to wanting to go home.  However, has not exhibited recurrent physical aggression or self-injurious behaviors.  Significant other has expressed concerns about safely managing at home due to impaired cognition.  Suspect that current presentation is delirium (due to unfamiliar environment and routine, as well as recent additional multifocal strokes) in setting of possible underlying vascular dementia, especially given significant other's reports of prior baseline (low cognitive demand, was providing significant supervision).  While IDT suspects patient may perform better in familiar home environment and routine, at this time would need to recommend initial 24/7 supervision.  Will plan for care conference to further discuss options with significant other (home with 24/7 vs LTC/memory care).  - Appreciate ongoing psychiatry assistance.  Appreciate psychiatry follow up on 2/26, no changes to plan of care per their note.  - Appreciate neuropsychology assistance.  See their note on 2/26 for  additional details.  - Delirium precautions  - Continue 1:1 bedside attendant  - Continue Haldol 1 mg BID (increased on 2/2) with 5 mg IM PRN for agitation.  Haldol recommended at this time due to less risk of exacerbating orthostasis.  Discussed possibly decreasing morning Haldol dose with psychiatry due to increased fatigue.  Advised dose could be decreased if daytime sedation has been consistent, but if it has only been a 1-2 day occurrence then would keep as is for at least a couple more days.    - Outpatient neuropsych testing recommended by psychiatry    Unresponsive episode x2 on 2/15  In early morning 2/15,  patient was noted by nursing staff to be weaker and non-responsive. Patient was found by nursing sitting at edge of bed on his way to the bathroom. Nursing assisted patient using gait belt and walker to the bathroom when it was noticed that patient was having weakness with standing and mobility. Patient was placed in wheelchair where he slumped to his side and became unresponsive. Patient was lifted back into bed where Code Blue was initially called but then was downgraded to RRT. ICU provider team recommended stroke code be called. Neurology evaluated patient at bedside. Vital signs showed /105 when resting in bed. CT imaging showed no hemorrhage or LVO. Lab work-up unremarkable. Patient was deemed not requiring transfer to acute care. Later in the morning, nursing staff stated that patient had similar episode of being disoriented and not being able to transfer from chair to bed.  Rehab medical team arrived at bedside to evaluate.  While in chair, patient was not responsive to questions.  Blood pressure noticeably lower than usual, 113/71.  Presentation was likely related to either new stroke or orthostatic hypotension.  Nursing staff to assisted with lifting patient back into bed.  Once patient was in bed and sitting up, patient became more responsive to questions.  Presentation likely related to  "positional orthostatic hypotension while sitting up in chair but MRI brain was ordered to rule out acute stroke.  MRI results showed no evidence of new infarcts.  Neurology evaluated results and confirmed no new strokes and signed off.  Felt possibly delirium vs hypotension vs stroke recrudescence.   - Continue to assess cognition and vital signs   - Low threshold to transfer to acute care if patient continues to demonstrate altered mental status through orthostatic hypotension  - Per neurology, consider EEG if persistent/recurrent episodes.  EEG completed on 2/21 per psychiatry, interpretation \"moderate diffuse slowing which is non-specific but suggestive of diffuse cerebral dysfunction. There were no epileptiform discharges or seizures\"      HTN   20-year hx orthostasis   Has had long standing issue with orthostatic hypotension even before diagnosis of HTN. Had a tilt table test through [facility] that showed severe orthostatic hypotension. Was followed by cardiology and has a loop recorder in place   Per his SO didn't tolerate abdominal binder and compression socks in the past; was also on midodrine for a while, but this was for overall hypotension and not specifically for orthostasis.  Cardiology consulted at ARU for assistance with management of OH in the setting of overall elevated BPs.  Recommended changing Lisinopril to 10 mg at bedtime, and continue with conservative measures as able.  Assistance appreciated.  - Continue lisinopril 10 mg at HS  - Continue slow position changes, PO intake abdominal binder, compression  - Continue hydralazine 10-20 mg q6h PRN for SBP >200  - Given long history of OH, target BP goal may be higher than typical for a patient post-stroke     HLD    2/11  Documented intolerance to atorvastatin, rosuvastatin, simvastatin   - Continue lovastatin and zetia as above   - Outpatient referral to vascular medicine for consideration of PCSK9 inhibitor and further evaluation of " hyperlipidemia      Type II diabetes mellitus  Polyneuropathy ?  Autonomic dysfunction   PTA on metformin 1000 mg BID and glipizide 10 mg BID. Was not checking his BG at home as his BG was well controlled but has a glucometer.  A1c 8.6% on 2/11/25.  Hyperglycemia during inpatient stay but has been lower at ARU with decreased intake in setting of altered mental status as above.  - Goal A1c <7.0   - Monitor BG TID AC + HS.    - Holding glipizide starting 2/20 due to hypoglycemia in setting of impaired intake.  Glucose levels improved since discontinuation.   - Continue PTA metformin 1000 mg BID.  XR changed to IR formulation on 2/26 to allow option to crush medications when patient reluctant to swallow (has been observed to pocket or spit out, felt to be behavioral related to paranoia).  Monitor for any GI side effects.  - Hypoglycemia protocol     Bilateral hand muscle atrophy and paresthesia   Concerns for focal mononeuropathy vs cervical radiculopathy; doesn't seem to be explained by polyneuropathy only    - Neurology follow up as outpatient    - May need NCS/EMG for more evaluation      ADITI   Insomnia   Was not using CPAP prior to admission.  Diagnosed 2/2022 following witnessed apneic episodes and polysomnogram 12/2021. Patient reports stopping BIPAP therapy for his ADITI due to abdominal bloating. He reports a repeat polysomnogram after stopping BIPAP that showed minimal ADITI.   - Trazodone and seroquel discontinued as above  - Continue ramelteon 8 mg at HS (added 2/17)  - Sleep hygiene  - Follow up with sleep medicine as outpatient    Moderate malnutrition in the context of acute illness or injury  Poor oral intake  At ARU admission, felt that patient did not appear to be having good oral hydration.  Stable electrolytes, did receive IVF bolus on 2/16 for hydration.  With increased paranoia and agitation since ARU admission (as above), PO intake has been limited.  - RD consulted, appreciate assistance  - Carb  restriction removed from diet order on 2/20 to allow greater selections, will monitor BG  - Supplements per RD  - RD also sending more finger foods and pre-packaged foods for safety and to encourage more intake given paranoia and self-injurious behaviors     Chronic low back pain  Secondary to prior injury years ago. Follows at [facility] in [place].  - Scheduled Tylenol 975 mg TID  - Continue Robaxin 500 mg QID PRN (added 2/25)     Tobacco use  Per significant other, has significant tobacco use history and is concerned he is having cravings or withdrawal.  Nicotine patch was ordered to help if any cravings present, however he removed and tried to eat it on 2/20 so discontinued  - Consider PRN nicotine replacement such as gum or lozenge if appropriate     Adjustment to disability:  Clinical psychology to eval and treat for support/assistance for significant other  FEN: regular diet  Bowel: on prn bowel meds; continent.  Some recent constipation but did have BM on 2/25 with PRN Miralax (did not take suppository) and again 2/26 (which is last documented BM).  Continue to monitor.  Bladder: continent.  One PVR obtained 60 ml.  Unable to obtain further values due to agitation  DVT Prophylaxis: mechanical  GI Prophylaxis: on famotidine due to h/o GERD; added TUMS and Mylanta prn  Code: DNR/DNI - confirmed upon admission   Disposition: home with initial 24-hour supervision vs long-term care/memory care.  Care conference planned for 3/4  ELOS:  Pending dispo as above  Follow up Appointments on Discharge: PCP in 1-2 weeks, cardiology and NIVIA, stroke neurology, vascular surgery, PM&R, sleep medicine       25 minutes spent on the date of service doing chart review, history and exam, documentation, and further activities as noted above.     Gary Zavala MD  Department of Rehabilitation Medicine

## 2025-02-28 NOTE — PROGRESS NOTES
Discharge Planner Post-Acute Rehab OT:     Discharge Plan: TBD     Precautions: Orthostatic hypotension and significant resting hypertension, cognition - impulsive and agitated    Permissive resting/supine hypertension unless diastolic >110. Pt will be orthostatic at ranges considered WNL.    Do not leave alone in seated position; must have legs elevated in recliner.    Current Status:  ADLs:  Mobility: Supervision/1:1 FWW for behavior only; functionally Mod I from physical and simple functional cognition perspective. Instances of increased A needed when orthostatic.  Grooming: Supervision  Dressing: Supervision FWW.  Bathing: Simulated tub/shower transfer with grab bars similar to home environment SBA FWW <> shower chair. Clinical judgement Min A.  Toileting: Prompts to initiate toileting in ARU environment; mixed continence. Recommend supervision FWW <> toilet d/t hx of syncopal episodes.   IADLs: Baseline IND simple meal prep, light home mgmt, med mgmt. 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 Enjoys refurbishing antique toys.   Vision/Cognition: Baseline vision deficit d/t retinal disease. Dysarthric. Delirium - fluctuating orientation. Agitated and combative at times. Significant functional cognition deficits across attention, memory, reasoning, impulse control. Level of prompting required for initiation or sequencing variable since agitation and appears to fluctuate with medications.    Assessment: Calm and appropriate in session; completed tub/shower transfer with grab bars <> shower chair FWW SBA simulating home environment; BP tolerated well.    Other Barriers to Discharge (DME, Family Training, etc):   Cognition.  Fall risk  - significant orthostatic.      DME: Shower chair, 4WW.

## 2025-02-28 NOTE — PLAN OF CARE
Goal Outcome Evaluation:      Plan of Care Reviewed With: patient    Overall Patient Progress: no changeOverall Patient Progress: no change     Per sitter report patient slept on and off at night, appeared to be calm on bed  No physical manifestation of pain, no respiratory distress.  Patient can  be impulsive at times, no behavior overnight  Incontinent of Bladder, no BM this shift  No significant changed overnight  Plan of care ongoing.    Tried to checked pt B/P this morning, but pt was lying flat on bed, does not want to elevate his head, per order B/P should be check in upright or HOB elevated.

## 2025-03-01 ENCOUNTER — APPOINTMENT (OUTPATIENT)
Dept: OCCUPATIONAL THERAPY | Facility: CLINIC | Age: 60
DRG: 057 | End: 2025-03-01
Attending: PHYSICAL MEDICINE & REHABILITATION
Payer: COMMERCIAL

## 2025-03-01 ENCOUNTER — APPOINTMENT (OUTPATIENT)
Dept: SPEECH THERAPY | Facility: CLINIC | Age: 60
DRG: 057 | End: 2025-03-01
Attending: PHYSICAL MEDICINE & REHABILITATION
Payer: COMMERCIAL

## 2025-03-01 LAB
GLUCOSE BLDC GLUCOMTR-MCNC: 194 MG/DL (ref 70–99)
GLUCOSE BLDC GLUCOMTR-MCNC: 219 MG/DL (ref 70–99)

## 2025-03-01 PROCEDURE — 250N000013 HC RX MED GY IP 250 OP 250 PS 637: Performed by: STUDENT IN AN ORGANIZED HEALTH CARE EDUCATION/TRAINING PROGRAM

## 2025-03-01 PROCEDURE — 250N000013 HC RX MED GY IP 250 OP 250 PS 637: Performed by: PHYSICIAN ASSISTANT

## 2025-03-01 PROCEDURE — 97130 THER IVNTJ EA ADDL 15 MIN: CPT | Mod: GN

## 2025-03-01 PROCEDURE — 250N000013 HC RX MED GY IP 250 OP 250 PS 637: Performed by: PHYSICAL MEDICINE & REHABILITATION

## 2025-03-01 PROCEDURE — 128N000003 HC R&B REHAB

## 2025-03-01 PROCEDURE — 97535 SELF CARE MNGMENT TRAINING: CPT | Mod: GO

## 2025-03-01 PROCEDURE — 250N000011 HC RX IP 250 OP 636: Performed by: STUDENT IN AN ORGANIZED HEALTH CARE EDUCATION/TRAINING PROGRAM

## 2025-03-01 PROCEDURE — 97129 THER IVNTJ 1ST 15 MIN: CPT | Mod: GN

## 2025-03-01 RX ADMIN — TICAGRELOR 90 MG: 90 TABLET ORAL at 07:54

## 2025-03-01 RX ADMIN — HALOPERIDOL LACTATE 5 MG: 5 INJECTION, SOLUTION INTRAMUSCULAR at 19:59

## 2025-03-01 RX ADMIN — PANTOPRAZOLE SODIUM 40 MG: 40 TABLET, DELAYED RELEASE ORAL at 07:54

## 2025-03-01 RX ADMIN — TICAGRELOR 90 MG: 90 TABLET ORAL at 17:24

## 2025-03-01 RX ADMIN — METFORMIN HYDROCHLORIDE 1000 MG: 500 TABLET, FILM COATED ORAL at 17:24

## 2025-03-01 RX ADMIN — ASPIRIN 81 MG CHEWABLE TABLET 81 MG: 81 TABLET CHEWABLE at 07:54

## 2025-03-01 RX ADMIN — ATORVASTATIN CALCIUM 10 MG: 10 TABLET, FILM COATED ORAL at 07:54

## 2025-03-01 RX ADMIN — FAMOTIDINE 40 MG: 20 TABLET, FILM COATED ORAL at 17:24

## 2025-03-01 RX ADMIN — FAMOTIDINE 40 MG: 20 TABLET, FILM COATED ORAL at 07:54

## 2025-03-01 RX ADMIN — EZETIMIBE 10 MG: 10 TABLET ORAL at 17:24

## 2025-03-01 RX ADMIN — HALOPERIDOL 1 MG: 0.5 TABLET ORAL at 07:54

## 2025-03-01 ASSESSMENT — ACTIVITIES OF DAILY LIVING (ADL)
ADLS_ACUITY_SCORE: 54

## 2025-03-01 NOTE — PROGRESS NOTES
Discharge Planner Post-Acute Rehab OT:     Discharge Plan: TBD     Precautions: Orthostatic hypotension and significant resting hypertension, cognition - impulsive and agitated    Permissive resting/supine hypertension unless diastolic >110. Pt will be orthostatic at ranges considered WNL.    Do not leave alone in seated position; must have legs elevated in recliner.    Current Status:  ADLs:  Mobility: Supervision/1:1 FWW for behavior only; functionally Mod I from physical and simple functional cognition perspective. Instances of increased A needed when orthostatic.  Grooming: Supervision  Dressing: Supervision FWW.  Bathing: Simulated tub/shower transfer with grab bars similar to home environment SBA FWW <> shower chair. Clinical judgement Min A.  Toileting: Prompts to initiate toileting in ARU environment; mixed continence. Recommend supervision FWW <> toilet d/t hx of syncopal episodes.   IADLs: Baseline IND simple meal prep, light home mgmt, med mgmt. 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 Enjoys refurbishing antique toys.   Vision/Cognition: Baseline vision deficit d/t retinal disease. Dysarthric. Delirium - fluctuating orientation. Agitated and combative at times. Significant functional cognition deficits across attention, memory, reasoning, impulse control. Level of prompting required for initiation or sequencing variable since agitation and appears to fluctuate with medications.    Assessment: Agitated and perseverating on cell phone upon approach; improved over course of session with slow approach and behavioral mgmt techniques including use of name, self-narration of writer's actions, engagement in tasks; however, pt continues to redirect full attention to calling s/o. Able to complete dressing and transfers SBA FWW with cues for initiation and continuation.     Other Barriers to Discharge (DME, Family Training, etc):   Cognition.  Fall risk  - significant orthostatic.      DME: Shower chair, 4WW.

## 2025-03-01 NOTE — PLAN OF CARE
Discharge Planner Post-Acute Rehab SLP:      Discharge Plan: Home with 24/7 supervision vs LTC/memory care? ongoing SLP     Precautions: 1:1 attendant, can be combative with cares.     Current Status:  Hearing: WFL  Vision: Retinal detachment, Readers  Communication: Mild dysarthria when pt alert, motor speech at baseline per wife's report. Hx expressive aphasia; WAB-R Bedside 02/16 moderate expressive/receptive deficits (suspect fatigue, medications impacting)  Cognition: severe cognitive impairments re: poor insight into deficits/safety/situation, poor working memory, alternating/sustained attention, executive function, very slow processing , poor initiation  Swallow: Regular solids,Thin liquids (0). Goals met during acute. SLP re assessed and deemed any dysfunction related to behavior. May informally monitor      Assessment: Pt refused, mildly agitated upon SLP arrival. -30 minutes SLP.     Other Barriers to Discharge (Family Training, etc): level of assist/supervision with iADLs?

## 2025-03-01 NOTE — PLAN OF CARE
Goal Outcome Evaluation:      Plan of Care Reviewed With: patient    Overall Patient Progress: no changeOverall Patient Progress: no change     Pt alert, oriented to self. Denies chest pain and shortness of breath. Sitter at bedside. Mixed continence, LBM 2/26. Pt took meds two at a time if smaller and one at a time if larger. Behavior appropriate. Call light within reach, bed in low position.

## 2025-03-01 NOTE — PLAN OF CARE
Goal Outcome Evaluation:      Plan of Care Reviewed With: patient    Overall Patient Progress: no changeOverall Patient Progress: no change      VS:    BP (!) 148/96 (BP Location: Right arm)   Pulse 87   Temp 97.4  F (36.3  C) (Oral)   Resp 16   Ht 1.829 m (6')   Wt 109 kg (240 lb 4.8 oz)   SpO2 97%   BMI 32.59 kg/m       O2: SpO2 > 97 and stable on RA. Denies chest pain and SOB.    Output: Voids spontaneously without difficulty to bathroom / using beside urinal    Last BM: 2/26 denies abdominal discomfort. BS active / passing flatus.    Activity: Up with A1 walker and gait belt    Skin: WDL    Pain: Denied pain    CMS: Intact, Aox2 . Disoriented to situation and time. Denies numbness and tingling.   Dressing:     Diet: Regular diet. Denies nausea/vomiting.   BG checks before meals and at bedtime.    LDA: No PIV access.    Equipment: IV pole, personal belongings,    Plan: Safety precautions maintained / Continue with plan of care. Call light within reach, pt able to make needs known.    Additional Info: Patient able to take most  his medication except tylenol and metformin.  Nursing will continue to monitor.

## 2025-03-01 NOTE — PLAN OF CARE
Goal Outcome Evaluation:    VS: Vital signs:  Temp: 97.6  F (36.4  C) Temp src: Oral BP: (!) 148/85 Pulse: 89   Resp: 16 SpO2: 97 % O2 Device: None (Room air)        O2: O2>95% on RA. Denies SOB and CP   Output: Voiding without any issues.   Last BM: 2/26/25, Mixed incontinence   Activity: 1:1 sitter at bedside. GB, walker   Skin: N/A   Pain: Denies pain   CMS: A/O to self.   Dressing: N/A   Diet: Reg/thin/whole   LDA: N/A   Equipment: Call light within reach   Plan: Continue with plan of care   Additional Info:

## 2025-03-02 LAB
GLUCOSE BLDC GLUCOMTR-MCNC: 173 MG/DL (ref 70–99)
GLUCOSE BLDC GLUCOMTR-MCNC: 225 MG/DL (ref 70–99)

## 2025-03-02 PROCEDURE — 250N000013 HC RX MED GY IP 250 OP 250 PS 637

## 2025-03-02 PROCEDURE — 250N000013 HC RX MED GY IP 250 OP 250 PS 637: Performed by: PHYSICAL MEDICINE & REHABILITATION

## 2025-03-02 PROCEDURE — 250N000013 HC RX MED GY IP 250 OP 250 PS 637: Performed by: PHYSICIAN ASSISTANT

## 2025-03-02 PROCEDURE — 128N000003 HC R&B REHAB

## 2025-03-02 PROCEDURE — 99232 SBSQ HOSP IP/OBS MODERATE 35: CPT | Performed by: PHYSICAL MEDICINE & REHABILITATION

## 2025-03-02 RX ORDER — LIDOCAINE 4 G/G
1 PATCH TOPICAL
Status: DISCONTINUED | OUTPATIENT
Start: 2025-03-02 | End: 2025-03-03

## 2025-03-02 RX ADMIN — FAMOTIDINE 40 MG: 20 TABLET, FILM COATED ORAL at 17:41

## 2025-03-02 RX ADMIN — TICAGRELOR 90 MG: 90 TABLET ORAL at 17:41

## 2025-03-02 RX ADMIN — METFORMIN HYDROCHLORIDE 1000 MG: 500 TABLET, FILM COATED ORAL at 17:40

## 2025-03-02 RX ADMIN — LISINOPRIL 10 MG: 10 TABLET ORAL at 21:43

## 2025-03-02 RX ADMIN — LIDOCAINE 4% 1 PATCH: 40 PATCH TOPICAL at 14:38

## 2025-03-02 RX ADMIN — RAMELTEON 8 MG: 8 TABLET, FILM COATED ORAL at 19:29

## 2025-03-02 RX ADMIN — TICAGRELOR 90 MG: 90 TABLET ORAL at 12:03

## 2025-03-02 RX ADMIN — POTASSIUM CHLORIDE 20 MEQ: 1.5 POWDER, FOR SOLUTION ORAL at 17:41

## 2025-03-02 RX ADMIN — POTASSIUM CHLORIDE 20 MEQ: 1.5 POWDER, FOR SOLUTION ORAL at 08:31

## 2025-03-02 RX ADMIN — ACETAMINOPHEN 975 MG: 325 TABLET, FILM COATED ORAL at 08:31

## 2025-03-02 RX ADMIN — ASPIRIN 81 MG CHEWABLE TABLET 81 MG: 81 TABLET CHEWABLE at 08:31

## 2025-03-02 RX ADMIN — EZETIMIBE 10 MG: 10 TABLET ORAL at 17:40

## 2025-03-02 RX ADMIN — FAMOTIDINE 40 MG: 20 TABLET, FILM COATED ORAL at 08:32

## 2025-03-02 RX ADMIN — METFORMIN HYDROCHLORIDE 1000 MG: 500 TABLET, FILM COATED ORAL at 08:32

## 2025-03-02 RX ADMIN — ATORVASTATIN CALCIUM 10 MG: 10 TABLET, FILM COATED ORAL at 08:31

## 2025-03-02 RX ADMIN — ACETAMINOPHEN 975 MG: 325 TABLET, FILM COATED ORAL at 19:29

## 2025-03-02 RX ADMIN — PANTOPRAZOLE SODIUM 40 MG: 40 TABLET, DELAYED RELEASE ORAL at 08:32

## 2025-03-02 ASSESSMENT — ACTIVITIES OF DAILY LIVING (ADL)
ADLS_ACUITY_SCORE: 54
ADLS_ACUITY_SCORE: 53
ADLS_ACUITY_SCORE: 54
ADLS_ACUITY_SCORE: 53
ADLS_ACUITY_SCORE: 54

## 2025-03-02 NOTE — PLAN OF CARE
Goal Outcome Evaluation:    Pt is alert and oriented to self only. Continent of B&B. LB 3/1. Ax1 walker. Denied pain. Call light within reach. Bedside attendant present for safety concerns and impulsivity. Pt remains impulsive and did not call for help prior to transferring this shift. Will continue with POC.

## 2025-03-02 NOTE — PROGRESS NOTES
[facility], [place]   Physical Medicine and Rehabilitation Daily Note           Assessment and Plan of Care:   Chris Delcid is a 59 year old male with a past medical history of HTN, orthostatic hypotension, HLD, DM II and prior stroke who was admitted on 2/11 with worsening fatigue, weakness and syncope and was found to have multifocal acute subcortical infarcts.     --Vitals stable (BP trending a bit elevated).   --Labs: BG elevated (194-219).  --Continue ongoing medical management.  --Continue therapies and plan of care.           Interval history:   Patient seen and examined at bedside. Per nursing report, a code 21 occurred overnight due to agitation. He ultimately needed an IM Haldol, and there were no further issues. He has a bedside attendant for impulsivity.    Today, patient is still sleeping, and significant other is present. She states that since receiving the IM Haldol, he has been sleeping deeply and unable to participate in therapies. Denies fever, chills, CP, SOB, N/V, abdominal pain, new pain or weakness/numbness/tingling. Last BM 3/1.     The patient is fully participating in therapies and is making progress.           Physical Exam:   VS:   Vitals:    02/28/25 2009 03/01/25 0717 03/01/25 0720 03/01/25 1532   BP: (!) 148/85 (!) 161/103 (!) 148/96 128/84   BP Location: Left arm Left arm Right arm Left arm   Patient Position: Sitting      Cuff Size: Adult Regular      Pulse: 89 87  86   Resp:  16  16   Temp:  97.4  F (36.3  C)  98.4  F (36.9  C)   TempSrc:  Oral  Oral   SpO2:  97%  98%   Weight:       Height:         Gen: NAD, resting comfortably in bed  Deferred exam for conversation         Data:   Scheduled meds  Current Facility-Administered Medications   Medication Dose Route Frequency Provider Last Rate Last Admin    acetaminophen (TYLENOL) tablet 975 mg  975 mg Oral TID Nick Zavala MD   975 mg at 02/28/25 2010    aspirin (ASA) chewable tablet 81 mg   81 mg Oral Daily Nichelle Chavez PA   81 mg at 03/01/25 0754    atorvastatin (LIPITOR) tablet 10 mg  10 mg Oral Daily Nichelle Chavez PA   10 mg at 03/01/25 0754    ezetimibe (ZETIA) tablet 10 mg  10 mg Oral Daily with supper Nick Zavala MD   10 mg at 03/01/25 1724    famotidine (PEPCID) tablet 40 mg  40 mg Oral BID w/meals Nick Zavala MD   40 mg at 03/01/25 1724    [Held by provider] glipiZIDE (GLUCOTROL) half-tab 10 mg  10 mg Oral BID AC Nichelle Chavez PA   10 mg at 02/20/25 0735    haloperidol (HALDOL) tablet 1 mg  1 mg Oral BID Je Alford MD   1 mg at 03/01/25 0754    iopamidol (ISOVUE-370) solution 500 mL  500 mL Intravenous Once Dakotah Huggins APRN CNP        lisinopril (ZESTRIL) tablet 10 mg  10 mg Oral QPM SallyNick wilson MD   10 mg at 02/28/25 2009    metFORMIN (GLUCOPHAGE) tablet 1,000 mg  1,000 mg Oral BID w/meals Lupe Chase PA-C   1,000 mg at 03/01/25 1724    pantoprazole (PROTONIX) EC tablet 40 mg  40 mg Oral QAM AC Christie Cook MD   40 mg at 03/01/25 0754    potassium chloride (KLOR-CON) Packet 20 mEq  20 mEq Oral BID w/meals Lupe Chase PA-C   20 mEq at 02/27/25 1705    ramelteon (ROZEREM) tablet 8 mg  8 mg Oral At Bedtime Shanta Mcmahon MD   8 mg at 02/28/25 2010    sodium chloride 0.9 % bag for CT scan flush use  100 mL As instructed Once Dakoath Huggins APRN CNP        ticagrelor (BRILINTA) tablet 90 mg  90 mg Oral BID w/meals Nick Zavala MD   90 mg at 03/01/25 1724       PRN meds:  Current Facility-Administered Medications   Medication Dose Route Frequency Provider Last Rate Last Admin    alum & mag hydroxide-simethicone (MAALOX) suspension 30 mL  30 mL Oral Q4H PRN Nick Zavala MD        bisacodyl (DULCOLAX) suppository 10 mg  10 mg Rectal Daily PRN Nick Zavala MD        calcium carbonate (TUMS) chewable tablet 500 mg  500 mg Oral TID PRN Lupe Chase PA-C         carboxymethylcellulose PF (REFRESH PLUS) 0.5 % ophthalmic solution 1 drop  1 drop Both Eyes 4x Daily PRN Nick Zavala MD        glucose gel 15-30 g  15-30 g Oral Q15 Min PRN Nichelle Chavez PA   15 g at 02/20/25 1424    Or    dextrose 50 % injection 25-50 mL  25-50 mL Intravenous Q15 Min PRN Nichelle Chavez PA        Or    glucagon injection 1 mg  1 mg Subcutaneous Q15 Min PRN Nichelle Chavez PA        haloperidol lactate (HALDOL) injection 5 mg  5 mg Intramuscular Q6H PRN Je Alford MD   5 mg at 03/01/25 1959    hydrALAZINE (APRESOLINE) tablet 10 mg  10 mg Oral Q6H PRN Nick Zavala MD        Or    hydrALAZINE (APRESOLINE) tablet 20 mg  20 mg Oral Q6H PRN Nick Zavala MD        methocarbamol (ROBAXIN) tablet 500 mg  500 mg Oral 4x Daily PRN Nick Zavala MD        polyethylene glycol (MIRALAX) Packet 17 g  17 g Oral Daily PRN Nichelle Chavez PA   17 g at 02/25/25 0821         Taya Wills MD  Physical Medicine and Rehabilitation     I spent a total of 35 minutes face-to-face and managing the care of Chris Delcid. Over 50% of my time on the unit was spent counseling the patient and coordinating care. Please see note for details.

## 2025-03-02 NOTE — PLAN OF CARE
Goal Outcome Evaluation:      Plan of Care Reviewed With: patient    Overall Patient Progress: no changeOverall Patient Progress: no change     Pt alert and oriented to self. Denies chest pain and shortness of breath. At start of shift, pt calm and cooperative. LBM 2/26, denies abdominal discomfort. Around 2000H, pt became agitated and aggressive towards staff, attempting to hit, kick, and spit on staff, swearing at staff. Pt stated he wanted to leave in a body bag. Code 21 called, PRN IM haldol given. Bedtime meds not given. 1:1 attendant at bedside. Call light within reach, bed in low position.

## 2025-03-02 NOTE — PROGRESS NOTES
Shift: 5948-0372  VS:BP (!) 169/98 (BP Location: Right arm)   Pulse 101   Temp 98.2  F (36.8  C) (Oral)   Resp 16   Ht 1.829 m (6')   Wt 109 kg (240 lb 4.8 oz)   SpO2 98%   BMI 32.59 kg/m      Pain: No signs of discomfort. Scheduled Tylenol given  Neuro:Alert to self  Cardiac: WDL.   Respiratory: WDL.On RA  Diet/Appetite: Regular diet.   /GI:Voiding in urinal. LBM 3/1  LDA's: None  Skin: No new skin issues noted.  Activity: Up with A X 1 with a walker  Plan: Precautions maintained / Continue with plan of care. Call light within reach.  Remains drowsy all shift, pushing away when doing assessment. Pt's spouse at bedside, pt cooperative with wife but not this RN.  Continues on 1:1 bedside attendant.

## 2025-03-03 ENCOUNTER — APPOINTMENT (OUTPATIENT)
Dept: GENERAL RADIOLOGY | Facility: CLINIC | Age: 60
End: 2025-03-03
Attending: PHYSICIAN ASSISTANT
Payer: COMMERCIAL

## 2025-03-03 ENCOUNTER — APPOINTMENT (OUTPATIENT)
Dept: SPEECH THERAPY | Facility: CLINIC | Age: 60
DRG: 057 | End: 2025-03-03
Attending: PHYSICAL MEDICINE & REHABILITATION
Payer: COMMERCIAL

## 2025-03-03 LAB
ALBUMIN SERPL BCG-MCNC: 3.8 G/DL (ref 3.5–5.2)
ALP SERPL-CCNC: 77 U/L (ref 40–150)
ALT SERPL W P-5'-P-CCNC: 30 U/L (ref 0–70)
ANION GAP SERPL CALCULATED.3IONS-SCNC: 14 MMOL/L (ref 7–15)
AST SERPL W P-5'-P-CCNC: 24 U/L (ref 0–45)
ATRIAL RATE - MUSE: 82 BPM
BILIRUB SERPL-MCNC: 0.7 MG/DL
BUN SERPL-MCNC: 16.9 MG/DL (ref 8–23)
C PNEUM DNA SPEC QL NAA+PROBE: NOT DETECTED
CALCIUM SERPL-MCNC: 9.2 MG/DL (ref 8.8–10.4)
CHLORIDE SERPL-SCNC: 106 MMOL/L (ref 98–107)
CREAT SERPL-MCNC: 1.03 MG/DL (ref 0.67–1.17)
DIASTOLIC BLOOD PRESSURE - MUSE: NORMAL MMHG
EGFRCR SERPLBLD CKD-EPI 2021: 83 ML/MIN/1.73M2
ERYTHROCYTE [DISTWIDTH] IN BLOOD BY AUTOMATED COUNT: 13.5 % (ref 10–15)
FLUAV H1 2009 PAND RNA SPEC QL NAA+PROBE: NOT DETECTED
FLUAV H1 RNA SPEC QL NAA+PROBE: NOT DETECTED
FLUAV H3 RNA SPEC QL NAA+PROBE: NOT DETECTED
FLUAV RNA SPEC QL NAA+PROBE: NEGATIVE
FLUAV RNA SPEC QL NAA+PROBE: NOT DETECTED
FLUBV RNA RESP QL NAA+PROBE: NEGATIVE
FLUBV RNA SPEC QL NAA+PROBE: NOT DETECTED
GLUCOSE BLDC GLUCOMTR-MCNC: 142 MG/DL (ref 70–99)
GLUCOSE BLDC GLUCOMTR-MCNC: 166 MG/DL (ref 70–99)
GLUCOSE BLDC GLUCOMTR-MCNC: 167 MG/DL (ref 70–99)
GLUCOSE BLDC GLUCOMTR-MCNC: 169 MG/DL (ref 70–99)
GLUCOSE SERPL-MCNC: 172 MG/DL (ref 70–99)
HADV DNA SPEC QL NAA+PROBE: NOT DETECTED
HCO3 SERPL-SCNC: 20 MMOL/L (ref 22–29)
HCOV PNL SPEC NAA+PROBE: DETECTED
HCT VFR BLD AUTO: 40.9 % (ref 40–53)
HGB BLD-MCNC: 14.3 G/DL (ref 13.3–17.7)
HMPV RNA SPEC QL NAA+PROBE: NOT DETECTED
HOLD SPECIMEN: NORMAL
HPIV1 RNA SPEC QL NAA+PROBE: NOT DETECTED
HPIV2 RNA SPEC QL NAA+PROBE: NOT DETECTED
HPIV3 RNA SPEC QL NAA+PROBE: NOT DETECTED
HPIV4 RNA SPEC QL NAA+PROBE: NOT DETECTED
INTERPRETATION ECG - MUSE: NORMAL
M PNEUMO DNA SPEC QL NAA+PROBE: NOT DETECTED
MCH RBC QN AUTO: 29.9 PG (ref 26.5–33)
MCHC RBC AUTO-ENTMCNC: 35 G/DL (ref 31.5–36.5)
MCV RBC AUTO: 86 FL (ref 78–100)
P AXIS - MUSE: 46 DEGREES
PLATELET # BLD AUTO: 311 10E3/UL (ref 150–450)
POTASSIUM SERPL-SCNC: 3.9 MMOL/L (ref 3.4–5.3)
PR INTERVAL - MUSE: 214 MS
PROT SERPL-MCNC: 6.7 G/DL (ref 6.4–8.3)
QRS DURATION - MUSE: 90 MS
QT - MUSE: 378 MS
QTC - MUSE: 441 MS
R AXIS - MUSE: -13 DEGREES
RBC # BLD AUTO: 4.78 10E6/UL (ref 4.4–5.9)
RSV RNA SPEC NAA+PROBE: NEGATIVE
RSV RNA SPEC QL NAA+PROBE: NOT DETECTED
RSV RNA SPEC QL NAA+PROBE: NOT DETECTED
RV+EV RNA SPEC QL NAA+PROBE: NOT DETECTED
SARS-COV-2 RNA RESP QL NAA+PROBE: NEGATIVE
SODIUM SERPL-SCNC: 140 MMOL/L (ref 135–145)
SYSTOLIC BLOOD PRESSURE - MUSE: NORMAL MMHG
T AXIS - MUSE: 19 DEGREES
VENTRICULAR RATE- MUSE: 82 BPM
WBC # BLD AUTO: 8.9 10E3/UL (ref 4–11)

## 2025-03-03 PROCEDURE — 87486 CHLMYD PNEUM DNA AMP PROBE: CPT | Performed by: PHYSICIAN ASSISTANT

## 2025-03-03 PROCEDURE — 250N000013 HC RX MED GY IP 250 OP 250 PS 637

## 2025-03-03 PROCEDURE — 250N000013 HC RX MED GY IP 250 OP 250 PS 637: Performed by: STUDENT IN AN ORGANIZED HEALTH CARE EDUCATION/TRAINING PROGRAM

## 2025-03-03 PROCEDURE — 250N000013 HC RX MED GY IP 250 OP 250 PS 637: Performed by: PHYSICIAN ASSISTANT

## 2025-03-03 PROCEDURE — 97129 THER IVNTJ 1ST 15 MIN: CPT | Mod: GN

## 2025-03-03 PROCEDURE — 85014 HEMATOCRIT: CPT | Performed by: PHYSICIAN ASSISTANT

## 2025-03-03 PROCEDURE — 36415 COLL VENOUS BLD VENIPUNCTURE: CPT | Performed by: PHYSICIAN ASSISTANT

## 2025-03-03 PROCEDURE — 99231 SBSQ HOSP IP/OBS SF/LOW 25: CPT | Performed by: PHYSICIAN ASSISTANT

## 2025-03-03 PROCEDURE — 250N000013 HC RX MED GY IP 250 OP 250 PS 637: Performed by: PHYSICAL MEDICINE & REHABILITATION

## 2025-03-03 PROCEDURE — 93005 ELECTROCARDIOGRAM TRACING: CPT

## 2025-03-03 PROCEDURE — 97130 THER IVNTJ EA ADDL 15 MIN: CPT | Mod: GN

## 2025-03-03 PROCEDURE — 71046 X-RAY EXAM CHEST 2 VIEWS: CPT

## 2025-03-03 PROCEDURE — 250N000009 HC RX 250: Performed by: PHYSICIAN ASSISTANT

## 2025-03-03 PROCEDURE — 71046 X-RAY EXAM CHEST 2 VIEWS: CPT | Mod: 26 | Performed by: RADIOLOGY

## 2025-03-03 PROCEDURE — 128N000003 HC R&B REHAB

## 2025-03-03 PROCEDURE — 87637 SARSCOV2&INF A&B&RSV AMP PRB: CPT | Performed by: PHYSICIAN ASSISTANT

## 2025-03-03 PROCEDURE — 84155 ASSAY OF PROTEIN SERUM: CPT | Performed by: PHYSICIAN ASSISTANT

## 2025-03-03 PROCEDURE — 80051 ELECTROLYTE PANEL: CPT | Performed by: PHYSICIAN ASSISTANT

## 2025-03-03 PROCEDURE — 80053 COMPREHEN METABOLIC PANEL: CPT | Performed by: PHYSICIAN ASSISTANT

## 2025-03-03 PROCEDURE — 87581 M.PNEUMON DNA AMP PROBE: CPT | Performed by: PHYSICIAN ASSISTANT

## 2025-03-03 RX ORDER — GUAIFENESIN 200 MG/10ML
200 LIQUID ORAL EVERY 4 HOURS PRN
Status: DISCONTINUED | OUTPATIENT
Start: 2025-03-03 | End: 2025-03-06 | Stop reason: HOSPADM

## 2025-03-03 RX ORDER — OXYMETAZOLINE HYDROCHLORIDE 0.05 G/100ML
2 SPRAY NASAL 2 TIMES DAILY
Status: COMPLETED | OUTPATIENT
Start: 2025-03-03 | End: 2025-03-05

## 2025-03-03 RX ORDER — HALOPERIDOL 0.5 MG/1
0.5 TABLET ORAL EVERY MORNING
Status: DISCONTINUED | OUTPATIENT
Start: 2025-03-04 | End: 2025-03-06 | Stop reason: HOSPADM

## 2025-03-03 RX ORDER — METFORMIN HYDROCHLORIDE 500 MG/1
1000 TABLET, EXTENDED RELEASE ORAL 2 TIMES DAILY WITH MEALS
Status: DISCONTINUED | OUTPATIENT
Start: 2025-03-03 | End: 2025-03-04

## 2025-03-03 RX ORDER — ONDANSETRON 4 MG/1
4 TABLET, ORALLY DISINTEGRATING ORAL EVERY 6 HOURS PRN
Status: DISCONTINUED | OUTPATIENT
Start: 2025-03-03 | End: 2025-03-06 | Stop reason: HOSPADM

## 2025-03-03 RX ORDER — HALOPERIDOL 0.5 MG/1
1 TABLET ORAL AT BEDTIME
Status: DISCONTINUED | OUTPATIENT
Start: 2025-03-03 | End: 2025-03-06 | Stop reason: HOSPADM

## 2025-03-03 RX ORDER — HALOPERIDOL 5 MG/ML
2 INJECTION INTRAMUSCULAR EVERY 6 HOURS PRN
Status: DISCONTINUED | OUTPATIENT
Start: 2025-03-03 | End: 2025-03-06 | Stop reason: HOSPADM

## 2025-03-03 RX ADMIN — LISINOPRIL 10 MG: 10 TABLET ORAL at 21:06

## 2025-03-03 RX ADMIN — RAMELTEON 8 MG: 8 TABLET, FILM COATED ORAL at 21:06

## 2025-03-03 RX ADMIN — METFORMIN HYDROCHLORIDE 1000 MG: 500 TABLET, FILM COATED ORAL at 08:36

## 2025-03-03 RX ADMIN — HALOPERIDOL 1 MG: 0.5 TABLET ORAL at 21:06

## 2025-03-03 RX ADMIN — METFORMIN ER 500 MG 1000 MG: 500 TABLET ORAL at 18:04

## 2025-03-03 RX ADMIN — FAMOTIDINE 40 MG: 20 TABLET, FILM COATED ORAL at 08:36

## 2025-03-03 RX ADMIN — TICAGRELOR 90 MG: 90 TABLET ORAL at 08:37

## 2025-03-03 RX ADMIN — ACETAMINOPHEN 975 MG: 325 TABLET, FILM COATED ORAL at 21:06

## 2025-03-03 RX ADMIN — ASPIRIN 81 MG CHEWABLE TABLET 81 MG: 81 TABLET CHEWABLE at 08:38

## 2025-03-03 RX ADMIN — PANTOPRAZOLE SODIUM 40 MG: 40 TABLET, DELAYED RELEASE ORAL at 08:38

## 2025-03-03 RX ADMIN — OXYMETAZOLINE HYDROCHLORIDE 2 SPRAY: 0.05 SPRAY NASAL at 18:05

## 2025-03-03 RX ADMIN — TICAGRELOR 90 MG: 90 TABLET ORAL at 18:05

## 2025-03-03 RX ADMIN — ATORVASTATIN CALCIUM 10 MG: 10 TABLET, FILM COATED ORAL at 08:38

## 2025-03-03 RX ADMIN — EZETIMIBE 10 MG: 10 TABLET ORAL at 16:42

## 2025-03-03 RX ADMIN — FAMOTIDINE 40 MG: 20 TABLET, FILM COATED ORAL at 18:04

## 2025-03-03 RX ADMIN — ACETAMINOPHEN 975 MG: 325 TABLET, FILM COATED ORAL at 08:36

## 2025-03-03 RX ADMIN — HALOPERIDOL 1 MG: 0.5 TABLET ORAL at 08:38

## 2025-03-03 RX ADMIN — POTASSIUM CHLORIDE 20 MEQ: 1.5 POWDER, FOR SOLUTION ORAL at 08:38

## 2025-03-03 ASSESSMENT — ACTIVITIES OF DAILY LIVING (ADL)
ADLS_ACUITY_SCORE: 54
ADLS_ACUITY_SCORE: 56
ADLS_ACUITY_SCORE: 54
ADLS_ACUITY_SCORE: 54
ADLS_ACUITY_SCORE: 56
ADLS_ACUITY_SCORE: 61
ADLS_ACUITY_SCORE: 56
ADLS_ACUITY_SCORE: 54
ADLS_ACUITY_SCORE: 56
ADLS_ACUITY_SCORE: 56
ADLS_ACUITY_SCORE: 61
ADLS_ACUITY_SCORE: 56
ADLS_ACUITY_SCORE: 56
ADLS_ACUITY_SCORE: 54
ADLS_ACUITY_SCORE: 56
ADLS_ACUITY_SCORE: 56
ADLS_ACUITY_SCORE: 61
ADLS_ACUITY_SCORE: 54
ADLS_ACUITY_SCORE: 54
ADLS_ACUITY_SCORE: 61

## 2025-03-03 NOTE — PROVIDER NOTIFICATION
Notified PA at 1000 AM regarding change in condition.  Patient has very runny nose and now reporting nausea. VSS on RA.     Sent [employer] text message to MAYE Abbott.      Comments: PCR and Influenza swab ordered.

## 2025-03-03 NOTE — PLAN OF CARE
Goal Outcome Evaluation:      Plan of Care Reviewed With: patient, significant other    A&Ox2, disoriented to time & place. Ax1 w/walker & GB - not oob this shift. Mixed continence - help w/urinal, LBM 3/1. C/O of nausea, chest congestion & back pain - refused meds for nausea - scheduled tylenol administered. Pt currently on RA. Diet regular, thin, whole. Bed low, locked, call light within reach, & bed alarm on. Will continue POC.

## 2025-03-03 NOTE — PLAN OF CARE
Goal Outcome Evaluation:    Patient is alert to self. Disorientated to time, place and situation. On RA. Denies SOB and CP. A1 with walker. Sitter at bedside for safety.Voiding and LBM 3/1/25. No LDA. Skin: scattered bruises. B  Diet:reg/thin/whole.Precaution is maintained. Call light within reach. Continue with plan of care.

## 2025-03-03 NOTE — PLAN OF CARE
Discharge Planner Post-Acute Rehab SLP:      Discharge Plan: Home with 24/7 supervision vs LTC/memory care? ongoing SLP     Precautions: 1:1 attendant, can be combative with cares.     Current Status:  Hearing: WFL  Vision: Retinal detachment, Readers  Communication: Mild dysarthria when pt alert, motor speech at baseline per wife's report. Hx expressive aphasia; WAB-R Bedside 02/16 moderate expressive/receptive deficits (suspect fatigue, medications impacting)  Cognition: severe cognitive impairments re: poor insight into deficits/safety/situation, poor working memory, alternating/sustained attention, executive function, very slow processing , poor initiation  Swallow: Regular solids,Thin liquids (0). Goals met during acute. SLP re assessed and deemed any dysfunction related to behavior. May informally monitor      Assessment: Pt engaegd in basic level orientation task and was oriented x4 with use of visual aids. Attempted to initiate basic level visual 'odd one out' task. However, pt suddenly began demonstrating significant conjestion, runny nose, excessive saliva, and nausea. Pt with reduced engagement in task d/t feeling ill. Pt visually grabbing stomach in discomfort. Noting worsening processing speed than usual and increased difficulty following directions/answer ?s. Session discontinued d/t symptoms of illness. SLP provided handoff to nursing.      Other Barriers to Discharge (Family Training, etc): level of assist/supervision with iADLs?

## 2025-03-03 NOTE — PROGRESS NOTES
Discharge Planner Post-Acute Rehab OT:     Discharge Plan: TBD     Precautions: Orthostatic hypotension and significant resting hypertension, cognition - impulsive and agitated    Permissive resting/supine hypertension unless diastolic >110. Pt will be orthostatic at ranges considered WNL.    Do not leave alone in seated position; must have legs elevated in recliner.    Current Status:  ADLs:  Mobility: Supervision/1:1 FWW for behavior only; functionally Mod I from physical and simple functional cognition perspective. Instances of increased A needed when orthostatic.  Grooming: Supervision  Dressing: Supervision FWW.  Bathing: Simulated tub/shower transfer with grab bars similar to home environment SBA FWW <> shower chair. Clinical judgement Min A.  Toileting: Prompts to initiate toileting in ARU environment; mixed continence. Recommend supervision FWW <> toilet d/t hx of syncopal episodes.   IADLs: Baseline IND simple meal prep, light home mgmt, med mgmt. 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 Enjoys refurbishing antique toys.   Vision/Cognition: Baseline vision deficit d/t retinal disease. Dysarthric. Delirium - fluctuating orientation. Agitated and combative at times. Significant functional cognition deficits across attention, memory, reasoning, impulse control. Level of prompting required for initiation or sequencing variable since agitation and appears to fluctuate with medications.    Assessment: Pt unwell upon approach, spitting into emesis bag, appearing extremely fatigued and unable to consistently follow one-step directions including perseverative  on bed rail; requiring Ax2 bed mobility d/t confluence of factors. Decreasing OT POC to 3x/week.    Other Barriers to Discharge (DME, Family Training, etc):   Cognition.  Fall risk  - significant orthostatic.      DME: Shower chair, 4WW.

## 2025-03-03 NOTE — PLAN OF CARE
Goal Outcome Evaluation:    Pt is alert and oriented to self. Mixed continent of bladder this shift. LBM 3/1. SBA walker. Reported pain but refused PRN pain medication. Pt was willing to try heat packs. No lidocaine patches observed on Pt. Call light within reach. Bedside attendant present for safety concerns and impulsivity. Pt alternated between restlessness and sleep this shift - did not call staff for needs. Will continue with POC.

## 2025-03-03 NOTE — PROGRESS NOTES
"  General acute hospital   Acute Rehabilitation Unit  Daily progress note    INTERVAL HISTORY  Weekend and therapy notes reviewed.  Repeat code 21 was called on 3/1 due to agitation, physical aggression towards staff.  Received 1-time dose of IM Haldol and was more somnolent on 3/4.    Chris Delcid seen this morning at bedside.  He reports that he is feeling \"sleepy\".  He is \"not sure\" if he slept well last night, but sitter reports that nursing handoff was that he did.  He denies feeling unwell.  He denies any pain or other specific complaints during my visit.  1:1 staff notes that patient has been somnolent throughout the morning, though he did get up to take medications.  Nursing staff has observed runny nose, sneezing, coughing, and patient complaining of some nausea later in the morning.      With therapies, OT attempted session this morning but noted that patient appeared unwell, fatigued and unable to consistently follow one-step directions including perseverative  on bed rail; requiring Ax2 bed mobility d/t confluence of factors.       MEDICATIONS  Current Facility-Administered Medications   Medication Dose Route Frequency Provider Last Rate Last Admin    acetaminophen (TYLENOL) tablet 975 mg  975 mg Oral TID Nick Zavala MD   975 mg at 03/03/25 0836    aspirin (ASA) chewable tablet 81 mg  81 mg Oral Daily Nichelle Chavez PA   81 mg at 03/03/25 0838    atorvastatin (LIPITOR) tablet 10 mg  10 mg Oral Daily Nichelle Chavez PA   10 mg at 03/03/25 0838    ezetimibe (ZETIA) tablet 10 mg  10 mg Oral Daily with supper Nick Zavala MD   10 mg at 03/02/25 1740    famotidine (PEPCID) tablet 40 mg  40 mg Oral BID w/meals Nick Zavala MD   40 mg at 03/03/25 0836    [Held by provider] glipiZIDE (GLUCOTROL) half-tab 10 mg  10 mg Oral BID AC Nichelle Chavez PA   10 mg at 02/20/25 0735    haloperidol (HALDOL) tablet 1 mg  1 mg Oral BID Je Alford, " MD   1 mg at 03/03/25 0838    iopamidol (ISOVUE-370) solution 500 mL  500 mL Intravenous Once Dakotah Huggins APRN CNP        lisinopril (ZESTRIL) tablet 10 mg  10 mg Oral QPM Nick Zavala MD   10 mg at 03/02/25 2143    metFORMIN (GLUCOPHAGE) tablet 1,000 mg  1,000 mg Oral BID w/meals Lupe Chase PA-C   1,000 mg at 03/03/25 0836    pantoprazole (PROTONIX) EC tablet 40 mg  40 mg Oral QAM AC Christie Cook MD   40 mg at 03/03/25 0838    potassium chloride (KLOR-CON) Packet 20 mEq  20 mEq Oral BID w/meals Lupe Chase PA-C   20 mEq at 03/03/25 0838    ramelteon (ROZEREM) tablet 8 mg  8 mg Oral At Bedtime Shanta Mcmahon MD   8 mg at 03/02/25 1929    sodium chloride 0.9 % bag for CT scan flush use  100 mL As instructed Once Dakotah Huggins APRN CNP        ticagrelor (BRILINTA) tablet 90 mg  90 mg Oral BID w/meals Nick Zavala MD   90 mg at 03/03/25 0837          Current Facility-Administered Medications   Medication Dose Route Frequency Provider Last Rate Last Admin    alum & mag hydroxide-simethicone (MAALOX) suspension 30 mL  30 mL Oral Q4H PRN Nick Zavala MD        bisacodyl (DULCOLAX) suppository 10 mg  10 mg Rectal Daily PRN Nick Zavala MD        calcium carbonate (TUMS) chewable tablet 500 mg  500 mg Oral TID PRN Lupe Chase PA-C        carboxymethylcellulose PF (REFRESH PLUS) 0.5 % ophthalmic solution 1 drop  1 drop Both Eyes 4x Daily PRN Nick Zavala MD        glucose gel 15-30 g  15-30 g Oral Q15 Min PRN Nichelle Chavez PA   15 g at 02/20/25 1424    Or    dextrose 50 % injection 25-50 mL  25-50 mL Intravenous Q15 Min PRN Nichelle Chavez PA        Or    glucagon injection 1 mg  1 mg Subcutaneous Q15 Min PRN Nichelle Chavez PA        haloperidol lactate (HALDOL) injection 5 mg  5 mg Intramuscular Q6H PRN Je Alford MD   5 mg at 03/01/25 1959    hydrALAZINE (APRESOLINE) tablet 10 mg  10 mg Oral Q6H PRN Sally,  Nick Vega MD        Or    hydrALAZINE (APRESOLINE) tablet 20 mg  20 mg Oral Q6H PRN Nick Zavala MD        methocarbamol (ROBAXIN) tablet 500 mg  500 mg Oral 4x Daily PRN Nick Zavala MD        polyethylene glycol (MIRALAX) Packet 17 g  17 g Oral Daily PRN Nichelle Chavez PA   17 g at 02/25/25 0821        PHYSICAL EXAM  BP (!) 158/77 (BP Location: Left arm, Patient Position: Semi-Giron's)   Pulse 89   Temp 97.9  F (36.6  C) (Oral)   Resp 20   Ht 1.829 m (6')   Wt 109 kg (240 lb 4.8 oz)   SpO2 97%   BMI 32.59 kg/m    Gen: NAD, lying in bed  HEENT: NC/AT  Pulm: non-labored on room air  Abd: Non-distended  Ext: no appreciable edema in BLE  Neuro/MSK: somnolent, dysarthric    LABS  CBC RESULTS:   Recent Labs   Lab Test 03/03/25  0603 02/24/25  0557 02/17/25  0534   WBC 8.9 6.6 7.4   RBC 4.78 5.08 4.90   HGB 14.3 14.9 14.4   HCT 40.9 43.4 41.9   MCV 86 85 86   MCH 29.9 29.3 29.4   MCHC 35.0 34.3 34.4   RDW 13.5 13.6 13.6    398 320       Last Basic Metabolic Panel:  Recent Labs   Lab Test 03/03/25  0837 03/02/25  2139 03/02/25  1127 02/24/25  0712 02/24/25  0557 02/20/25  0546 02/20/25  0541 02/17/25  0849 02/17/25  0534   NA  --   --   --   --  138  --  142  --  140   POTASSIUM  --   --   --   --  4.2  --  4.0  --  3.7   CHLORIDE  --   --   --   --  104  --  108*  --  106   CO2  --   --   --   --  20*  --  21*  --  21*   ANIONGAP  --   --   --   --  14  --  13  --  13   * 173* 225*   < > 226*   < > 65*   < > 75   BUN  --   --   --   --  21.5  --  20.0  --  14.3   CR  --   --   --   --  1.16  --  0.97  --  0.98   GFRESTIMATED  --   --   --   --  72  --  89  --  88   JAMIE  --   --   --   --  9.4  --  9.4  --  9.2    < > = values in this interval not displayed.       Rehabilitation -  Admission to acute inpatient rehab 2/14/25.    Impairment group code: Stroke Ischemic 01.3 Bilateral Involvement; acute lacunar infarcts in the left thalamus, the posterior left centrum  semiovale ovale, the right occipital and right occipitotemporal white matter, and the right frontal white matter, suspected embolic etiology      PT, OT and SLP daily.  Decreasing daily minutes due to lack of meaningful therapy participation.  2. Impairment of ADL's:  OT to work on upper and lower body self care, dressing, toileting, bathing, energy conservation techniques with use of ADs as needed.  3. Impairment of mobility:   PT to work on gait exercises, strengthening, endurance buildup, transfers with use of walker as needed.  4. Impairment of cognition/language/swallow:   SLP for cognitive evaluation and treatment strategies for higher level cognitive deficits and memory impairment.  5. Rehab RN to administer medication, patient education on medication taking, VS monitoring, bowel regimen, glucose monitoring and management of orthostasis.     Continue comprehensive acute inpatient rehabilitation program with multidisciplinary approach including therapies, rehab nursing, and physiatry following. See interval history for updates.      ASSESSMENT AND PLAN  Chris Delcid is a 59 year old right hand dominant male with a past medical history of multiple prior strokes (left pontine; L and R centrum semiovale; R hemipons), HTN, orthostatic hypotension, HLD, DM II c/b gastroparesis, ADITI, chronic low back pain, and GERD who was admitted on 2/11/25 with worsening fatigue, weakness, recurrent falls, and syncope and was found to have multifocal acute/subacute subcortical infarcts with hospital course complicated by additional imaging finding of right vertebral artery occlusion/dissection, hypertensive urgency, hyperglycemia, and elevated troponin.  He was admitted to ARU on 2/14/25 for multidisciplinary rehabilitation and ongoing medical management.     Medical Conditions    Multiple acute lacunar infarcts in both cerebral hemispheres   Right vertebral artery dissection  Hx multiple prior strokes (10/2020 L pontine;  "11/2021 L and R centrum semiovale infarcts; 8/2022 acute R hemipons, possible late subacute right centrum semiovale)  PTA on Brilinta monotherapy (since 8/2022 stroke).  Per neurology, patient \"has had recurrent primarily subcortical infarcts and progression of microvascular disease over the past five years. This has occurred in the setting of uncontrolled vascular risk factors (HTN, HLD, DM, ADITI), however, will pursue further evaluation for atypical stroke etiologies given recurrent nature and family history.\"  Hypercoag workup: Protein C and S, lupus anticoagulant, cardiolipin Ab, Antithrombin III, Beta 2 glycoprotein, factor II, and factor V all negative.   - Continue PTA brilinta 90 bid and ASA 81 mg daily, which was added this admission.  DAPT duration TBD  - Continue lovastatin 40 mg daily and zetia (added this admission for HLD)  - Goal BP <130/80, management as below  - Goal A1c <7, management as below   - Continue PT/OT  - Outpatient [facility]  - Outpatient genetic referral given family history of stroke in young, can be further discussed at outpatient stroke followup   - Follow up with vascular medicine for consideration of PCSK9 inhibitor and further evaluation of hyperlipidemia   - Follow up neurology in 4-6 weeks with any stroke [facility] ([phone])   - Follow up with PM&R in 8-10 weeks      Agitation, paranoia  Delirium  Suicidal ideations  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 Patient reportedly consistently trying to get out of bed. Patient trying to throw punches at staff per nursing. Patient reportedly unwilling to take oral medications. Given the acutely agitated presentation and to maintain the safety of staff on unit, Code Green was activated and provider team ordered a IM olanzapine 5mg x1 for use.  1:1 bedside attendant added for safety.  Seroquel was added at bedtime (stopped trazodone).  Psychiatry consulted on " 2/17 and changed seroquel to scheduled and PRN Haldol.  [place] most likely delirium though also with concerns for underlying vascular dementia.  UA negative on 2/18.  2 recurrent code 21 episodes on 2/19 due to paranoia/agitation and physical aggression.  Psychiatry reassessed and no changes to plan at that time.  However, on 2/21 had episode of self-injurious behavior (cutting wrist with plastic knife) which required physical restraints and use of PRN IM Haldol.  Scheduled Haldol doses were increased and psychiatry recommended vEEG, which showed moderate diffuse slowing but no epileptiform discharges or seizures (able to obtain 22 min).  Neurology re-consulted as well to consider any further work-up for etiology.  Recommended some labs (B12 and ammonia WNL, TSH elevated but free T4 normal), delirium precautions, ongoing psychology, but no further work-up at this time from their perspective.  Pharmacy reviewed medications, none currently felt to be contributing to increased risk of delirium  2/27: still some intermittent paranoia, especially around medications, and agitation particularly related to wanting to go home.  However, has not exhibited recurrent physical aggression or self-injurious behaviors.  Significant other has expressed concerns about safely managing at home due to impaired cognition.  Suspect that current presentation is delirium (due to unfamiliar environment and routine, as well as recent additional multifocal strokes) in setting of possible underlying vascular dementia, especially given significant other's reports of prior baseline (low cognitive demand, was providing significant supervision).  While IDT suspects patient may perform better in familiar home environment and routine, at this time would need to recommend initial 24/7 supervision.  Will plan for care conference to further discuss options with significant other (home with 24/7 vs LTC/memory care).  - Appreciate ongoing psychiatry  assistance.  Appreciate psychiatry follow up on 2/26, no changes to plan of care per their note.  - Appreciate neuropsychology assistance.  See their note on 2/26 for additional details.  - Delirium precautions  - Continue 1:1 bedside attendant  - Continue Haldol 1 mg BID (increased on 2/2) with 5 mg IM PRN for agitation.  Haldol recommended at this time due to less risk of exacerbating orthostasis.  Discussed possibly decreasing morning Haldol dose with psychiatry due to increased fatigue.  Advised dose could be decreased if daytime sedation consistent.  Recurrent code 21 on the evening of 3/1 and received 1 dose of Haldol 5 mg IM.  Patient was more somnolent on 3/2 and did not receive either dose of scheduled PO Haldol.  Remains somnolent this morning.  This could still be related to IM dose given half-life of 20 hours.  Throughout ARU course, have also noted persistent waxing/waning levels of alertness which do not always clearly correlate with Haldol dose.  Suspect this may be moreso related to delirium but can revisit with psychiatry if ongoing issues.  Also with possible viral illness as below.    - Outpatient neuropsych testing recommended by psychiatry    Unresponsive episode x2 on 2/15  In early morning 2/15,  patient was noted by nursing staff to be weaker and non-responsive. Patient was found by nursing sitting at edge of bed on his way to the bathroom. Nursing assisted patient using gait belt and walker to the bathroom when it was noticed that patient was having weakness with standing and mobility. Patient was placed in wheelchair where he slumped to his side and became unresponsive. Patient was lifted back into bed where Code Blue was initially called but then was downgraded to RRT. ICU provider team recommended stroke code be called. Neurology evaluated patient at bedside. Vital signs showed /105 when resting in bed. CT imaging showed no hemorrhage or LVO. Lab work-up unremarkable. Patient was  "deemed not requiring transfer to acute care. Later in the morning, nursing staff stated that patient had similar episode of being disoriented and not being able to transfer from chair to bed.  Rehab medical team arrived at bedside to evaluate.  While in chair, patient was not responsive to questions.  Blood pressure noticeably lower than usual, 113/71.  Presentation was likely related to either new stroke or orthostatic hypotension.  Nursing staff to assisted with lifting patient back into bed.  Once patient was in bed and sitting up, patient became more responsive to questions.  Presentation likely related to positional orthostatic hypotension while sitting up in chair but MRI brain was ordered to rule out acute stroke.  MRI results showed no evidence of new infarcts.  Neurology evaluated results and confirmed no new strokes and signed off.  Felt possibly delirium vs hypotension vs stroke recrudescence.   - Continue to assess cognition and vital signs   - Low threshold to transfer to acute care if patient continues to demonstrate altered mental status through orthostatic hypotension  - Per neurology, consider EEG if persistent/recurrent episodes.  EEG completed on 2/21 per psychiatry, interpretation \"moderate diffuse slowing which is non-specific but suggestive of diffuse cerebral dysfunction. There were no epileptiform discharges or seizures\"      HTN   20-year hx orthostasis   Has had long standing issue with orthostatic hypotension even before diagnosis of HTN. Had a tilt table test through [facility] that showed severe orthostatic hypotension. Was followed by cardiology and has a loop recorder in place   Per his SO didn't tolerate abdominal binder and compression socks in the past; was also on midodrine for a while, but this was for overall hypotension and not specifically for orthostasis.  Cardiology consulted at ARU for assistance with management of OH in the setting of overall elevated BPs.  Recommended " changing Lisinopril to 10 mg at bedtime, and continue with conservative measures as able.  Assistance appreciated.  - Continue lisinopril 10 mg at HS  - Continue slow position changes, PO intake abdominal binder, compression  - Continue hydralazine 10-20 mg q6h PRN for SBP >200  - Given long history of OH, target BP goal may be higher than typical for a patient post-stroke     HLD    2/11  Documented intolerance to atorvastatin, rosuvastatin, simvastatin   - Continue lovastatin and zetia as above   - Outpatient referral to vascular medicine for consideration of PCSK9 inhibitor and further evaluation of hyperlipidemia      Type II diabetes mellitus  Polyneuropathy ?  Autonomic dysfunction   PTA on metformin 1000 mg BID and glipizide 10 mg BID. Was not checking his BG at home as his BG was well controlled but has a glucometer.  A1c 8.6% on 2/11/25.  Hyperglycemia during inpatient stay but has been lower at ARU with decreased intake in setting of altered mental status as above.  - Goal A1c <7.0   - Monitor BG TID AC + HS.    - Holding glipizide starting 2/20 due to hypoglycemia in setting of impaired intake.  Glucose levels improved since discontinuation.   - Continue PTA metformin 1000 mg BID.  XR changed to IR formulation on 2/26 to allow option to crush medications when patient reluctant to swallow (has been observed to pocket or spit out, felt to be behavioral related to paranoia).  Has been complaining of more nausea since that time, will resume XR formula (can take whole meds safely pending mental status).  - Hypoglycemia protocol     Bilateral hand muscle atrophy and paresthesia   Concerns for focal mononeuropathy vs cervical radiculopathy; doesn't seem to be explained by polyneuropathy only    - Neurology follow up as outpatient    - May need NCS/EMG for more evaluation      ADITI   Insomnia   Was not using CPAP prior to admission.  Diagnosed 2/2022 following witnessed apneic episodes and polysomnogram  12/2021. Patient reports stopping BIPAP therapy for his ADITI due to abdominal bloating. He reports a repeat polysomnogram after stopping BIPAP that showed minimal ADITI.   - Trazodone and seroquel discontinued as above  - Continue ramelteon 8 mg at HS (added 2/17)  - Sleep hygiene  - Follow up with sleep medicine as outpatient    Moderate malnutrition in the context of acute illness or injury  Poor oral intake  Nausea  At ARU admission, felt that patient did not appear to be having good oral hydration.  Stable electrolytes, did receive IVF bolus on 2/16 for hydration.  With increased paranoia and agitation since ARU admission (as above), PO intake has been limited.  - RD consulted, appreciate assistance  - Carb restriction removed from diet order on 2/20 to allow greater selections, will monitor BG  - Supplements per RD  - RD also sending more finger foods and pre-packaged foods for safety and to encourage more intake given paranoia and self-injurious behaviors  - Patient has intermittently complained of nausea, seems more often since 2/28, did again this morning.  Did not eat breakfast this morning, so may have been due to meds on empty stomach.  Has not previously been interested in PRN but will add Zofran and can offer.  CBC WNL, will add on CMP.  Also could be related to changing metformin from XR formulation as above, will resume prior to see if improved tolerance.    Rhinorrhea, sneezing, cough  Noted by nursing staff on 3/3.  Also more somnolent this morning as above, though has been intermittently throughout course.  Afebrile, no hypoxia.  No leukocytosis.  - COVID/influenza A/B/RSV PCR negative  - Respiratory panel PCR in process     Chronic low back pain  Secondary to prior injury years ago. Follows at [facility] in [place].  - Scheduled Tylenol 975 mg TID  - Continue Robaxin 500 mg QID PRN (added 2/25)     Tobacco use  Per significant other, has significant tobacco use history and is concerned he is  having cravings or withdrawal.  Nicotine patch was ordered to help if any cravings present, however he removed and tried to eat it on 2/20 so discontinued  - Consider PRN nicotine replacement such as gum or lozenge if appropriate     Adjustment to disability:  Clinical psychology to eval and treat for support/assistance for significant other  FEN: regular diet  Bowel: on prn bowel meds; continent.  Some recent constipation but did have BM on 2/25 with PRN Miralax (did not take suppository) and again 2/26 (which is last documented BM).  Continue to monitor.  Bladder: continent.  One PVR obtained 60 ml.  Unable to obtain further values due to agitation  DVT Prophylaxis: mechanical  GI Prophylaxis: on famotidine due to h/o GERD; added TUMS and Mylanta prn  Code: DNR/DNI - confirmed upon admission   Disposition: home with initial 24-hour supervision vs long-term care/memory care.  Care conference planned for 3/4  ELOS:  Pending dispo as above  Follow up Appointments on Discharge: PCP in 1-2 weeks, cardiology and NIVIA, stroke neurology, vascular surgery, PM&R, sleep medicine       30 minutes spent on the date of service doing chart review, history and exam, documentation, and further activities as noted above.     Plan of care was discussed with Dr. Gary Zavala, PM&R staff physician     Lupe Chase PA-C  Physical Medicine & Rehabilitation

## 2025-03-03 NOTE — PROGRESS NOTES
Patient tested positive for Coronavirus (not MERS-CoV or COVID-19), which requires droplet precautions for his duration of illness. Once symptoms resolve, droplet precautions may be discontinued.    Jose Hung, Infection Prevention on 3/3/2025 at 4:02 PM

## 2025-03-03 NOTE — PLAN OF CARE
Goal Outcome Evaluation:      Plan of Care Reviewed With: patient    Overall Patient Progress: no changeOverall Patient Progress: no change       Orientation: alert to self  Bowel: continent   LBM: 3/1  Bladder:mixed continence  Pain: back pain, scheduled tylenol given but refused in the afternoon  Ambulation/Transfers: one assist with walker and gait belt  Diet/Liquids: regular diet, thin liquids, pills whole   Tubes/Lines/Drains: none   Skin: scattered bruising to the arms   Patient sleepy this shift, haldol dose decreased. Patient with runny nose, sneezing, coughing and nausea. Swab test done. Negative for COVID and Influenza. PCR pending.

## 2025-03-03 NOTE — CONSULTS
Psychiatry Consultation; Follow up   2/26/25         Reason for Consult, requesting source:    Reason for Consult: Delirium evaluation and treatment, self injurious behavior  Requesting source: Nick Zavala    Labs and imaging reviewed.             Interim history:    Per nursing notes Pa has still had intermittent agitation and difficulty following directions at times. Over the weekend behavioral code was called and he received 5 mg IM haldol. He had been physically aggressive, swearing, and spitting at staff per chart. Today on interview Pa opens his eyes briefly. Mumbles a couple words to writer but otherwise too somnolent to answer any questions. Staff at bedside report he had been too sedated to eat his breakfast safely and was not able to work with therapy. Did takes medications this morning.        Current Medications:     Current Facility-Administered Medications   Medication Dose Route Frequency Provider Last Rate Last Admin    acetaminophen (TYLENOL) tablet 975 mg  975 mg Oral TID Nick Zavala MD   975 mg at 03/03/25 0836    aspirin (ASA) chewable tablet 81 mg  81 mg Oral Daily Nichelle Chavez PA   81 mg at 03/03/25 0838    atorvastatin (LIPITOR) tablet 10 mg  10 mg Oral Daily Nichelle Chavez PA   10 mg at 03/03/25 0838    ezetimibe (ZETIA) tablet 10 mg  10 mg Oral Daily with supper Nick Zavala MD   10 mg at 03/02/25 1740    famotidine (PEPCID) tablet 40 mg  40 mg Oral BID w/meals Nick Zavala MD   40 mg at 03/03/25 0836    [Held by provider] glipiZIDE (GLUCOTROL) half-tab 10 mg  10 mg Oral BID AC Nichelle Chavez PA   10 mg at 02/20/25 0735    [START ON 3/4/2025] haloperidol (HALDOL) tablet 0.5 mg  0.5 mg Oral Shanta Lance MD        And    haloperidol (HALDOL) tablet 1 mg  1 mg Oral At Bedtime Shanta Mcmahon MD        iopamidol (ISOVUE-370) solution 500 mL  500 mL Intravenous Once Dakotah Huggins APRN CNP        lisinopril  "(ZESTRIL) tablet 10 mg  10 mg Oral QPM Nick Zavala MD   10 mg at 03/02/25 2143    metFORMIN (GLUCOPHAGE XR) 24 hr tablet 1,000 mg  1,000 mg Oral BID w/meals Lupe Chase PA-C        pantoprazole (PROTONIX) EC tablet 40 mg  40 mg Oral QAM AC Christie Cook MD   40 mg at 03/03/25 0838    potassium chloride (KLOR-CON) Packet 20 mEq  20 mEq Oral BID w/meals Lupe Chase PA-C   20 mEq at 03/03/25 0838    ramelteon (ROZEREM) tablet 8 mg  8 mg Oral At Bedtime Shanta Mcmahon MD   8 mg at 03/02/25 1929    sodium chloride 0.9 % bag for CT scan flush use  100 mL As instructed Once Dakotah Hgugins APRN CNP        ticagrelor (BRILINTA) tablet 90 mg  90 mg Oral BID w/meals Nick Zavala MD   90 mg at 03/03/25 0837              MSE:   Appearance:  somnolent , dressed casually   Attitude:   somnolent, unable to participate  Eye Contact:   eyes closed  Mood:  \"ok\"  Affect:   stable, mood congruent  Speech:  dysarthria  Psychomotor Behavior:  no evidence of tardive dyskinesia, dystonia, or tics  Muscle strength and tone: not assessed  Thought Process:   unable to assess today    Associations:  no loose associations on prior exam  Thought Content:   unable to fully assess today, denies feeling that others are out to get him ,   Insight:  limited  Judgement:  limited  Oriented to:   not able to assess today  Attention Span and Concentration:  intact to short conversation  Recent and Remote Memory:  fair    Vital signs:  Temp: 97.9  F (36.6  C) Temp src: Oral BP: (!) 158/77 Pulse: 89   Resp: 20 SpO2: 97 % O2 Device: None (Room air)   Height: 182.9 cm (6') Weight: 109 kg (240 lb 4.8 oz)  Estimated body mass index is 32.59 kg/m  as calculated from the following:    Height as of this encounter: 1.829 m (6').    Weight as of this encounter: 109 kg (240 lb 4.8 oz).           DSM-5 Diagnosis:   Delirium/encephalopathy  C/f vascular dementia           Assessment:   Pa Delcid is a 59 year old man " with no past psychiatric history and medical history of HTN, orthostatic hypotension, HLD, T2DM, and prior stroke who was admitted 2/11 with worsening fatigue, weakness, syncope and was found to have multifocal acute subcortical infarcts. Was admitted to in rehab 2/14/25. Pt has been seen by neurology team this admission. Per their note, transient neurological episodes could be due to delirium, hypotension, vs elements of stroke recrudescence.     EEG done 2/21/25 showed moderate diffuse slowing, likely consistent with delirium. Likely component of vascular cognitive impairment contributing to presentation as well. Over the last week Pa had been more alert and paranoia is lessened. Had been having some intermittent agitation but overall more behaviorally appropriate, compliant with medications and treatment plan. However, over the weekend had a behavioral code called and received 5 mg haldol PRN. Today on exam is oversedated and unable to participate in interview. Plan to decrease morning dose of haldol to 0.5 mg and continue with 1 mg at bedtime. Plan to decrease haldol IM PRN to 2 mg for severe agitation. Will need to continue 1:1 for now. Will order EKG today to check Qtc. Once closer to discharge, will need a plan for tapering off.            Summary of Recommendations:   Legal: Does not have capacity to leave AMA  Safety: 1:1. Recommend verbal de-escalation, redirection, haldol 2mg PRN available  Medications: decrease morning dose Haldol to 0.5 mg, continue bedtime dose 1 mg. Decrease Haldol PRN for agitation to 2 mg.  Labs/orders:EKG ordered  Follow-Up: Psychiatry will continue to follow    Shanta Mcmahon MD MPH  PGY2 Psychiatry resident   Pt staffed with attending Dr. Alford

## 2025-03-04 ENCOUNTER — APPOINTMENT (OUTPATIENT)
Dept: SPEECH THERAPY | Facility: CLINIC | Age: 60
DRG: 057 | End: 2025-03-04
Attending: PHYSICAL MEDICINE & REHABILITATION
Payer: COMMERCIAL

## 2025-03-04 LAB
GLUCOSE BLDC GLUCOMTR-MCNC: 186 MG/DL (ref 70–99)
GLUCOSE BLDC GLUCOMTR-MCNC: 192 MG/DL (ref 70–99)

## 2025-03-04 PROCEDURE — 99233 SBSQ HOSP IP/OBS HIGH 50: CPT | Performed by: PHYSICAL MEDICINE & REHABILITATION

## 2025-03-04 PROCEDURE — 250N000013 HC RX MED GY IP 250 OP 250 PS 637

## 2025-03-04 PROCEDURE — 97129 THER IVNTJ 1ST 15 MIN: CPT | Mod: GN

## 2025-03-04 PROCEDURE — 250N000013 HC RX MED GY IP 250 OP 250 PS 637: Performed by: PHYSICAL MEDICINE & REHABILITATION

## 2025-03-04 PROCEDURE — 97130 THER IVNTJ EA ADDL 15 MIN: CPT | Mod: GN

## 2025-03-04 PROCEDURE — 250N000011 HC RX IP 250 OP 636: Performed by: PHYSICIAN ASSISTANT

## 2025-03-04 PROCEDURE — 250N000013 HC RX MED GY IP 250 OP 250 PS 637: Performed by: PHYSICIAN ASSISTANT

## 2025-03-04 PROCEDURE — 128N000003 HC R&B REHAB

## 2025-03-04 RX ORDER — LIDOCAINE 4 G/G
2 PATCH TOPICAL
Status: DISCONTINUED | OUTPATIENT
Start: 2025-03-04 | End: 2025-03-05

## 2025-03-04 RX ORDER — SENNOSIDES 8.6 MG
8.6 TABLET ORAL 2 TIMES DAILY PRN
Status: DISCONTINUED | OUTPATIENT
Start: 2025-03-04 | End: 2025-03-06 | Stop reason: HOSPADM

## 2025-03-04 RX ADMIN — PANTOPRAZOLE SODIUM 40 MG: 40 TABLET, DELAYED RELEASE ORAL at 09:29

## 2025-03-04 RX ADMIN — FAMOTIDINE 40 MG: 20 TABLET, FILM COATED ORAL at 09:28

## 2025-03-04 RX ADMIN — POTASSIUM CHLORIDE 20 MEQ: 1.5 POWDER, FOR SOLUTION ORAL at 09:30

## 2025-03-04 RX ADMIN — GUAIFENESIN 200 MG: 200 SOLUTION ORAL at 17:30

## 2025-03-04 RX ADMIN — RAMELTEON 8 MG: 8 TABLET, FILM COATED ORAL at 20:10

## 2025-03-04 RX ADMIN — LISINOPRIL 10 MG: 10 TABLET ORAL at 20:08

## 2025-03-04 RX ADMIN — EZETIMIBE 10 MG: 10 TABLET ORAL at 17:28

## 2025-03-04 RX ADMIN — ONDANSETRON 4 MG: 4 TABLET, ORALLY DISINTEGRATING ORAL at 06:39

## 2025-03-04 RX ADMIN — TICAGRELOR 90 MG: 90 TABLET ORAL at 17:28

## 2025-03-04 RX ADMIN — FAMOTIDINE 40 MG: 20 TABLET, FILM COATED ORAL at 17:28

## 2025-03-04 RX ADMIN — HALOPERIDOL 0.5 MG: 0.5 TABLET ORAL at 09:30

## 2025-03-04 RX ADMIN — LIDOCAINE 4% 2 PATCH: 40 PATCH TOPICAL at 20:15

## 2025-03-04 RX ADMIN — ACETAMINOPHEN 975 MG: 325 TABLET, FILM COATED ORAL at 20:10

## 2025-03-04 RX ADMIN — ONDANSETRON 4 MG: 4 TABLET, ORALLY DISINTEGRATING ORAL at 19:16

## 2025-03-04 RX ADMIN — OXYMETAZOLINE HYDROCHLORIDE 2 SPRAY: 0.05 SPRAY NASAL at 20:12

## 2025-03-04 RX ADMIN — METFORMIN ER 500 MG 1000 MG: 500 TABLET ORAL at 09:27

## 2025-03-04 RX ADMIN — TICAGRELOR 90 MG: 90 TABLET ORAL at 09:29

## 2025-03-04 RX ADMIN — ATORVASTATIN CALCIUM 10 MG: 10 TABLET, FILM COATED ORAL at 09:31

## 2025-03-04 RX ADMIN — ACETAMINOPHEN 975 MG: 325 TABLET, FILM COATED ORAL at 09:28

## 2025-03-04 RX ADMIN — METFORMIN HYDROCHLORIDE 500 MG: 500 TABLET, FILM COATED ORAL at 18:33

## 2025-03-04 RX ADMIN — ASPIRIN 81 MG CHEWABLE TABLET 81 MG: 81 TABLET CHEWABLE at 09:31

## 2025-03-04 RX ADMIN — HALOPERIDOL 1 MG: 0.5 TABLET ORAL at 20:10

## 2025-03-04 ASSESSMENT — ACTIVITIES OF DAILY LIVING (ADL)
ADLS_ACUITY_SCORE: 52
ADLS_ACUITY_SCORE: 56
ADLS_ACUITY_SCORE: 52
ADLS_ACUITY_SCORE: 56
ADLS_ACUITY_SCORE: 52
ADLS_ACUITY_SCORE: 56
ADLS_ACUITY_SCORE: 52
ADLS_ACUITY_SCORE: 56
ADLS_ACUITY_SCORE: 52
ADLS_ACUITY_SCORE: 56
ADLS_ACUITY_SCORE: 52
ADLS_ACUITY_SCORE: 56
ADLS_ACUITY_SCORE: 52
ADLS_ACUITY_SCORE: 56
ADLS_ACUITY_SCORE: 56

## 2025-03-04 NOTE — PROGRESS NOTES
[facility]   Acute Rehabilitation Unit  Daily progress note    INTERVAL HISTORY  Chris Delcid was seen today during physician rounds, discussed in team rounds, and in care conference with the patient's spouse.  No acute events overnight, however yesterday tested positive for Coronavirus (not COVID) and is now on droplet precautions.  During my visit he was calm and cooperative, and did not have any aggressive behaviors.  He continues to be overall tired and fatigued since his intramuscular Haldol injection over the weekend.  At time of visit fatigue was his only complaint and he had difficulty sitting at the edge of bed on his own, and needed assistance to be moved back to lying supine with the head of bed up in order to eat lunch.  During her care conference, the patient's spouse continues to have reservations about discharging home and has secured him a TCU bed which is very reasonable.  Ultimately the goal is to bring him home but she would like him to continue to improve from a cognitive standpoint, and also due to his decreased functional status over the past couple of days which is likely mostly from the Haldol which is now decreased, but also could have other contributing factors such as his coronavirus infection.    Current functional status:  OT:  ADLs:  Mobility: Supervision/1:1 FWW for behavior only; functionally Mod I from physical and simple functional cognition perspective. Instances of increased A needed when orthostatic.  Grooming: Supervision  Dressing: Supervision FWW.  Bathing: Simulated tub/shower transfer with grab bars similar to home environment SBA FWW <> shower chair. Clinical judgement Min A.  Toileting: Prompts to initiate toileting in ARU environment; mixed continence. Recommend supervision FWW <> toilet d/t hx of syncopal episodes.   IADLs: Baseline IND simple meal prep, light home mgmt, med mgmt. 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 Enjoys refurbishing  antique toys.   Vision/Cognition: Baseline vision deficit d/t retinal disease. Dysarthric. Delirium - fluctuating orientation. Agitated and combative at times. Significant functional cognition deficits across attention, memory, reasoning, impulse control. Level of prompting required for initiation or sequencing variable since agitation and appears to fluctuate with medications.     Assessment: Pt unwell upon approach, spitting into emesis bag, appearing extremely fatigued and unable to consistently follow one-step directions including perseverative  on bed rail; requiring Ax2 bed mobility d/t confluence of factors. Decreasing OT POC to 3x/week.    SLP:  Hearing: [facility]  Vision: Retinal detachment, Readers  Communication: Mild dysarthria when pt alert, motor speech at baseline per wife's report. Hx expressive aphasia; WAB-R Bedside 02/16 moderate expressive/receptive deficits (suspect fatigue, medications impacting)  Cognition: severe cognitive impairments re: poor insight into deficits/safety/situation, poor working memory, alternating/sustained attention, executive function, very slow processing , poor initiation  Swallow: Regular solids,Thin liquids (0). Goals met during acute. SLP re assessed and deemed any dysfunction related to behavior. May informally monitor      Assessment: Noting mild improvement in responding to ?s this session. Pt agreeable to participate. Pt oriented to location in room of visual aids decpiting temporal information and location. Pt able to utilize with mod cues. Pt engaged in task, identifying target given category and letter tiles. ongoing very slow processing. able to identify target with 90%IND, 100% mod cues. Benefited from semantic cues. Pt requires max cues to continue to participate and engage in sessions.     MEDICATIONS  Current Facility-Administered Medications   Medication Dose Route Frequency Provider Last Rate Last Admin    acetaminophen (TYLENOL) tablet 975 mg  975 mg Oral  TID Nick Zavala MD   975 mg at 03/04/25 0928    aspirin (ASA) chewable tablet 81 mg  81 mg Oral Daily Nichelle Chavez PA   81 mg at 03/04/25 0931    atorvastatin (LIPITOR) tablet 10 mg  10 mg Oral Daily Nichelle Chavez PA   10 mg at 03/04/25 0931    ezetimibe (ZETIA) tablet 10 mg  10 mg Oral Daily with supper Nick Zavala MD   10 mg at 03/03/25 1642    famotidine (PEPCID) tablet 40 mg  40 mg Oral BID w/meals Nick Zavala MD   40 mg at 03/04/25 0928    [Held by provider] glipiZIDE (GLUCOTROL) half-tab 10 mg  10 mg Oral BID AC Nichelle Chavez PA   10 mg at 02/20/25 0735    haloperidol (HALDOL) tablet 0.5 mg  0.5 mg Oral SLAVA Shanta Mcmahon MD   0.5 mg at 03/04/25 0930    And    haloperidol (HALDOL) tablet 1 mg  1 mg Oral At Bedtime Shanta Mcmahon MD   1 mg at 03/03/25 2106    iopamidol (ISOVUE-370) solution 500 mL  500 mL Intravenous Once Dakotah Huggins APRN CNP        lisinopril (ZESTRIL) tablet 10 mg  10 mg Oral QPM Nick Zavala MD   10 mg at 03/03/25 2106    metFORMIN (GLUCOPHAGE XR) 24 hr tablet 1,000 mg  1,000 mg Oral BID w/meals Lupe Chase PA-C   1,000 mg at 03/04/25 0927    oxymetazoline (AFRIN) 0.05 % spray 2 spray  2 spray Both Nostrils BID Lupe Chase PA-C   2 spray at 03/03/25 1805    pantoprazole (PROTONIX) EC tablet 40 mg  40 mg Oral QAM AC Christie Cook MD   40 mg at 03/04/25 0929    potassium chloride (KLOR-CON) Packet 20 mEq  20 mEq Oral BID w/meals Lupe Chase PA-C   20 mEq at 03/04/25 0930    ramelteon (ROZEREM) tablet 8 mg  8 mg Oral At Bedtime Shanta Mcmahon MD   8 mg at 03/03/25 2102    sodium chloride 0.9 % bag for CT scan flush use  100 mL As instructed Once Dakotah Huggins APRN CNP        ticagrelor (BRILINTA) tablet 90 mg  90 mg Oral BID w/meals Nick Zavala MD   90 mg at 03/04/25 0904          Current Facility-Administered Medications   Medication Dose Route Frequency  Provider Last Rate Last Admin    alum & mag hydroxide-simethicone (MAALOX) suspension 30 mL  30 mL Oral Q4H PRN Nick Zavala MD        bisacodyl (DULCOLAX) suppository 10 mg  10 mg Rectal Daily PRN Nick Zavala MD        calcium carbonate (TUMS) chewable tablet 500 mg  500 mg Oral TID PRN Lupe Chase PA-C        carboxymethylcellulose PF (REFRESH PLUS) 0.5 % ophthalmic solution 1 drop  1 drop Both Eyes 4x Daily PRN Nick Zavala MD        glucose gel 15-30 g  15-30 g Oral Q15 Min PRN Nichelle Chavez PA   15 g at 02/20/25 1424    Or    dextrose 50 % injection 25-50 mL  25-50 mL Intravenous Q15 Min PRN Nichelle Chavez PA        Or    glucagon injection 1 mg  1 mg Subcutaneous Q15 Min PRN Nichelle Chavez PA        guaiFENesin (ROBITUSSIN) 20 mg/mL solution 200 mg  200 mg Oral Q4H PRN Lupe Chase PA-C        haloperidol lactate (HALDOL) injection 2 mg  2 mg Intramuscular Q6H PRN Shanta Mcmahon MD        hydrALAZINE (APRESOLINE) tablet 10 mg  10 mg Oral Q6H PRN Nick Zavala MD        Or    hydrALAZINE (APRESOLINE) tablet 20 mg  20 mg Oral Q6H PRN Nick Zavala MD        methocarbamol (ROBAXIN) tablet 500 mg  500 mg Oral 4x Daily PRN Nick Zavala MD        ondansetron (ZOFRAN ODT) ODT tab 4 mg  4 mg Oral Q6H PRN Lupe Chase PA-C   4 mg at 03/04/25 0639    polyethylene glycol (MIRALAX) Packet 17 g  17 g Oral Daily PRN Nichelle Chavez PA   17 g at 02/25/25 0821        PHYSICAL EXAM  BP (!) 151/100 (BP Location: Left arm)   Pulse 67   Temp 98.2  F (36.8  C) (Oral)   Resp 16   Ht 1.829 m (6')   Wt 109 kg (240 lb 4.8 oz)   SpO2 97%   BMI 32.59 kg/m    Gen: NAD, had difficulty sitting at the edge of the bed independently and was moved back into bed supine  HEENT: NC/AT  Pulm: non-labored on room air  Abd: Non-distended  Ext: no appreciable edema in BLE  Neuro/MSK: Awake but appears fatigued, dysarthric    LABS  CBC  RESULTS:   Recent Labs   Lab Test 03/03/25  0603 02/24/25  0557 02/17/25  0534   WBC 8.9 6.6 7.4   RBC 4.78 5.08 4.90   HGB 14.3 14.9 14.4   HCT 40.9 43.4 41.9   MCV 86 85 86   MCH 29.9 29.3 29.4   MCHC 35.0 34.3 34.4   RDW 13.5 13.6 13.6    398 320       Last Basic Metabolic Panel:  Recent Labs   Lab Test 03/04/25  0857 03/03/25  2125 03/03/25  1641 03/03/25  0837 03/03/25  0603 02/24/25  0712 02/24/25  0557 02/20/25  0546 02/20/25  0541   NA  --   --   --   --  140  --  138  --  142   POTASSIUM  --   --   --   --  3.9  --  4.2  --  4.0   CHLORIDE  --   --   --   --  106  --  104  --  108*   CO2  --   --   --   --  20*  --  20*  --  21*   ANIONGAP  --   --   --   --  14  --  14  --  13   * 166* 142*   < > 172*   < > 226*   < > 65*   BUN  --   --   --   --  16.9  --  21.5  --  20.0   CR  --   --   --   --  1.03  --  1.16  --  0.97   GFRESTIMATED  --   --   --   --  83  --  72  --  89   JAMIE  --   --   --   --  9.2  --  9.4  --  9.4    < > = values in this interval not displayed.       Rehabilitation -  Admission to acute inpatient rehab 2/14/25.    Impairment group code: Stroke Ischemic 01.3 Bilateral Involvement; acute lacunar infarcts in the left thalamus, the posterior left centrum semiovale ovale, the right occipital and right occipitotemporal white matter, and the right frontal white matter, suspected embolic etiology      PT, OT and SLP daily.  Decreasing daily minutes due to lack of meaningful therapy participation.  2. Impairment of ADL's:  OT to work on upper and lower body self care, dressing, toileting, bathing, energy conservation techniques with use of ADs as needed.  3. Impairment of mobility:   PT to work on gait exercises, strengthening, endurance buildup, transfers with use of walker as needed.  4. Impairment of cognition/language/swallow:   SLP for cognitive evaluation and treatment strategies for higher level cognitive deficits and memory impairment.  5. Rehab RN to administer  "medication, patient education on medication taking, VS monitoring, bowel regimen, glucose monitoring and management of orthostasis.     Continue comprehensive acute inpatient rehabilitation program with multidisciplinary approach including therapies, rehab nursing, and physiatry following. See interval history for updates.      ASSESSMENT AND PLAN  Chris Declid is a 59 year old right hand dominant male with a past medical history of multiple prior strokes (left pontine; L and R centrum semiovale; R hemipons), HTN, orthostatic hypotension, HLD, DM II c/b gastroparesis, ADITI, chronic low back pain, and GERD who was admitted on 2/11/25 with worsening fatigue, weakness, recurrent falls, and syncope and was found to have multifocal acute/subacute subcortical infarcts with hospital course complicated by additional imaging finding of right vertebral artery occlusion/dissection, hypertensive urgency, hyperglycemia, and elevated troponin.  He was admitted to ARU on 2/14/25 for multidisciplinary rehabilitation and ongoing medical management.     Medical Conditions    Multiple acute lacunar infarcts in both cerebral hemispheres   Right vertebral artery dissection  Hx multiple prior strokes (10/2020 L pontine; 11/2021 L and R centrum semiovale infarcts; 8/2022 acute R hemipons, possible late subacute right centrum semiovale)  PTA on Brilinta monotherapy (since 8/2022 stroke).  Per neurology, patient \"has had recurrent primarily subcortical infarcts and progression of microvascular disease over the past five years. This has occurred in the setting of uncontrolled vascular risk factors (HTN, HLD, DM, ADITI), however, will pursue further evaluation for atypical stroke etiologies given recurrent nature and family history.\"  Hypercoag workup: Protein C and S, lupus anticoagulant, cardiolipin Ab, Antithrombin III, Beta 2 glycoprotein, factor II, and factor V all negative.   - Continue PTA brilinta 90 bid and ASA 81 mg daily, which " was added this admission.  DAPT duration TBD  - Continue lovastatin 40 mg daily and zetia (added this admission for HLD)  - Goal BP <130/80, management as below  - Goal A1c <7, management as below   - Continue PT/OT  - Outpatient NIVIA  - Outpatient genetic referral given family history of stroke in young, can be further discussed at outpatient stroke followup   - Follow up with vascular medicine for consideration of PCSK9 inhibitor and further evaluation of hyperlipidemia   - Follow up neurology in 4-6 weeks with any stroke ALEXIS ([phone])   - Follow up with PM&R in 8-10 weeks      Agitation, paranoia  Delirium  Suicidal ideations  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 Patient reportedly consistently trying to get out of bed. Patient trying to throw punches at staff per nursing. Patient reportedly unwilling to take oral medications. Given the acutely agitated presentation and to maintain the safety of staff on unit, Code Green was activated and provider team ordered a IM olanzapine 5mg x1 for use.  1:1 bedside attendant added for safety.  Seroquel was added at bedtime (stopped trazodone).  Psychiatry consulted on 2/17 and changed seroquel to scheduled and PRN Haldol.  Lexa most likely delirium though also with concerns for underlying vascular dementia.  UA negative on 2/18.  2 recurrent code 21 episodes on 2/19 due to paranoia/agitation and physical aggression.  Psychiatry reassessed and no changes to plan at that time.  However, on 2/21 had episode of self-injurious behavior (cutting wrist with plastic knife) which required physical restraints and use of PRN IM Haldol.  Scheduled Haldol doses were increased and psychiatry recommended vEEG, which showed moderate diffuse slowing but no epileptiform discharges or seizures (able to obtain 22 min).  Neurology re-consulted as well to consider any further work-up for etiology.   Recommended some labs (B12 and ammonia WNL, TSH elevated but free T4 normal), delirium precautions, ongoing psychology, but no further work-up at this time from their perspective.  Pharmacy reviewed medications, none currently felt to be contributing to increased risk of delirium  2/27: still some intermittent paranoia, especially around medications, and agitation particularly related to wanting to go home.  However, has not exhibited recurrent physical aggression or self-injurious behaviors.  Significant other has expressed concerns about safely managing at home due to impaired cognition.  Suspect that current presentation is delirium (due to unfamiliar environment and routine, as well as recent additional multifocal strokes) in setting of possible underlying vascular dementia, especially given significant other's reports of prior baseline (low cognitive demand, was providing significant supervision).  While IDT suspects patient may perform better in familiar home environment and routine, at this time would need to recommend initial 24/7 supervision.  Will plan for care conference to further discuss options with significant other (home with 24/7 vs LTC/memory care).  - Appreciate ongoing psychiatry assistance.  Appreciate psychiatry follow up on 2/26, no changes to plan of care per their note.  - Appreciate neuropsychology assistance.  See their note on 2/26 for additional details.  - Delirium precautions  - Continue 1:1 bedside attendant   Recurrent code 21 on the evening of 3/1 and received 1 dose of Haldol 5 mg IM.  Patient was more somnolent on 3/2 and did not receive either dose of scheduled PO Haldol.  Remains overall fatigued.  This could still be related to IM dose given half-life of 20 hours.  Throughout ARU course, have also noted persistent waxing/waning levels of alertness which do not always clearly correlate with Haldol dose.    -Ongoing psychiatry assistance appreciated.  PO haldol decreased to 0.5  mg qAM and continue 1 mg qPM, with PRN IM Haldol decreased to 2 mg for agitation.  - Outpatient neuropsych testing recommended by psychiatry    Unresponsive episode x2 on 2/15  In early morning 2/15,  patient was noted by nursing staff to be weaker and non-responsive. Patient was found by nursing sitting at edge of bed on his way to the bathroom. Nursing assisted patient using gait belt and walker to the bathroom when it was noticed that patient was having weakness with standing and mobility. Patient was placed in wheelchair where he slumped to his side and became unresponsive. Patient was lifted back into bed where Code Blue was initially called but then was downgraded to RRT. ICU provider team recommended stroke code be called. Neurology evaluated patient at bedside. Vital signs showed /105 when resting in bed. CT imaging showed no hemorrhage or LVO. Lab work-up unremarkable. Patient was deemed not requiring transfer to acute care. Later in the morning, nursing staff stated that patient had similar episode of being disoriented and not being able to transfer from chair to bed.  Rehab medical team arrived at bedside to evaluate.  While in chair, patient was not responsive to questions.  Blood pressure noticeably lower than usual, 113/71.  Presentation was likely related to either new stroke or orthostatic hypotension.  Nursing staff to assisted with lifting patient back into bed.  Once patient was in bed and sitting up, patient became more responsive to questions.  Presentation likely related to positional orthostatic hypotension while sitting up in chair but MRI brain was ordered to rule out acute stroke.  MRI results showed no evidence of new infarcts.  Neurology evaluated results and confirmed no new strokes and signed off.  Felt possibly delirium vs hypotension vs stroke recrudescence.   - Continue to assess cognition and vital signs   - Low threshold to transfer to acute care if patient continues to  "demonstrate altered mental status through orthostatic hypotension  - Per neurology, consider EEG if persistent/recurrent episodes.  EEG completed on 2/21 per psychiatry, interpretation \"moderate diffuse slowing which is non-specific but suggestive of diffuse cerebral dysfunction. There were no epileptiform discharges or seizures\"      HTN   20-year hx orthostasis   Has had long standing issue with orthostatic hypotension even before diagnosis of HTN. Had a tilt table test through [facility] that showed severe orthostatic hypotension. Was followed by cardiology and has a loop recorder in place   Per his SO didn't tolerate abdominal binder and compression socks in the past; was also on midodrine for a while, but this was for overall hypotension and not specifically for orthostasis.  Cardiology consulted at ARU for assistance with management of OH in the setting of overall elevated BPs.  Recommended changing Lisinopril to 10 mg at bedtime, and continue with conservative measures as able.  Assistance appreciated.  - Continue lisinopril 10 mg at HS  - Continue slow position changes, PO intake abdominal binder, compression  - Continue hydralazine 10-20 mg q6h PRN for SBP >200  - Given long history of OH, target BP goal may be higher than typical for a patient post-stroke     HLD    2/11  Documented intolerance to atorvastatin, rosuvastatin, simvastatin   - Continue lovastatin and zetia as above   - Outpatient referral to vascular medicine for consideration of PCSK9 inhibitor and further evaluation of hyperlipidemia      Type II diabetes mellitus  Polyneuropathy ?  Autonomic dysfunction   PTA on metformin 1000 mg BID and glipizide 10 mg BID. Was not checking his BG at home as his BG was well controlled but has a glucometer.  A1c 8.6% on 2/11/25.  Hyperglycemia during inpatient stay but has been lower at ARU with decreased intake in setting of altered mental status as above.  - Goal A1c <7.0   - Monitor BG TID AC + " HS.    - Holding glipizide starting 2/20 due to hypoglycemia in setting of impaired intake.  Glucose levels improved since discontinuation.   - Continue PTA metformin 1000 mg BID.  XR changed to IR formulation on 2/26 to allow option to crush medications when patient reluctant to swallow (has been observed to pocket or spit out, felt to be behavioral related to paranoia).  Has been complaining of more nausea since that time, will resume XR formula (can take whole meds safely pending mental status).  - Hypoglycemia protocol     Bilateral hand muscle atrophy and paresthesia   Concerns for focal mononeuropathy vs cervical radiculopathy; doesn't seem to be explained by polyneuropathy only    - Neurology follow up as outpatient    - May need NCS/EMG for more evaluation      ADITI   Insomnia   Was not using CPAP prior to admission.  Diagnosed 2/2022 following witnessed apneic episodes and polysomnogram 12/2021. Patient reports stopping BIPAP therapy for his ADITI due to abdominal bloating. He reports a repeat polysomnogram after stopping BIPAP that showed minimal ADITI.   - Trazodone and seroquel discontinued as above  - Continue ramelteon 8 mg at HS (added 2/17)  - Sleep hygiene  - Follow up with sleep medicine as outpatient    Moderate malnutrition in the context of acute illness or injury  Poor oral intake  Nausea  At ARU admission, felt that patient did not appear to be having good oral hydration.  Stable electrolytes, did receive IVF bolus on 2/16 for hydration.  With increased paranoia and agitation since ARU admission (as above), PO intake has been limited.  - RD consulted, appreciate assistance  - Carb restriction removed from diet order on 2/20 to allow greater selections, will monitor BG  - Supplements per RD  - RD also sending more finger foods and pre-packaged foods for safety and to encourage more intake given paranoia and self-injurious behaviors  - Patient has intermittently complained of nausea, seems more often  since 2/28, PRN Zofran added.  CBC and CMP 3/3 unremarkable.  Also could be related to changing metformin from XR formulation as above, will resume prior to see if improved tolerance.    Rhinorrhea, sneezing, cough  Noted by nursing staff on 3/3.   -Respiratory panel positive for Coronavirus (not COVID).  Treat symptomatically.  Per infection prevention, needs droplet precautions until symptom resolution     Chronic low back pain  Secondary to prior injury years ago. Follows at [facility] in [place].  - Scheduled Tylenol 975 mg TID  - Continue Robaxin 500 mg QID PRN (added 2/25)     Tobacco use  Per significant other, has significant tobacco use history and is concerned he is having cravings or withdrawal.  Nicotine patch was ordered to help if any cravings present, however he removed and tried to eat it on 2/20 so discontinued  - Consider PRN nicotine replacement such as gum or lozenge if appropriate     Adjustment to disability:  Clinical psychology to eval and treat for support/assistance for significant other  FEN: regular diet  Bowel: on prn bowel meds; continent.  Some recent constipation but did have BM on 2/25 with PRN Miralax (did not take suppository) and again 2/26 (which is last documented BM).  Continue to monitor.  Bladder: continent.  One PVR obtained 60 ml.  Unable to obtain further values due to agitation  DVT Prophylaxis: mechanical  GI Prophylaxis: on famotidine due to h/o GERD; added TUMS and Mylanta prn  Code: DNR/DNI - confirmed upon admission   Disposition: Significant other has secured a TCU bed  ELOS:  Pending transfer to TCU, targeting 3/6  Follow up Appointments on Discharge: PCP in 1-2 weeks, cardiology and NIVIA, stroke neurology, vascular surgery, PM&R, sleep medicine       50 minutes spent on the date of service doing chart review, history and exam, documentation, and further activities as noted above.     Gary Zavala MD  Department of Rehabilitation Medicine

## 2025-03-04 NOTE — PROGRESS NOTES
ARU Social Work Progress Notes     Referral Sent     Destination    Service Provider Request Status Services Address Phone Fax Patient Preferred   Tracee Alba (SNF) Accepted -- [id number] [facility] [id number] [phone] --     Tracee Alba  Roger-Admission/  Tel:[phone]  Fax:[phone]    Potential discharge on Thursday.    JAX Molina  [facility], Acute Inpatient Rehab Unit   [address] [place] [address], [address] [place] [address]  Phone: [phone]  Fax: [phone]

## 2025-03-04 NOTE — PLAN OF CARE
Discharge Planner Post-Acute Rehab SLP:      Discharge Plan: TBD     Precautions: 1:1 attendant, can be combative with cares.     Current Status:  Hearing: WFL  Vision: Retinal detachment, Readers  Communication: Mild dysarthria when pt alert, motor speech at baseline per wife's report. Hx expressive aphasia; WAB-R Bedside 02/16 moderate expressive/receptive deficits (suspect fatigue, medications impacting)  Cognition: severe cognitive impairments re: poor insight into deficits/safety/situation, poor working memory, alternating/sustained attention, executive function, very slow processing , poor initiation  Swallow: Regular solids,Thin liquids (0). Goals met during acute. SLP re assessed and deemed any dysfunction related to behavior. May informally monitor      Assessment: Noting mild improvement in responding to ?s this session. Pt agreeable to participate. Pt oriented to location in room of visual aids decpiting temporal information and location. Pt able to utilize with mod cues. Pt engaged in task, identifying target given category and letter tiles. ongoing very slow processing. able to identify target with 90%IND, 100% mod cues. Benefited from semantic cues. Pt requires max cues to continue to participate and engage in sessions.      Other Barriers to Discharge (Family Training, etc): level of assist/supervision with iADLs?

## 2025-03-04 NOTE — PROGRESS NOTES
Care Conference    Attendence: Lupe Chase PA-C,Nick Zavala MD,Rajesh Chow, PT, Luisana Whitt-, Jovany Meryl-Significant Other.      Team met with patient's spouse Meryl to discuss discharge planning and next steps. She shared that she has reached out to skilled nursing facility and they are willing to accept referral to help with Pa's ongoing on therapy and nursing needs.    Next Steps  Send Referral  Tracee Alba  Roger  Tel:[phone]  Fax:[phone]      JAX Molina  [facility], Acute Inpatient Rehab Unit   [facility] [address], [address]th [place] [address]  Phone: [phone]  Fax: [phone]

## 2025-03-04 NOTE — PROGRESS NOTES
ARU Social Work Progress Notes      called and spoke to Meryl regarding update; we are looking at a potential discharge on Thursday. Stretcher ride is preferred.      JAX Molina  [facility], Acute Inpatient Rehab Unit   [address] [place] [address], [address]th [place] [address]  Phone: [phone]  Fax: [phone]

## 2025-03-04 NOTE — PLAN OF CARE
FOCUS/GOAL  Mobility, Safety management, and Prevention of secondary complications    ASSESSMENT, INTERVENTIONS AND CONTINUING PLAN FOR GOAL:  Pt is hypertensive, 185/103.  Has PRN hydralyzine for parameters SBP > 200, DBP > 110.  Other VSS.  In bed using urinal at bedside.  Is continent of bowel/bladder.  Pt having orthostatic hypotension with syncope per report so pt has not been out of bed this shift and using urinal.  Sitter is in room and given full report about pt deficits.  Very slow to respond and soft spoken.  Is alert and oriented to x2.  No meds given this shift  Goal Outcome Evaluation:

## 2025-03-04 NOTE — PROGRESS NOTES
VS: BP (!) 151/100 (BP Location: Left arm)   Pulse 67   Temp 98.2  F (36.8  C) (Oral)   Resp 16   Ht 1.829 m (6')   Wt 109 kg (240 lb 4.8 oz)   SpO2 97%   BMI 32.59 kg/m     O2: SpO2 stable on RA. LS clear and equal bilaterally. Denies chest pain and SOB.    Output: Voids spontaneously without difficulty to using beside urinal.   Last BM: 3/1, denies abdominal discomfort. BS active / passing flatus.    Activity: Assist of one with walker.    Skin: WDL   Pain: Denies pain.    CMS: Alert to self. Denies numbness and tingling.   Dressing: N/A   Diet: Regular diet. Denies nausea/vomiting.      LDA: No lines/drains.    Equipment: Personal belongings at bedside.    Plan: Droplet precautions maintained / Continue with plan of care. Call light within reach, pt able to make needs known.    Additional Info: Sitter at bedside-assists with meals, toileting and redirection.

## 2025-03-04 NOTE — PLAN OF CARE
Acute Rehab Care Conference/Team Rounds      Type: Team Rounds    Present: Dr. Zavala, Nick Vega MD, Rena Andrade PA, Unique Joseph Neuropsychologist, Rajesh Chow PT, Connie Diop OT, Anand Cornejo SLP,  Luisana Whitt , Radha Grimaldo RD, Yenny Rapp Rehab Supervisor, Kasey Westbrook RN, Bhavin CHIN Patient.      Discharge Barriers/Treatment/Education    Rehab Diagnosis: Bilateral CVAs     Active Medical Co-morbidities/Prognosis: Delirium/agitation, vertebral artery dissection, HTN, orthostatic hypotension, DM, polyneuropathy, autonomic dysfunction, HLD, ADITI, insomnia, chronic low back pain    Safety: severe HTN, last 185/103; ortho hypotension may have syncope/black out if standing so toileting has been in bed    Pain: denies    Medications, Skin, Tubes/Lines:    Swallowing/Nutrition:  Regular solids,Thin liquids (0). Goals met during acute. SLP re assessed and deemed any dysfunction related to behavior. May informally monitor     Bowel/Bladder: usually continent x2; LBM 3/3    Psychosocial:Pt lives in a house with his significant other.     ADLs/IADLs: Functional performance variable and difficult to assess in context of continued functional cognitive deficits/delirium and behaviors and increased level of sedation in recent days. Previously supervision-SBA FWW-based transfers and ADLs with supervision and A for orthostatic hypotension and cognition in context of delirium; in recent days requiring variable Ax1-2 and increased prompts for initiation and continuation of tasks with increased sedation. Decreased OT POC to 3/week due to barriers to participation. Goal at admission to discharge Mod I home daytime with IADL A from s/o; since admission s/o raised concerns regarding pt safety alone at home at baseline and in light of current function would only recommend trial of home to maximize functional cognition and mood with initial 24/7 supervision and behavior escalation  plan. If s/o unable to provide initial 24/7 supervision, recommend memory care unit or skilled facility able to meet level of need.    Mobility: Pt remains on unit primarily due to social, behavioral, and medical concerns. When pt is alert and feeling well, he mobilizing around the unit at a mod-I baseline level with FWW and performs stairs mod-I. No further IP PT needs. Medications, particularly meds needed to de-escalate agitation lead to decreased mobility. Recommend initial supervision with HH PT follow-up at home to ensure safe transition as cognition makes transition back to familiar environment unpredictable. If needs not met at home, will need to discharge to a skilled facility for his cares.    Cognition/Language:  Mild dysarthria when pt alert, motor speech at baseline per wife's report. Hx expressive aphasia. Pt presents with severe cognitive impairments re: poor insight into deficits/safety/situation, poor working memory, alternating/sustained attention, executive function, very slow processing , poor initiation. Noting fluctuating progress with use of visuals to orient self. Participation remains limited and passive. Will benefit from likely brief SLP via HH (if home is goal) to ease transition to environment. SO to complete all IADLs.     Community Re-Entry: Limited re-entry based on baseline activity tolerance and acute cognitive deficit    Transportation: pt not a , do not anticipate transfer to be a barrier    Decision maker: significant other    Plan of Care and goals reviewed and updated.    Discharge Plan/Recommendations    Fall Precautions: continue    Patient/Family input to goals: Yes    Anticipated rehab needs following discharge: TCU    Anticipated care giver support after discharge: TCU    Estimated length of stay: 20 days    Overall plan for the patient: Patient with increased fatigue over the past few days, suspect most related to receiving intramuscular Haldol over the weekend but  also other contributing factors are possible including new coronavirus diagnosis.  Overall has seen improvement in behaviors aside from isolated incident over the weekend has shown an improvement in behaviors over the past week aside from isolated incident over the weekend which required the after mentioned intramuscular Haldol.  Care conference held with the patient's spouse today.  Ultimate goal is for discharging home, however she would like for him to continue to improve from a cognitive and physical standpoint for his and her own safety.  She has secured a TCU bed for him and plan for discharge there later this week.      Utilization Review and Continued Stay Justification    Medical Necessity Criteria:    For any criteria that is not met, please document reason and plan for discharge, transfer, or modification of plan of care to address.    Requires intensive rehabilitation program to treat functional deficits?: Yes    Requires 3x per week or greater involvement of rehabilitation physician to oversee rehabilitation program?: Yes    Requires rehabilitation nursing interventions?: Yes    Patient is making functional progress?:  Depending on agitation and mental status    There is a potential for additional functional progress? Yes    Patient is participating in therapy 3 hours per day a minimum of 5 days per week or 15 hours per week in 7 day period?: Ongoing behavioral and discharge disposition barriers, limited ability to deliver therapy services.  Care conference today with wife  to further discuss discharge options    Has discharge needs that require coordinated discharge planning approach?:Yes      Barriers/Concerns related to meeting medical necessity criteria:  Agitation, delirium    Team Plan to Address Concern:  Ongoing medical and psychiatry management       Final Physician Sign off    Statement of Approval: I have reviewed and agree with the recommendations and documentation in this team rounds note.        Patient Goals    Social Work Goals: Confirm discharge recommendations with therapy, coordinate safe discharge plan and remain available to support and assist as needed.     OT Predicted Duration/Target Date for Goal Attainment: 03/03/25  Therapy Frequency (OT): 3 times/week (30 minutes)  OT: Hygiene/Grooming: modified independent, within precautions  OT: Upper Body Dressing: Modified independent, within precautions  OT: Lower Body Dressing: Modified independent, within precautions  OT: Upper Body Bathing: Modified independent, within precautions, using adaptive equipment  OT: Lower Body Bathing: Modified independent, using adaptive equipment, with precautions  OT: Toilet Transfer/Toileting: Modified independent, toilet transfer, cleaning and garment management, using adaptive equipment, within precautions  OT: Meal Preparation: Modified independent, with simple meal preparation, ambulatory level, within precautions  OT: Cognitive: Patient/caregiver will verbalize understanding of cognitive assessment results/recommendations as needed for safe discharge planning  OT: Goal 1: Pt will perform bathing transfer simulating home environment SBA within precautions with graded assist utilizing DME.    PT Predicted Duration/Target Date for Goal Attainment: 03/07/25  PT Frequency: 3x/week  PT: Bed Mobility: Completed  PT: Transfers: Completed  PT: Gait: Completed  PT: Stairs: Completed  PT: Goal 1: Car transfer SBA for community entry  PT: Goal 2: Completed       SLP Predicted Duration/Target Date for Goal Attainment: 02/27/25  Therapy Frequency (SLP Eval): 6 times/week  SLP: Improve language comprehension for interaction with caregivers/environment: minimal assist  SLP: Communicate basic wants and needs: verbally, minimal assist  SLP: Goal 1: Patient will maintain alertness, focus/attend to basic level task for 30 minutes with no cues from SLP to increase alertness.                                              Goal:  Skin Integrity: Pt will remain free from skin breakdown while on ARU by frequently repositioning.                    Goal: Safety Management: Pt will remain free from falls while on ARU by calling appropriately and waiting for assistance.                        Goal Outcome Evaluation:

## 2025-03-05 ENCOUNTER — APPOINTMENT (OUTPATIENT)
Dept: SPEECH THERAPY | Facility: CLINIC | Age: 60
DRG: 057 | End: 2025-03-05
Attending: PHYSICAL MEDICINE & REHABILITATION
Payer: COMMERCIAL

## 2025-03-05 LAB
GLUCOSE BLDC GLUCOMTR-MCNC: 157 MG/DL (ref 70–99)
GLUCOSE BLDC GLUCOMTR-MCNC: 180 MG/DL (ref 70–99)
GLUCOSE BLDC GLUCOMTR-MCNC: 228 MG/DL (ref 70–99)

## 2025-03-05 PROCEDURE — 92507 TX SP LANG VOICE COMM INDIV: CPT | Mod: GN | Performed by: SPEECH-LANGUAGE PATHOLOGIST

## 2025-03-05 PROCEDURE — 250N000013 HC RX MED GY IP 250 OP 250 PS 637: Performed by: PHYSICAL MEDICINE & REHABILITATION

## 2025-03-05 PROCEDURE — 250N000013 HC RX MED GY IP 250 OP 250 PS 637

## 2025-03-05 PROCEDURE — 250N000013 HC RX MED GY IP 250 OP 250 PS 637: Performed by: PHYSICIAN ASSISTANT

## 2025-03-05 PROCEDURE — 128N000003 HC R&B REHAB

## 2025-03-05 PROCEDURE — 99231 SBSQ HOSP IP/OBS SF/LOW 25: CPT | Performed by: PHYSICIAN ASSISTANT

## 2025-03-05 RX ORDER — LISINOPRIL 10 MG/1
10 TABLET ORAL EVERY EVENING
DISCHARGE
Start: 2025-03-05

## 2025-03-05 RX ORDER — HALOPERIDOL 0.5 MG/1
TABLET ORAL
DISCHARGE
Start: 2025-03-05

## 2025-03-05 RX ORDER — POTASSIUM CHLORIDE 1.5 G/1.58G
20 POWDER, FOR SOLUTION ORAL 2 TIMES DAILY WITH MEALS
DISCHARGE
Start: 2025-03-05

## 2025-03-05 RX ORDER — CALCIUM CARBONATE 500 MG/1
1 TABLET, CHEWABLE ORAL 3 TIMES DAILY PRN
DISCHARGE
Start: 2025-03-05

## 2025-03-05 RX ORDER — RAMELTEON 8 MG/1
8 TABLET ORAL AT BEDTIME
DISCHARGE
Start: 2025-03-05

## 2025-03-05 RX ORDER — METHOCARBAMOL 500 MG/1
500 TABLET, FILM COATED ORAL 4 TIMES DAILY PRN
DISCHARGE
Start: 2025-03-05

## 2025-03-05 RX ORDER — MAGNESIUM HYDROXIDE/ALUMINUM HYDROXICE/SIMETHICONE 120; 1200; 1200 MG/30ML; MG/30ML; MG/30ML
30 SUSPENSION ORAL EVERY 4 HOURS PRN
DISCHARGE
Start: 2025-03-05

## 2025-03-05 RX ORDER — HALOPERIDOL 5 MG/ML
2 INJECTION INTRAMUSCULAR EVERY 6 HOURS PRN
DISCHARGE
Start: 2025-03-05 | End: 2025-03-06

## 2025-03-05 RX ORDER — GUAIFENESIN 200 MG/10ML
200 LIQUID ORAL EVERY 4 HOURS PRN
DISCHARGE
Start: 2025-03-05

## 2025-03-05 RX ORDER — SENNOSIDES 8.6 MG
1 TABLET ORAL 2 TIMES DAILY PRN
DISCHARGE
Start: 2025-03-05

## 2025-03-05 RX ORDER — EZETIMIBE 10 MG/1
10 TABLET ORAL
DISCHARGE
Start: 2025-03-05

## 2025-03-05 RX ORDER — ACETAMINOPHEN 325 MG/1
975 TABLET ORAL 3 TIMES DAILY
DISCHARGE
Start: 2025-03-05

## 2025-03-05 RX ORDER — FAMOTIDINE 40 MG/1
40 TABLET, FILM COATED ORAL 2 TIMES DAILY WITH MEALS
DISCHARGE
Start: 2025-03-05

## 2025-03-05 RX ADMIN — LISINOPRIL 10 MG: 10 TABLET ORAL at 19:35

## 2025-03-05 RX ADMIN — ACETAMINOPHEN 975 MG: 325 TABLET, FILM COATED ORAL at 09:01

## 2025-03-05 RX ADMIN — PANTOPRAZOLE SODIUM 40 MG: 40 TABLET, DELAYED RELEASE ORAL at 09:01

## 2025-03-05 RX ADMIN — POTASSIUM CHLORIDE 20 MEQ: 1.5 POWDER, FOR SOLUTION ORAL at 09:09

## 2025-03-05 RX ADMIN — METFORMIN HYDROCHLORIDE 1000 MG: 500 TABLET, FILM COATED ORAL at 18:20

## 2025-03-05 RX ADMIN — HALOPERIDOL 0.5 MG: 0.5 TABLET ORAL at 09:01

## 2025-03-05 RX ADMIN — ACETAMINOPHEN 975 MG: 325 TABLET, FILM COATED ORAL at 14:12

## 2025-03-05 RX ADMIN — RAMELTEON 8 MG: 8 TABLET, FILM COATED ORAL at 19:35

## 2025-03-05 RX ADMIN — HALOPERIDOL 1 MG: 0.5 TABLET ORAL at 19:34

## 2025-03-05 RX ADMIN — GUAIFENESIN 200 MG: 200 SOLUTION ORAL at 14:18

## 2025-03-05 RX ADMIN — TICAGRELOR 90 MG: 90 TABLET ORAL at 18:20

## 2025-03-05 RX ADMIN — ACETAMINOPHEN 975 MG: 325 TABLET, FILM COATED ORAL at 19:35

## 2025-03-05 RX ADMIN — FAMOTIDINE 40 MG: 20 TABLET, FILM COATED ORAL at 18:20

## 2025-03-05 RX ADMIN — EZETIMIBE 10 MG: 10 TABLET ORAL at 18:20

## 2025-03-05 ASSESSMENT — ACTIVITIES OF DAILY LIVING (ADL)
ADLS_ACUITY_SCORE: 55
ADLS_ACUITY_SCORE: 56
ADLS_ACUITY_SCORE: 55
ADLS_ACUITY_SCORE: 56
ADLS_ACUITY_SCORE: 55
ADLS_ACUITY_SCORE: 56
ADLS_ACUITY_SCORE: 56
ADLS_ACUITY_SCORE: 55
ADLS_ACUITY_SCORE: 56
ADLS_ACUITY_SCORE: 56
ADLS_ACUITY_SCORE: 55
ADLS_ACUITY_SCORE: 56
ADLS_ACUITY_SCORE: 55
ADLS_ACUITY_SCORE: 56

## 2025-03-05 NOTE — DISCHARGE INSTRUCTIONS
Upon discharge from TCU please schedule appointments for PCP in 1-2 weeks, cardiology and NIVIA, stroke neurology, vascular surgery, PM&R, sleep medicine

## 2025-03-05 NOTE — PLAN OF CARE
FOCUS/GOAL  Cognition/Memory/Judgment/Problem solving, Caregiver training, and Safety management    ASSESSMENT, INTERVENTIONS AND CONTINUING PLAN FOR GOAL:  Pt had code 21 called in previous shift, agitated with staff swiping at them and verbally was aggressive.  Received IM Haldol.  Has been calm this shift restless in bed per sitter.  Mixed continence of both bowel/bladder.   Has syncope if he is OOB so toileting should be done while in bed.  Regular thin diet, takes pills whole with water but often times will refuse cares if he is agitated.    Pt is historically very hypertensive last BP was 160s SBP, previous day was close to 200BP.  On droplet precautions for coronavirus.  Infrequent cough.    Pt reportedly turned in bed and peed all over floor around 0330.  Pt agitated when trying to change bedsheets while sleeping.  Will try again when pt is awake.    Performed full bed change while patient was not agitated, bed soaked with urine.  Pt cleaned up and changed to best of writer and sitter's ability.        Goal Outcome Evaluation:

## 2025-03-05 NOTE — PLAN OF CARE
Discharge Planner Post-Acute Rehab SLP:      Discharge Plan: TCU/LTC.     Precautions: 1:1 attendant, can be combative with cares.     Current Status:  Hearing: WFL  Vision: Retinal detachment, Readers  Communication: Mild dysarthria when pt alert, motor speech at baseline per wife's report. Hx expressive aphasia; WAB-R Bedside 02/16 moderate expressive/receptive deficits (suspect fatigue, medications impacting)  Cognition: severe cognitive impairments re: poor insight into deficits/safety/situation, poor working memory, alternating/sustained attention, executive function, very slow processing , poor initiation  Swallow: Regular solids,Thin liquids (0). Goals met during acute. SLP re assessed and deemed any dysfunction related to behavior. May informally monitor      Assessment: Pt in bed, eyes intially closed but opened sporadically when addressed by SLP. Significant other at bedside, reported pt had been up, awake, conversant, laughing at her jokes earlier but became tired. Facilitated discussion with sig other regrding pt's communication when fully alert, sig other endorsed when pt fully awake/participative communication consistent with prior to this CVA level. Pt and wife edu in plan related to ARU SLP, will continue to attempt as able if pt remains at ARU/if discharge delayed.      Other Barriers to Discharge (Family Training, etc): level of assist/supervision with iADLs?

## 2025-03-05 NOTE — PLAN OF CARE
Goal Outcome Evaluation:      Plan of Care Reviewed With: patient  Overall Patient Progress: no change  Overall Patient Progress: no change        Orientation: AO to Self and Wife; Disoriented to everything else w/ intermittent confusion  Bowel: Mixed Continent  LBM: 3-5-25  Bladder: Mixed Continent  Pain: Denies  Ambulation/Transfers: Ax1 w/ WW and GB  Blood sugars: 157 mg/dl @ 0917H. Refused Metformin  Diet/Liquids: Combination Regular Diet; Thin Liquids; Pills Whole  Tubes/Lines/Drains: N/A  Skin: WDL      Patint only took scheduled Haldol, Tylenol, and Potassium Chloride and Pantoprazole. Refused everything else. No other acute changes this shift. Continue POC.

## 2025-03-05 NOTE — PLAN OF CARE
"Goal Outcome Evaluation:      Plan of Care Reviewed With: patient    Overall Patient Progress: no changeOverall Patient Progress: no change    pt alert with intermittnent somnolence. does not respond at all times. VSS. reported pain to low back. heat packs applied with minimal effect per pt. per wife request, lido patches ordered from oncall provider and admin at HS. scheduled tylenol admin at HS. no changes in skin integrity noted to visible skin. sitter remains at bedside. Patient began \"swatting\" at sitter and using piercing eyes. Writer intervened, pt reported that he wanted the sitter, \"out of my home and I want to get into my bed.\" Re-orientation attempted and encouraged pt to sleep in the bed that he is in for tonight. Writer stayed with patient until charge nurse came and patient began \"swatting\" at charge nurse. Clarified if patient is in pain. Reported, \"get my puke bag\" and started dry-heaving. Writer stayed with patient and requested charge nurse to get prn zofran. Pt continent at this time. Zofran admin with good results.     Patient spit out 1 out of 2 tabs of metformin. Clarified with oncall if additional tab to be admin; no new orders to admin. HS meds admin crushed with pudding; tolerated.     Noted infrequent coughing with pt stating that his throat is sore. Prn robitussin admin x1 with good results.     Declined potassium supplement; stating that it makes him nauseous.sticky note left for providers.     NST attempted to collect blood glu pt swatted at NST saying, \"what are you doing\" explained/educated cares and declined. Pt then repositioned self to have head facing foot of bed. Offered assistance with repositioning; pt declined x2.     Requested prn senna. Day 3 no BM; asymptomatic. At time of senna admin pt sleeping. Passing on to NOC shift to admin on day shift.    "

## 2025-03-05 NOTE — PROGRESS NOTES
ARU Social Work Progress Notes     Insurance authorization was approved.    OBRA II was completed.    Transportation  [facility]  [phone]    From; [facility]      To:[facility]e [facility] ([facility])  [address] [place] [id number]  Tel:[phone]  Fax:[phone]    RIDE :11:42am-12:27PM      Ride Time given to Meryl and Tracee Alba.    JAX Molina  [facility], Acute Inpatient Rehab Unit   [address] [place] [address], [address] [place] [address]  Phone: [phone]  Fax: [phone]

## 2025-03-05 NOTE — PROGRESS NOTES
"  [facility]   Acute Rehabilitation Unit  Daily progress note    INTERVAL HISTORY  Chris Delcid was seen at bedside this morning.  Apparently was code 21 called during PM shift at some point due to patient \"swatting\" at staff.  ON nursing note indicates that IM Haldol was given but this is not reflected on the MAR.  This morning, bedside attendant notes that patient slept most of the shift overnight, though nurse's note indicates he urinated on the floor and became agitated during attempts to change bed linens.  Bedside attendant indicates no recurrent behavioral concerns this morning and states that patient was awake for about 30 minutes and attempted to eat breakfast but just had a few bites and took a few meds and then reported feeling nauseous.  Patient reports that he is feeling \"lousy\".  He does endorse some nausea and persistent cold symptoms.  He indicates runny/congested nose overnight, mild sore throat, mild dry cough, and \"sometimes\" feels short of breath.  He denies any chest pain/tightness.  He is unable to state whether PRN medications were helpful.  He denies any abdominal pain, vomiting.    With therapies, SLP yesterday noting mild improvement in responding to ?s this session. Pt agreeable to participate. Pt oriented to location in room of visual aids decpiting temporal information and location. Pt able to utilize with mod cues. Pt engaged in task, identifying target given category and letter tiles. ongoing very slow processing. able to identify target with 90%IND, 100% mod cues. Benefited from semantic cues. Pt requires max cues to continue to participate and engage in sessions.  On track for discharge to TCU tomorrow or when available.    MEDICATIONS  Current Facility-Administered Medications   Medication Dose Route Frequency Provider Last Rate Last Admin    acetaminophen (TYLENOL) tablet 975 mg  975 mg Oral TID Nick Zavala MD   975 mg at 03/04/25 " 2010    aspirin (ASA) chewable tablet 81 mg  81 mg Oral Daily Nichelle Chavez PA   81 mg at 03/04/25 0931    atorvastatin (LIPITOR) tablet 10 mg  10 mg Oral Daily Nichelle Chavez PA   10 mg at 03/04/25 0931    ezetimibe (ZETIA) tablet 10 mg  10 mg Oral Daily with supper Nick Zavala MD   10 mg at 03/04/25 1728    famotidine (PEPCID) tablet 40 mg  40 mg Oral BID w/meals Nick Zavala MD   40 mg at 03/04/25 1728    [Held by provider] glipiZIDE (GLUCOTROL) half-tab 10 mg  10 mg Oral BID AC Nichelle Chavez PA   10 mg at 02/20/25 0735    haloperidol (HALDOL) tablet 0.5 mg  0.5 mg Oral QA Shanta Mcmahon MD   0.5 mg at 03/04/25 0930    And    haloperidol (HALDOL) tablet 1 mg  1 mg Oral At Bedtime Shanta Mcmahon MD   1 mg at 03/04/25 2010    iopamidol (ISOVUE-370) solution 500 mL  500 mL Intravenous Once Dakotah Huggins APRN CNP        lisinopril (ZESTRIL) tablet 10 mg  10 mg Oral QPM Nick Zavala MD   10 mg at 03/04/25 2008    metFORMIN (GLUCOPHAGE) tablet 1,000 mg  1,000 mg Oral BID w/meals Tish Moe MD   500 mg at 03/04/25 1833    oxymetazoline (AFRIN) 0.05 % spray 2 spray  2 spray Both Nostrils BID Lupe Chase PA-C   2 spray at 03/04/25 2012    pantoprazole (PROTONIX) EC tablet 40 mg  40 mg Oral QAM Christie Roldan MD   40 mg at 03/04/25 0929    potassium chloride (KLOR-CON) Packet 20 mEq  20 mEq Oral BID w/meals Lupe Chase PA-C   20 mEq at 03/04/25 0930    ramelteon (ROZEREM) tablet 8 mg  8 mg Oral At Bedtime Shanta Mcmahon MD   8 mg at 03/04/25 2010    sodium chloride 0.9 % bag for CT scan flush use  100 mL As instructed Once Dakotah Huggins APRN CNP        ticagrelor (BRILINTA) tablet 90 mg  90 mg Oral BID w/meals Nick Zavala MD   90 mg at 03/04/25 1728          Current Facility-Administered Medications   Medication Dose Route Frequency Provider Last Rate Last Admin    alum & mag hydroxide-simethicone  (MAALOX) suspension 30 mL  30 mL Oral Q4H PRN Nick Zavala MD        bisacodyl (DULCOLAX) suppository 10 mg  10 mg Rectal Daily PRN Nick Zavala MD        calcium carbonate (TUMS) chewable tablet 500 mg  500 mg Oral TID PRN Lupe Chase PA-C        carboxymethylcellulose PF (REFRESH PLUS) 0.5 % ophthalmic solution 1 drop  1 drop Both Eyes 4x Daily PRN Nick Zavala MD        glucose gel 15-30 g  15-30 g Oral Q15 Min PRN Nichelle Chavez PA   15 g at 02/20/25 1424    Or    dextrose 50 % injection 25-50 mL  25-50 mL Intravenous Q15 Min PRN Nichelle Chavez PA        Or    glucagon injection 1 mg  1 mg Subcutaneous Q15 Min PRN Nichelle Chavez PA        guaiFENesin (ROBITUSSIN) 20 mg/mL solution 200 mg  200 mg Oral Q4H PRN Lupe Chase PA-C   200 mg at 03/04/25 1730    haloperidol lactate (HALDOL) injection 2 mg  2 mg Intramuscular Q6H PRN Shanta Mcmahon MD        hydrALAZINE (APRESOLINE) tablet 10 mg  10 mg Oral Q6H PRN Nick Zavala MD        Or    hydrALAZINE (APRESOLINE) tablet 20 mg  20 mg Oral Q6H PRN Nick Zavala MD        methocarbamol (ROBAXIN) tablet 500 mg  500 mg Oral 4x Daily PRN Nick Zavala MD        ondansetron (ZOFRAN ODT) ODT tab 4 mg  4 mg Oral Q6H PRN Lupe Chase PA-C   4 mg at 03/04/25 1916    polyethylene glycol (MIRALAX) Packet 17 g  17 g Oral Daily PRN Nichelle Chavez PA   17 g at 02/25/25 0821    sennosides (SENOKOT) tablet 8.6 mg  8.6 mg Oral BID PRN Tish Moe MD            PHYSICAL EXAM  BP (!) 144/74 (BP Location: Left arm)   Pulse 77   Temp 98.5  F (36.9  C) (Oral)   Resp 18   Ht 1.829 m (6')   Wt 109 kg (240 lb 4.8 oz)   SpO2 97%   BMI 32.59 kg/m    Gen: NAD, laying in bed  HEENT: NC/AT  CVS: RRR, no murmurs  Pulm: non-labored on room air, lungs CTA bilaterally  Abd: soft, non-distended, non-tender, bowel sounds present  Ext: no appreciable edema in BLE  Neuro/MSK: drowsy  but responds to voice, dysarthric, moving all extremities actively in bed    LABS  CBC RESULTS:   Recent Labs   Lab Test 03/03/25  0603 02/24/25  0557 02/17/25  0534   WBC 8.9 6.6 7.4   RBC 4.78 5.08 4.90   HGB 14.3 14.9 14.4   HCT 40.9 43.4 41.9   MCV 86 85 86   MCH 29.9 29.3 29.4   MCHC 35.0 34.3 34.4   RDW 13.5 13.6 13.6    398 320       Last Basic Metabolic Panel:  Recent Labs   Lab Test 03/04/25  1618 03/04/25  0857 03/03/25  2125 03/03/25  0837 03/03/25  0603 02/24/25  0712 02/24/25  0557 02/20/25  0546 02/20/25  0541   NA  --   --   --   --  140  --  138  --  142   POTASSIUM  --   --   --   --  3.9  --  4.2  --  4.0   CHLORIDE  --   --   --   --  106  --  104  --  108*   CO2  --   --   --   --  20*  --  20*  --  21*   ANIONGAP  --   --   --   --  14  --  14  --  13   * 192* 166*   < > 172*   < > 226*   < > 65*   BUN  --   --   --   --  16.9  --  21.5  --  20.0   CR  --   --   --   --  1.03  --  1.16  --  0.97   GFRESTIMATED  --   --   --   --  83  --  72  --  89   JAMIE  --   --   --   --  9.2  --  9.4  --  9.4    < > = values in this interval not displayed.       Rehabilitation -  Admission to acute inpatient rehab 2/14/25.    Impairment group code: Stroke Ischemic 01.3 Bilateral Involvement; acute lacunar infarcts in the left thalamus, the posterior left centrum semiovale ovale, the right occipital and right occipitotemporal white matter, and the right frontal white matter, suspected embolic etiology      PT, OT and SLP daily.  Decreasing daily minutes due to lack of meaningful therapy participation.  2. Impairment of ADL's:  OT to work on upper and lower body self care, dressing, toileting, bathing, energy conservation techniques with use of ADs as needed.  3. Impairment of mobility:   PT to work on gait exercises, strengthening, endurance buildup, transfers with use of walker as needed.  4. Impairment of cognition/language/swallow:   SLP for cognitive evaluation and treatment strategies for  "higher level cognitive deficits and memory impairment.  5. Rehab RN to administer medication, patient education on medication taking, VS monitoring, bowel regimen, glucose monitoring and management of orthostasis.     Continue comprehensive acute inpatient rehabilitation program with multidisciplinary approach including therapies, rehab nursing, and physiatry following. See interval history for updates.      ASSESSMENT AND PLAN  Chris Delcid is a 59 year old right hand dominant male with a past medical history of multiple prior strokes (left pontine; L and R centrum semiovale; R hemipons), HTN, orthostatic hypotension, HLD, DM II c/b gastroparesis, ADITI, chronic low back pain, and GERD who was admitted on 2/11/25 with worsening fatigue, weakness, recurrent falls, and syncope and was found to have multifocal acute/subacute subcortical infarcts with hospital course complicated by additional imaging finding of right vertebral artery occlusion/dissection, hypertensive urgency, hyperglycemia, and elevated troponin.  He was admitted to ARU on 2/14/25 for multidisciplinary rehabilitation and ongoing medical management.     Medical Conditions    Multiple acute lacunar infarcts in both cerebral hemispheres   Right vertebral artery dissection  Hx multiple prior strokes (10/2020 L pontine; 11/2021 L and R centrum semiovale infarcts; 8/2022 acute R hemipons, possible late subacute right centrum semiovale)  PTA on Brilinta monotherapy (since 8/2022 stroke).  Per neurology, patient \"has had recurrent primarily subcortical infarcts and progression of microvascular disease over the past five years. This has occurred in the setting of uncontrolled vascular risk factors (HTN, HLD, DM, ADITI), however, will pursue further evaluation for atypical stroke etiologies given recurrent nature and family history.\"  Hypercoag workup: Protein C and S, lupus anticoagulant, cardiolipin Ab, Antithrombin III, Beta 2 glycoprotein, factor II, and " factor V all negative.   - Continue PTA brilinta 90 bid and ASA 81 mg daily, which was added this admission.  DAPT duration TBD  - Continue lovastatin 40 mg daily and zetia (added this admission for HLD)  - Goal BP <130/80, management as below  - Goal A1c <7, management as below   - Continue PT/OT  - Outpatient NIVIA  - Outpatient genetic referral given family history of stroke in young, can be further discussed at outpatient stroke followup   - Follow up with vascular medicine for consideration of PCSK9 inhibitor and further evaluation of hyperlipidemia   - Follow up neurology in 4-6 weeks with any stroke ALEXIS ([phone])   - Follow up with PM&R in 8-10 weeks      Agitation, paranoia  Delirium  Suicidal ideations  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 Patient reportedly consistently trying to get out of bed. Patient trying to throw punches at staff per nursing. Patient reportedly unwilling to take oral medications. Given the acutely agitated presentation and to maintain the safety of staff on unit, Code Green was activated and provider team ordered a IM olanzapine 5mg x1 for use.  1:1 bedside attendant added for safety.  Seroquel was added at bedtime (stopped trazodone).  Psychiatry consulted on 2/17 and changed seroquel to scheduled and PRN Haldol.  [place] most likely delirium though also with concerns for underlying vascular dementia.  UA negative on 2/18.  2 recurrent code 21 episodes on 2/19 due to paranoia/agitation and physical aggression.  Psychiatry reassessed and no changes to plan at that time.  However, on 2/21 had episode of self-injurious behavior (cutting wrist with plastic knife) which required physical restraints and use of PRN IM Haldol.  Scheduled Haldol doses were increased and psychiatry recommended vEEG, which showed moderate diffuse slowing but no epileptiform discharges or seizures (able to obtain 22 min).   Neurology re-consulted as well to consider any further work-up for etiology.  Recommended some labs (B12 and ammonia WNL, TSH elevated but free T4 normal), delirium precautions, ongoing psychology, but no further work-up at this time from their perspective.  Pharmacy reviewed medications, none currently felt to be contributing to increased risk of delirium  2/27: still some intermittent paranoia, especially around medications, and agitation particularly related to wanting to go home.  However, has not exhibited recurrent physical aggression or self-injurious behaviors.  Significant other has expressed concerns about safely managing at home due to impaired cognition.  Suspect that current presentation is delirium (due to unfamiliar environment and routine, as well as recent additional multifocal strokes) in setting of possible underlying vascular dementia, especially given significant other's reports of prior baseline (low cognitive demand, was providing significant supervision).  While IDT suspects patient may perform better in familiar home environment and routine, at this time would need to recommend initial 24/7 supervision.  Will plan for care conference to further discuss options with significant other (home with 24/7 vs LTC/memory care).  - Appreciate ongoing psychiatry assistance.  Appreciate psychiatry follow up on 2/26, no changes to plan of care per their note.  - Appreciate neuropsychology assistance.  See their note on 2/26 for additional details.  - Delirium precautions  - Continue 1:1 bedside attendant  - Recurrent code 21 on the evening of 3/1 and received 1 dose of Haldol 5 mg IM.  Patient was more somnolent on 3/2 and did not receive either dose of scheduled PO Haldol.  Remains overall fatigued.  This could still be related to IM dose given half-life of 20 hours.  Throughout ARU course, have also noted persistent waxing/waning levels of alertness which do not always clearly correlate with Haldol  "dose.  Recurrent code 21 on 3/4 during PM shift.  Minimal information in chart, but appears patient was agitated and physically \"swatting\" at staff.  Did not require IM Haldol.  Appears calm/drowsy this morning.  Continue to monitor.  - Ongoing psychiatry assistance appreciated.  As of 3/3, PO haldol decreased to 0.5 mg qAM and continue 1 mg qPM, with PRN IM Haldol decreased to 2 mg for agitation.  - Outpatient neuropsych testing recommended by psychiatry    Unresponsive episode x2 on 2/15  In early morning 2/15,  patient was noted by nursing staff to be weaker and non-responsive. Patient was found by nursing sitting at edge of bed on his way to the bathroom. Nursing assisted patient using gait belt and walker to the bathroom when it was noticed that patient was having weakness with standing and mobility. Patient was placed in wheelchair where he slumped to his side and became unresponsive. Patient was lifted back into bed where Code Blue was initially called but then was downgraded to RRT. ICU provider team recommended stroke code be called. Neurology evaluated patient at bedside. Vital signs showed /105 when resting in bed. CT imaging showed no hemorrhage or LVO. Lab work-up unremarkable. Patient was deemed not requiring transfer to acute care. Later in the morning, nursing staff stated that patient had similar episode of being disoriented and not being able to transfer from chair to bed.  Rehab medical team arrived at bedside to evaluate.  While in chair, patient was not responsive to questions.  Blood pressure noticeably lower than usual, 113/71.  Presentation was likely related to either new stroke or orthostatic hypotension.  Nursing staff to assisted with lifting patient back into bed.  Once patient was in bed and sitting up, patient became more responsive to questions.  Presentation likely related to positional orthostatic hypotension while sitting up in chair but MRI brain was ordered to rule out " "acute stroke.  MRI results showed no evidence of new infarcts.  Neurology evaluated results and confirmed no new strokes and signed off.  Felt possibly delirium vs hypotension vs stroke recrudescence.   - Continue to assess cognition and vital signs   - Low threshold to transfer to acute care if patient continues to demonstrate altered mental status through orthostatic hypotension  - Per neurology, consider EEG if persistent/recurrent episodes.  EEG completed on 2/21 per psychiatry, interpretation \"moderate diffuse slowing which is non-specific but suggestive of diffuse cerebral dysfunction. There were no epileptiform discharges or seizures\"      HTN   20-year hx orthostasis   Has had long standing issue with orthostatic hypotension even before diagnosis of HTN. Had a tilt table test through [facility] that showed severe orthostatic hypotension. Was followed by cardiology and has a loop recorder in place   Per his SO didn't tolerate abdominal binder and compression socks in the past; was also on midodrine for a while, but this was for overall hypotension and not specifically for orthostasis.  Cardiology consulted at ARU for assistance with management of OH in the setting of overall elevated BPs.  Recommended changing Lisinopril to 10 mg at bedtime, and continue with conservative measures as able.  Assistance appreciated.  - Continue lisinopril 10 mg at HS  - Continue slow position changes, PO intake abdominal binder, compression  - Continue hydralazine 10-20 mg q6h PRN for SBP >200  - Given long history of OH, target BP goal may be higher than typical for a patient post-stroke     HLD    2/11  Documented intolerance to atorvastatin, rosuvastatin, simvastatin   - Continue lovastatin and zetia as above   - Outpatient referral to vascular medicine for consideration of PCSK9 inhibitor and further evaluation of hyperlipidemia      Type II diabetes mellitus  Polyneuropathy ?  Autonomic dysfunction   PTA on " metformin 1000 mg BID and glipizide 10 mg BID. Was not checking his BG at home as his BG was well controlled but has a glucometer.  A1c 8.6% on 2/11/25.  Hyperglycemia during inpatient stay but has been lower at ARU with decreased intake in setting of altered mental status as above.  - Goal A1c <7.0   - Monitor BG TID AC + HS.    - Holding glipizide starting 2/20 due to hypoglycemia in setting of impaired intake.  Glucose levels improved since discontinuation.   - Continue PTA metformin 1000 mg BID.  XR changed to IR formulation on 2/26 to allow option to crush medications when patient reluctant to swallow (has been observed to pocket or spit out, felt to be behavioral related to paranoia).  Has been complaining of more nausea since that time, so XR formula  resumed (can take whole meds safely pending mental status).  - Hypoglycemia protocol     Bilateral hand muscle atrophy and paresthesia   Concerns for focal mononeuropathy vs cervical radiculopathy; doesn't seem to be explained by polyneuropathy only    - Neurology follow up as outpatient    - May need NCS/EMG for more evaluation      ADITI   Insomnia   Was not using CPAP prior to admission.  Diagnosed 2/2022 following witnessed apneic episodes and polysomnogram 12/2021. Patient reports stopping BIPAP therapy for his ADITI due to abdominal bloating. He reports a repeat polysomnogram after stopping BIPAP that showed minimal ADITI.   - Trazodone and seroquel discontinued as above  - Continue ramelteon 8 mg at HS (added 2/17)  - Sleep hygiene  - Follow up with sleep medicine as outpatient    Moderate malnutrition in the context of acute illness or injury  Poor oral intake  Nausea  At ARU admission, felt that patient did not appear to be having good oral hydration.  Stable electrolytes, did receive IVF bolus on 2/16 for hydration.  With increased paranoia and agitation since ARU admission (as above), PO intake has been limited.  - RD consulted, appreciate assistance  -  Carb restriction removed from diet order on 2/20 to allow greater selections, will monitor BG  - Supplements per RD  - RD also sending more finger foods and pre-packaged foods for safety and to encourage more intake given paranoia and self-injurious behaviors  - Patient has intermittently complained of nausea, seems more often since 2/28, PRN Zofran added.  CBC and CMP 3/3 unremarkable.  Also could be related to changing metformin from XR formulation as above, will resume prior to see if improved tolerance.    Coronavirus (not MERS-CoV or COVID-19)  Rhinorrhea, sneezing, cough  Noted by nursing staff on 3/3.   Respiratory panel positive for Coronavirus (not COVID).    - Treat symptomatically: Afrin for nasal congestion, robitussin PRN for cough.   - Per infection prevention, needs droplet precautions until symptom resolution     Chronic low back pain  Secondary to prior injury years ago. Follows at [facility] in [place].  - Scheduled Tylenol 975 mg TID  - Continue Robaxin 500 mg QID PRN (added 2/25)     Tobacco use  Per significant other, has significant tobacco use history and is concerned he is having cravings or withdrawal.  Nicotine patch was ordered to help if any cravings present, however he removed and tried to eat it on 2/20 so discontinued  - Consider PRN nicotine replacement such as gum or lozenge if appropriate     Adjustment to disability:  Clinical psychology to eval and treat for support/assistance for significant other  FEN: regular diet  Bowel: on prn bowel meds; continent.  Some recent constipation.  Last BM 3/1, should attempt to provide PRN medication if/when patient willing to take.  Continue to monitor.  Bladder: continent.  One PVR obtained 60 ml.  Unable to obtain further values due to agitation  DVT Prophylaxis: mechanical  GI Prophylaxis: on famotidine due to h/o GERD; added TUMS and Mylanta prn  Code: DNR/DNI - confirmed upon admission   Disposition: Significant other has secured a  TCU bed  ELOS:  Pending transfer to TCU, targeting 3/6  Follow up Appointments on Discharge: PCP in 1-2 weeks, cardiology and NIVIA, stroke neurology, vascular surgery, PM&R, sleep medicine       30 minutes spent on the date of service doing chart review, history and exam, documentation, and further activities as noted above.     Plan of care was discussed with Dr. Gary Zavala , PM&R staff physician     Lupe Chase PA-C  Physical Medicine & Rehabilitation

## 2025-03-06 ENCOUNTER — APPOINTMENT (OUTPATIENT)
Dept: SPEECH THERAPY | Facility: CLINIC | Age: 60
DRG: 057 | End: 2025-03-06
Attending: PHYSICAL MEDICINE & REHABILITATION
Payer: COMMERCIAL

## 2025-03-06 VITALS
WEIGHT: 240.3 LBS | TEMPERATURE: 98.2 F | DIASTOLIC BLOOD PRESSURE: 76 MMHG | BODY MASS INDEX: 32.55 KG/M2 | OXYGEN SATURATION: 97 % | HEIGHT: 72 IN | SYSTOLIC BLOOD PRESSURE: 126 MMHG | RESPIRATION RATE: 18 BRPM | HEART RATE: 85 BPM

## 2025-03-06 LAB — GLUCOSE BLDC GLUCOMTR-MCNC: 177 MG/DL (ref 70–99)

## 2025-03-06 PROCEDURE — 250N000013 HC RX MED GY IP 250 OP 250 PS 637

## 2025-03-06 PROCEDURE — 97130 THER IVNTJ EA ADDL 15 MIN: CPT | Mod: GN

## 2025-03-06 PROCEDURE — 250N000013 HC RX MED GY IP 250 OP 250 PS 637: Performed by: PHYSICAL MEDICINE & REHABILITATION

## 2025-03-06 PROCEDURE — 99239 HOSP IP/OBS DSCHRG MGMT >30: CPT | Performed by: PHYSICAL MEDICINE & REHABILITATION

## 2025-03-06 PROCEDURE — 250N000013 HC RX MED GY IP 250 OP 250 PS 637: Performed by: PHYSICIAN ASSISTANT

## 2025-03-06 PROCEDURE — 97129 THER IVNTJ 1ST 15 MIN: CPT | Mod: GN

## 2025-03-06 RX ORDER — HALOPERIDOL 2 MG/ML
2 SOLUTION ORAL EVERY 6 HOURS PRN
DISCHARGE
Start: 2025-03-06

## 2025-03-06 RX ADMIN — GUAIFENESIN 200 MG: 200 SOLUTION ORAL at 06:54

## 2025-03-06 RX ADMIN — FAMOTIDINE 40 MG: 20 TABLET, FILM COATED ORAL at 08:55

## 2025-03-06 RX ADMIN — METFORMIN HYDROCHLORIDE 1000 MG: 500 TABLET, FILM COATED ORAL at 08:55

## 2025-03-06 RX ADMIN — HALOPERIDOL 0.5 MG: 0.5 TABLET ORAL at 08:55

## 2025-03-06 RX ADMIN — PANTOPRAZOLE SODIUM 40 MG: 40 TABLET, DELAYED RELEASE ORAL at 08:55

## 2025-03-06 RX ADMIN — ASPIRIN 81 MG CHEWABLE TABLET 81 MG: 81 TABLET CHEWABLE at 08:56

## 2025-03-06 RX ADMIN — ACETAMINOPHEN 975 MG: 325 TABLET, FILM COATED ORAL at 06:09

## 2025-03-06 RX ADMIN — TICAGRELOR 90 MG: 90 TABLET ORAL at 08:56

## 2025-03-06 RX ADMIN — ATORVASTATIN CALCIUM 10 MG: 10 TABLET, FILM COATED ORAL at 08:56

## 2025-03-06 ASSESSMENT — ACTIVITIES OF DAILY LIVING (ADL)
ADLS_ACUITY_SCORE: 55

## 2025-03-06 NOTE — PROGRESS NOTES
Discharge Plan     Discharge Date: 03/06/2025    Discharge Disposition:  Transitional Care Placement.     Tracee Alba ([facility])  [address] [facility] [address]  Tel:[phone]  Fax:[phone]    Discharge Services:  Therapies (Transitional Care)     ORDERS AND DISCHARGE SUMMARY WILL NEED TO BE FAXED TO      Discharge Supplies: All DME supplied by PT/OT       Discharge Transportation: Stretcher ride; form completed; RIDE :11:42am-12:27PM.      JAX Molina  [facility], Acute Inpatient Rehab Unit   [address] [place] [address], [address] [place] [address]  Phone: [phone]  Fax: [phone]

## 2025-03-06 NOTE — PLAN OF CARE
Goal Outcome Evaluation:      Plan of Care Reviewed With: patient  Overall Patient Progress: no change  Overall Patient Progress: no change        Orientation:  AOx2 Self and Family; Disoriented to time and situation  Bowel: Mixed Continence  LBM: 3-5-25 per report  Bladder: Mixed Continence  Pain: Denies  Ambulation/Transfers: Ax1 w/ WW and GB  Blood sugars: 177 mg/dl @ 0751H  Diet/Liquids:  Regular Diet; Thin Liquids; Pills Whole 1x1  Tubes/Lines/Drains: N/A  Skin: WDL      Patient discharged today. All belongings packed and brought with patient. Discharge paperwork handed off to transport personnel @ ~1230H. Nurse gave report to transport personnel Patient escorted by transport team down hallway @ 1235H.

## 2025-03-06 NOTE — PROGRESS NOTES
"                                                           [facility] Social Work Progress Notes     Sw met with patient and completed IRF. Patient set to discharge today to [facility].     IRF-KRISH Pain Assessment    Pain Effect on Sleep  \"Over the past 5 days, how much of the time has pain made it hard for you to sleep at night?\"    0. Does not apply - I have not had any pain or hurting in the past 5 days     Pain Interference with Therapy Activities  \"Over the past 5 days, how often have you limited your participation in rehabilitation therapy sessions due to pain?\"   0. Does not apply - I have not had any pain or hurting in the past 5 days        JAX Molina  [facility], Acute Inpatient Rehab Unit   [facility] [address] [place] [address]  Phone: [phone]  Fax: [phone]    "

## 2025-03-06 NOTE — PLAN OF CARE
Goal Outcome Evaluation:    Overall Patient Progress: no change    Pt is alert and oriented x2. Disoriented to time and place. Mixed continence of bladder. LBM 3/5 per report. Ax1 walker. C/o pain at the end of the shift - scheduled morning pain medication given early per Pt request. Call light within reach and bedside attendant present for safety concerns. Per attendant, Pt was awake for the second half of the shift, and was able to verbalize needs when prompted. Planning for discharge 3/6. Will continue with POC.

## 2025-03-06 NOTE — PLAN OF CARE
Goal Outcome Evaluation:      Plan of Care Reviewed With: patient    Overall Patient Progress: no changeOverall Patient Progress: no change       Pt alert with intermittent confusion. Spouse at bedside involved in care. VSS, denied chest pain, SOB, N/V or headache. Pain on back area managed by scheduled Tylenol. Had fair appetite for dinner, able to take 2-3 pills with applesauce. Calm and cooperative throughout cares, no untoward events this shift. Mixed continent with bladder needs, was able to use urinal with assistance. Had BM today per report. Positioned comfortably on bed. Call light within reach. Safety measures in place. Sitter at bedside. Maintained on droplet precaution. Will continue POC.    Patient's most recent vital signs are:     Vital signs:  BP: 146/74  Temp: 98.2  HR: 83  RR: 17  SpO2: 97 %

## 2025-03-06 NOTE — PROGRESS NOTES
Speech Language Therapy Discharge Summary    Reason for therapy discharge:    Discharged to transitional care facility.    Progress towards therapy goal(s). See goals on Care Plan in [facility] electronic health record for goal details.  Goals partially met.  Barriers to achieving goals:   discharge from facility.    Therapy recommendation(s):    Continued therapy is recommended.  Rationale/Recommendations: Pt w/ severe cognitive impairments re: poor insight into deficits/safety/situation, poor working memory, alternating/sustained attention, executive function, very slow processing , poor initiation. Pt demonstrates consistent difficulties with orientation to date, time, place, etc. Pt benefits from consistent use of orientation visual aids. Pt benefits from familiar items and pictures in room to aid in orientation. Hx expressive aphasia; WAB-R Bedside 02/16 moderate expressive/receptive deficits (suspect fatigue, medications impacting). Pt has been known to be combative with nursing, but never towards this SLP. Recommend short course SLP services to orient patient to new environment and ease transition to new facility.

## 2025-03-06 NOTE — DISCHARGE SUMMARY
[facility]   Acute Rehabilitation Unit  Discharge summary     Date of Admission: 2/14/2025  Date of Discharge: 3/6/2025  Disposition: TCU  Primary Care Physician: JUNIOR D[facility]  Attending physician: Nick Zavala MD  Other significant physician provider(s): Lupe VILLAVICENCIO, Psychiatry team      DISCHARGE DIAGNOSIS    Bilateral CVAs  Delirium/agitation, vertebral artery dissection, HTN, orthostatic hypotension, DM, polyneuropathy, autonomic dysfunction, HLD, ADITI, insomnia, chronic low back pain       BRIEF SUMMARY (Per HPI)  Chris Delcid is a 59 year old right hand dominant male with past medical history of HTN, orthostatic hypotension, HLD, DM II and prior strokes who presented on 2/11 with worsening fatigue, generalized weakness and syncope resulting in falls.   He had some functional decline since spring 2024, mainly due to orthostasis and frequent episodes of hypotension and syncope. They basically had to stop all of his BP meds.  Then few weeks before admission he was just excessively fatigue and was dragging his RLE. These changes were subtle and gradual. The night before admission he had some sudden changes with clear worsening weakness in RLE and another syncopal episode.      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 MRI shows  multiple small foci of FLAIR hyperintense diffusion restriction compatible with acute lacunar infarcts in the left thalamus, the posterior left centrum semiovale ovale, the right occipital and right occipitotemporal white matter, and the right frontal white matter. Scattered distribution involving multiple vascular territories suggested an embolic etiology. MRA was done to characterize for R vert pathology dissection versus athero changes. Other work-up as listed in A/P section.        REHABILITATION COURSE  Chris Delcid  was admitted to the [facility] acute inpatient rehabilitation unit on 2/14/25.  Initially Pa was participating in therapies, but limited by his long standing orthostatic hypotension.  This improved throughout his stay with adjustment of his Lisinopril back to evening dosing, as he was taking prior to admission.  The day after admission he had a stroke code activated with acute confusion and agitation, but workup including an MRI of the brain were negative for acute intracranial pathology.  After this episode he developed ongoing delirium with agitation and paranoia which required a 1:1 sitter, multiple code 21s, and assistance of psychiatry for management.  He was started on Haldol, with doses adjusted based on effectiveness and side effects (fatigue).  Haldol selected given less effect on blood pressure and his history of orthostasis.  At the time of discharge he was on 0.5 mg qAM, and 1 mg qPM, with extra doses for breakthrough agitation (changed from IM to PO due to inability of TCU to administer IM meds).  He overall demonstrated improvement in his agitation with less outbursts, but still had ongoing confusion and he will discharge to a TCU for ongoing PT, OT, and SLP.      Therapy summaries at the time of discharge:  PT:  Goals partially met.  Barriers to achieving goals:   Pt had met mobility goals required for home, however did not discharge home due to behavior/aggression. Mobility goals not consistently met as performance fluctuated pending med requirements to manage agitation.     Therapy recommendation(s):    Continued therapy is recommended.  Rationale/Recommendations:  PT eval/tx at next level of care. Pt's mobility status fluctuated while on unit, and early on in his stay he was appreciably at his baseline mobility mod-I with FWW, however was on 1:1 supervision due to aggression and self-harm attempts. Due this behavior, his s/o did not feel safe with a discharge home with supervision and pt remained  on the unit, however participation was very limited. As his course went on, pt developed acute illness and required sedation multiple times and now anticipate he is more deconditioned due to recent lack of activity. Goal is for pt to return home with mod-I mobility during the day, however anticipate will require initial supervision to ensure safe transition to familiar environment due to cognition.       OT:  ADLs:  Mobility: Supervision/1:1 FWW for behavior only; functionally Mod I from physical and simple functional cognition perspective. Instances of increased A needed when orthostatic.  Grooming: Supervision  Dressing: Supervision FWW.  Bathing: Simulated tub/shower transfer with grab bars similar to home environment SBA FWW <> shower chair. Clinical judgement Min A.  Toileting: Prompts to initiate toileting in ARU environment; mixed continence. Recommend supervision FWW <> toilet d/t hx of syncopal episodes.   IADLs: Baseline IND simple meal prep, light home mgmt, med mgmt. 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 Enjoys refurbishing antique toys.   Vision/Cognition: Baseline vision deficit d/t retinal disease. Dysarthric. Delirium - fluctuating orientation. Agitated and combative at times. Significant functional cognition deficits across attention, memory, reasoning, impulse control. Level of prompting required for initiation or sequencing variable since agitation and appears to fluctuate with medications.     Assessment: Pt unwell upon approach, spitting into emesis bag, appearing extremely fatigued and unable to consistently follow one-step directions including perseverative  on bed rail; requiring Ax2 bed mobility d/t confluence of factors. Decreasing OT POC to 3x/week.    SLP:  Goals partially met.  Barriers to achieving goals:   discharge from facility.     Therapy recommendation(s):    Continued therapy is recommended.  Rationale/Recommendations: Pt w/ severe cognitive impairments re: poor insight into  "deficits/safety/situation, poor working memory, alternating/sustained attention, executive function, very slow processing , poor initiation. Pt demonstrates consistent difficulties with orientation to date, time, place, etc. Pt benefits from consistent use of orientation visual aids. Pt benefits from familiar items and pictures in room to aid in orientation. Hx expressive aphasia; WAB-R Bedside 02/16 moderate expressive/receptive deficits (suspect fatigue, medications impacting). Pt has been known to be combative with nursing, but never towards this SLP. Recommend short course SLP services to orient patient to new environment and ease transition to new facility.     MEDICAL COURSE  Multiple acute lacunar infarcts in both cerebral hemispheres   Right vertebral artery dissection  Hx multiple prior strokes (10/2020 L pontine; 11/2021 L and R centrum semiovale infarcts; 8/2022 acute R hemipons, possible late subacute right centrum semiovale)  PTA on Brilinta monotherapy (since 8/2022 stroke).  Per neurology, patient \"has had recurrent primarily subcortical infarcts and progression of microvascular disease over the past five years. This has occurred in the setting of uncontrolled vascular risk factors (HTN, HLD, DM, ADITI), however, will pursue further evaluation for atypical stroke etiologies given recurrent nature and family history.\"  Hypercoag workup: Protein C and S, lupus anticoagulant, cardiolipin Ab, Antithrombin III, Beta 2 glycoprotein, factor II, and factor V all negative.   - Continue PTA brilinta 90 bid and ASA 81 mg daily, which was added this admission.  DAPT duration TBD  - Continue lovastatin 40 mg daily and zetia (added this admission for HLD)  - Goal BP <130/80, management as below  - Goal A1c <7, management as below   - Continue PT/OT at TCU  - Outpatient [facility]  - Outpatient genetic referral given family history of stroke in young, can be further discussed at outpatient stroke followup   - Follow up with " vascular medicine for consideration of PCSK9 inhibitor and further evaluation of hyperlipidemia   - Follow up neurology in 4-6 weeks with any stroke A[facility] ([phone])   - Follow up with PM&R in 8-10 weeks      Agitation, paranoia  Delirium  Suicidal ideations  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 Patient reportedly consistently trying to get out of bed. Patient trying to throw punches at staff per nursing. Patient reportedly unwilling to take oral medications. Given the acutely agitated presentation and to maintain the safety of staff on unit, Code Green was activated and provider team ordered a IM olanzapine 5mg x1 for use.  1:1 bedside attendant added for safety.  Seroquel was added at bedtime (stopped trazodone).  Psychiatry consulted on 2/17 and changed seroquel to scheduled and PRN Haldol.  [place] most likely delirium though also with concerns for underlying vascular dementia.  UA negative on 2/18.  2 recurrent code 21 episodes on 2/19 due to paranoia/agitation and physical aggression.  Psychiatry reassessed and no changes to plan at that time.  However, on 2/21 had episode of self-injurious behavior (cutting wrist with plastic knife) which required physical restraints and use of PRN IM Haldol.  Scheduled Haldol doses were increased and psychiatry recommended vEEG, which showed moderate diffuse slowing but no epileptiform discharges or seizures (able to obtain 22 min).  Neurology re-consulted as well to consider any further work-up for etiology.  Recommended some labs (B12 and ammonia WNL, TSH elevated but free T4 normal), delirium precautions, ongoing psychology, but no further work-up at this time from their perspective.  Pharmacy reviewed medications, none currently felt to be contributing to increased risk of delirium  2/27: still some intermittent paranoia, especially around medications, and agitation particularly related  "to wanting to go home.  However, has not exhibited recurrent physical aggression or self-injurious behaviors.  Significant other has expressed concerns about safely managing at home due to impaired cognition.  Suspect that current presentation is delirium (due to unfamiliar environment and routine, as well as recent additional multifocal strokes) in setting of possible underlying vascular dementia, especially given significant other's reports of prior baseline (low cognitive demand, was providing significant supervision).  While IDT suspects patient may perform better in familiar home environment and routine, at this time would need to recommend initial 24/7 supervision.  Will plan for care conference to further discuss options with significant other (home with 24/7 vs LTC/memory care).  - Appreciate ongoing psychiatry assistance.  Appreciate psychiatry follow up on 2/26, no changes to plan of care per their note.  - Appreciate neuropsychology assistance.  See their note on 2/26 for additional details.  - Delirium precautions  - Recurrent code 21 on the evening of 3/1 and received 1 dose of Haldol 5 mg IM.  Patient was more somnolent on 3/2 and did not receive either dose of scheduled PO Haldol.  Remains overall fatigued.  This could still be related to IM dose given half-life of 20 hours.  Throughout ARU course, have also noted persistent waxing/waning levels of alertness which do not always clearly correlate with Haldol dose.  Recurrent code 21 on 3/4 during PM shift.  Minimal information in chart, but appears patient was agitated and physically \"swatting\" at staff.  Did not require IM Haldol.  Appears calm/drowsy this morning.  Continue to monitor.  - Ongoing psychiatry assistance appreciated.  As of 3/3, PO haldol decreased to 0.5 mg qAM and continue 1 mg qPM, with PRN IM Haldol decreased to 2 mg for agitation.  - Outpatient neuropsych testing recommended by psychiatry  - Morning of discharge 3/6 - Pt is calm, " cooperative, and aware of transfer to [facility]     Unresponsive episode x2 on 2/15  In early morning 2/15,  patient was noted by nursing staff to be weaker and non-responsive. Patient was found by nursing sitting at edge of bed on his way to the bathroom. Nursing assisted patient using gait belt and walker to the bathroom when it was noticed that patient was having weakness with standing and mobility. Patient was placed in wheelchair where he slumped to his side and became unresponsive. Patient was lifted back into bed where Code Blue was initially called but then was downgraded to RRT. ICU provider team recommended stroke code be called. Neurology evaluated patient at bedside. Vital signs showed /105 when resting in bed. CT imaging showed no hemorrhage or LVO. Lab work-up unremarkable. Patient was deemed not requiring transfer to acute care. Later in the morning, nursing staff stated that patient had similar episode of being disoriented and not being able to transfer from chair to bed.  Rehab medical team arrived at bedside to evaluate.  While in chair, patient was not responsive to questions.  Blood pressure noticeably lower than usual, 113/71.  Presentation was likely related to either new stroke or orthostatic hypotension.  Nursing staff to assisted with lifting patient back into bed.  Once patient was in bed and sitting up, patient became more responsive to questions.  Presentation likely related to positional orthostatic hypotension while sitting up in chair but MRI brain was ordered to rule out acute stroke.  MRI results showed no evidence of new infarcts.  Neurology evaluated results and confirmed no new strokes and signed off.  Felt possibly delirium vs hypotension vs stroke recrudescence.   - Continue to assess cognition and vital signs   - Low threshold to transfer to acute care if patient continues to demonstrate altered mental status through orthostatic hypotension  - Per neurology, consider  "EEG if persistent/recurrent episodes.  EEG completed on 2/21 per psychiatry, interpretation \"moderate diffuse slowing which is non-specific but suggestive of diffuse cerebral dysfunction. There were no epileptiform discharges or seizures\"      HTN   20-year hx orthostasis   Has had long standing issue with orthostatic hypotension even before diagnosis of HTN. Had a tilt table test through [facility] that showed severe orthostatic hypotension. Was followed by cardiology and has a loop recorder in place   Per his SO didn't tolerate abdominal binder and compression socks in the past; was also on midodrine for a while, but this was for overall hypotension and not specifically for orthostasis.  Cardiology consulted at ARU for assistance with management of OH in the setting of overall elevated BPs.  Recommended changing Lisinopril to 10 mg at bedtime, and continue with conservative measures as able.  Assistance appreciated.  - Continue lisinopril 10 mg at HS  - Continue slow position changes, PO intake abdominal binder, compression  - Given long history of OH, target BP goal may be higher than typical for a patient post-stroke     HLD    2/11  Documented intolerance to atorvastatin, rosuvastatin, simvastatin   - Continue lovastatin and zetia as above   - Outpatient referral to vascular medicine for consideration of PCSK9 inhibitor and further evaluation of hyperlipidemia      Type II diabetes mellitus  Polyneuropathy ?  Autonomic dysfunction   PTA on metformin 1000 mg BID and glipizide 10 mg BID. Was not checking his BG at home as his BG was well controlled but has a glucometer.  A1c 8.6% on 2/11/25.  Hyperglycemia during inpatient stay but has been lower at ARU with decreased intake in setting of altered mental status as above.  - Goal A1c <7.0   - Monitor BG TID AC + HS.    - Holding glipizide starting 2/20 due to hypoglycemia in setting of impaired intake.  Glucose levels improved since discontinuation.   - " Continue PTA metformin 1000 mg BID.  XR changed to IR formulation on 2/26 to allow option to crush medications when patient reluctant to swallow (has been observed to pocket or spit out, felt to be behavioral related to paranoia).  Has been complaining of more nausea since that time, so XR formula resumed (can take whole meds safely pending mental status).  - Hypoglycemia protocol     Bilateral hand muscle atrophy and paresthesia   Concerns for focal mononeuropathy vs cervical radiculopathy; doesn't seem to be explained by polyneuropathy only    - Neurology follow up as outpatient    - May need NCS/EMG for more evaluation      ADITI   Insomnia   Was not using CPAP prior to admission.  Diagnosed 2/2022 following witnessed apneic episodes and polysomnogram 12/2021. Patient reports stopping BIPAP therapy for his ADITI due to abdominal bloating. He reports a repeat polysomnogram after stopping BIPAP that showed minimal ADITI.   - Trazodone and seroquel discontinued as above  - Continue ramelteon 8 mg at HS (added 2/17)  - Sleep hygiene  - Follow up with sleep medicine as outpatient     Moderate malnutrition in the context of acute illness or injury  Poor oral intake  Nausea  At ARU admission, felt that patient did not appear to be having good oral hydration.  Stable electrolytes, did receive IVF bolus on 2/16 for hydration.  With increased paranoia and agitation since ARU admission (as above), PO intake has been limited.  - RD consulted, appreciate assistance  - Carb restriction removed from diet order on 2/20 to allow greater selections, will monitor BG  - Supplements per RD  - RD also sending more finger foods and pre-packaged foods for safety and to encourage more intake given paranoia and self-injurious behaviors  - Patient has intermittently complained of nausea, seems more often since 2/28, PRN Zofran added.  CBC and CMP 3/3 unremarkable.  Also could be related to changing metformin from XR formulation as above, will  resume prior to see if improved tolerance.     Coronavirus (not MERS-CoV or COVID-19)  Rhinorrhea, sneezing, cough  Noted by nursing staff on 3/3.   Respiratory panel positive for Coronavirus (not COVID).    - Treat symptomatically: Afrin for nasal congestion, robitussin PRN for cough.   - Per infection prevention, needs droplet precautions until symptom resolution     Chronic low back pain  Secondary to prior injury years ago. Follows at [facility] in [place].  - Scheduled Tylenol 975 mg TID  - Continue Robaxin 500 mg QID PRN (added 2/25)     Tobacco use  Per significant other, has significant tobacco use history and is concerned he is having cravings or withdrawal.  Nicotine patch was ordered to help if any cravings present, however he removed and tried to eat it on 2/20 so discontinued  - Consider PRN nicotine replacement such as gum or lozenge if appropriate       Code: DNR/DNI - confirmed upon admission   Follow up Appointments on Discharge: PCP in 1-2 weeks, cardiology and NIVIA, stroke neurology, vascular surgery, PM&R, sleep medicine       DISCHARGE MEDICATIONS  Current Discharge Medication List        START taking these medications    Details   alum & mag hydroxide-simethicone (MAALOX) 200-200-20 MG/5ML SUSP suspension Take 30 mLs by mouth every 4 hours as needed for indigestion.    Associated Diagnoses: Cerebrovascular accident (CVA) due to bilateral embolism of middle cerebral arteries (H)      calcium carbonate (TUMS) 500 MG chewable tablet Take 1 tablet (500 mg) by mouth 3 times daily as needed for heartburn.    Associated Diagnoses: Cerebrovascular accident (CVA) due to bilateral embolism of middle cerebral arteries (H); Hypertension, unspecified type      guaiFENesin (ROBITUSSIN) 20 mg/mL liquid Take 10 mLs (200 mg) by mouth every 4 hours as needed for cough.    Associated Diagnoses: Cerebrovascular accident (CVA) due to bilateral embolism of middle cerebral arteries (H)      haloperidol  (HALDOL) 0.5 MG tablet Take 1 tablet (0.5 mg) by mouth every morning AND 2 tablets (1 mg) at bedtime.    Associated Diagnoses: Cerebrovascular accident (CVA) due to bilateral embolism of middle cerebral arteries (H); Delirium      haloperidol (HALDOL) 2 MG/ML (HIGH CONC) solution Take 1 mL (2 mg) by mouth every 6 hours as needed for agitation.    Associated Diagnoses: Cerebrovascular accident (CVA) due to bilateral embolism of middle cerebral arteries (H); Delirium      metFORMIN (GLUCOPHAGE) 1000 MG tablet Take 1 tablet (1,000 mg) by mouth 2 times daily (with meals).    Associated Diagnoses: Cerebrovascular accident (CVA) due to bilateral embolism of middle cerebral arteries (H)      methocarbamol (ROBAXIN) 500 MG tablet Take 1 tablet (500 mg) by mouth 4 times daily as needed for muscle spasms or other (Pain).    Associated Diagnoses: Cerebrovascular accident (CVA) due to bilateral embolism of middle cerebral arteries (H); Chronic low back pain without sciatica, unspecified back pain laterality      potassium chloride (KLOR-CON) 20 MEQ packet Take 20 mEq by mouth 2 times daily (with meals).    Associated Diagnoses: Cerebrovascular accident (CVA) due to bilateral embolism of middle cerebral arteries (H); Hypokalemia      ramelteon (ROZEREM) 8 MG tablet Take 1 tablet (8 mg) by mouth at bedtime.    Associated Diagnoses: Cerebrovascular accident (CVA) due to bilateral embolism of middle cerebral arteries (H); Delirium      sennosides (SENOKOT) 8.6 MG tablet Take 1 tablet by mouth 2 times daily as needed for constipation.    Associated Diagnoses: Cerebrovascular accident (CVA) due to bilateral embolism of middle cerebral arteries (H)           CONTINUE these medications which have CHANGED    Details   acetaminophen (TYLENOL) 325 MG tablet Take 3 tablets (975 mg) by mouth 3 times daily.    Associated Diagnoses: Cerebrovascular accident (CVA) due to bilateral embolism of middle cerebral arteries (H); Chronic low back pain  without sciatica, unspecified back pain laterality      ezetimibe (ZETIA) 10 MG tablet Take 1 tablet (10 mg) by mouth daily (with dinner).    Associated Diagnoses: Cerebrovascular accident (CVA) due to bilateral embolism of middle cerebral arteries (H)      famotidine (PEPCID) 40 MG tablet Take 1 tablet (40 mg) by mouth 2 times daily (with meals).    Associated Diagnoses: Cerebrovascular accident (CVA) due to bilateral embolism of middle cerebral arteries (H); Gastroesophageal reflux disease with esophagitis, unspecified whether hemorrhage      lisinopril (ZESTRIL) 10 MG tablet Take 1 tablet (10 mg) by mouth every evening.    Associated Diagnoses: Cerebrovascular accident (CVA) due to bilateral embolism of middle cerebral arteries (H); Hypertension, unspecified type           CONTINUE these medications which have NOT CHANGED    Details   aspirin (ASA) 81 MG chewable tablet Take 1 tablet (81 mg) by mouth daily.    Associated Diagnoses: Personal history of transient cerebral ischemia      blood glucose monitoring (SOFTCLIX) lancets TEST BLOOD SUGAR ONCE DAILY      Blood Glucose Monitoring Suppl (ACCU-CHEK GUIDE) w/Device KIT TEST BLOOD SUGAR ONCE DAILY      BRILINTA 90 MG tablet Take 1 tablet (90 mg) by mouth 2 times daily.  Qty: 180 tablet, Refills: 0    Associated Diagnoses: Hemiparesis affecting dominant side as late effect of stroke (H); Stroke-like symptoms      hypromellose (ARTIFICIAL TEARS) 0.5 % SOLN ophthalmic solution Place 1 drop into both eyes 4 times daily as needed for dry eyes      [employer] BLOOD GLUCOSE TEST test strip Test 1 times per day.      lovastatin (MEVACOR) 40 MG tablet Take 1 tablet (40 mg) by mouth at bedtime  Qty: 90 tablet, Refills: 3    Associated Diagnoses: Left pontine cerebrovascular accident (H)      ondansetron (ZOFRAN ODT) 4 MG ODT tab Take 1 tablet (4 mg) by mouth every 6 hours as needed.    Associated Diagnoses: Personal history of transient cerebral ischemia      polyethylene  glycol (MIRALAX) 17 GM/Dose powder Take 17 g by mouth daily as needed for constipation.    Associated Diagnoses: Personal history of transient cerebral ischemia           STOP taking these medications       glipiZIDE (GLUCOTROL) 10 MG tablet Comments:   Reason for Stopping:         hydrALAZINE (APRESOLINE) 10 MG tablet Comments:   Reason for Stopping:         metFORMIN (GLUCOPHAGE XR) 500 MG 24 hr tablet Comments:   Reason for Stopping:         traZODone (DESYREL) 50 MG tablet Comments:   Reason for Stopping:                 DISCHARGE INSTRUCTIONS AND FOLLOW UP  Discharge Procedure Orders   General info for SNF   Order Comments: Length of Stay Estimate: Short Term Care: Estimated # of Days <30  Condition at Discharge: Stable  Level of care:skilled   Rehabilitation Potential: Fair  Admission H&P remains valid and up-to-date: Yes  Recent Chemotherapy: N/A  Use Nursing Home Standing Orders: Yes     Mantoux instructions   Order Comments: Give two-step Mantoux (PPD) Per Facility Policy Yes     Follow Up and recommended labs and tests   Order Comments: Follow up with Nursing home physician.  No follow up labs or test are needed.     Reason for your hospital stay   Order Comments: Stroke     Glucose monitor nursing POCT   Order Comments: BID     Activity - Up with nursing assistance     Order Specific Question Answer Comments   Is discharge order? Yes      Physical Therapy Adult Consult   Order Comments: Evaluate and treat as clinically indicated.    Reason:  CVA     Occupational Therapy Adult Consult   Order Comments: Evaluate and treat as clinically indicated.    Reason:  CVA     Speech Language Path Adult Consult   Order Comments: Evaluate and treat as clinically indicated.    Reason:  CVA     Droplet Isolation   Order Comments: May discontinue when respiratory symptoms resolved     Fall precautions     Diet   Order Comments: Follow this diet upon discharge: Regular Diet; Thin Liquids (level 0)     Order Specific  Question Answer Comments   Is discharge order? Yes           PHYSICAL EXAMINATION    Most recent Vital Signs:   Vitals:    03/05/25 0624 03/05/25 1742 03/05/25 1935 03/06/25 0903   BP: (!) 144/74 124/74 (!) 146/74 126/76   BP Location: Left arm Right arm  Right arm   Pulse: 77 83  85   Resp: 18 17  18   Temp: 98.5  F (36.9  C) 98.2  F (36.8  C)     TempSrc: Oral Oral     SpO2: 97% 97%  97%   Weight:       Height:           Gen: NAD, laying in bed  HEENT: NC/AT  CVS: RRR, no murmurs  Pulm: non-labored on room air, lungs CTA bilaterally  Abd: soft, non-distended, non-tender, bowel sounds present  Ext: no appreciable edema or calf tenderness in BLE  Neuro/MSK: Awake and alert, calm cooperative.  Oriented to self and city.  Slow to respond but is appropriate.  Dysarthric, moving all extremities actively in bed      35 minutes spent in discharge, including >50% in counseling and coordination of care, medication review and plan of care recommended on follow up.     Discharge summary was forwarded to [facility] - [facility] Romo (PCP) at the time of discharge, so as to bridge from hospital to outpatient care.     It was our pleasure to care for Chris Delcid during this hospitalization. Please do not hesitate to contact me should there be questions regarding the hospital course or discharge plan.      Gary Zavala MD  Department of Rehabilitation Medicine

## 2025-03-06 NOTE — PROGRESS NOTES
Physical Therapy Discharge Summary    Reason for therapy discharge:    Discharged to transitional care facility.    Progress towards therapy goal(s). See goals on Care Plan in [facility] electronic health record for goal details.  Goals partially met.  Barriers to achieving goals:   Pt had met mobility goals required for home, however did not discharge home due to behavior/aggression. Mobility goals not consistently met as performance fluctuated pending med requirements to manage agitation.    Therapy recommendation(s):    Continued therapy is recommended.  Rationale/Recommendations:  PT eval/tx at next level of care. Pt's mobility status fluctuated while on unit, and early on in his stay he was appreciably at his baseline mobility mod-I with FWW, however was on 1:1 supervision due to aggression and self-harm attempts. Due this behavior, his s/o did not feel safe with a discharge home with supervision and pt remained on the unit, however participation was very limited. As his course went on, pt developed acute illness and required sedation multiple times and now anticipate he is more deconditioned due to recent lack of activity. Goal is for pt to return home with mod-I mobility during the day, however anticipate will require initial supervision to ensure safe transition to familiar environment due to cognition.

## 2025-03-06 NOTE — CONSULTS
"      Psychiatry Consultation; Follow up   2/26/25         Reason for Consult, requesting source:    Reason for Consult: Delirium evaluation and treatment, self injurious behavior  Requesting source: Nick Zavala    Labs and imaging reviewed.             Interim history:      Pa had another code 21 the evening of 3/4. When asked about this today he says \"they were being assholes, telling me I was wrong\". Today he is sitting on his bed wearing his sunglasses, bag packed on his bed, ready to leave. Says he is going to [facility] and he is looking forward to it. Still feels like staff have been stealing his things, but says he feels safe. Reports he is not angry at all with his wife and is looking forward to seeing her. Denies any side effects to medications.         Current Medications:     Current Facility-Administered Medications   Medication Dose Route Frequency Provider Last Rate Last Admin    acetaminophen (TYLENOL) tablet 975 mg  975 mg Oral TID Nick Zavala MD   975 mg at 03/06/25 0609    aspirin (ASA) chewable tablet 81 mg  81 mg Oral Daily Nichelle Chavez PA   81 mg at 03/06/25 0856    atorvastatin (LIPITOR) tablet 10 mg  10 mg Oral Daily Nichelle Chavez PA   10 mg at 03/06/25 0856    ezetimibe (ZETIA) tablet 10 mg  10 mg Oral Daily with supper Nick Zavala MD   10 mg at 03/05/25 1820    famotidine (PEPCID) tablet 40 mg  40 mg Oral BID w/meals Nick Zavala MD   40 mg at 03/06/25 0855    [Held by provider] glipiZIDE (GLUCOTROL) half-tab 10 mg  10 mg Oral BID AC Nichelle Chavez PA   10 mg at 02/20/25 0735    haloperidol (HALDOL) tablet 0.5 mg  0.5 mg Oral QAM Shanta Mcmahon MD   0.5 mg at 03/06/25 0855    And    haloperidol (HALDOL) tablet 1 mg  1 mg Oral At Bedtime Shanta Mcmahon MD   1 mg at 03/05/25 1934    iopamidol (ISOVUE-370) solution 500 mL  500 mL Intravenous Once Dakotah Huggins APRN CNP        lisinopril (ZESTRIL) tablet 10 mg  10 " "mg Oral QPM Nick Zavala MD   10 mg at 03/05/25 1935    metFORMIN (GLUCOPHAGE) tablet 1,000 mg  1,000 mg Oral BID w/meals Tish Moe MD   1,000 mg at 03/06/25 0855    pantoprazole (PROTONIX) EC tablet 40 mg  40 mg Oral QAM AC Christie Cook MD   40 mg at 03/06/25 0855    potassium chloride (KLOR-CON) Packet 20 mEq  20 mEq Oral BID w/meals Lupe Chase PA-C   20 mEq at 03/05/25 0909    ramelteon (ROZEREM) tablet 8 mg  8 mg Oral At Bedtime Shanta Mcmahon MD   8 mg at 03/05/25 1935    sodium chloride 0.9 % bag for CT scan flush use  100 mL As instructed Once Dakotah Huggins APRN CNP        ticagrelor (BRILINTA) tablet 90 mg  90 mg Oral BID w/meals Nick Zavala MD   90 mg at 03/06/25 0856              MSE:   Appearance:  somnolent , dressed casually   Attitude:   somnolent, unable to participate  Eye Contact:   eyes closed  Mood:  \"ok\"  Affect:   stable, mood congruent  Speech:  dysarthria  Psychomotor Behavior:  no evidence of tardive dyskinesia, dystonia, or tics  Muscle strength and tone: not assessed  Thought Process:   unable to assess today    Associations:  no loose associations on prior exam  Thought Content:   unable to fully assess today, denies feeling that others are out to get him ,   Insight:  limited  Judgement:  limited  Oriented to:   not able to assess today  Attention Span and Concentration:  intact to short conversation  Recent and Remote Memory:  fair    Vital signs:  Temp: 98.2  F (36.8  C) Temp src: Oral BP: 126/76 Pulse: 85   Resp: 18 SpO2: 97 % O2 Device: None (Room air)   Height: 182.9 cm (6') Weight: 109 kg (240 lb 4.8 oz)  Estimated body mass index is 32.59 kg/m  as calculated from the following:    Height as of this encounter: 1.829 m (6').    Weight as of this encounter: 109 kg (240 lb 4.8 oz).           DSM-5 Diagnosis:   Delirium/encephalopathy, improving   C/f vascular dementia           Assessment:   Pa Delcid is a 59 year old man with " no past psychiatric history and medical history of HTN, orthostatic hypotension, HLD, T2DM, and prior stroke who was admitted 2/11 with worsening fatigue, weakness, syncope and was found to have multifocal acute subcortical infarcts. Was admitted to in rehab 2/14/25. Pt has been seen by neurology team this admission. Per their note, transient neurological episodes could be due to delirium, hypotension, vs elements of stroke recrudescence.     EEG done 2/21/25 showed moderate diffuse slowing, likely consistent with delirium. Likely component of vascular cognitive impairment contributing to presentation as well. Over the last week Pa had been more alert and paranoia is lessened. Had been having some intermittent agitation but overall more behaviorally appropriate, compliant with medications and treatment plan. However, over the weekend had a behavioral code called and received 5 mg haldol PRN. Today on exam is oversedated and unable to participate in interview. Plan to decrease morning dose of haldol to 0.5 mg and continue with 1 mg at bedtime. Plan to decrease haldol IM PRN to 2 mg for severe agitation. Will need to continue 1:1 for now. Will order EKG today to check Qtc.     3/6/25: Patient is discharging today. Because of upcoming transition (times when agitation are more common) and recent episodes of agitation leading to code 21s will leave Haldol as it is, it should be managed by his outpatient prescriber after this admission. Ideally will not be a long term med though it has been effective this admission.           Summary of Recommendations:   Legal: Does not have capacity to leave AMA  Safety: 1:1. Recommend verbal de-escalation, redirection, haldol 2mg PRN available  Medications: Continue morning dose Haldol 0.5 mg, continue bedtime dose 1 mg. Ideally can be decreased and stopped in outpatient setting.   Labs/orders:EKG ordered  Follow-Up: Recommend OP neuropsych testing.     Je Alford MD  Associate  Professor  Department of Psychiatry  Please Elias if questions

## 2025-03-08 ENCOUNTER — LAB REQUISITION (OUTPATIENT)
Dept: LAB | Facility: CLINIC | Age: 60
End: 2025-03-08
Payer: COMMERCIAL

## 2025-03-08 DIAGNOSIS — Z51.81 ENCOUNTER FOR THERAPEUTIC DRUG LEVEL MONITORING: ICD-10-CM

## 2025-03-08 DIAGNOSIS — E87.6 HYPOKALEMIA: ICD-10-CM

## 2025-03-10 LAB
ANION GAP SERPL CALCULATED.3IONS-SCNC: 14 MMOL/L (ref 7–15)
BUN SERPL-MCNC: 13.4 MG/DL (ref 8–23)
CALCIUM SERPL-MCNC: 9.4 MG/DL (ref 8.8–10.4)
CHLORIDE SERPL-SCNC: 105 MMOL/L (ref 98–107)
CREAT SERPL-MCNC: 0.84 MG/DL (ref 0.67–1.17)
EGFRCR SERPLBLD CKD-EPI 2021: >90 ML/MIN/1.73M2
GLUCOSE SERPL-MCNC: 160 MG/DL (ref 70–99)
HCO3 SERPL-SCNC: 21 MMOL/L (ref 22–29)
POTASSIUM SERPL-SCNC: 3.8 MMOL/L (ref 3.4–5.3)
SODIUM SERPL-SCNC: 140 MMOL/L (ref 135–145)

## 2025-03-10 PROCEDURE — P9604 ONE-WAY ALLOW PRORATED TRIP: HCPCS | Mod: ORL | Performed by: NURSE PRACTITIONER

## 2025-03-10 PROCEDURE — 80048 BASIC METABOLIC PNL TOTAL CA: CPT | Mod: ORL | Performed by: NURSE PRACTITIONER

## 2025-03-10 PROCEDURE — 36415 COLL VENOUS BLD VENIPUNCTURE: CPT | Mod: ORL | Performed by: NURSE PRACTITIONER

## 2025-04-01 ENCOUNTER — MEDICAL CORRESPONDENCE (OUTPATIENT)
Dept: HEALTH INFORMATION MANAGEMENT | Facility: CLINIC | Age: 60
End: 2025-04-01
Payer: COMMERCIAL

## 2025-04-02 ENCOUNTER — PATIENT OUTREACH (OUTPATIENT)
Dept: CARE COORDINATION | Facility: CLINIC | Age: 60
End: 2025-04-02
Payer: COMMERCIAL

## 2025-04-02 NOTE — PROGRESS NOTES
Clinic Care Coordination Contact  Care Coordination Clinician Chart Review    Situation: Patient chart reviewed by care coordinator.    Background: Clinic Care Coordination Referral received from inpatient care team for transition handoff communication following hospital admission.    Assessment: Upon chart review, patient is not a candidate for Primary Care Clinic Care Coordination enrollment due to reason stated below:  Patient enrolled into hospice.    Plan/Recommendations: Clinic Care Coordination Referral/order cancelled. RN/SW CC will perform no further monitoring/outreaches at this time and will remain available as needed. If new needs arise, a new Care Coordination Referral may be placed.    Ericka Hunter RN Care Coordination   [facility]  Email: [email or web address]  Phone: [phone]

## 2025-04-04 ENCOUNTER — TELEPHONE (OUTPATIENT)
Dept: FAMILY MEDICINE | Facility: CLINIC | Age: 60
End: 2025-04-04
Payer: COMMERCIAL

## 2025-04-04 NOTE — TELEPHONE ENCOUNTER
Forms/Letter Request    Orders to be signed by the provider  Type of form/letter: A Home Care Certification and Plan of Care orders  Orders were received via fax at [facility]  Dates of coverage: [facility] Home Care Certification 4/1/2025 - 6/29/2025  Have you been seen for this request: N/A  Do we have the form/letter: Yes: The orders were received from:   When is form/letter needed by:   How would you like the form/letter returned: Fax to the fax number on the forms  Patient Notified form requests are processed in 3-5 business days:N/A  Okay to leave a detailed message?: N/A  The orders were received from:   [facility]  Phone: [phone] Fax: [phone]

## 2025-04-08 NOTE — TELEPHONE ENCOUNTER
Confirmed faxed back signed copy of the Home Care Certification. A copy has been sent to be added to the chart. Ryanne Boyce,

## 2025-04-16 ENCOUNTER — MEDICAL CORRESPONDENCE (OUTPATIENT)
Dept: HEALTH INFORMATION MANAGEMENT | Facility: CLINIC | Age: 60
End: 2025-04-16
Payer: COMMERCIAL